# Patient Record
Sex: FEMALE | Race: WHITE | NOT HISPANIC OR LATINO | Employment: OTHER | URBAN - NONMETROPOLITAN AREA
[De-identification: names, ages, dates, MRNs, and addresses within clinical notes are randomized per-mention and may not be internally consistent; named-entity substitution may affect disease eponyms.]

---

## 2017-01-09 DIAGNOSIS — G89.29 CHRONIC LOW BACK PAIN: ICD-10-CM

## 2017-01-09 DIAGNOSIS — M54.50 CHRONIC LOW BACK PAIN: ICD-10-CM

## 2017-01-09 RX ORDER — CETIRIZINE HYDROCHLORIDE 10 MG/1
TABLET ORAL
Qty: 30 TABLET | Refills: 0 | Status: SHIPPED | OUTPATIENT
Start: 2017-01-09 | End: 2017-02-17 | Stop reason: SDUPTHER

## 2017-01-09 RX ORDER — HYDROCHLOROTHIAZIDE 25 MG/1
TABLET ORAL
Qty: 30 TABLET | Refills: 0 | Status: SHIPPED | OUTPATIENT
Start: 2017-01-09 | End: 2017-02-17 | Stop reason: SDUPTHER

## 2017-01-09 RX ORDER — GABAPENTIN 400 MG/1
CAPSULE ORAL
Qty: 180 CAPSULE | Refills: 0 | Status: SHIPPED | OUTPATIENT
Start: 2017-01-09 | End: 2017-02-17 | Stop reason: SDUPTHER

## 2017-01-09 RX ORDER — FLUOXETINE HYDROCHLORIDE 20 MG/1
CAPSULE ORAL
Qty: 90 CAPSULE | Refills: 0 | Status: SHIPPED | OUTPATIENT
Start: 2017-01-09 | End: 2017-02-17 | Stop reason: SDUPTHER

## 2017-01-09 RX ORDER — MELOXICAM 7.5 MG/1
TABLET ORAL
Qty: 30 TABLET | Refills: 0 | Status: SHIPPED | OUTPATIENT
Start: 2017-01-09 | End: 2017-02-17 | Stop reason: SDUPTHER

## 2017-02-17 DIAGNOSIS — J18.9 CAP (COMMUNITY ACQUIRED PNEUMONIA): ICD-10-CM

## 2017-02-17 DIAGNOSIS — G89.29 CHRONIC LOW BACK PAIN: ICD-10-CM

## 2017-02-17 DIAGNOSIS — M54.50 CHRONIC LOW BACK PAIN: ICD-10-CM

## 2017-02-20 RX ORDER — GABAPENTIN 400 MG/1
CAPSULE ORAL
Qty: 180 CAPSULE | Refills: 0 | Status: SHIPPED | OUTPATIENT
Start: 2017-02-20 | End: 2017-04-13 | Stop reason: SDUPTHER

## 2017-02-20 RX ORDER — CETIRIZINE HYDROCHLORIDE 10 MG/1
TABLET ORAL
Qty: 30 TABLET | Refills: 0 | Status: SHIPPED | OUTPATIENT
Start: 2017-02-20 | End: 2017-04-13 | Stop reason: SDUPTHER

## 2017-02-20 RX ORDER — FLUOXETINE HYDROCHLORIDE 20 MG/1
CAPSULE ORAL
Qty: 90 CAPSULE | Refills: 0 | Status: SHIPPED | OUTPATIENT
Start: 2017-02-20 | End: 2017-04-13 | Stop reason: SDUPTHER

## 2017-02-20 RX ORDER — HYDROCHLOROTHIAZIDE 25 MG/1
TABLET ORAL
Qty: 30 TABLET | Refills: 0 | Status: SHIPPED | OUTPATIENT
Start: 2017-02-20 | End: 2017-04-13 | Stop reason: SDUPTHER

## 2017-02-20 RX ORDER — MELOXICAM 7.5 MG/1
TABLET ORAL
Qty: 30 TABLET | Refills: 0 | Status: SHIPPED | OUTPATIENT
Start: 2017-02-20 | End: 2017-04-13 | Stop reason: SDUPTHER

## 2017-03-07 ENCOUNTER — OFFICE VISIT (OUTPATIENT)
Dept: INTERNAL MEDICINE | Facility: CLINIC | Age: 72
End: 2017-03-07

## 2017-03-07 VITALS
SYSTOLIC BLOOD PRESSURE: 110 MMHG | DIASTOLIC BLOOD PRESSURE: 70 MMHG | TEMPERATURE: 97.6 F | HEIGHT: 62 IN | OXYGEN SATURATION: 96 % | WEIGHT: 206.4 LBS | HEART RATE: 68 BPM | BODY MASS INDEX: 37.98 KG/M2

## 2017-03-07 DIAGNOSIS — G89.29 BILATERAL CHRONIC KNEE PAIN: Chronic | ICD-10-CM

## 2017-03-07 DIAGNOSIS — M25.561 BILATERAL CHRONIC KNEE PAIN: Chronic | ICD-10-CM

## 2017-03-07 DIAGNOSIS — E78.5 HYPERLIPIDEMIA, UNSPECIFIED HYPERLIPIDEMIA TYPE: Primary | ICD-10-CM

## 2017-03-07 DIAGNOSIS — I10 BENIGN ESSENTIAL HYPERTENSION: ICD-10-CM

## 2017-03-07 DIAGNOSIS — Z11.59 NEED FOR HEPATITIS C SCREENING TEST: ICD-10-CM

## 2017-03-07 DIAGNOSIS — M25.562 BILATERAL CHRONIC KNEE PAIN: Chronic | ICD-10-CM

## 2017-03-07 LAB
ALBUMIN SERPL-MCNC: 4.2 G/DL (ref 3.2–4.8)
ALBUMIN/GLOB SERPL: 1.6 G/DL (ref 1.5–2.5)
ALP SERPL-CCNC: 63 U/L (ref 25–100)
ALT SERPL W P-5'-P-CCNC: 16 U/L (ref 7–40)
ANION GAP SERPL CALCULATED.3IONS-SCNC: 13 MMOL/L (ref 3–11)
ARTICHOKE IGE QN: 156 MG/DL (ref 0–130)
AST SERPL-CCNC: 18 U/L (ref 0–33)
BILIRUB SERPL-MCNC: 0.4 MG/DL (ref 0.3–1.2)
BUN BLD-MCNC: 39 MG/DL (ref 9–23)
BUN/CREAT SERPL: 39 (ref 7–25)
CALCIUM SPEC-SCNC: 9.9 MG/DL (ref 8.7–10.4)
CHLORIDE SERPL-SCNC: 101 MMOL/L (ref 99–109)
CHOLEST SERPL-MCNC: 219 MG/DL (ref 0–200)
CO2 SERPL-SCNC: 22 MMOL/L (ref 20–31)
CREAT BLD-MCNC: 1 MG/DL (ref 0.6–1.3)
GFR SERPL CREATININE-BSD FRML MDRD: 55 ML/MIN/1.73
GLOBULIN UR ELPH-MCNC: 2.6 GM/DL
GLUCOSE BLD-MCNC: 103 MG/DL (ref 70–100)
HCV AB SER DONR QL: NORMAL
HDLC SERPL-MCNC: 39 MG/DL (ref 40–60)
POTASSIUM BLD-SCNC: 5.4 MMOL/L (ref 3.5–5.5)
PROT SERPL-MCNC: 6.8 G/DL (ref 5.7–8.2)
SODIUM BLD-SCNC: 136 MMOL/L (ref 132–146)
TRIGL SERPL-MCNC: 201 MG/DL (ref 0–150)

## 2017-03-07 PROCEDURE — 80053 COMPREHEN METABOLIC PANEL: CPT | Performed by: FAMILY MEDICINE

## 2017-03-07 PROCEDURE — 36415 COLL VENOUS BLD VENIPUNCTURE: CPT | Performed by: FAMILY MEDICINE

## 2017-03-07 PROCEDURE — 86803 HEPATITIS C AB TEST: CPT | Performed by: FAMILY MEDICINE

## 2017-03-07 PROCEDURE — 99214 OFFICE O/P EST MOD 30 MIN: CPT | Performed by: FAMILY MEDICINE

## 2017-03-07 PROCEDURE — 80061 LIPID PANEL: CPT | Performed by: FAMILY MEDICINE

## 2017-03-07 RX ORDER — INFLUENZA A VIRUS A/MICHIGAN/45/2015 X-275 (H1N1) ANTIGEN (FORMALDEHYDE INACTIVATED), INFLUENZA A VIRUS A/SINGAPORE/INFIMH-16-0019/2016 IVR-186 (H3N2) ANTIGEN (FORMALDEHYDE INACTIVATED), AND INFLUENZA B VIRUS B/MARYLAND/15/2016 BX-69A (A B/COLORADO/6/2017-LIKE VIRUS) ANTIGEN (FORMALDEHYDE INACTIVATED) 60; 60; 60 UG/.5ML; UG/.5ML; UG/.5ML
INJECTION, SUSPENSION INTRAMUSCULAR
Refills: 0 | COMMUNITY
Start: 2016-12-09 | End: 2017-09-10

## 2017-03-07 RX ORDER — PNEUMOCOCCAL VACCINE POLYVALENT 25; 25; 25; 25; 25; 25; 25; 25; 25; 25; 25; 25; 25; 25; 25; 25; 25; 25; 25; 25; 25; 25; 25 UG/.5ML; UG/.5ML; UG/.5ML; UG/.5ML; UG/.5ML; UG/.5ML; UG/.5ML; UG/.5ML; UG/.5ML; UG/.5ML; UG/.5ML; UG/.5ML; UG/.5ML; UG/.5ML; UG/.5ML; UG/.5ML; UG/.5ML; UG/.5ML; UG/.5ML; UG/.5ML; UG/.5ML; UG/.5ML; UG/.5ML
INJECTION, SOLUTION INTRAMUSCULAR; SUBCUTANEOUS
Refills: 0 | COMMUNITY
Start: 2016-12-09 | End: 2017-09-10

## 2017-03-08 NOTE — PROGRESS NOTES
Subjective   Lata Justice is a 71 y.o. female.     History of Present Illness   Patient here to follow up on chronic issues. She continues to have widespread joint pain. At her last visit, I evaluated different joints and completed paperwork for braces for various areas. This was sent to the mail order facility that had contacted her regarding braces and she's not heard any thing back from them since that time. Same for . She is not sure if her insurance was billed. She is having pain in knees, wrists, ankles. Continues to have pain in lower back as well. Taking Mobic and sees orthopedics.    Essential hypertension, which has been hard to control in the past, is controlled today with HCTZ, metoprolol.    Hyperlipidemia was controlled last check (2015) but is due to be rechecked. She was taking pravastatin for this . Obese. Exercise limited due to joint issues and COPD.     The following portions of the patient's history were reviewed and updated as appropriate: allergies, current medications, past family history, past medical history, past social history, past surgical history and problem list.    Review of Systems   Constitutional: Negative for activity change.   Respiratory: Negative for shortness of breath.    Cardiovascular: Negative for chest pain.   Musculoskeletal: Positive for arthralgias, back pain and joint swelling.       Objective   Physical Exam   Constitutional: She is oriented to person, place, and time. Vital signs are normal. She appears well-developed and well-nourished. She is active. She does not have a sickly appearance. She does not appear ill.   Appears stated age. Well groomed. Obese     HENT:   Head: Normocephalic and atraumatic.   Right Ear: Hearing normal.   Left Ear: Hearing normal.   Nose: Nose normal.   Eyes: EOM and lids are normal. Pupils are equal, round, and reactive to light.   Wearing glasses     Cardiovascular: Normal rate, regular rhythm and normal heart sounds.     Pulmonary/Chest: Effort normal and breath sounds normal.   Neurological: She is alert and oriented to person, place, and time. She displays no tremor. No cranial nerve deficit.   Skin: She is not diaphoretic. No cyanosis. Nails show no clubbing.   Psychiatric: She has a normal mood and affect. Her speech is normal and behavior is normal. Judgment and thought content normal. Cognition and memory are normal.   Happy,smiling.   Nursing note and vitals reviewed.      Assessment/Plan   Lata was seen today for hypertension.    Diagnoses and all orders for this visit:    Hyperlipidemia, unspecified hyperlipidemia type  -     Lipid Panel; Future  -     Comprehensive Metabolic Panel; Future  -     Lipid Panel  -     Comprehensive Metabolic Panel    Benign essential hypertension    Need for hepatitis C screening test  -     Hepatitis C Antibody; Future  -     Hepatitis C Antibody    Bilateral chronic knee pain    call for refills if needed  Will have CMA investigate brace situation  Follow up in 3 months on blood pressure

## 2017-03-09 ENCOUNTER — TELEPHONE (OUTPATIENT)
Dept: INTERNAL MEDICINE | Facility: CLINIC | Age: 72
End: 2017-03-09

## 2017-03-09 NOTE — TELEPHONE ENCOUNTER
----- Message from Annetta Mcclendon MD sent at 3/8/2017  5:26 AM EST -----  Regarding: braces  After their last visit, where we did evaluations for braces as requested, they never heard anything back from the company. Can you check on this?

## 2017-03-09 NOTE — TELEPHONE ENCOUNTER
"I CALLED [facility] TO GET A STATUS ON KNEE BRACES AND HAND/WRIST SUPPORTS. THEY TOLD ME THAT THEY ARE NEEDING 6 MONTHS OF OFFICE NOTES FAXED TO THEM IN REGARDS TO THE NEED FOR THESE SUPPORTS. I ADVISED THEM THAT THIS WILL MAKE THE 3RD TIME THAT I HAVE FAXED THE REQUESTED PAPERWORK TO THEM.     I CALLED [facility] AND ASK HER IF WE COULD GO [facility]OUGH [facility]. SHE SAID \"YES\". I TOLD HER THAT I WOULD FAX THE ORDERS AND PROPER PAPERWORK TO [facility] IN THE MORNING AND I WAS SURE THE OUTCOME WOULD BE MUCH BETTER.    I WILL NEED A SCRIPT FOR BILATERAL HAND/WRIST SUPPORT, WE DO NOT HAVE A FORM FOR THOSE.     THANK YOU!  "

## 2017-03-15 DIAGNOSIS — G89.29 BILATERAL CHRONIC KNEE PAIN: Primary | Chronic | ICD-10-CM

## 2017-03-15 DIAGNOSIS — M25.561 BILATERAL CHRONIC KNEE PAIN: Primary | Chronic | ICD-10-CM

## 2017-03-15 DIAGNOSIS — M25.562 BILATERAL CHRONIC KNEE PAIN: Primary | Chronic | ICD-10-CM

## 2017-03-22 ENCOUNTER — TELEPHONE (OUTPATIENT)
Dept: INTERNAL MEDICINE | Facility: CLINIC | Age: 72
End: 2017-03-22

## 2017-04-13 DIAGNOSIS — G89.29 CHRONIC LOW BACK PAIN: ICD-10-CM

## 2017-04-13 DIAGNOSIS — J18.9 CAP (COMMUNITY ACQUIRED PNEUMONIA): ICD-10-CM

## 2017-04-13 DIAGNOSIS — M54.50 CHRONIC LOW BACK PAIN: ICD-10-CM

## 2017-04-14 RX ORDER — MELOXICAM 7.5 MG/1
TABLET ORAL
Qty: 30 TABLET | Refills: 0 | Status: SHIPPED | OUTPATIENT
Start: 2017-04-14 | End: 2017-05-30 | Stop reason: SDUPTHER

## 2017-04-14 RX ORDER — HYDROCHLOROTHIAZIDE 25 MG/1
TABLET ORAL
Qty: 30 TABLET | Refills: 0 | Status: SHIPPED | OUTPATIENT
Start: 2017-04-14 | End: 2017-05-30 | Stop reason: SDUPTHER

## 2017-04-14 RX ORDER — GABAPENTIN 400 MG/1
CAPSULE ORAL
Qty: 180 CAPSULE | Refills: 0 | Status: SHIPPED | OUTPATIENT
Start: 2017-04-14 | End: 2017-05-30 | Stop reason: SDUPTHER

## 2017-04-14 RX ORDER — CETIRIZINE HYDROCHLORIDE 10 MG/1
TABLET ORAL
Qty: 30 TABLET | Refills: 0 | Status: SHIPPED | OUTPATIENT
Start: 2017-04-14 | End: 2017-05-30 | Stop reason: SDUPTHER

## 2017-04-14 RX ORDER — FLUOXETINE HYDROCHLORIDE 20 MG/1
CAPSULE ORAL
Qty: 90 CAPSULE | Refills: 0 | Status: SHIPPED | OUTPATIENT
Start: 2017-04-14 | End: 2017-05-30 | Stop reason: SDUPTHER

## 2017-05-30 ENCOUNTER — TELEPHONE (OUTPATIENT)
Dept: INTERNAL MEDICINE | Facility: CLINIC | Age: 72
End: 2017-05-30

## 2017-05-30 DIAGNOSIS — G89.29 CHRONIC LOW BACK PAIN: ICD-10-CM

## 2017-05-30 DIAGNOSIS — M54.50 CHRONIC LOW BACK PAIN: ICD-10-CM

## 2017-05-30 RX ORDER — HYDROCHLOROTHIAZIDE 25 MG/1
TABLET ORAL
Qty: 30 TABLET | Refills: 0 | Status: SHIPPED | OUTPATIENT
Start: 2017-05-30 | End: 2017-06-22 | Stop reason: SDUPTHER

## 2017-05-30 RX ORDER — MELOXICAM 7.5 MG/1
TABLET ORAL
Qty: 30 TABLET | Refills: 0 | Status: SHIPPED | OUTPATIENT
Start: 2017-05-30 | End: 2017-06-22 | Stop reason: SDUPTHER

## 2017-05-30 RX ORDER — GABAPENTIN 400 MG/1
800 CAPSULE ORAL 3 TIMES DAILY
Qty: 90 CAPSULE | Refills: 0 | Status: SHIPPED | OUTPATIENT
Start: 2017-05-30 | End: 2017-06-22 | Stop reason: SDUPTHER

## 2017-05-30 RX ORDER — GABAPENTIN 400 MG/1
CAPSULE ORAL
Qty: 90 CAPSULE | Refills: 0 | Status: SHIPPED | OUTPATIENT
Start: 2017-05-30 | End: 2017-05-30 | Stop reason: SDUPTHER

## 2017-05-30 RX ORDER — CETIRIZINE HYDROCHLORIDE 10 MG/1
TABLET ORAL
Qty: 30 TABLET | Refills: 0 | Status: SHIPPED | OUTPATIENT
Start: 2017-05-30 | End: 2017-06-22 | Stop reason: SDUPTHER

## 2017-05-30 RX ORDER — FLUOXETINE HYDROCHLORIDE 20 MG/1
CAPSULE ORAL
Qty: 90 CAPSULE | Refills: 0 | Status: SHIPPED | OUTPATIENT
Start: 2017-05-30 | End: 2017-06-22 | Stop reason: SDUPTHER

## 2017-06-08 ENCOUNTER — OFFICE VISIT (OUTPATIENT)
Dept: INTERNAL MEDICINE | Facility: CLINIC | Age: 72
End: 2017-06-08

## 2017-06-08 VITALS
BODY MASS INDEX: 37.58 KG/M2 | DIASTOLIC BLOOD PRESSURE: 80 MMHG | OXYGEN SATURATION: 96 % | WEIGHT: 204.2 LBS | SYSTOLIC BLOOD PRESSURE: 120 MMHG | HEIGHT: 62 IN | HEART RATE: 62 BPM | TEMPERATURE: 97.2 F

## 2017-06-08 DIAGNOSIS — I10 BENIGN ESSENTIAL HYPERTENSION: ICD-10-CM

## 2017-06-08 DIAGNOSIS — N18.30 CHRONIC RENAL INSUFFICIENCY, STAGE III (MODERATE) (HCC): ICD-10-CM

## 2017-06-08 DIAGNOSIS — E78.5 HYPERLIPIDEMIA, UNSPECIFIED HYPERLIPIDEMIA TYPE: Primary | ICD-10-CM

## 2017-06-08 DIAGNOSIS — R63.4 WEIGHT LOSS, UNINTENTIONAL: ICD-10-CM

## 2017-06-08 LAB
BUN SERPL-MCNC: 28 MG/DL (ref 7–20)
BUN/CREAT SERPL: 35 (ref 7.1–23.5)
CALCIUM SERPL-MCNC: 9.4 MG/DL (ref 8.4–10.2)
CHLORIDE SERPL-SCNC: 103 MMOL/L (ref 98–107)
CHOLEST SERPL-MCNC: 167 MG/DL (ref 0–199)
CO2 SERPL-SCNC: 30 MMOL/L (ref 26–30)
CREAT SERPL-MCNC: 0.8 MG/DL (ref 0.6–1.3)
GLUCOSE SERPL-MCNC: 114 MG/DL (ref 74–98)
HDLC SERPL-MCNC: 40 MG/DL (ref 40–60)
LDLC SERPL CALC-MCNC: 101 MG/DL (ref 0–99)
POTASSIUM SERPL-SCNC: 4.3 MMOL/L (ref 3.5–5.1)
SODIUM SERPL-SCNC: 144 MMOL/L (ref 137–145)
TRIGL SERPL-MCNC: 130 MG/DL
VLDLC SERPL CALC-MCNC: 26 MG/DL

## 2017-06-08 PROCEDURE — 99214 OFFICE O/P EST MOD 30 MIN: CPT | Performed by: FAMILY MEDICINE

## 2017-06-09 NOTE — PROGRESS NOTES
Subjective   Lata Justice is a 71 y.o. female.     Hyperlipidemia   This is a chronic problem. The current episode started more than 1 year ago. The problem is uncontrolled. Recent lipid tests were reviewed and are high. Exacerbating diseases include chronic renal disease (Last GFR supports CKD 3.) and obesity. She has no history of diabetes (Past history, no longer on medicine.). Factors aggravating her hyperlipidemia include beta blockers, thiazides and fatty foods. Pertinent negatives include no chest pain or shortness of breath. Current antihyperlipidemic treatment includes statins (Has failed crestor and lipitor due to myalgias. No problems thus far with pravastatin 40 mg.). Compliance problems include adherence to diet and adherence to exercise.  Risk factors for coronary artery disease include dyslipidemia, obesity, post-menopausal, a sedentary lifestyle, stress and hypertension.   Hypertension   This is a chronic problem. The current episode started more than 1 year ago. The problem has been waxing and waning since onset. The problem is controlled. Pertinent negatives include no chest pain or shortness of breath. Agents associated with hypertension include NSAIDs. Risk factors for coronary artery disease include dyslipidemia, obesity, post-menopausal state, stress and sedentary lifestyle. Past treatments include diuretics and beta blockers. The current treatment provides significant improvement. Compliance problems include exercise.  Identifiable causes of hypertension include chronic renal disease (Last GFR supports CKD 3.) and a hypertension causing med.        The following portions of the patient's history were reviewed and updated as appropriate: allergies, current medications, past family history, past medical history, past social history, past surgical history and problem list.    Review of Systems   Constitutional: Positive for unexpected weight change. Negative for activity change.   Respiratory:  Negative for shortness of breath.    Cardiovascular: Negative for chest pain.     Wt Readings from Last 3 Encounters:   06/08/17 204 lb 3.2 oz (92.6 kg)   03/07/17 206 lb 6.4 oz (93.6 kg)   12/07/16 211 lb 9.6 oz (96 kg)         Objective   Physical Exam   Constitutional: She is oriented to person, place, and time. Vital signs are normal. She appears well-developed and well-nourished. She is active. She does not have a sickly appearance. She does not appear ill.   Appears stated age. Well groomed. Obese   HENT:   Head: Normocephalic and atraumatic.   Right Ear: Hearing normal.   Left Ear: Hearing normal.   Nose: Nose normal.   Eyes: EOM and lids are normal. Pupils are equal, round, and reactive to light. No scleral icterus.   Wearing glasses   Cardiovascular: Normal rate, regular rhythm and normal heart sounds.    Pulmonary/Chest: Effort normal and breath sounds normal.   Abdominal: Soft. Bowel sounds are normal. There is no tenderness.   Musculoskeletal: She exhibits no deformity.   Using her walker today to help with ambulation   Neurological: She is alert and oriented to person, place, and time. She displays no tremor. No cranial nerve deficit.   Skin: She is not diaphoretic. No cyanosis. Nails show no clubbing.   Psychiatric: She has a normal mood and affect. Her speech is normal and behavior is normal. Judgment and thought content normal. Cognition and memory are normal.   Happy,smiling.   Nursing note and vitals reviewed.    3/7/17  Total Cholesterol 0 - 200 mg/dL 219 (H)   Triglycerides 0 - 150 mg/dL 201 (H)   HDL Cholesterol 40 - 60 mg/dL 39 (L)   LDL Cholesterol  0 - 130 mg/dL 156 (H)     Patient on pravastatin 40 at this time.  Glucose 70 - 100 mg/dL 103 (H)   BUN 9 - 23 mg/dL 39 (H)   Creatinine 0.60 - 1.30 mg/dL 1.00   Sodium 132 - 146 mmol/L 136   Potassium 3.5 - 5.5 mmol/L 5.4   Chloride 99 - 109 mmol/L 101   CO2 20.0 - 31.0 mmol/L 22.0   Calcium 8.7 - 10.4 mg/dL 9.9   Total Protein 5.7 - 8.2 g/dL 6.8    Albumin 3.20 - 4.80 g/dL 4.20   ALT (SGPT) 7 - 40 U/L 16   AST (SGOT) 0 - 33 U/L 18   Alkaline Phosphatase 25 - 100 U/L 63   Total Bilirubin 0.3 - 1.2 mg/dL 0.4   eGFR Non African Amer >60 mL/min/1.73 55 (L)   Globulin gm/dL 2.6   A/G Ratio 1.5 - 2.5 g/dL 1.6   BUN/Creatinine Ratio 7.0 - 25.0 39.0 (H)   Anion Gap 3.0 - 11.0 mmol/L 13.0 (H)           Assessment/Plan   Lata was seen today for hyperlipidemia.    Diagnoses and all orders for this visit:    Hyperlipidemia, unspecified hyperlipidemia type  -     Lipid Panel    Benign essential hypertension    Chronic renal insufficiency, stage III (moderate)  -     Basic Metabolic Panel    Weight loss, unintentional      Reviewed Synopsis, weight fluctuates, there appears to be a pattern to it and I will watch this carefully. Rechecking lipids, if confirmed abnormal will increase pravastatin to full dose of 80 mg daily. If she develops myalgias with this can cut back to 40 mg.

## 2017-06-22 DIAGNOSIS — M54.50 CHRONIC LOW BACK PAIN: ICD-10-CM

## 2017-06-22 DIAGNOSIS — G89.29 CHRONIC LOW BACK PAIN: ICD-10-CM

## 2017-06-22 RX ORDER — GABAPENTIN 400 MG/1
CAPSULE ORAL
Qty: 90 CAPSULE | Refills: 0 | Status: SHIPPED | OUTPATIENT
Start: 2017-06-22 | End: 2017-07-24 | Stop reason: SDUPTHER

## 2017-06-22 RX ORDER — FLUOXETINE HYDROCHLORIDE 20 MG/1
CAPSULE ORAL
Qty: 90 CAPSULE | Refills: 0 | Status: SHIPPED | OUTPATIENT
Start: 2017-06-22 | End: 2017-07-17 | Stop reason: SDUPTHER

## 2017-06-22 RX ORDER — MELOXICAM 7.5 MG/1
TABLET ORAL
Qty: 30 TABLET | Refills: 0 | Status: SHIPPED | OUTPATIENT
Start: 2017-06-22 | End: 2017-07-17 | Stop reason: SDUPTHER

## 2017-06-22 RX ORDER — HYDROCHLOROTHIAZIDE 25 MG/1
TABLET ORAL
Qty: 30 TABLET | Refills: 0 | Status: SHIPPED | OUTPATIENT
Start: 2017-06-22 | End: 2017-07-17 | Stop reason: SDUPTHER

## 2017-06-22 RX ORDER — CETIRIZINE HYDROCHLORIDE 10 MG/1
TABLET ORAL
Qty: 30 TABLET | Refills: 0 | Status: SHIPPED | OUTPATIENT
Start: 2017-06-22 | End: 2017-07-17 | Stop reason: SDUPTHER

## 2017-06-22 RX ORDER — ASPIRIN 81 MG/1
TABLET ORAL
Qty: 30 TABLET | Refills: 0 | Status: SHIPPED | OUTPATIENT
Start: 2017-06-22 | End: 2017-07-17 | Stop reason: SDUPTHER

## 2017-07-17 DIAGNOSIS — M54.50 CHRONIC LOW BACK PAIN: ICD-10-CM

## 2017-07-17 DIAGNOSIS — G89.29 CHRONIC LOW BACK PAIN: ICD-10-CM

## 2017-07-17 DIAGNOSIS — J18.9 CAP (COMMUNITY ACQUIRED PNEUMONIA): ICD-10-CM

## 2017-07-18 RX ORDER — ASPIRIN 81 MG/1
TABLET ORAL
Qty: 30 TABLET | Refills: 0 | Status: SHIPPED | OUTPATIENT
Start: 2017-07-18 | End: 2017-09-01 | Stop reason: SDUPTHER

## 2017-07-18 RX ORDER — MELOXICAM 7.5 MG/1
TABLET ORAL
Qty: 30 TABLET | Refills: 0 | Status: SHIPPED | OUTPATIENT
Start: 2017-07-18 | End: 2017-09-01 | Stop reason: SDUPTHER

## 2017-07-18 RX ORDER — FLUOXETINE HYDROCHLORIDE 20 MG/1
CAPSULE ORAL
Qty: 90 CAPSULE | Refills: 0 | Status: SHIPPED | OUTPATIENT
Start: 2017-07-18 | End: 2017-09-01 | Stop reason: SDUPTHER

## 2017-07-18 RX ORDER — HYDROCHLOROTHIAZIDE 25 MG/1
TABLET ORAL
Qty: 30 TABLET | Refills: 0 | Status: SHIPPED | OUTPATIENT
Start: 2017-07-18 | End: 2017-09-01 | Stop reason: SDUPTHER

## 2017-07-18 RX ORDER — CETIRIZINE HYDROCHLORIDE 10 MG/1
TABLET ORAL
Qty: 30 TABLET | Refills: 0 | Status: SHIPPED | OUTPATIENT
Start: 2017-07-18 | End: 2017-09-01 | Stop reason: SDUPTHER

## 2017-07-24 RX ORDER — GABAPENTIN 400 MG/1
800 CAPSULE ORAL 3 TIMES DAILY
Qty: 180 CAPSULE | Refills: 0 | OUTPATIENT
Start: 2017-07-24 | End: 2017-10-12 | Stop reason: SDUPTHER

## 2017-09-01 DIAGNOSIS — G89.29 CHRONIC LOW BACK PAIN: ICD-10-CM

## 2017-09-01 DIAGNOSIS — M54.50 CHRONIC LOW BACK PAIN: ICD-10-CM

## 2017-09-01 DIAGNOSIS — J18.9 CAP (COMMUNITY ACQUIRED PNEUMONIA): ICD-10-CM

## 2017-09-07 RX ORDER — HYDROCHLOROTHIAZIDE 25 MG/1
TABLET ORAL
Qty: 30 TABLET | Refills: 0 | Status: SHIPPED | OUTPATIENT
Start: 2017-09-07 | End: 2017-10-06 | Stop reason: SDUPTHER

## 2017-09-07 RX ORDER — FLUOXETINE HYDROCHLORIDE 20 MG/1
CAPSULE ORAL
Qty: 90 CAPSULE | Refills: 0 | Status: SHIPPED | OUTPATIENT
Start: 2017-09-07 | End: 2017-10-06 | Stop reason: SDUPTHER

## 2017-09-07 RX ORDER — MELOXICAM 7.5 MG/1
TABLET ORAL
Qty: 30 TABLET | Refills: 0 | Status: SHIPPED | OUTPATIENT
Start: 2017-09-07 | End: 2017-10-06 | Stop reason: SDUPTHER

## 2017-09-07 RX ORDER — CETIRIZINE HYDROCHLORIDE 10 MG/1
TABLET ORAL
Qty: 30 TABLET | Refills: 0 | Status: SHIPPED | OUTPATIENT
Start: 2017-09-07 | End: 2017-10-06 | Stop reason: SDUPTHER

## 2017-09-07 RX ORDER — ASPIRIN 81 MG/1
TABLET ORAL
Qty: 30 TABLET | Refills: 0 | Status: SHIPPED | OUTPATIENT
Start: 2017-09-07 | End: 2017-10-06 | Stop reason: SDUPTHER

## 2017-09-08 ENCOUNTER — OFFICE VISIT (OUTPATIENT)
Dept: INTERNAL MEDICINE | Facility: CLINIC | Age: 72
End: 2017-09-08

## 2017-09-08 VITALS
SYSTOLIC BLOOD PRESSURE: 118 MMHG | HEIGHT: 62 IN | WEIGHT: 205 LBS | DIASTOLIC BLOOD PRESSURE: 80 MMHG | HEART RATE: 58 BPM | OXYGEN SATURATION: 95 % | TEMPERATURE: 97.5 F | BODY MASS INDEX: 37.73 KG/M2

## 2017-09-08 DIAGNOSIS — I10 BENIGN ESSENTIAL HYPERTENSION: ICD-10-CM

## 2017-09-08 DIAGNOSIS — M54.41 CHRONIC MIDLINE LOW BACK PAIN WITH RIGHT-SIDED SCIATICA: Chronic | ICD-10-CM

## 2017-09-08 DIAGNOSIS — M25.561 BILATERAL CHRONIC KNEE PAIN: Primary | Chronic | ICD-10-CM

## 2017-09-08 DIAGNOSIS — R29.6 FREQUENT FALLS: ICD-10-CM

## 2017-09-08 DIAGNOSIS — G62.9 PERIPHERAL POLYNEUROPATHY: ICD-10-CM

## 2017-09-08 DIAGNOSIS — G89.29 BILATERAL CHRONIC KNEE PAIN: Primary | Chronic | ICD-10-CM

## 2017-09-08 DIAGNOSIS — E78.5 HYPERLIPIDEMIA, UNSPECIFIED HYPERLIPIDEMIA TYPE: ICD-10-CM

## 2017-09-08 DIAGNOSIS — M25.562 BILATERAL CHRONIC KNEE PAIN: Primary | Chronic | ICD-10-CM

## 2017-09-08 DIAGNOSIS — G89.29 CHRONIC MIDLINE LOW BACK PAIN WITH RIGHT-SIDED SCIATICA: Chronic | ICD-10-CM

## 2017-09-08 DIAGNOSIS — I73.9 INTERMITTENT CLAUDICATION (HCC): ICD-10-CM

## 2017-09-08 PROCEDURE — 99214 OFFICE O/P EST MOD 30 MIN: CPT | Performed by: FAMILY MEDICINE

## 2017-09-08 NOTE — PROGRESS NOTES
Subjective    Lata Justice is a 72 y.o. female here for:  Chief Complaint   Patient presents with   • Follow-up     3 month follow up on hyperlip   • Hyperlipidemia   • Hypertension     Hypertension   This is a chronic problem. The current episode started more than 1 year ago. The problem has been waxing and waning since onset. The problem is controlled. Pertinent negatives include no chest pain or shortness of breath. Agents associated with hypertension include NSAIDs. Risk factors for coronary artery disease include dyslipidemia, obesity, post-menopausal state, stress and sedentary lifestyle. Past treatments include diuretics and beta blockers. The current treatment provides significant improvement. Compliance problems include exercise and diet.  Hypertensive end-organ damage includes kidney disease. There is no history of CAD/MI or CVA. Identifiable causes of hypertension include chronic renal disease (Last GFR supports CKD 3.) and a hypertension causing med.   Hyperlipidemia   This is a chronic problem. The current episode started more than 1 year ago. The problem is uncontrolled. Recent lipid tests were reviewed and are high. Exacerbating diseases include chronic renal disease (Last GFR supports CKD 3.) and obesity. She has no history of diabetes (Past history, no longer on medicine.). Factors aggravating her hyperlipidemia include beta blockers, thiazides and fatty foods. Associated symptoms include a focal sensory loss (right lower leg and some to left foot). Pertinent negatives include no chest pain or shortness of breath. Current antihyperlipidemic treatment includes statins (Has failed crestor and lipitor due to myalgias. No problems thus far with pravastatin 40 mg.). Compliance problems include adherence to diet and adherence to exercise.  Risk factors for coronary artery disease include dyslipidemia, obesity, post-menopausal, a sedentary lifestyle, stress and hypertension.   Knee Pain    The incident  occurred more than 1 week ago. There was no injury mechanism. The pain is present in the right knee and left knee. The quality of the pain is described as aching. The pain is severe. The pain has been worsening since onset. Associated symptoms include numbness. She has tried NSAIDs and immobilization (Patient has had injections in the knees but that has not helped with her pain nor with mobility.) for the symptoms. The treatment provided no relief.   Neurologic Problem   The patient's primary symptoms include focal sensory loss (right lower leg and some to left foot) and a loss of balance. This is a chronic problem. The current episode started more than 1 year ago. The neurological problem developed gradually. The problem has been gradually worsening since onset. There was right-sided and lower extremity focality noted. Associated symptoms include back pain (chronic lumbar pain s/p surgery). Pertinent negatives include no chest pain or shortness of breath. (She's noted over the last few months pain in the lower legs that develops with ambulation, improves with rest. She has to shave legs once every two to three months, has little hair growth now. Has been falling a lot. Has a simple cane at home and has a walker. )        The following portions of the patient's history were reviewed and updated as appropriate: allergies, current medications, past family history, past medical history, past social history, past surgical history and problem list.    Review of Systems   Respiratory: Negative for shortness of breath.    Cardiovascular: Negative for chest pain.   Musculoskeletal: Positive for back pain (chronic lumbar pain s/p surgery).   Neurological: Positive for numbness and loss of balance.       Vitals:    09/08/17 0839   BP: 118/80   Pulse: 58   Temp: 97.5 °F (36.4 °C)   SpO2: 95%       Objective   Physical Exam   Constitutional: She is oriented to person, place, and time. Vital signs are normal. She appears  well-developed and well-nourished. She is active. She does not have a sickly appearance. She does not appear ill.   Appears stated age. Well groomed. Obese   HENT:   Head: Normocephalic and atraumatic.   Right Ear: Hearing normal.   Left Ear: Hearing normal.   Nose: Nose normal.   Eyes: EOM and lids are normal. Pupils are equal, round, and reactive to light. No scleral icterus.   Wearing glasses   Neck: Neck supple.   Cardiovascular: Normal rate, regular rhythm and normal heart sounds.    Pulses:       Dorsalis pedis pulses are 1+ on the right side        Posterior tibial pulses are 1+ on the right side   Pulmonary/Chest: Effort normal and breath sounds normal.   Abdominal: Soft. Bowel sounds are normal. There is no tenderness.   Musculoskeletal: She exhibits no edema or deformity.        Right foot: There is no deformity.   Using rolling walker with breaks to help with ambulation.   Neurological: She is alert and oriented to person, place, and time. She displays no tremor. No cranial nerve deficit. Gait abnormal.   Skin: Skin is warm. She is not diaphoretic. No cyanosis. No pallor. Nails show no clubbing.   Paucity of hair to lower legs bilaterally. No ulcerations.    Psychiatric: She has a normal mood and affect. Her speech is normal and behavior is normal. Judgment and thought content normal. Cognition and memory are normal.   Nursing note and vitals reviewed.    Lipids 6/8/17:  167/40/101/130  eGFR on 6/8/17 was 71    Immunization History   Administered Date(s) Administered   • Flu Vaccine High Dose PF 65YR+ 12/09/2016   • Pneumococcal Polysaccharide 12/09/2016   • Zoster 12/09/2016         Assessment/Plan   Lata was seen today for follow-up, hyperlipidemia and hypertension.    Diagnoses and all orders for this visit:    Bilateral chronic knee pain  -     Ambulatory Referral to Orthopedic Surgery  -     [facility]    Frequent falls  -     Misc. Devices (QUAD CANE) misc; Use to help steady gait    Peripheral  polyneuropathy  -     CBC & Differential  -     Comprehensive Metabolic Panel  -     Vitamin B12 & Folate  -     TSH  -     US Arterial Doppler Lower Extremity Bilateral; Future  -     [place]    Intermittent claudication  -     US Arterial Doppler Lower Extremity Bilateral; Future    Benign essential hypertension  -     CBC & Differential  -     Comprehensive Metabolic Panel  -     TSH    Hyperlipidemia, unspecified hyperlipidemia type    Chronic midline low back pain with right-sided sciatica               Annetta Mcclendon MD

## 2017-09-11 LAB
ALBUMIN SERPL-MCNC: 4.3 G/DL (ref 3.5–5)
ALBUMIN/GLOB SERPL: 1.5 G/DL (ref 1–2)
ALP SERPL-CCNC: 64 U/L (ref 38–126)
ALT SERPL-CCNC: 27 U/L (ref 13–69)
ANA SER QL: NEGATIVE
AST SERPL-CCNC: 22 U/L (ref 15–46)
BASOPHILS # BLD AUTO: 0.04 10*3/MM3 (ref 0–0.2)
BASOPHILS NFR BLD AUTO: 0.7 % (ref 0–2.5)
BILIRUB SERPL-MCNC: 0.6 MG/DL (ref 0.2–1.3)
BUN SERPL-MCNC: 35 MG/DL (ref 7–20)
BUN/CREAT SERPL: 31.8 (ref 7.1–23.5)
CALCIUM SERPL-MCNC: 9.9 MG/DL (ref 8.4–10.2)
CHLORIDE SERPL-SCNC: 106 MMOL/L (ref 98–107)
CO2 SERPL-SCNC: 27 MMOL/L (ref 26–30)
CREAT SERPL-MCNC: 1.1 MG/DL (ref 0.6–1.3)
EOSINOPHIL # BLD AUTO: 0.18 10*3/MM3 (ref 0–0.7)
EOSINOPHIL NFR BLD AUTO: 3.1 % (ref 0–7)
ERYTHROCYTE [DISTWIDTH] IN BLOOD BY AUTOMATED COUNT: 13.5 % (ref 11.5–14.5)
FOLATE SERPL-MCNC: 8.7 NG/ML
GLOBULIN SER CALC-MCNC: 2.8 GM/DL
GLUCOSE SERPL-MCNC: 112 MG/DL (ref 74–98)
HCT VFR BLD AUTO: 42.8 % (ref 37–47)
HGB BLD-MCNC: 13.4 G/DL (ref 12–16)
IMM GRANULOCYTES # BLD: 0.03 10*3/MM3 (ref 0–0.06)
IMM GRANULOCYTES NFR BLD: 0.5 % (ref 0–0.6)
LYMPHOCYTES # BLD AUTO: 1.31 10*3/MM3 (ref 0.6–3.4)
LYMPHOCYTES NFR BLD AUTO: 22.7 % (ref 10–50)
MCH RBC QN AUTO: 28.8 PG (ref 27–31)
MCHC RBC AUTO-ENTMCNC: 31.3 G/DL (ref 30–37)
MCV RBC AUTO: 92 FL (ref 81–99)
MONOCYTES # BLD AUTO: 0.45 10*3/MM3 (ref 0–0.9)
MONOCYTES NFR BLD AUTO: 7.8 % (ref 0–12)
NEUTROPHILS # BLD AUTO: 3.76 10*3/MM3 (ref 2–6.9)
NEUTROPHILS NFR BLD AUTO: 65.2 % (ref 37–80)
NRBC BLD AUTO-RTO: 0 /100 WBC (ref 0–0)
PLATELET # BLD AUTO: 222 10*3/MM3 (ref 130–400)
POTASSIUM SERPL-SCNC: 5.5 MMOL/L (ref 3.5–5.1)
PROT SERPL-MCNC: 7.1 G/DL (ref 6.3–8.2)
RBC # BLD AUTO: 4.65 10*6/MM3 (ref 4.2–5.4)
SODIUM SERPL-SCNC: 144 MMOL/L (ref 137–145)
TSH SERPL DL<=0.005 MIU/L-ACNC: 1.13 MIU/ML (ref 0.47–4.68)
VIT B12 SERPL-MCNC: 256 PG/ML (ref 239–931)
WBC # BLD AUTO: 5.77 10*3/MM3 (ref 4.8–10.8)

## 2017-09-13 ENCOUNTER — TELEPHONE (OUTPATIENT)
Dept: INTERNAL MEDICINE | Facility: CLINIC | Age: 72
End: 2017-09-13

## 2017-09-13 DIAGNOSIS — E53.8 LOW VITAMIN B12 LEVEL: ICD-10-CM

## 2017-09-13 DIAGNOSIS — I10 ESSENTIAL (PRIMARY) HYPERTENSION: ICD-10-CM

## 2017-09-13 DIAGNOSIS — N18.30 CHRONIC RENAL INSUFFICIENCY, STAGE III (MODERATE) (HCC): Primary | ICD-10-CM

## 2017-09-13 NOTE — TELEPHONE ENCOUNTER
----- Message from Cresencio Jarrett MA sent at 9/12/2017  5:50 PM EDT -----  PATIENT NOTIFIED. SHE WILL HAVE THEM DONE AT [facility].

## 2017-09-16 LAB
BUN SERPL-MCNC: 17 MG/DL (ref 7–20)
BUN/CREAT SERPL: 18.9 (ref 7.1–23.5)
CALCIUM SERPL-MCNC: 10 MG/DL (ref 8.4–10.2)
CHLORIDE SERPL-SCNC: 102 MMOL/L (ref 98–107)
CO2 SERPL-SCNC: 26 MMOL/L (ref 26–30)
CREAT SERPL-MCNC: 0.9 MG/DL (ref 0.6–1.3)
GLUCOSE SERPL-MCNC: 110 MG/DL (ref 74–98)
HCYS SERPL-SCNC: 19.3 UMOL/L (ref 0–15)
METHYLMALONATE SERPL-SCNC: 423 NMOL/L (ref 0–378)
POTASSIUM SERPL-SCNC: 4.2 MMOL/L (ref 3.5–5.1)
SODIUM SERPL-SCNC: 141 MMOL/L (ref 137–145)

## 2017-09-18 ENCOUNTER — HOSPITAL ENCOUNTER (OUTPATIENT)
Dept: ULTRASOUND IMAGING | Facility: HOSPITAL | Age: 72
Discharge: HOME | End: 2017-09-18
Admitting: FAMILY MEDICINE

## 2017-09-18 DIAGNOSIS — G62.9 PERIPHERAL POLYNEUROPATHY: ICD-10-CM

## 2017-09-18 DIAGNOSIS — I73.9 INTERMITTENT CLAUDICATION (HCC): ICD-10-CM

## 2017-09-18 PROCEDURE — 93923 UPR/LXTR ART STDY 3+ LVLS: CPT

## 2017-09-26 ENCOUNTER — OFFICE VISIT (OUTPATIENT)
Dept: ORTHOPEDIC SURGERY | Facility: CLINIC | Age: 72
End: 2017-09-26

## 2017-09-26 ENCOUNTER — CLINICAL SUPPORT (OUTPATIENT)
Dept: INTERNAL MEDICINE | Facility: CLINIC | Age: 72
End: 2017-09-26

## 2017-09-26 VITALS — WEIGHT: 202 LBS | BODY MASS INDEX: 37.17 KG/M2 | HEIGHT: 62 IN | RESPIRATION RATE: 18 BRPM

## 2017-09-26 DIAGNOSIS — M79.604 PAIN OF RIGHT LOWER EXTREMITY: ICD-10-CM

## 2017-09-26 DIAGNOSIS — M25.562 ARTHRALGIA OF BOTH KNEES: ICD-10-CM

## 2017-09-26 DIAGNOSIS — M25.561 ARTHRALGIA OF BOTH KNEES: ICD-10-CM

## 2017-09-26 DIAGNOSIS — E53.8 B12 DEFICIENCY: ICD-10-CM

## 2017-09-26 DIAGNOSIS — M25.561 PAIN IN BOTH KNEES, UNSPECIFIED CHRONICITY: Primary | ICD-10-CM

## 2017-09-26 DIAGNOSIS — M17.11 PRIMARY OSTEOARTHRITIS OF RIGHT KNEE: ICD-10-CM

## 2017-09-26 DIAGNOSIS — M25.562 PAIN IN BOTH KNEES, UNSPECIFIED CHRONICITY: Primary | ICD-10-CM

## 2017-09-26 DIAGNOSIS — Z01.818 PRE-OP TESTING: ICD-10-CM

## 2017-09-26 DIAGNOSIS — Z23 NEED FOR VACCINATION: Primary | ICD-10-CM

## 2017-09-26 DIAGNOSIS — M79.10 MYALGIA: ICD-10-CM

## 2017-09-26 DIAGNOSIS — M17.0 PRIMARY OSTEOARTHRITIS OF BOTH KNEES: Primary | ICD-10-CM

## 2017-09-26 PROCEDURE — 99213 OFFICE O/P EST LOW 20 MIN: CPT | Performed by: PHYSICIAN ASSISTANT

## 2017-09-26 PROCEDURE — G0008 ADMIN INFLUENZA VIRUS VAC: HCPCS | Performed by: FAMILY MEDICINE

## 2017-09-26 PROCEDURE — 96372 THER/PROPH/DIAG INJ SC/IM: CPT | Performed by: FAMILY MEDICINE

## 2017-09-26 PROCEDURE — 90662 IIV NO PRSV INCREASED AG IM: CPT | Performed by: FAMILY MEDICINE

## 2017-09-26 RX ORDER — CYANOCOBALAMIN 1000 UG/ML
1000 INJECTION, SOLUTION INTRAMUSCULAR; SUBCUTANEOUS
Status: DISCONTINUED | OUTPATIENT
Start: 2017-09-26 | End: 2017-11-29

## 2017-09-26 RX ADMIN — CYANOCOBALAMIN 1000 MCG: 1000 INJECTION, SOLUTION INTRAMUSCULAR; SUBCUTANEOUS at 11:49

## 2017-09-26 NOTE — PROGRESS NOTES
Lata Justice is a 72 y.o.female   Pain and Follow-up of the Left Knee and Pain and Follow-up of the Right Knee       History of Present Illness      Patient presents for continued bilateral knee pain.  She states her right knee is worse than her left.  She states in the past she received Visco supplementation injections which did not provide relief.  She is also received intra-articular cortisone injections which she states causes more pain than relief.  Patient denies any injury or trauma.  She states the right knee pain is interfering with her daily activity.  She is taking anti-inflammatories as well as over-the-counter topical rubs with very little relief.        PAST MEDICAL HISTORY:    Past Medical History:   Diagnosis Date   • Acute colitis    • Arthritis    • Depression    • Diabetes mellitus    • GERD (gastroesophageal reflux disease)    • High cholesterol    • Hx of colonic polyps    • Low back pain    • Obesity    • Osteoarthritis          Current Outpatient Prescriptions:   •  ALPRAZolam (XANAX) 0.25 MG tablet, 2 (two) times a day., Disp: , Rfl:   •  ASPIR-LOW 81 MG EC tablet, TAKE ONE TABLET BY MOUTH AT BEDTIME, Disp: 30 tablet, Rfl: 0  •  aspirin 325 MG tablet, Take 325 mg by mouth daily.  , Disp: , Rfl:   •  cetirizine (zyrTEC) 10 MG tablet, TAKE ONE TABLET BY MOUTH EVERY DAY, Disp: 30 tablet, Rfl: 0  •  diclofenac (VOLTAREN) 1 % gel gel, Apply 4 g topically 4 (Four) Times a Day As Needed (joint pain)., Disp: 100 g, Rfl: 5  •  Elastic Bandages & Supports (KNEE BRACE) misc, 1 Units Daily. When active. Bilateral., Disp: 2 each, Rfl: 0  •  esomeprazole (NEXIUM) 40 MG capsule, Take 1 capsule by mouth daily., Disp: , Rfl:   •  FLUoxetine (PROzac) 20 MG capsule, TAKE 3 CAPSULES BY MOUTH EVERY MORNING, Disp: 90 capsule, Rfl: 0  •  gabapentin (NEURONTIN) 400 MG capsule, Take 2 capsules by mouth 3 (Three) Times a Day., Disp: 180 capsule, Rfl: 0  •  hydrochlorothiazide (HYDRODIURIL) 25 MG tablet, TAKE ONE  TABLET BY MOUTH EVERY DAY, Disp: 30 tablet, Rfl: 0  •  meloxicam (MOBIC) 7.5 MG tablet, TAKE ONE TABLET BY MOUTH EVERY DAY, Disp: 30 tablet, Rfl: 0  •  metoprolol tartrate (LOPRESSOR) 25 MG tablet, TAKE ONE TABLET BY MOUTH 2 TIMES A DAY, Disp: 60 tablet, Rfl: 0  •  Misc. Devices (QUAD CANE) misc, Use to help steady gait, Disp: 1 each, Rfl: 0  •  pravastatin (PRAVACHOL) 40 MG tablet, Take 1 tablet by mouth Daily., Disp: 90 tablet, Rfl: 3  •  PROAIR  (90 Base) MCG/ACT inhaler, INHALE 2 PUFFS INTO THE LUNGS EVERY 4 TO 6 HOURS AS NEEDED FOR WHEEZING., Disp: 8.5 g, Rfl: 0  •  sodium hyaluronate (SUPARTZ FX) 25 MG/2.5ML solution prefilled syringe, Inject 2.5 mL into the joint 1 (one) time per week., Disp: 16.24 mL, Rfl: 0    Current Facility-Administered Medications:   •  cyanocobalamin injection 1,000 mcg, 1,000 mcg, Intramuscular, Q30 Days, Annetta Mcclendon MD, 1,000 mcg at 09/26/17 1149    Past Surgical History:   Procedure Laterality Date   • BACK SURGERY      Lower back   • CHOLECYSTECTOMY     • HYSTERECTOMY      Total Hysterectomy   • LUMBAR SPINE SURGERY     • TUBAL ABDOMINAL LIGATION         Family History   Problem Relation Age of Onset   • Depression Daughter    • Mental illness Daughter    • Obesity Daughter    • Other Daughter      chronic back pain   • Arthritis Other    • Diabetes Other    • Hypertension Other    • Obesity Other    • Hyperlipidemia Other      high cholesterol       Social History     Social History   • Marital status:      Spouse name: N/A   • Number of children: N/A   • Years of education: N/A     Occupational History   • Not on file.     Social History Main Topics   • Smoking status: Never Smoker   • Smokeless tobacco: Never Used   • Alcohol use No   • Drug use: No   • Sexual activity: Defer     Other Topics Concern   • Not on file     Social History Narrative        Allergies   Allergen Reactions   • Acetaminophen    • Ezetimibe    • Fluticasone    • Fluticasone  "Propionate    • Furosemide    • Loratadine    • Mometasone Furoate    • Propoxyphene    • Sertraline Hcl    • Triamcinolone    • Triamcinolone Acetonide        Review of Systems   Constitutional: Negative.    HENT: Negative.    Eyes: Negative.    Respiratory: Negative.    Endocrine: Negative.    Musculoskeletal: Positive for arthralgias (bilateral knee pain).   Skin: Negative.    Allergic/Immunologic: Negative.    Neurological: Negative.    Hematological: Negative.        PHYSICAL EXAMINATION:       Resp 18  Ht 62\" (157.5 cm)  Wt 202 lb (91.6 kg)  BMI 36.95 kg/m2    GENERAL [x] Well developed  []Ill appearing [x] No acute distress    HEENT [x]No acute changes [x] Normocephalic, atraumatic    NECK [x]Supple  [] No midline tenderness    LUNGS [x]Clear bilaterally [x]No wheezes []Rhonchi [] Rales    HEART [x] Regular rate  [x] Regular rhythm [] Irregular    ABDOMEN [x] Soft [x] Not tender [x] Not distended [x] Normal sounds    VAS/EXT [x] Normal Pulses []Edema []Cyanosis             SKIN [x] Warm [x]Dry []Pink []Ecchymosis []Cool    NEURO [x] Sensation Intact [x] Motor Intact [x] Pulse intact  [] Dysesthesias:_________  []Weakness:__________             Physical Exam   Constitutional: She is oriented to person, place, and time. She appears well-nourished.   HENT:   Head: Normocephalic.   Eyes: Conjunctivae are normal.   Neck: No tracheal deviation present.   Cardiovascular: Normal rate.    Pulmonary/Chest: Effort normal. No respiratory distress.   Abdominal: She exhibits no distension.   Musculoskeletal:        Right knee: She exhibits no swelling, no effusion and no ecchymosis. Tenderness found. Medial joint line and lateral joint line tenderness noted.        Left knee: She exhibits no swelling, no effusion, no ecchymosis, no deformity and no erythema. Tenderness found. Medial joint line and lateral joint line tenderness noted.   Neurological: She is alert and oriented to person, place, and time.   Skin: No rash " noted.   Psychiatric: She has a normal mood and affect. Her behavior is normal.   Vitals reviewed.    Right Knee Exam     Range of Motion   Right knee extension: 5.   Flexion: 100     Other   Erythema: absent  Sensation: normal  Pulse: present  Other tests: no effusion present      Left Knee Exam     Range of Motion   Left knee extension: 4.   Flexion: 100     Other   Erythema: absent  Sensation: normal  Pulse: present  Effusion: no effusion present          Extremity DVT signs are Negative on physical exam with negative Peter sign, with no calf pain, with no palpable cords, with no increased pain with passive stretch/extension and with no skin tone change   Neurologic Exam     Mental Status   Oriented to person, place, and time.     Left Knee Exam     Tenderness   The patient is experiencing tenderness in the medial joint line, lateral joint line.    Right Knee Exam     Tenderness   The patient is experiencing tenderness in the medial joint line, lateral joint line.            DVT Screening: No Yes   Smoker [x] []   Personal/Family History of DVT or PE [] [x]   Sedentary Lifestyle [x] []   Overweight [x] []          Previous or Active DVT/PE: yes in her 30's pulmonary embolism  Cardiac Stent within 1 year: NO  Embolic/Ischemic stroke within 1 year: NO  Creatinine less than 30ml/min: NO  Congenital Thrombophilia: NO  Hypersensitivity to TXA: NO  Color Blindness: NO  NOTE:  TXA  tranexemic acid summary: THIS PATIENT IS NOT A CANDIDATE FOR IV TXA DURING SURGERY.    Independent Review of Radiographic Studies:    Indication to evaluate joint condition, and compared with prior images, shows evident chronic advanced osteoarthritis.  Laboratory and Other Studies:  No new results reviewed today.         Risks, benefits, and alternative treatments discussed with the patient: [x] Yes [] No    Risk benefits and merits of the proposed surgery were discussed and the patient's questions were answered risks discussed including and  not limited to:  Anesthesia reactions  Blood loss and possible transfusion  Infection  Deep venous thrombosis and pulmonary embolus  Nerve, vascular or tendon injury  Fracture  Deformity  Stiffness  Weakness  Prosthesis and implant issues such as loosening, material wear or dislocation  Skin necrosis  Revision surgery or further treatment  Recurrence of problem and condition     Informed consent: [] Signed  [x] To be obtained at hospital  [] Selene Guido was seen today for pain, follow-up, pain and follow-up.    Diagnoses and all orders for this visit:    Arthralgia of both knees    Primary osteoarthritis of both knees       Recommendations/Plan:    Orthopedic activities reviewed and patient expressed appreciation  Discussion of orthopedic goals  Risk, benefits, and merits of treatment alternatives reviewed with the patient and questions answered  The nature of the proposed surgery reviewed with the patient including risks, benefits, rehabilitation, recovery timeframe, and outcome expectations  Ice, heat, and/or modalities as beneficial    Exercise, medications, injections, other patient advice, and return appointment as noted.  Surgery: Surgery proposed at this visit as noted., If symptoms and functional limitations persist with current treatment, patient to consider elective surgery. and For intractable painful limiting condition, patient to consider elective surgical options.  Patient is encouraged to call or return for any issues or concerns.    PLANNED SURGICAL PROCEDURE: Elective right total knee replacement

## 2017-10-03 ENCOUNTER — OFFICE VISIT (OUTPATIENT)
Dept: ORTHOPEDIC SURGERY | Facility: CLINIC | Age: 72
End: 2017-10-03

## 2017-10-03 VITALS — BODY MASS INDEX: 38.04 KG/M2 | RESPIRATION RATE: 16 BRPM | WEIGHT: 206.7 LBS | HEIGHT: 62 IN

## 2017-10-03 DIAGNOSIS — M17.11 PRIMARY OSTEOARTHRITIS OF RIGHT KNEE: Primary | ICD-10-CM

## 2017-10-03 PROBLEM — M25.562 ARTHRALGIA OF BOTH KNEES: Status: ACTIVE | Noted: 2017-10-03

## 2017-10-03 PROBLEM — M25.561 ARTHRALGIA OF BOTH KNEES: Status: ACTIVE | Noted: 2017-10-03

## 2017-10-03 PROBLEM — M17.0 PRIMARY OSTEOARTHRITIS OF BOTH KNEES: Status: ACTIVE | Noted: 2017-10-03

## 2017-10-03 PROBLEM — Z01.818 PRE-OP TESTING: Status: ACTIVE | Noted: 2017-10-03

## 2017-10-03 PROCEDURE — 99214 OFFICE O/P EST MOD 30 MIN: CPT | Performed by: ORTHOPAEDIC SURGERY

## 2017-10-03 NOTE — PROGRESS NOTES
Subjective   Patient ID: Lata Justice is a 72 y.o. female  Pre-op Exam of the Right Knee (Right TKA)             History of Present Illness    72-year-old female with many years of increasing constant burning severe knee pain on the medial side with activity denies recent trauma diagnoses osteoarthritis here to discuss right knee replacement surgery.  Walks with a walker due to poor balance, has history of lumbar surgery ×2 most recently in 2003, no history of blood clotting, no history of active diabetes elbow she has had diabetes in the past.  Dr. Mcclendon evaluated her medically found her to have hyperlipidemia, obesity, chronic low back pain, GERD, osteoarthritis and stage III moderate chronic renal insufficiency.  Patient has had history of edema in both lower tremor he is on chronic basis had recent normal vascular ultrasound both legs.  Patient has received cortisone injections and viscous supplement injections to right knee with minimal short-term improvement, is forced to use a walker due to persistence of medial joint line pain with weightbearing activities, is unable to carry out activities of daily living due to right knee pain.    Patient has been walking at home with a walker, her  has had knee replacement surgery in the past successfully and helps with her independent activities at home    Review of Systems   Constitutional: Negative for diaphoresis, fever and unexpected weight change.   HENT: Negative for dental problem and sore throat.    Eyes: Negative for visual disturbance.   Respiratory: Negative for shortness of breath.    Cardiovascular: Negative for chest pain.   Gastrointestinal: Negative for abdominal pain, constipation, diarrhea, nausea and vomiting.   Genitourinary: Negative for difficulty urinating and frequency.   Musculoskeletal: Positive for arthralgias.   Neurological: Negative for headaches.   Hematological: Does not bruise/bleed easily.   All other systems reviewed and are  "negative.      Past Medical History:   Diagnosis Date   • Acute colitis    • Arthritis    • Depression    • Diabetes mellitus    • GERD (gastroesophageal reflux disease)    • High cholesterol    • Hx of colonic polyps    • Low back pain    • Obesity    • Osteoarthritis         Past Surgical History:   Procedure Laterality Date   • BACK SURGERY      Lower back   • CHOLECYSTECTOMY     • HYSTERECTOMY      Total Hysterectomy   • LUMBAR SPINE SURGERY     • TUBAL ABDOMINAL LIGATION         Family History   Problem Relation Age of Onset   • Depression Daughter    • Mental illness Daughter    • Obesity Daughter    • Other Daughter      chronic back pain   • Arthritis Other    • Diabetes Other    • Hypertension Other    • Obesity Other    • Hyperlipidemia Other      high cholesterol       Social History     Social History   • Marital status:      Spouse name: N/A   • Number of children: N/A   • Years of education: N/A     Occupational History   • Not on file.     Social History Main Topics   • Smoking status: Never Smoker   • Smokeless tobacco: Never Used   • Alcohol use No   • Drug use: No   • Sexual activity: Defer     Other Topics Concern   • Not on file     Social History Narrative       All the above social hx, family hx, surgical history,medications, allergies, ros & HPI reviewed.    Allergies   Allergen Reactions   • Acetaminophen    • Ezetimibe    • Fluticasone    • Fluticasone Propionate    • Furosemide    • Loratadine    • Mometasone Furoate    • Propoxyphene    • Sertraline Hcl    • Triamcinolone    • Triamcinolone Acetonide        Objective   Resp 16  Ht 62\" (157.5 cm)  Wt 206 lb 11.2 oz (93.8 kg)  BMI 37.81 kg/m2   Physical Exam   Cardiovascular: Regular rhythm and normal heart sounds.    Pulmonary/Chest: Effort normal and breath sounds normal.     Constitutional: He is oriented to person, place, and time. He appears well-developed and well-nourished.   HENT:   Head: Normocephalic and atraumatic. "   Eyes: EOM are normal. Pupils are equal, round, and reactive to light.   Neck: Normal range of motion. Neck supple.   Cardiovascular: Normal rate.    Pulmonary/Chest: Effort normal and breath sounds normal.   Abdominal: Soft.   Neurological: He is alert and oriented to person, place, and time.   Skin: Skin is warm and dry.   Psychiatric: He has a normal mood and affect.   Nursing note and vitals reviewed.       Ortho Exam   Right knee with slight varus alignment near full extension flexion 110 negative Lockman sign calf supple poorly palpable pulses in the dorsum of the foot and posterior tibial areas minimal edema in the ankle both feet are warm hip with no limitation or pain with internal/external rotation leg lengths appear equal lower back confirms well-healed midline lumbosacral scar with minimal pain with forward bending in the lumbar spine and negative treat leg raising bilaterally.    Assessment/Plan   Review of Radiographic Studies:    Indication to evaluate joint condition, no comparison views available, shows evident chronic advanced osteoarthritis.    X-rays confirm bone-on-bone medial compartment arthrosis with periarticular osteophytes loss of joint space periarticular loose bodies consistent with chronic osteoarthritic change  Procedures     Lata was seen today for pre-op exam.    Diagnoses and all orders for this visit:    Primary osteoarthritis of right knee  -     Ambulatory Referral to Physical Therapy Evaluate and treat     Physical therapy referral given      Recommendations/Plan:   Surgery: Right total knee arthroplasty    Patient agreeable to call or return sooner for any concerns.         I reviewed with the patient the diagnosis of knee joint  arthritis and reviewed images and using models and diagrams explained alternatives of treatment, both surgical and nonsurgical.  I did explain the nature of knee replacement surgery, including procedural details, anesthetic methods, preoperative  testing and postoperative rehabilitation.  I also explained risks and complications of the surgical procedure including but not limited to infection, bleeding, neurovascular injury, blood clotting leading to pulmonary embolus or sudden death, persistence of pain, failure of the procedure, stiffness, poor motion, failure of the procedure, possible need for future surgery, loosening and wear that may require future revision surgery.  Anesthetic related risks were also reviewed and explained.  The patient understood the discussion had questions answered to their satisfaction,  wished to proceed with knee replacement surgery which will be scheduled upon receiving satisfactory medical clearance.  The patient will sign a written consent form in the presence of the nursing staff prior to the surgery.     Christine for elective right total knee arthroplasty    Impression:  History of lumbar spine surgery with poor balance, history of hyperlipidemia, history of chronic renal insufficiency, history of bilateral chronic lower leg edema , history of hypertension, history of obstructive pulmonary disease, history of GERD with Adkins's esophagus  Plan:  Refer to physical therapy for perioperative instruction on right total knee replacement protocol, plan for elective right total knee arthroplasty with discharge home postoperative care and home rehabilitation with therapy

## 2017-10-06 DIAGNOSIS — J18.9 CAP (COMMUNITY ACQUIRED PNEUMONIA): ICD-10-CM

## 2017-10-06 DIAGNOSIS — M54.50 CHRONIC LOW BACK PAIN: ICD-10-CM

## 2017-10-06 DIAGNOSIS — G89.29 CHRONIC LOW BACK PAIN: ICD-10-CM

## 2017-10-09 ENCOUNTER — APPOINTMENT (OUTPATIENT)
Dept: PREADMISSION TESTING | Facility: HOSPITAL | Age: 72
End: 2017-10-09

## 2017-10-09 ENCOUNTER — HOSPITAL ENCOUNTER (OUTPATIENT)
Dept: GENERAL RADIOLOGY | Facility: HOSPITAL | Age: 72
Discharge: HOME | End: 2017-10-09
Admitting: PHYSICIAN ASSISTANT

## 2017-10-09 VITALS
WEIGHT: 206 LBS | BODY MASS INDEX: 37.91 KG/M2 | SYSTOLIC BLOOD PRESSURE: 164 MMHG | DIASTOLIC BLOOD PRESSURE: 83 MMHG | HEIGHT: 62 IN

## 2017-10-09 LAB
ALBUMIN SERPL-MCNC: 4.3 G/DL (ref 3.5–5)
ALBUMIN/GLOB SERPL: 1.5 G/DL (ref 1–2)
ALP SERPL-CCNC: 60 U/L (ref 38–126)
ALT SERPL W P-5'-P-CCNC: 29 U/L (ref 13–69)
ANION GAP SERPL CALCULATED.3IONS-SCNC: 14.9 MMOL/L
APTT PPP: 25.7 SECONDS (ref 25–36)
AST SERPL-CCNC: 23 U/L (ref 15–46)
BASOPHILS # BLD AUTO: 0.03 10*3/MM3 (ref 0–0.2)
BASOPHILS NFR BLD AUTO: 0.6 % (ref 0–2.5)
BILIRUB SERPL-MCNC: 0.7 MG/DL (ref 0.2–1.3)
BILIRUB UR QL STRIP: NEGATIVE
BUN BLD-MCNC: 21 MG/DL (ref 7–20)
BUN/CREAT SERPL: 23.3 (ref 7.1–23.5)
CALCIUM SPEC-SCNC: 9.9 MG/DL (ref 8.4–10.2)
CHLORIDE SERPL-SCNC: 107 MMOL/L (ref 98–107)
CLARITY UR: CLEAR
CO2 SERPL-SCNC: 28 MMOL/L (ref 26–30)
COLOR UR: YELLOW
CREAT BLD-MCNC: 0.9 MG/DL (ref 0.6–1.3)
DEPRECATED RDW RBC AUTO: 43.4 FL (ref 37–54)
EOSINOPHIL # BLD AUTO: 0.13 10*3/MM3 (ref 0–0.7)
EOSINOPHIL NFR BLD AUTO: 2.7 % (ref 0–7)
ERYTHROCYTE [DISTWIDTH] IN BLOOD BY AUTOMATED COUNT: 13.2 % (ref 11.5–14.5)
GFR SERPL CREATININE-BSD FRML MDRD: 62 ML/MIN/1.73
GLOBULIN UR ELPH-MCNC: 2.9 GM/DL
GLUCOSE BLD-MCNC: 104 MG/DL (ref 74–98)
GLUCOSE UR STRIP-MCNC: NEGATIVE MG/DL
HCT VFR BLD AUTO: 40.5 % (ref 37–47)
HGB BLD-MCNC: 12.9 G/DL (ref 12–16)
HGB UR QL STRIP.AUTO: NEGATIVE
IMM GRANULOCYTES # BLD: 0.02 10*3/MM3 (ref 0–0.06)
IMM GRANULOCYTES NFR BLD: 0.4 % (ref 0–0.6)
INR PPP: 1.08 (ref 0.9–1.1)
KETONES UR QL STRIP: NEGATIVE
LEUKOCYTE ESTERASE UR QL STRIP.AUTO: NEGATIVE
LYMPHOCYTES # BLD AUTO: 1.12 10*3/MM3 (ref 0.6–3.4)
LYMPHOCYTES NFR BLD AUTO: 23.1 % (ref 10–50)
MCH RBC QN AUTO: 28.7 PG (ref 27–31)
MCHC RBC AUTO-ENTMCNC: 31.9 G/DL (ref 30–37)
MCV RBC AUTO: 90 FL (ref 81–99)
MONOCYTES # BLD AUTO: 0.3 10*3/MM3 (ref 0–0.9)
MONOCYTES NFR BLD AUTO: 6.2 % (ref 0–12)
NEUTROPHILS # BLD AUTO: 3.24 10*3/MM3 (ref 2–6.9)
NEUTROPHILS NFR BLD AUTO: 67 % (ref 37–80)
NITRITE UR QL STRIP: NEGATIVE
NRBC BLD MANUAL-RTO: 0 /100 WBC (ref 0–0)
PH UR STRIP.AUTO: 7 [PH] (ref 5–8)
PLATELET # BLD AUTO: 190 10*3/MM3 (ref 130–400)
PMV BLD AUTO: 10.4 FL (ref 6–12)
POTASSIUM BLD-SCNC: 4.9 MMOL/L (ref 3.5–5.1)
PROT SERPL-MCNC: 7.2 G/DL (ref 6.3–8.2)
PROT UR QL STRIP: NEGATIVE
PROTHROMBIN TIME: 11.8 SECONDS (ref 9.3–12.1)
RBC # BLD AUTO: 4.5 10*6/MM3 (ref 4.2–5.4)
SODIUM BLD-SCNC: 145 MMOL/L (ref 137–145)
SP GR UR STRIP: 1.02 (ref 1–1.03)
UROBILINOGEN UR QL STRIP: NORMAL
WBC NRBC COR # BLD: 4.84 10*3/MM3 (ref 4.8–10.8)

## 2017-10-09 PROCEDURE — 36415 COLL VENOUS BLD VENIPUNCTURE: CPT | Performed by: PHYSICIAN ASSISTANT

## 2017-10-09 PROCEDURE — 85730 THROMBOPLASTIN TIME PARTIAL: CPT | Performed by: PHYSICIAN ASSISTANT

## 2017-10-09 PROCEDURE — 87081 CULTURE SCREEN ONLY: CPT | Performed by: PHYSICIAN ASSISTANT

## 2017-10-09 PROCEDURE — 71020 HC CHEST PA AND LATERAL: CPT

## 2017-10-09 PROCEDURE — 85610 PROTHROMBIN TIME: CPT | Performed by: PHYSICIAN ASSISTANT

## 2017-10-09 PROCEDURE — 80053 COMPREHEN METABOLIC PANEL: CPT | Performed by: PHYSICIAN ASSISTANT

## 2017-10-09 PROCEDURE — 81003 URINALYSIS AUTO W/O SCOPE: CPT | Performed by: PHYSICIAN ASSISTANT

## 2017-10-09 PROCEDURE — 85025 COMPLETE CBC W/AUTO DIFF WBC: CPT | Performed by: PHYSICIAN ASSISTANT

## 2017-10-09 RX ORDER — LISINOPRIL 40 MG/1
40 TABLET ORAL DAILY
Status: ON HOLD | COMMUNITY
End: 2017-10-18 | Stop reason: SDUPTHER

## 2017-10-09 ASSESSMENT — KOOS JR: KOOS JR SCORE: 22

## 2017-10-11 LAB — MRSA SPEC QL CULT: NORMAL

## 2017-10-11 RX ORDER — LISINOPRIL 40 MG/1
TABLET ORAL
Qty: 30 TABLET | Refills: 5 | Status: SHIPPED | OUTPATIENT
Start: 2017-10-11 | End: 2018-05-02 | Stop reason: SDUPTHER

## 2017-10-11 RX ORDER — CETIRIZINE HYDROCHLORIDE 10 MG/1
TABLET ORAL
Qty: 30 TABLET | Refills: 5 | Status: SHIPPED | OUTPATIENT
Start: 2017-10-11 | End: 2018-05-02 | Stop reason: SDUPTHER

## 2017-10-11 RX ORDER — HYDROCHLOROTHIAZIDE 25 MG/1
TABLET ORAL
Qty: 30 TABLET | Refills: 5 | Status: SHIPPED | OUTPATIENT
Start: 2017-10-11 | End: 2018-05-02 | Stop reason: SDUPTHER

## 2017-10-11 RX ORDER — MELOXICAM 7.5 MG/1
TABLET ORAL
Qty: 30 TABLET | Refills: 5 | Status: SHIPPED | OUTPATIENT
Start: 2017-10-11 | End: 2017-10-20 | Stop reason: HOSPADM

## 2017-10-11 RX ORDER — FLUOXETINE HYDROCHLORIDE 20 MG/1
CAPSULE ORAL
Qty: 90 CAPSULE | Refills: 5 | Status: SHIPPED | OUTPATIENT
Start: 2017-10-11 | End: 2018-05-02 | Stop reason: SDUPTHER

## 2017-10-11 RX ORDER — ASPIRIN 81 MG/1
TABLET ORAL
Qty: 30 TABLET | Refills: 11 | Status: SHIPPED | OUTPATIENT
Start: 2017-10-11 | End: 2018-10-15 | Stop reason: SDUPTHER

## 2017-10-11 RX ORDER — PRAVASTATIN SODIUM 40 MG
TABLET ORAL
Qty: 30 TABLET | Refills: 5 | Status: SHIPPED | OUTPATIENT
Start: 2017-10-11 | End: 2018-05-02 | Stop reason: SDUPTHER

## 2017-10-12 ENCOUNTER — TREATMENT (OUTPATIENT)
Dept: PHYSICAL THERAPY | Facility: CLINIC | Age: 72
End: 2017-10-12

## 2017-10-12 DIAGNOSIS — M25.561 CHRONIC PAIN OF RIGHT KNEE: Primary | ICD-10-CM

## 2017-10-12 DIAGNOSIS — G89.29 CHRONIC PAIN OF RIGHT KNEE: Primary | ICD-10-CM

## 2017-10-12 PROCEDURE — 97110 THERAPEUTIC EXERCISES: CPT | Performed by: PHYSICAL THERAPIST

## 2017-10-12 PROCEDURE — 97161 PT EVAL LOW COMPLEX 20 MIN: CPT | Performed by: PHYSICAL THERAPIST

## 2017-10-12 RX ORDER — GABAPENTIN 400 MG/1
800 CAPSULE ORAL 3 TIMES DAILY
Qty: 180 CAPSULE | Refills: 1 | Status: SHIPPED | OUTPATIENT
Start: 2017-10-12 | End: 2018-02-16 | Stop reason: SDUPTHER

## 2017-10-12 NOTE — PROGRESS NOTES
Physical Therapy Initial Evaluation and Plan of Care      Patient: Lata Justice   : 1945  Diagnosis/ICD-10 Code:  No primary diagnosis found.  Referring practitioner: Doc Mixon MD    Subjective Evaluation    History of Present Illness  Mechanism of injury: R knee pain since early .  Pain insidious onset that progressively got worse.  Never had PT for the knee.  PT for the back 1 year ago. Pain for years in the spine and many years of PT.      Currently no back pain at this time.     Pain  Current pain ratin  Location: Pain under the R Patello femoral joint.   Relieving factors: heat and rest  Progression: worsening    Social Support  Lives with: spouse             Objective       Observations     Additional Observation Details  Arrives with wheeled walker into PT area.     Active Range of Motion     Right Knee   Flexion: 119 degrees with pain  Extension: 2 degrees     Additional Active Range of Motion Details  HS on the R  degrees.     Strength/Myotome Testing     Right Hip   Planes of Motion   Flexion: 3  Extension: 3+  Abduction: 3    Right Knee   Flexion: 3+  Extension: 3+  Quadriceps contraction: fair (fair +  without pain elevated significantly.)    Swelling     Right Knee Girth Measurement (cm)   Joint line: 41.6cm.    Ambulation   Weight-Bearing Status   Weight-Bearing Status (Right): weight-bearing as tolerated    Assistive device used: four-wheeled walker    Ambulation: Level Surfaces   Ambulation with assistive device: independent  Ambulation without assistive device: contact guard assist    Observational Gait   Decreased walking speed and stride length.          Assessment & Plan     Assessment  Impairments: abnormal gait, abnormal muscle tone, abnormal or restricted ROM, activity intolerance, impaired balance, impaired physical strength, lacks appropriate home exercise program and pain with function  Assessment details: Patient is a 72 year old female who comes to  physical therapy with R knee pain. Signs and symptoms are consistent with R Knee OA.  The patient currently has pain, decreased ROM, decreased strength, and inability to perform all essential functional activities. Pt will benefit from skilled PT services to address the above issues.     Prognosis: fair  Prognosis details:   SHORT TERM GOALS:  2 weeks       1. Pt independent with HEP  2. Pt to will understand proper gait mechanics and transfer mechanics.     Functional Limitations: carrying objects, walking, uncomfortable because of pain, standing and unable to perform repetitive tasks  Plan  Therapy options: will be seen for skilled physical therapy services  Planned modality interventions: cryotherapy  Planned therapy interventions: strengthening, motor coordination training, ADL retraining, balance/weight-bearing training, flexibility, functional ROM exercises, gait training and home exercise program  Treatment plan discussed with: patient  Plan details: Pt will be seen 2 times per week for skilled PT until Pt has TKA.         Manual Therapy:         mins  74730;  Therapeutic Exercise:    15     mins  48110;     Neuromuscular Gabrielle:        mins  13326;    Therapeutic Activity:          mins  50521;     Gait Training:           mins  48623;     Ultrasound:          mins  18336;    Electrical Stimulation:         mins  35630 ( );  Dry Needling          mins self-pay    Timed Treatment:   15   mins   Total Treatment:     40   mins    PT SIGNATURE: Fidel García, PT   DATE TREATMENT INITIATED: 10/12/2017    Medicare Initial Certification  Certification Period: 1/10/2018  I certify that the therapy services are furnished while this patient is under my care.  The services outlined above are required by this patient, and will be reviewed every 90 days.     PHYSICIAN: Doc Mixon MD      DATE:     Please sign and return via fax to  .. Thank you, [facility].

## 2017-10-13 ENCOUNTER — TELEPHONE (OUTPATIENT)
Dept: INTERNAL MEDICINE | Facility: CLINIC | Age: 72
End: 2017-10-13

## 2017-10-17 ENCOUNTER — ANESTHESIA EVENT (OUTPATIENT)
Dept: PERIOP | Facility: HOSPITAL | Age: 72
End: 2017-10-17

## 2017-10-18 ENCOUNTER — APPOINTMENT (OUTPATIENT)
Dept: GENERAL RADIOLOGY | Facility: HOSPITAL | Age: 72
End: 2017-10-18

## 2017-10-18 ENCOUNTER — ANESTHESIA (OUTPATIENT)
Dept: PERIOP | Facility: HOSPITAL | Age: 72
End: 2017-10-18

## 2017-10-18 ENCOUNTER — HOSPITAL ENCOUNTER (INPATIENT)
Facility: HOSPITAL | Age: 72
LOS: 2 days | Discharge: HOME | End: 2017-10-20
Attending: ORTHOPAEDIC SURGERY | Admitting: ORTHOPAEDIC SURGERY

## 2017-10-18 DIAGNOSIS — M25.561 ARTHRALGIA OF BOTH KNEES: ICD-10-CM

## 2017-10-18 DIAGNOSIS — Z74.09 IMPAIRED MOBILITY AND ADLS: Primary | ICD-10-CM

## 2017-10-18 DIAGNOSIS — M79.604 PAIN OF RIGHT LOWER EXTREMITY: ICD-10-CM

## 2017-10-18 DIAGNOSIS — M79.10 MYALGIA: ICD-10-CM

## 2017-10-18 DIAGNOSIS — M25.562 ARTHRALGIA OF BOTH KNEES: ICD-10-CM

## 2017-10-18 DIAGNOSIS — Z74.09 IMPAIRED FUNCTIONAL MOBILITY, BALANCE, GAIT, AND ENDURANCE: ICD-10-CM

## 2017-10-18 DIAGNOSIS — Z78.9 IMPAIRED MOBILITY AND ADLS: Primary | ICD-10-CM

## 2017-10-18 DIAGNOSIS — Z01.818 PRE-OP TESTING: ICD-10-CM

## 2017-10-18 DIAGNOSIS — M17.0 PRIMARY OSTEOARTHRITIS OF BOTH KNEES: ICD-10-CM

## 2017-10-18 DIAGNOSIS — Z96.651 STATUS POST TOTAL RIGHT KNEE REPLACEMENT: ICD-10-CM

## 2017-10-18 DIAGNOSIS — M17.11 PRIMARY OSTEOARTHRITIS OF RIGHT KNEE: ICD-10-CM

## 2017-10-18 DIAGNOSIS — E53.8 B12 DEFICIENCY: ICD-10-CM

## 2017-10-18 PROBLEM — R73.03 BORDERLINE DIABETES: Status: ACTIVE | Noted: 2017-10-18

## 2017-10-18 LAB
GLUCOSE BLDC GLUCOMTR-MCNC: 105 MG/DL (ref 70–130)
INR PPP: 1.09 (ref 0.9–1.1)
PROTHROMBIN TIME: 11.9 SECONDS (ref 9.3–12.1)

## 2017-10-18 PROCEDURE — 27447 TOTAL KNEE ARTHROPLASTY: CPT | Performed by: ORTHOPAEDIC SURGERY

## 2017-10-18 PROCEDURE — 25010000002 DEXAMETHASONE SODIUM PHOSPHATE 10 MG/ML SOLUTION: Performed by: NURSE ANESTHETIST, CERTIFIED REGISTERED

## 2017-10-18 PROCEDURE — 85610 PROTHROMBIN TIME: CPT | Performed by: ORTHOPAEDIC SURGERY

## 2017-10-18 PROCEDURE — 94799 UNLISTED PULMONARY SVC/PX: CPT

## 2017-10-18 PROCEDURE — 25010000002 ONDANSETRON PER 1 MG: Performed by: NURSE ANESTHETIST, CERTIFIED REGISTERED

## 2017-10-18 PROCEDURE — 99221 1ST HOSP IP/OBS SF/LOW 40: CPT | Performed by: NURSE PRACTITIONER

## 2017-10-18 PROCEDURE — 25010000002 KETOROLAC TROMETHAMINE PER 15 MG: Performed by: ORTHOPAEDIC SURGERY

## 2017-10-18 PROCEDURE — 25010000002 ROPIVACAINE PER 1 MG: Performed by: ORTHOPAEDIC SURGERY

## 2017-10-18 PROCEDURE — 25010000002 MIDAZOLAM PER 1 MG: Performed by: NURSE ANESTHETIST, CERTIFIED REGISTERED

## 2017-10-18 PROCEDURE — 82962 GLUCOSE BLOOD TEST: CPT

## 2017-10-18 PROCEDURE — 94762 N-INVAS EAR/PLS OXIMTRY CONT: CPT

## 2017-10-18 PROCEDURE — 88300 SURGICAL PATH GROSS: CPT | Performed by: ORTHOPAEDIC SURGERY

## 2017-10-18 PROCEDURE — 25010000002 DEXAMETHASONE PER 1 MG: Performed by: NURSE ANESTHETIST, CERTIFIED REGISTERED

## 2017-10-18 PROCEDURE — 25010000003 CEFAZOLIN PER 500 MG: Performed by: ORTHOPAEDIC SURGERY

## 2017-10-18 PROCEDURE — 97165 OT EVAL LOW COMPLEX 30 MIN: CPT

## 2017-10-18 PROCEDURE — 73560 X-RAY EXAM OF KNEE 1 OR 2: CPT

## 2017-10-18 PROCEDURE — 97162 PT EVAL MOD COMPLEX 30 MIN: CPT

## 2017-10-18 PROCEDURE — 25010000002 PROPOFOL 200 MG/20ML EMULSION: Performed by: NURSE ANESTHETIST, CERTIFIED REGISTERED

## 2017-10-18 PROCEDURE — 25010000003 CEFAZOLIN PER 500 MG: Performed by: PHYSICIAN ASSISTANT

## 2017-10-18 PROCEDURE — 0SRC0J9 REPLACEMENT OF RIGHT KNEE JOINT WITH SYNTHETIC SUBSTITUTE, CEMENTED, OPEN APPROACH: ICD-10-PCS | Performed by: ORTHOPAEDIC SURGERY

## 2017-10-18 PROCEDURE — C1713 ANCHOR/SCREW BN/BN,TIS/BN: HCPCS | Performed by: ORTHOPAEDIC SURGERY

## 2017-10-18 PROCEDURE — 25010000002 EPINEPHRINE 1 MG/ML SOLUTION: Performed by: ORTHOPAEDIC SURGERY

## 2017-10-18 PROCEDURE — C1776 JOINT DEVICE (IMPLANTABLE): HCPCS | Performed by: ORTHOPAEDIC SURGERY

## 2017-10-18 DEVICE — JOURNEY BCS PATELLA RESURFACING                                    ROUND 29 MM STANDARD
Type: IMPLANTABLE DEVICE | Site: KNEE | Status: FUNCTIONAL
Brand: JOURNEY

## 2017-10-18 DEVICE — JOURNEY II BCS XLPE ARTICULAR                                    INSERT SZ 3-4 RIGHT 12MM
Type: IMPLANTABLE DEVICE | Site: KNEE | Status: FUNCTIONAL
Brand: JOURNEY

## 2017-10-18 DEVICE — IMPLANTABLE DEVICE: Type: IMPLANTABLE DEVICE | Site: KNEE | Status: FUNCTIONAL

## 2017-10-18 DEVICE — JOURNEY II BCS FEMORAL OXINIUM                                    RIGHT SIZE 4
Type: IMPLANTABLE DEVICE | Site: KNEE | Status: FUNCTIONAL
Brand: JOURNEY

## 2017-10-18 DEVICE — RALLY HV BONE CEMENT 40 GRAMS
Type: IMPLANTABLE DEVICE | Site: KNEE | Status: FUNCTIONAL
Brand: RALLY

## 2017-10-18 DEVICE — JOURNEY TIBIAL BASEPLATE NONPOROUS                                    RIGHT SIZE 3
Type: IMPLANTABLE DEVICE | Site: KNEE | Status: FUNCTIONAL
Brand: JOURNEY

## 2017-10-18 RX ORDER — MELOXICAM 7.5 MG/1
7.5 TABLET ORAL DAILY
Status: DISCONTINUED | OUTPATIENT
Start: 2017-10-18 | End: 2017-10-20 | Stop reason: HOSPADM

## 2017-10-18 RX ORDER — SODIUM CHLORIDE, SODIUM LACTATE, POTASSIUM CHLORIDE, CALCIUM CHLORIDE 600; 310; 30; 20 MG/100ML; MG/100ML; MG/100ML; MG/100ML
1000 INJECTION, SOLUTION INTRAVENOUS CONTINUOUS PRN
Status: DISCONTINUED | OUTPATIENT
Start: 2017-10-18 | End: 2017-10-18 | Stop reason: HOSPADM

## 2017-10-18 RX ORDER — ROPIVACAINE HYDROCHLORIDE 5 MG/ML
INJECTION, SOLUTION EPIDURAL; INFILTRATION; PERINEURAL AS NEEDED
Status: DISCONTINUED | OUTPATIENT
Start: 2017-10-18 | End: 2017-10-18 | Stop reason: HOSPADM

## 2017-10-18 RX ORDER — GABAPENTIN 300 MG/1
CAPSULE ORAL
Status: COMPLETED
Start: 2017-10-18 | End: 2017-10-18

## 2017-10-18 RX ORDER — ASPIRIN 81 MG/1
81 TABLET ORAL ONCE
Status: COMPLETED | OUTPATIENT
Start: 2017-10-18 | End: 2017-10-18

## 2017-10-18 RX ORDER — MEPERIDINE HYDROCHLORIDE 25 MG/ML
INJECTION INTRAMUSCULAR; INTRAVENOUS; SUBCUTANEOUS
Status: COMPLETED
Start: 2017-10-18 | End: 2017-10-18

## 2017-10-18 RX ORDER — LISINOPRIL 20 MG/1
40 TABLET ORAL NIGHTLY
Status: DISCONTINUED | OUTPATIENT
Start: 2017-10-18 | End: 2017-10-20 | Stop reason: HOSPADM

## 2017-10-18 RX ORDER — BUPIVACAINE HYDROCHLORIDE 2.5 MG/ML
INJECTION, SOLUTION EPIDURAL; INFILTRATION; INTRACAUDAL AS NEEDED
Status: DISCONTINUED | OUTPATIENT
Start: 2017-10-18 | End: 2017-10-18 | Stop reason: SURG

## 2017-10-18 RX ORDER — DOCUSATE SODIUM 100 MG/1
100 CAPSULE, LIQUID FILLED ORAL 2 TIMES DAILY PRN
Status: DISCONTINUED | OUTPATIENT
Start: 2017-10-18 | End: 2017-10-20 | Stop reason: HOSPADM

## 2017-10-18 RX ORDER — ATORVASTATIN CALCIUM 10 MG/1
10 TABLET, FILM COATED ORAL NIGHTLY
Status: DISCONTINUED | OUTPATIENT
Start: 2017-10-18 | End: 2017-10-20 | Stop reason: HOSPADM

## 2017-10-18 RX ORDER — ONDANSETRON 2 MG/ML
4 INJECTION INTRAMUSCULAR; INTRAVENOUS ONCE
Status: DISCONTINUED | OUTPATIENT
Start: 2017-10-18 | End: 2017-10-18 | Stop reason: HOSPADM

## 2017-10-18 RX ORDER — ONDANSETRON 2 MG/ML
INJECTION INTRAMUSCULAR; INTRAVENOUS AS NEEDED
Status: DISCONTINUED | OUTPATIENT
Start: 2017-10-18 | End: 2017-10-18 | Stop reason: SURG

## 2017-10-18 RX ORDER — DEXAMETHASONE SODIUM PHOSPHATE 4 MG/ML
INJECTION, SOLUTION INTRA-ARTICULAR; INTRALESIONAL; INTRAMUSCULAR; INTRAVENOUS; SOFT TISSUE AS NEEDED
Status: DISCONTINUED | OUTPATIENT
Start: 2017-10-18 | End: 2017-10-18 | Stop reason: SURG

## 2017-10-18 RX ORDER — SODIUM CHLORIDE 0.9 % (FLUSH) 0.9 %
3 SYRINGE (ML) INJECTION AS NEEDED
Status: DISCONTINUED | OUTPATIENT
Start: 2017-10-18 | End: 2017-10-18 | Stop reason: HOSPADM

## 2017-10-18 RX ORDER — DIPHENHYDRAMINE HCL 25 MG
25 CAPSULE ORAL EVERY 6 HOURS PRN
Status: DISCONTINUED | OUTPATIENT
Start: 2017-10-18 | End: 2017-10-20 | Stop reason: HOSPADM

## 2017-10-18 RX ORDER — ONDANSETRON 4 MG/1
4 TABLET, FILM COATED ORAL EVERY 6 HOURS PRN
Status: DISCONTINUED | OUTPATIENT
Start: 2017-10-18 | End: 2017-10-20 | Stop reason: HOSPADM

## 2017-10-18 RX ORDER — IPRATROPIUM BROMIDE AND ALBUTEROL SULFATE 2.5; .5 MG/3ML; MG/3ML
3 SOLUTION RESPIRATORY (INHALATION) ONCE
Status: COMPLETED | OUTPATIENT
Start: 2017-10-18 | End: 2017-10-18

## 2017-10-18 RX ORDER — CYANOCOBALAMIN 1000 UG/ML
1000 INJECTION, SOLUTION INTRAMUSCULAR; SUBCUTANEOUS
Status: DISCONTINUED | OUTPATIENT
Start: 2017-10-26 | End: 2017-10-20 | Stop reason: HOSPADM

## 2017-10-18 RX ORDER — ALPRAZOLAM 0.25 MG/1
0.25 TABLET ORAL EVERY 12 HOURS SCHEDULED
Status: DISCONTINUED | OUTPATIENT
Start: 2017-10-18 | End: 2017-10-20 | Stop reason: HOSPADM

## 2017-10-18 RX ORDER — ENALAPRILAT 2.5 MG/2ML
INJECTION INTRAVENOUS AS NEEDED
Status: DISCONTINUED | OUTPATIENT
Start: 2017-10-18 | End: 2017-10-18 | Stop reason: SURG

## 2017-10-18 RX ORDER — PROPOFOL 10 MG/ML
INJECTION, EMULSION INTRAVENOUS AS NEEDED
Status: DISCONTINUED | OUTPATIENT
Start: 2017-10-18 | End: 2017-10-18 | Stop reason: SURG

## 2017-10-18 RX ORDER — KETOROLAC TROMETHAMINE 30 MG/ML
INJECTION, SOLUTION INTRAMUSCULAR; INTRAVENOUS AS NEEDED
Status: DISCONTINUED | OUTPATIENT
Start: 2017-10-18 | End: 2017-10-18 | Stop reason: HOSPADM

## 2017-10-18 RX ORDER — MIDAZOLAM HYDROCHLORIDE 1 MG/ML
INJECTION INTRAMUSCULAR; INTRAVENOUS AS NEEDED
Status: DISCONTINUED | OUTPATIENT
Start: 2017-10-18 | End: 2017-10-18 | Stop reason: SURG

## 2017-10-18 RX ORDER — LABETALOL HYDROCHLORIDE 5 MG/ML
INJECTION, SOLUTION INTRAVENOUS AS NEEDED
Status: DISCONTINUED | OUTPATIENT
Start: 2017-10-18 | End: 2017-10-18 | Stop reason: SURG

## 2017-10-18 RX ORDER — PROMETHAZINE HYDROCHLORIDE 25 MG/ML
6.25 INJECTION, SOLUTION INTRAMUSCULAR; INTRAVENOUS ONCE
Status: DISCONTINUED | OUTPATIENT
Start: 2017-10-18 | End: 2017-10-18 | Stop reason: HOSPADM

## 2017-10-18 RX ORDER — NALOXONE HCL 0.4 MG/ML
0.1 VIAL (ML) INJECTION
Status: DISCONTINUED | OUTPATIENT
Start: 2017-10-18 | End: 2017-10-20 | Stop reason: HOSPADM

## 2017-10-18 RX ORDER — HYDROCHLOROTHIAZIDE 25 MG/1
25 TABLET ORAL DAILY
Status: DISCONTINUED | OUTPATIENT
Start: 2017-10-18 | End: 2017-10-20 | Stop reason: HOSPADM

## 2017-10-18 RX ORDER — MEPERIDINE HYDROCHLORIDE 25 MG/ML
25 INJECTION INTRAMUSCULAR; INTRAVENOUS; SUBCUTANEOUS
Status: COMPLETED | OUTPATIENT
Start: 2017-10-18 | End: 2017-10-18

## 2017-10-18 RX ORDER — OXYCODONE HYDROCHLORIDE 5 MG/1
5 TABLET ORAL EVERY 4 HOURS PRN
Status: DISCONTINUED | OUTPATIENT
Start: 2017-10-18 | End: 2017-10-20 | Stop reason: HOSPADM

## 2017-10-18 RX ORDER — BUPIVACAINE HYDROCHLORIDE 2.5 MG/ML
INJECTION, SOLUTION EPIDURAL; INFILTRATION; INTRACAUDAL
Status: DISPENSED
Start: 2017-10-18 | End: 2017-10-18

## 2017-10-18 RX ORDER — BISACODYL 10 MG
10 SUPPOSITORY, RECTAL RECTAL DAILY PRN
Status: DISCONTINUED | OUTPATIENT
Start: 2017-10-18 | End: 2017-10-20 | Stop reason: HOSPADM

## 2017-10-18 RX ORDER — MORPHINE SULFATE 2 MG/ML
2 INJECTION, SOLUTION INTRAMUSCULAR; INTRAVENOUS
Status: DISCONTINUED | OUTPATIENT
Start: 2017-10-18 | End: 2017-10-18 | Stop reason: HOSPADM

## 2017-10-18 RX ORDER — NEOSTIGMINE METHYLSULFATE 5 MG/5 ML
SYRINGE (ML) INTRAVENOUS AS NEEDED
Status: DISCONTINUED | OUTPATIENT
Start: 2017-10-18 | End: 2017-10-18 | Stop reason: SURG

## 2017-10-18 RX ORDER — SUFENTANIL CITRATE 50 UG/ML
INJECTION EPIDURAL; INTRAVENOUS AS NEEDED
Status: DISCONTINUED | OUTPATIENT
Start: 2017-10-18 | End: 2017-10-18 | Stop reason: SURG

## 2017-10-18 RX ORDER — EPINEPHRINE 1 MG/ML
INJECTION INTRAMUSCULAR; INTRAVENOUS; SUBCUTANEOUS AS NEEDED
Status: DISCONTINUED | OUTPATIENT
Start: 2017-10-18 | End: 2017-10-18 | Stop reason: HOSPADM

## 2017-10-18 RX ORDER — ROCURONIUM BROMIDE 10 MG/ML
INJECTION, SOLUTION INTRAVENOUS AS NEEDED
Status: DISCONTINUED | OUTPATIENT
Start: 2017-10-18 | End: 2017-10-18 | Stop reason: SURG

## 2017-10-18 RX ORDER — PANTOPRAZOLE SODIUM 40 MG/1
40 TABLET, DELAYED RELEASE ORAL EVERY MORNING
Status: DISCONTINUED | OUTPATIENT
Start: 2017-10-19 | End: 2017-10-20 | Stop reason: HOSPADM

## 2017-10-18 RX ORDER — SCOLOPAMINE TRANSDERMAL SYSTEM 1 MG/1
1 PATCH, EXTENDED RELEASE TRANSDERMAL ONCE
Status: DISCONTINUED | OUTPATIENT
Start: 2017-10-18 | End: 2017-10-18

## 2017-10-18 RX ORDER — GLYCOPYRROLATE 0.2 MG/ML
INJECTION INTRAMUSCULAR; INTRAVENOUS AS NEEDED
Status: DISCONTINUED | OUTPATIENT
Start: 2017-10-18 | End: 2017-10-18 | Stop reason: SURG

## 2017-10-18 RX ORDER — SODIUM CHLORIDE, SODIUM LACTATE, POTASSIUM CHLORIDE, CALCIUM CHLORIDE 600; 310; 30; 20 MG/100ML; MG/100ML; MG/100ML; MG/100ML
100 INJECTION, SOLUTION INTRAVENOUS CONTINUOUS
Status: DISCONTINUED | OUTPATIENT
Start: 2017-10-18 | End: 2017-10-20 | Stop reason: HOSPADM

## 2017-10-18 RX ORDER — DEXAMETHASONE SODIUM PHOSPHATE 10 MG/ML
INJECTION, SOLUTION INTRAMUSCULAR; INTRAVENOUS AS NEEDED
Status: DISCONTINUED | OUTPATIENT
Start: 2017-10-18 | End: 2017-10-18 | Stop reason: SURG

## 2017-10-18 RX ORDER — ONDANSETRON 2 MG/ML
4 INJECTION INTRAMUSCULAR; INTRAVENOUS EVERY 6 HOURS PRN
Status: DISCONTINUED | OUTPATIENT
Start: 2017-10-18 | End: 2017-10-20 | Stop reason: HOSPADM

## 2017-10-18 RX ORDER — ONDANSETRON 4 MG/1
4 TABLET, ORALLY DISINTEGRATING ORAL EVERY 6 HOURS PRN
Status: DISCONTINUED | OUTPATIENT
Start: 2017-10-18 | End: 2017-10-20 | Stop reason: HOSPADM

## 2017-10-18 RX ORDER — OXYCODONE HYDROCHLORIDE 5 MG/1
5 TABLET ORAL EVERY 4 HOURS PRN
Status: DISCONTINUED | OUTPATIENT
Start: 2017-10-18 | End: 2017-10-18 | Stop reason: SDUPTHER

## 2017-10-18 RX ORDER — IPRATROPIUM BROMIDE AND ALBUTEROL SULFATE 2.5; .5 MG/3ML; MG/3ML
SOLUTION RESPIRATORY (INHALATION)
Status: COMPLETED
Start: 2017-10-18 | End: 2017-10-18

## 2017-10-18 RX ORDER — MELOXICAM 7.5 MG/1
7.5 TABLET ORAL DAILY
Status: DISCONTINUED | OUTPATIENT
Start: 2017-10-18 | End: 2017-10-18

## 2017-10-18 RX ORDER — SCOLOPAMINE TRANSDERMAL SYSTEM 1 MG/1
PATCH, EXTENDED RELEASE TRANSDERMAL
Status: DISCONTINUED
Start: 2017-10-18 | End: 2017-10-20 | Stop reason: HOSPADM

## 2017-10-18 RX ORDER — DEXAMETHASONE SODIUM PHOSPHATE 10 MG/ML
INJECTION, SOLUTION INTRAMUSCULAR; INTRAVENOUS
Status: DISPENSED
Start: 2017-10-18 | End: 2017-10-18

## 2017-10-18 RX ORDER — GABAPENTIN 300 MG/1
600 CAPSULE ORAL ONCE
Status: COMPLETED | OUTPATIENT
Start: 2017-10-18 | End: 2017-10-18

## 2017-10-18 RX ORDER — GABAPENTIN 400 MG/1
800 CAPSULE ORAL 3 TIMES DAILY
Status: DISCONTINUED | OUTPATIENT
Start: 2017-10-18 | End: 2017-10-20 | Stop reason: HOSPADM

## 2017-10-18 RX ORDER — GABAPENTIN 400 MG/1
800 CAPSULE ORAL ONCE
Status: DISCONTINUED | OUTPATIENT
Start: 2017-10-18 | End: 2017-10-18

## 2017-10-18 RX ADMIN — SUFENTANIL CITRATE 5 MCG: 50 INJECTION EPIDURAL; INTRAVENOUS at 09:30

## 2017-10-18 RX ADMIN — ONDANSETRON 4 MG: 2 INJECTION INTRAMUSCULAR; INTRAVENOUS at 09:15

## 2017-10-18 RX ADMIN — CEFAZOLIN SODIUM 2 G: 2 SOLUTION INTRAVENOUS at 07:58

## 2017-10-18 RX ADMIN — Medication 100 MCG: at 08:17

## 2017-10-18 RX ADMIN — PROPOFOL 150 MG: 10 INJECTION, EMULSION INTRAVENOUS at 08:07

## 2017-10-18 RX ADMIN — GABAPENTIN 600 MG: 300 CAPSULE ORAL at 07:29

## 2017-10-18 RX ADMIN — MIDAZOLAM HYDROCHLORIDE 2 MG: 1 INJECTION, SOLUTION INTRAMUSCULAR; INTRAVENOUS at 07:38

## 2017-10-18 RX ADMIN — MEPERIDINE HYDROCHLORIDE 25 MG: 25 INJECTION, SOLUTION INTRAMUSCULAR; INTRAVENOUS; SUBCUTANEOUS at 11:13

## 2017-10-18 RX ADMIN — CEFAZOLIN SODIUM 2 G: 2 SOLUTION INTRAVENOUS at 16:54

## 2017-10-18 RX ADMIN — LISINOPRIL 40 MG: 20 TABLET ORAL at 20:45

## 2017-10-18 RX ADMIN — BUPIVACAINE HYDROCHLORIDE 20 ML: 2.5 INJECTION, SOLUTION EPIDURAL; INFILTRATION; INTRACAUDAL; PERINEURAL at 07:48

## 2017-10-18 RX ADMIN — ASPIRIN 81 MG: 81 TABLET, COATED ORAL at 13:18

## 2017-10-18 RX ADMIN — LABETALOL HYDROCHLORIDE 10 MG: 5 INJECTION, SOLUTION INTRAVENOUS at 10:29

## 2017-10-18 RX ADMIN — Medication 100 MCG: at 08:30

## 2017-10-18 RX ADMIN — SODIUM CHLORIDE, POTASSIUM CHLORIDE, SODIUM LACTATE AND CALCIUM CHLORIDE 100 ML/HR: 600; 310; 30; 20 INJECTION, SOLUTION INTRAVENOUS at 16:50

## 2017-10-18 RX ADMIN — DEXAMETHASONE SODIUM PHOSPHATE 4 MG: 4 INJECTION, SOLUTION INTRAMUSCULAR; INTRAVENOUS at 09:15

## 2017-10-18 RX ADMIN — METOPROLOL TARTRATE 25 MG: 25 TABLET ORAL at 20:45

## 2017-10-18 RX ADMIN — ROCURONIUM BROMIDE 5 MG: 10 INJECTION INTRAVENOUS at 09:22

## 2017-10-18 RX ADMIN — ENALAPRILAT 1.25 MG: 1.25 INJECTION INTRAVENOUS at 10:31

## 2017-10-18 RX ADMIN — GABAPENTIN 800 MG: 400 CAPSULE ORAL at 20:45

## 2017-10-18 RX ADMIN — SUFENTANIL CITRATE 10 MCG: 50 INJECTION EPIDURAL; INTRAVENOUS at 07:38

## 2017-10-18 RX ADMIN — SUFENTANIL CITRATE 15 MCG: 50 INJECTION EPIDURAL; INTRAVENOUS at 08:07

## 2017-10-18 RX ADMIN — SUFENTANIL CITRATE 5 MCG: 50 INJECTION EPIDURAL; INTRAVENOUS at 09:02

## 2017-10-18 RX ADMIN — Medication 3 MG: at 09:53

## 2017-10-18 RX ADMIN — ROCURONIUM BROMIDE 30 MG: 10 INJECTION INTRAVENOUS at 08:07

## 2017-10-18 RX ADMIN — SODIUM CHLORIDE, POTASSIUM CHLORIDE, SODIUM LACTATE AND CALCIUM CHLORIDE 1000 ML: 600; 310; 30; 20 INJECTION, SOLUTION INTRAVENOUS at 07:15

## 2017-10-18 RX ADMIN — SCOPALAMINE 1 PATCH: 1 PATCH, EXTENDED RELEASE TRANSDERMAL at 07:23

## 2017-10-18 RX ADMIN — SUFENTANIL CITRATE 10 MCG: 50 INJECTION EPIDURAL; INTRAVENOUS at 08:56

## 2017-10-18 RX ADMIN — LABETALOL HYDROCHLORIDE 10 MG: 5 INJECTION, SOLUTION INTRAVENOUS at 10:35

## 2017-10-18 RX ADMIN — MEPERIDINE HYDROCHLORIDE 25 MG: 25 INJECTION INTRAMUSCULAR; INTRAVENOUS; SUBCUTANEOUS at 11:37

## 2017-10-18 RX ADMIN — Medication 8 ML/HR: at 11:07

## 2017-10-18 RX ADMIN — HYDROCHLOROTHIAZIDE 25 MG: 25 TABLET ORAL at 14:36

## 2017-10-18 RX ADMIN — ALPRAZOLAM 0.25 MG: 0.25 TABLET ORAL at 13:17

## 2017-10-18 RX ADMIN — GABAPENTIN 800 MG: 400 CAPSULE ORAL at 16:50

## 2017-10-18 RX ADMIN — ATORVASTATIN CALCIUM 10 MG: 10 TABLET, FILM COATED ORAL at 20:45

## 2017-10-18 RX ADMIN — DEXAMETHASONE SODIUM PHOSPHATE 10 MG: 10 INJECTION, SOLUTION INTRAMUSCULAR; INTRAVENOUS at 07:48

## 2017-10-18 RX ADMIN — GLYCOPYRROLATE 0.6 MG: 0.2 INJECTION, SOLUTION INTRAMUSCULAR; INTRAVENOUS at 09:53

## 2017-10-18 RX ADMIN — ENALAPRILAT 1.25 MG: 1.25 INJECTION INTRAVENOUS at 10:50

## 2017-10-18 RX ADMIN — MELOXICAM 7.5 MG: 7.5 TABLET ORAL at 13:18

## 2017-10-18 RX ADMIN — ROCURONIUM BROMIDE 10 MG: 10 INJECTION INTRAVENOUS at 08:45

## 2017-10-18 RX ADMIN — LABETALOL HYDROCHLORIDE 10 MG: 5 INJECTION, SOLUTION INTRAVENOUS at 10:18

## 2017-10-18 RX ADMIN — METOPROLOL TARTRATE 25 MG: 25 TABLET ORAL at 14:36

## 2017-10-18 RX ADMIN — MEPERIDINE HYDROCHLORIDE 25 MG: 25 INJECTION INTRAMUSCULAR; INTRAVENOUS; SUBCUTANEOUS at 11:13

## 2017-10-18 RX ADMIN — MEPERIDINE HYDROCHLORIDE 25 MG: 25 INJECTION, SOLUTION INTRAMUSCULAR; INTRAVENOUS; SUBCUTANEOUS at 11:37

## 2017-10-18 RX ADMIN — LABETALOL HYDROCHLORIDE 10 MG: 5 INJECTION, SOLUTION INTRAVENOUS at 10:16

## 2017-10-18 RX ADMIN — ALPRAZOLAM 0.25 MG: 0.25 TABLET ORAL at 20:45

## 2017-10-18 RX ADMIN — SODIUM CHLORIDE, POTASSIUM CHLORIDE, SODIUM LACTATE AND CALCIUM CHLORIDE: 600; 310; 30; 20 INJECTION, SOLUTION INTRAVENOUS at 09:00

## 2017-10-18 RX ADMIN — IPRATROPIUM BROMIDE AND ALBUTEROL SULFATE 3 ML: .5; 3 SOLUTION RESPIRATORY (INHALATION) at 11:04

## 2017-10-18 RX ADMIN — SODIUM CHLORIDE, POTASSIUM CHLORIDE, SODIUM LACTATE AND CALCIUM CHLORIDE 100 ML/HR: 600; 310; 30; 20 INJECTION, SOLUTION INTRAVENOUS at 13:18

## 2017-10-18 RX ADMIN — SCOLOPAMINE TRANSDERMAL SYSTEM 1 PATCH: 1 PATCH, EXTENDED RELEASE TRANSDERMAL at 07:23

## 2017-10-18 RX ADMIN — IPRATROPIUM BROMIDE AND ALBUTEROL SULFATE 3 ML: 2.5; .5 SOLUTION RESPIRATORY (INHALATION) at 11:04

## 2017-10-18 NOTE — THERAPY EVALUATION
"Acute Care - Physical Therapy Initial Evaluation  [facility]     Patient Name: Lata Justice  : 1945  MRN: [id number]  Today's Date: 10/18/2017   Onset of Illness/Injury or Date of Surgery Date: 10/18/17  Date of Referral to PT: 10/18/17  Referring Physician: Oral      Admit Date: 10/18/2017     Visit Dx:    ICD-10-CM ICD-9-CM   1. Impaired functional mobility, balance, gait, and endurance Z74.09 V49.89   2. Arthralgia of both knees M25.561 719.46    M25.562    3. Primary osteoarthritis of both knees M17.0 715.16   4. Pre-op testing Z01.818 V72.84   5. Primary osteoarthritis of right knee M17.11 715.16   6. Myalgia  M79.1 729.1   7. Pain of right lower extremity  M79.604 729.5   8. B12 deficiency E53.8 266.2     Patient Active Problem List   Diagnosis   • Adkins's esophagus   • Benign essential hypertension   • Chronic low back pain   • Chronic obstructive pulmonary disease   • Depression   • Impaired glucose tolerance   • Hyperlipidemia   • Seasonal allergic rhinitis   • Bilateral chronic knee pain   • Bilateral edema of lower extremity   • Bilateral wrist pain   • Chronic pain of both ankles   • Chronic renal insufficiency, stage III (moderate)   • Primary osteoarthritis of right knee   • Arthralgia of both knees   • Borderline diabetes   • Status post right knee replacement     Past Medical History:   Diagnosis Date   • Abnormal finding     STATED SHE \"WOKE\" UP DURING AN EGD, BREAST BIOPSY, BACK SURGERY   • Acute colitis    • Allergic    • Anxiety    • Arthritis    • Body piercing     EARS   • Cataracts, bilateral    • Depression    • Depression    • Diabetes mellitus     DIET CONTROLLED   • Full dentures    • GERD (gastroesophageal reflux disease)    • Glaucoma    • High cholesterol    • Hx of colonic polyps    • Hypertension    • Injury of back    • Low back pain    • Obesity    • Osteoarthritis    • Pneumonia    • PONV (postoperative nausea and vomiting)    • Wears glasses      Past Surgical " History:   Procedure Laterality Date   • BACK SURGERY      Lower back X2   • CHOLECYSTECTOMY     • COLONOSCOPY     • DILATION AND CURETTAGE, DIAGNOSTIC / THERAPEUTIC     • ENDOSCOPY     • HYSTERECTOMY      Total Hysterectomy   • TUBAL ABDOMINAL LIGATION            PT ASSESSMENT (last 72 hours)      PT Evaluation       10/18/17 1318 10/18/17 1233    Rehab Evaluation    Document Type evaluation  -LM     Subjective Information agree to therapy;complains of;pain  -LM     Patient Effort, Rehab Treatment adequate  -LM     Symptoms Noted During/After Treatment fatigue;other (see comments)   nausea  -LM     General Information    Patient Profile Review yes  -LM     Onset of Illness/Injury or Date of Surgery Date 10/18/17  -LM     Referring Physician Oral  -LM     General Observations Pt received supine in bed. On-Q and IV intact.  -LM     Pertinent History Of Current Problem s/p R TKA  -LM     Precautions/Limitations fall precautions  -LM     Prior Level of Function independent:;community mobility   with rollator  -LM     Equipment Currently Used at Home cane, quad;rollator;shower chair  -LM walker, rolling  -DS    Plans/Goals Discussed With patient;spouse/S.O.;agreed upon  -LM     Risks Reviewed patient:;spouse/S.O.:;increased discomfort  -LM     Benefits Reviewed patient:;spouse/S.O.:;improve function;increase independence  -LM     Barriers to Rehab none identified  -LM     Living Environment    Lives With spouse  -LM     Living Arrangements house  -LM     Home Accessibility bed and bath on same level;ramps present at home  -LM     Clinical Impression    Date of Referral to PT 10/18/17  -LM     PT Diagnosis R TKA  -LM     Patient/Family Goals Statement Return home.  -LM     Criteria for Skilled Therapeutic Interventions Met yes;treatment indicated  -LM     Rehab Potential good, to achieve stated therapy goals  -LM     Pain Assessment    Pain Assessment 0-10  -LM     Pain Score 4  -LM     Post Pain Score 4  -LM     Pain  Type Acute pain;Surgical pain  -LM     Pain Location Knee  -LM     Pain Orientation Right  -LM     Pain Descriptors Throbbing;Tightness  -LM     Pain Intervention(s) Repositioned;Ambulation/increased activity  -LM     Response to Interventions Tolerated well.  -LM     Cognitive Assessment/Intervention    Current Cognitive/Communication Assessment functional  -LM     Orientation Status oriented x 4  -LM     Follows Commands/Answers Questions 100% of the time;needs cueing  -LM     Personal Safety decreased awareness, need for assist;decreased awareness, need for safety  -LM     Personal Safety Interventions fall prevention program maintained;gait belt;nonskid shoes/slippers when out of bed  -LM     ROM (Range of Motion)    General ROM lower extremity range of motion deficits identified  -LM     General ROM Detail R knee limited s/p TKA  -LM     MMT (Manual Muscle Testing)    General MMT Assessment lower extremity strength deficits identified  -LM     General MMT Assessment Detail R LE limited s/p TKA.  -LM     Mobility Assessment/Training    Extremity Weight-Bearing Status right lower extremity  -LM     Right Lower Extremity Weight-Bearing weight-bearing as tolerated  -LM     Bed Mobility, Assessment/Treatment    Bed Mobility, Assistive Device bed rails;head of bed elevated  -LM     Bed Mob, Supine to Sit, [place] supervision required  -LM     Bed Mobility, Impairments ROM decreased;strength decreased;pain  -LM     Transfer Assessment/Treatment    Transfers, Bed-Chair [place] contact guard assist  -LM     Transfers, Bed-Chair-Bed, Assist Device rolling walker  -LM     Transfers, Sit-Stand [place] contact guard assist  -LM     Transfers, Stand-Sit [place] contact guard assist  -LM     Transfers, Sit-Stand-Sit, Assist Device rolling walker  -LM     Transfer, Safety Issues sequencing ability decreased;step length decreased;weight-shifting ability decreased;steps too close front assistive device  -LM      Transfer, Impairments ROM decreased;strength decreased;impaired balance;pain  -LM     Gait Assessment/Treatment    Gait, [place] Level contact guard assist  -LM     Gait, Assistive Device rolling walker  -LM     Gait, Distance (Feet) 16  -LM     Gait, Gait Pattern Analysis swing-to gait  -LM     Gait, Safety Issues balance decreased during turns;sequencing ability decreased;step length decreased;weight-shifting ability decreased;steps too close front assistive device  -LM     Gait, Impairments ROM decreased;strength decreased;impaired balance;pain  -LM     Positioning and Restraints    Pre-Treatment Position in bed  -LM     Post Treatment Position chair  -LM     In Chair reclined;call light within reach;encouraged to call for assist  -LM       10/18/17 0663       Living Environment    Lives With spouse  -MB     Living Arrangements house  -MB     Home Accessibility no concerns;bed and bath on same level  -MB       User Key  (r) = Recorded By, (t) = Taken By, (c) = Cosigned By    Initials Name Provider Type    DAPHNEY Barakat, RN Registered Nurse    OBIE Pryor, PT Physical Therapist    JOE Mayer, RN Registered Nurse          Physical Therapy Education     Title: PT OT SLP Therapies (Done)     Topic: Physical Therapy (Done)     Point: Mobility training (Done)    Learning Progress Summary    Learner Readiness Method Response Comment Documented by Status   Patient Acceptance E VU Purpose of PT/POC; proper use of RW for safe transfers/ambulation; ther ex for HEP.  10/18/17 1403 Done               Point: Home exercise program (Done)    Learning Progress Summary    Learner Readiness Method Response Comment Documented by Status   Patient Acceptance E VU Purpose of PT/POC; proper use of RW for safe transfers/ambulation; ther ex for HEP.  10/18/17 1403 Done               Point: Precautions (Done)    Learning Progress Summary    Learner Readiness Method Response Comment Documented by Status    Patient Acceptance E VU Purpose of PT/POC; proper use of RW for safe transfers/ambulation; ther ex for HEP. LM 10/18/17 1403 Done                      User Key     Initials Effective Dates Name Provider Type Discipline     10/26/16 -  Venecia Pryor, PT Physical Therapist PT                PT Recommendation and Plan  Anticipated Discharge Disposition: home with home health  Planned Therapy Interventions: balance training, bed mobility training, gait training, home exercise program, patient/family education, strengthening, transfer training  PT Frequency: 2 times/day  Plan of Care Review  Plan Of Care Reviewed With: patient  Outcome Summary/Follow up Plan: PT eval completed with patient s/p R TKA.  She presents with decreased ROM,strength, balance, endurance and independence with mobility but is expected to benefit from continued skilled PT intervention to maximize her functional level.          IP PT Goals       10/18/17 1403          Bed Mobility PT LTG    Bed Mobility PT LTG, Date Established 10/18/17  -LM      Bed Mobility PT LTG, Time to Achieve 2 wks  -LM      Bed Mobility PT LTG, Activity Type supine to sit/sit to supine  -LM      Bed Mobility PT LTG, [place] Level conditional independence  -LM      Bed Mobility PT Goal  LTG, Assist Device bed rails  -LM      Bed Mobility PT LTG, Outcome goal ongoing  -LM      Transfer Training PT LTG    Transfer Training PT LTG, Date Established 10/18/17  -LM      Transfer Training PT LTG, Time to Achieve 2 wks  -LM      Transfer Training PT LTG, Activity Type sit to stand/stand to sit;bed to chair /chair to bed  -LM      Transfer Training PT LTG, [place] Level contact guard assist  -LM      Transfer Training PT LTG, Assist Device walker, rolling  -LM      Transfer Training PT LTG, Outcome goal ongoing  -LM      Gait Training PT LTG    Gait Training Goal PT LTG, Date Established 10/18/17  -LM      Gait Training Goal PT LTG, Time to Achieve 2 wks  -LM      Gait  Training Goal PT LTG, [place] Level contact guard assist  -LM      Gait Training Goal PT LTG, Assist Device walker, rolling  -LM      Gait Training Goal PT LTG, Distance to Achieve 100  -LM      Gait Training Goal PT LTG, Additional Goal WBAT R LE  -LM      Gait Training Goal PT LTG, Outcome goal ongoing  -LM      Strength Goal PT LTG    Strength Goal PT LTG, Date Established 10/18/17  -LM      Strength Goal PT LTG, Time to Achieve 2 wks  -LM      Strength Goal PT LTG, Measure to Achieve Patient will perform LE ther ex x 15 reps to improve functional strength and ROM for mobility.  -LM      Strength Goal PT LTG, Outcome goal ongoing  -LM        User Key  (r) = Recorded By, (t) = Taken By, (c) = Cosigned By    Initials Name Provider Type    OBIE Pryor PT Physical Therapist                Outcome Measures       10/18/17 1318          How much help from another person do you currently need...    Turning from your back to your side while in flat bed without using bedrails? 3  -LM      Moving from lying on back to sitting on the side of a flat bed without bedrails? 3  -LM      Moving to and from a bed to a chair (including a wheelchair)? 3  -LM      Standing up from a chair using your arms (e.g., wheelchair, bedside chair)? 3  -LM      Climbing 3-5 steps with a railing? 2  -LM      To walk in hospital room? 3  -LM      AM-PAC 6 Clicks Score 17  -LM      Functional Assessment    Outcome Measure Options AM-PAC 6 Clicks Basic Mobility (PT)  -LM        User Key  (r) = Recorded By, (t) = Taken By, (c) = Cosigned By    Initials Name Provider Type    OBIE Pryor PT Physical Therapist           Time Calculation:         PT Charges       10/18/17 1406          Time Calculation    Start Time 1318  -LM      PT Received On 10/18/17  -LM      PT Goal Re-Cert Due Date 10/28/17  -LM        User Key  (r) = Recorded By, (t) = Taken By, (c) = Cosigned By    Initials Name Provider Type    OBIE Pryor PT Physical  Therapist          Therapy Charges for Today     Code Description Service Date Service Provider Modifiers Qty    35414661778 HC PT EVAL MOD COMPLEXITY 4 10/18/2017 Venecia Pryor, PT GP 1          PT G-Codes  Outcome Measure Options: AM-PAC 6 Clicks Basic Mobility (PT)      Venecia Pryor, PT  10/18/2017

## 2017-10-18 NOTE — ANESTHESIA PREPROCEDURE EVALUATION
Anesthesia Evaluation     Patient summary reviewed and Nursing notes reviewed   history of anesthetic complications: PONV  NPO Solid Status: > 8 hours  NPO Liquid Status: > 8 hours     Airway   Mallampati: I  TM distance: >3 FB  Neck ROM: full  no difficulty expected  Dental - normal exam   (+) lower dentures, edentulous and upper dentures    Pulmonary - normal exam    breath sounds clear to auscultation  (+) a smoker (Quit Smoking: 10/09/85) Former, COPD mild,   Cardiovascular - normal exam    ECG reviewed  Patient on routine beta blocker and Beta blocker given within 24 hours of surgery  Rhythm: regular  Rate: normal    (+) hypertension well controlled, hyperlipidemia    ROS comment: EKG Sinus Bradycardia, Normal rate 57    Neuro/Psych  (+) psychiatric history Anxiety and Depression,    GI/Hepatic/Renal/Endo    (+) obesity,  GERD, renal disease CRI, diabetes mellitus (Diet controlled, was on meds, none now) well controlled,     Musculoskeletal     (+) back pain (Pt has had two back surgeries.),   Abdominal    Substance History - negative use     OB/GYN negative ob/gyn ROS         Other   (+) arthritis     ROS/Med Hx Other: Chest X-ray No acute cardiopulmonary process.  PT 11.8, INR 1.08  K 4.9  H/H 12.9/40.5  Gluc                                Anesthesia Plan    ASA 3     general   (Risks and benefits of general anesthesia discussed including risk of aspiration, recall and dental damage. All patient questions answered.  Patient told a breathing tube will be used to manage the airway.  Patient has had two back surgeries with fusion and is not a candidate for spinal anes.    Plans for Adductor Canal Block with On-Q catheter and I-VANNA or selective posterior tibial nerve block will be placed post procedure for POPC    Will continue with plan of care.)  intravenous induction   Anesthetic plan and risks discussed with patient.

## 2017-10-18 NOTE — PLAN OF CARE
Problem: Patient Care Overview (Adult)  Goal: Plan of Care Review  Outcome: Ongoing (interventions implemented as appropriate)    10/18/17 1403   Coping/Psychosocial Response Interventions   Plan Of Care Reviewed With patient   Outcome Evaluation   Outcome Summary/Follow up Plan PT deeal completed with patient s/p R TKA. She presents with decreased ROM,strength, balance, endurance and independence with mobility but is expected to benefit from continued skilled PT intervention to maximize her functional level.         Problem: Inpatient Physical Therapy  Goal: Bed Mobility Goal LTG- PT  Outcome: Ongoing (interventions implemented as appropriate)    10/18/17 1403   Bed Mobility PT LTG   Bed Mobility PT LTG, Date Established 10/18/17   Bed Mobility PT LTG, Time to Achieve 2 wks   Bed Mobility PT LTG, Activity Type supine to sit/sit to supine   Bed Mobility PT LTG, [place] Level conditional independence   Bed Mobility PT Goal LTG, Assist Device bed rails   Bed Mobility PT LTG, Outcome goal ongoing       Goal: Transfer Training Goal 1 LTG- PT  Outcome: Ongoing (interventions implemented as appropriate)    10/18/17 1403   Transfer Training PT LTG   Transfer Training PT LTG, Date Established 10/18/17   Transfer Training PT LTG, Time to Achieve 2 wks   Transfer Training PT LTG, Activity Type sit to stand/stand to sit;bed to chair /chair to bed   Transfer Training PT LTG, [place] Level contact guard assist   Transfer Training PT LTG, Assist Device walker, rolling   Transfer Training PT LTG, Outcome goal ongoing       Goal: Gait Training Goal LTG- PT  Outcome: Ongoing (interventions implemented as appropriate)    10/18/17 1403   Gait Training PT LTG   Gait Training Goal PT LTG, Date Established 10/18/17   Gait Training Goal PT LTG, Time to Achieve 2 wks   Gait Training Goal PT LTG, [place] Level contact guard assist   Gait Training Goal PT LTG, Assist Device walker, rolling   Gait Training Goal PT LTG, Distance  to Achieve 100   Gait Training Goal PT LTG, Additional Goal WBAT R LE   Gait Training Goal PT LTG, Outcome goal ongoing       Goal: Strength Goal LTG- PT  Outcome: Ongoing (interventions implemented as appropriate)    10/18/17 1403   Strength Goal PT LTG   Strength Goal PT LTG, Date Established 10/18/17   Strength Goal PT LTG, Time to Achieve 2 wks   Strength Goal PT LTG, Measure to Achieve Patient will perform LE ther ex x 15 reps to improve functional strength and ROM for mobility.   Strength Goal PT LTG, Outcome goal ongoing

## 2017-10-18 NOTE — ANESTHESIA PROCEDURE NOTES
Airway  Urgency: elective    Airway not difficult    General Information and Staff    Patient location during procedure: OR  CRNA: SHANKAR MUKHERJEE    Indications and Patient Condition  Indications for airway management: airway protection    Preoxygenated: yes  Mask difficulty assessment: 1 - vent by mask    Final Airway Details  Final airway type: endotracheal airway      Successful airway: ETT  Cuffed: yes   Successful intubation technique: direct laryngoscopy  Facilitating devices/methods: intubating stylet  Endotracheal tube insertion site: oral  Blade: [place]  Blade size: #2  ETT size: 7.5 mm  Cormack-Lehane Classification: grade I - full view of glottis  Placement verified by: chest auscultation and capnometry   Measured from: lips  ETT to lips (cm): 22  Number of attempts at approach: 1    Additional Comments  Airway placed without problems. Dentition and Lips as pre-induction. ETT cuff inflated to minimal occlusive pressure.

## 2017-10-18 NOTE — OP NOTE
Date of surgery:10/18/17  Surgeon: Doc Mixon M.D.  Preoperative diagnosis: Right knee osteoarthritis tricompartmental  Postoperative diagnosis: Same  Indications for procedure: Severe unrelenting medial and anterior right knee pain refractory to conservative treatment with medications and injections, x-rays confirm grade 4 osteoarthritis with bone-on-bone wear of the medial and patellofemoral compartments with significant periarticular osteophytes.  Name of procedure: Right total knee arthroplasty  Anesthesia: Gen. with regional block   Estimated blood loss: 100 cc  Tourniquet time:36 min  Drains:none  Specimens: Distal femur proximal tibia and patellar bone resections  Implant: [employer] BCS II size 4 right, size 3 tibia, 12 mm poly-, patella 29 x 9 mm.  Description of procedure: Patient was brought to our . suitable level of anesthesia given IV antibiotic prophylaxis in usual standard fashion.  He was in supine position appendectomy table knee was placed through full range of motion was a 2° loss of extension good stability skin appeared normal anteriorly the right knee.  Prep and drape of the knee was carried out in usual manner, standard linear incision was made to expose knee joint using a medial para-tendon quadriceps exposure reflecting the patella laterally performing patellar osteotomy and sizing with placement of a 29 x 9 patellar trial standard fashion.  Prolific amounts of osteophytes and wear of the articular surfaces were noted at the medial lateral and patellofemoral compartments, care was taken to remove osteophytes to expose the anatomic medial epicondylar and lateral, regions on the femur and also to protect the quadriceps and patellar tendons at the inferior and superior pole the patella.  distal femur intramedullary referencing was used posterior condylar rotational 3° rotation and 5° valgus angle was selected, a size 4 femur was cut in the standard fashion.  Proximal tibia  was exposed remnants of medial and lateral meniscal tissue were removed with cautery device ACL PCL were sacrificed posterior capsule was released slightly along the medial aspect 3 mm reference was made referencing the medial compartment resecting the proximal tibia in line with the second metatarsal the foot for correct coronal as well as sagittal alignment and the 3 mm resection gave good exposure and sizing of the proximal tibia to allow placement of the size 3 right tibial trial final preparation of femur and tibia were carried out in usual manner.  Pulsatile lavage is used Esmarch was placed to exsanguinate the upper thigh tourniquet was inflated to 3 mmHg.  Local anesthetic mixture was used and a periarticular block method followed by pulsatile lavage and drying cement mixing under vacuum conditions and then placed with pressurization components were placed and excess cement removed wound was irrigated after cement had hardened knee was placed through full range of motion good stability good flexion-extension gaps and no instability at mid flexion region.  Iodine irrigation was used left in place for 3 minutes in the standard mixture, standard closure was carried out with interrupted Vicryl sutures followed by strata fracture deep layer subcutaneous tissue and running Monocryl subcuticular suture was placed followed by the final Dermabond application.  The knee had full extension ,  flexion of 140 extension,  good stability dressings were applied the tourniquet was deflated there were no complications and she returned to recovery room in stable condition.

## 2017-10-18 NOTE — ANESTHESIA POSTPROCEDURE EVALUATION
Patient: Lata Justice    Procedure Summary     Date Anesthesia Start Anesthesia Stop Room / Location    10/18/17 0800 1051 [facility] 5 / [facility]       Procedure Diagnosis Surgeon Provider     TOTAL KNEE ARTHROPLASTY RIGHT ELECTIVE  (Right Knee) Pre-op testing; Primary osteoarthritis of right knee; Primary osteoarthritis of both knees; Arthralgia of both knees  (Pre-op testing [Z01.818]; Primary osteoarthritis of right knee [M17.11]; Primary osteoarthritis of both knees [M17.0]; Arthralgia of both knees [M25.561, M25.562]) MD Destin Cloud CRNA          Anesthesia Type: general  Last vitals  BP   181/85   Temp   97.4   Pulse   87   Resp   24    SpO2   96     Post Anesthesia Care and Evaluation    Patient location during evaluation: PACU  Patient participation: complete - patient participated  Level of consciousness: awake  Pain management: inadequate  Airway patency: patent  Anesthetic complications: No anesthetic complications  PONV Status: none  Cardiovascular status: acceptable  Respiratory status: acceptable, face mask, spontaneous ventilation and nonlabored ventilation  Hydration status: acceptable    Comments: Treating hypertensive and duoneb has been ordered.

## 2017-10-18 NOTE — THERAPY EVALUATION
"Acute Care - Occupational Therapy Initial Evaluation  [facility]     Patient Name: aLta Justice  : 1945  MRN: [id number]  Today's Date: 10/18/2017  Onset of Illness/Injury or Date of Surgery Date: 10/18/17  Date of Referral to OT: 10/18/17  Referring Physician: Dr. Mixon    Admit Date: 10/18/2017       ICD-10-CM ICD-9-CM   1. Impaired functional mobility, balance, gait, and endurance Z74.09 V49.89   2. Arthralgia of both knees M25.561 719.46    M25.562    3. Primary osteoarthritis of both knees M17.0 715.16   4. Pre-op testing Z01.818 V72.84   5. Primary osteoarthritis of right knee M17.11 715.16   6. Myalgia  M79.1 729.1   7. Pain of right lower extremity  M79.604 729.5   8. B12 deficiency E53.8 266.2   9. Impaired mobility and ADLs Z74.09 799.89     Patient Active Problem List   Diagnosis   • Adkins's esophagus   • Benign essential hypertension   • Chronic low back pain   • Chronic obstructive pulmonary disease   • Depression   • Impaired glucose tolerance   • Hyperlipidemia   • Seasonal allergic rhinitis   • Bilateral chronic knee pain   • Bilateral edema of lower extremity   • Bilateral wrist pain   • Chronic pain of both ankles   • Chronic renal insufficiency, stage III (moderate)   • Primary osteoarthritis of right knee   • Arthralgia of both knees   • Borderline diabetes   • Status post right knee replacement     Past Medical History:   Diagnosis Date   • Abnormal finding     STATED SHE \"WOKE\" UP DURING AN EGD, BREAST BIOPSY, BACK SURGERY   • Acute colitis    • Allergic    • Anxiety    • Arthritis    • Body piercing     EARS   • Cataracts, bilateral    • Depression    • Depression    • Diabetes mellitus     DIET CONTROLLED   • Full dentures    • GERD (gastroesophageal reflux disease)    • Glaucoma    • High cholesterol    • Hx of colonic polyps    • Hypertension    • Injury of back    • Low back pain    • Obesity    • Osteoarthritis    • Pneumonia    • PONV (postoperative nausea and vomiting)    • " Wears glasses      Past Surgical History:   Procedure Laterality Date   • BACK SURGERY      Lower back X2   • CHOLECYSTECTOMY     • COLONOSCOPY     • DILATION AND CURETTAGE, DIAGNOSTIC / THERAPEUTIC     • ENDOSCOPY     • HYSTERECTOMY      Total Hysterectomy   • TUBAL ABDOMINAL LIGATION            OT ASSESSMENT FLOWSHEET (last 72 hours)      OT Evaluation       10/18/17 1431 10/18/17 1320 10/18/17 1318 10/18/17 1233 10/18/17 0644    Rehab Evaluation    Document Type  evaluation  -SD evaluation  -LM      Subjective Information  agree to therapy;complains of;pain  -SD agree to therapy;complains of;pain  -LM      Patient Effort, Rehab Treatment  adequate  -SD adequate  -LM      Symptoms Noted During/After Treatment  dizziness;fatigue   nausea  -SD fatigue;other (see comments)   nausea  -LM      General Information    Patient Profile Review  yes  -SD yes  -LM      Onset of Illness/Injury or Date of Surgery Date  10/18/17  -SD 10/18/17  -LM      Referring Physician  Dr. Mixon  -SD Oral  -LM      General Observations  Supine in bed IV and On-Q intact  -SD Pt received supine in bed. On-Q and IV intact.  -LM      Pertinent History Of Current Problem  s/p R TKA  -SD s/p R TKA  -LM      Precautions/Limitations  fall precautions  -SD fall precautions  -LM      Prior Level of Function  independent:;all household mobility;community mobility;ADL's  -SD independent:;community mobility   with rollator  -LM      Equipment Currently Used at Home commode  -LP cane, quad;rollator;shower chair  -SD cane, quad;rollator;shower chair  -LM walker, rolling  -DS     Plans/Goals Discussed With  patient;agreed upon  -SD patient;spouse/S.O.;agreed upon  -LM      Risks Reviewed  patient:;dizziness;increased discomfort  -SD patient:;spouse/S.O.:;increased discomfort  -LM      Benefits Reviewed  patient:;improve function;increase independence;increase strength;increase balance  -SD patient:;spouse/S.O.:;improve function;increase independence  -LM       Barriers to Rehab  none identified  -SD none identified  -LM      Living Environment    Lives With  spouse  -SD spouse  -LM  spouse  -MB    Living Arrangements  house  -SD house  -LM  house  -MB    Home Accessibility  bed and bath on same level;ramps present at home  -SD bed and bath on same level;ramps present at home  -LM  no concerns;bed and bath on same level  -MB    Stair Railings at Home  none  -SD       Type of Financial/Environmental Concern  none  -SD       Transportation Available  car;family or friend will provide  -SD       Clinical Impression    Date of Referral to OT  10/18/17  -SD       OT Diagnosis  ADL decline  -SD       Impairments Found (describe specific impairments)  aerobic capacity/endurance;gait, locomotion, and balance  -SD       Criteria for Skilled Therapeutic Interventions Met  yes  -SD       Rehab Potential  good, to achieve stated therapy goals  -SD       Therapy Frequency  3-5 times/wk  -SD       Anticipated Discharge Disposition  home with home health  -SD       Functional Level Prior    Ambulation    1-->assistive equipment  -DS 1-->assistive equipment  -MB    Transferring    1-->assistive equipment  -DS 0-->independent  -MB    Toileting    1-->assistive equipment  -DS 0-->independent  -MB    Bathing    1-->assistive equipment  -DS 0-->independent  -MB    Dressing    1-->assistive equipment  -DS 0-->independent  -MB    Eating    0-->independent  -DS 0-->independent  -MB    Communication    0-->understands/communicates without difficulty  -DS 0-->understands/communicates without difficulty  -MB    Swallowing    0-->swallows foods/liquids without difficulty  -DS 0-->swallows foods/liquids without difficulty  -MB    Prior Functional Level Comment    none  -DS     Pain Assessment    Pain Assessment  0-10  -SD 0-10  -LM      Pain Score  4  -SD 4  -LM      Post Pain Score  4  -SD 4  -LM      Pain Type  Acute pain;Surgical pain  -SD Acute pain;Surgical pain  -LM      Pain Location  Knee   -SD Knee  -LM      Pain Orientation  Right  -SD Right  -LM      Pain Descriptors  Throbbing;Tightness  -SD Throbbing;Tightness  -LM      Pain Intervention(s)  Repositioned;Ambulation/increased activity  -SD Repositioned;Ambulation/increased activity  -LM      Response to Interventions  Tolerated  -SD Tolerated well.  -LM      Vision Assessment/Intervention    Visual Impairment  WFL with corrective lenses  -SD       Cognitive Assessment/Intervention    Current Cognitive/Communication Assessment  functional  -SD functional  -LM      Orientation Status  oriented x 4  -SD oriented x 4  -LM      Follows Commands/Answers Questions  100% of the time;needs cueing  -% of the time;needs cueing  -LM      Personal Safety  decreased awareness, need for assist;decreased awareness, need for safety  -SD decreased awareness, need for assist;decreased awareness, need for safety  -LM      Personal Safety Interventions  fall prevention program maintained;gait belt  -SD fall prevention program maintained;gait belt;nonskid shoes/slippers when out of bed  -LM      ROM (Range of Motion)    General ROM   lower extremity range of motion deficits identified  -LM      General ROM Detail  s/p R TKA  -SD R knee limited s/p TKA  -LM      MMT (Manual Muscle Testing)    General MMT Assessment  upper extremity strength deficits identified  -SD lower extremity strength deficits identified  -LM      General MMT Assessment Detail  4-/5   s/p R TKA  -SD R LE limited s/p TKA.  -LM      Mobility Assessment/Training    Extremity Weight-Bearing Status  right lower extremity  -SD right lower extremity  -LM      Right Lower Extremity Weight-Bearing  weight-bearing as tolerated  -SD weight-bearing as tolerated  -LM      Bed Mobility, Assessment/Treatment    Bed Mobility, Assistive Device  bed rails;head of bed elevated  -SD bed rails;head of bed elevated  -LM      Bed Mob, Supine to Sit, [place]  supervision required  -SD supervision required  -LM       Bed Mobility, Impairments  ROM decreased;strength decreased  -SD ROM decreased;strength decreased;pain  -LM      Transfer Assessment/Treatment    Transfers, Bed-Chair [place]  contact guard assist  -SD contact guard assist  -LM      Transfers, Bed-Chair-Bed, Assist Device  rolling walker  -SD rolling walker  -LM      Transfers, Sit-Stand [place]  contact guard assist  -SD contact guard assist  -LM      Transfers, Stand-Sit [place]  contact guard assist  -SD contact guard assist  -LM      Transfers, Sit-Stand-Sit, Assist Device  rolling walker  -SD rolling walker  -LM      Transfer, Safety Issues  balance decreased during turns;sequencing ability decreased;step length decreased;weight-shifting ability decreased  -SD sequencing ability decreased;step length decreased;weight-shifting ability decreased;steps too close front assistive device  -LM      Transfer, Impairments  ROM decreased;strength decreased;impaired balance  -SD ROM decreased;strength decreased;impaired balance;pain  -LM      Functional Mobility    Functional Mobility- Ind. Level  contact guard assist  -SD       Functional Mobility- Device  rolling walker  -SD       Functional Mobility-Distance (Feet)  16  -SD       Functional Mobility- Safety Issues  balance decreased during turns;sequencing ability decreased;weight-shifting ability decreased;step length decreased  -SD       Upper Body Bathing Assessment/Training    UB Bathing Assess/Train, [place] Level  supervision required  -SD       Lower Body Bathing Assessment/Training    LB Bathing Assess/Train, [place] Level  moderate assist (50% patient effort)  -SD       Upper Body Dressing Assessment/Training    UB Dressing Assess/Train, [place]  supervision required  -SD       Lower Body Dressing Assessment/Training    LB Dressing Assess/Train, [place]  moderate assist (50% patient effort)  -SD       Toileting Assessment/Training    Toileting Assess/Train, Indepen Level   minimum assist (75% patient effort)  -SD       Grooming Assessment/Training    Grooming Assess/Train, Indepen Level  set up required  -SD       Positioning and Restraints    Pre-Treatment Position  in bed  -SD in bed  -LM      Post Treatment Position  chair  -SD chair  -LM      In Chair  reclined;call light within reach;encouraged to call for assist  -SD reclined;call light within reach;encouraged to call for assist  -LM        User Key  (r) = Recorded By, (t) = Taken By, (c) = Cosigned By    Initials Name Effective Dates    DS Jazmine Barakat, RN 10/26/16 -     LM Venecia Pryor, PT 10/26/16 -     LP Alexia Hazel 10/26/16 -     JOE Mayer, RN 10/26/16 -     SD Latasha Nix OT 06/30/17 -            Occupational Therapy Education     Title: PT OT SLP Therapies (Active)     Topic: Occupational Therapy (Active)     Point: ADL training (Done)    Description: Instruct learner(s) on proper safety adaptation and remediation techniques during self care or transfers.   Instruct in proper use of assistive devices.    Learning Progress Summary    Learner Readiness Method Response Comment Documented by Status   Patient Acceptance E VU Benefits of OT and OT POC SD 10/18/17 1458 Done                      User Key     Initials Effective Dates Name Provider Type Discipline    SD 06/30/17 -  Latasha Nix, OT Occupational Therapist OT                  OT Recommendation and Plan  Anticipated Discharge Disposition: home with home health  Therapy Frequency: 3-5 times/wk  Plan of Care Review  Plan Of Care Reviewed With: patient  Progress: no change  Outcome Summary/Follow up Plan: OT eval completed. Patient presents deficits in strength, endurance, balance, mobility and ADL performance. Patient is expected to benefit from OT services to improve functional performance and mobility prior to DC.          OT Goals       10/18/17 1454          Transfer Training 2 OT LTG    Transfer Training 2 OT LTG, Date Established  10/18/17  -SD      Transfer Training 2 OT LTG, Time to Achieve 2 wks  -SD      Transfer Training 2 OT LTG, Activity Type sit to stand/stand to sit  -SD      Transfer Training 2 OT LTG, Door Level supervision required  -SD      Transfer Training 2 OT LTG, Assist Device walker, rolling  -SD      Transfer Training 2 OT LTG, Outcome goal ongoing  -SD      Strength OT LTG    Strength Goal OT LTG, Date Established 10/18/17  -SD      Strength Goal OT LTG, Time to Achieve 2 wks  -SD      Strength Goal OT LTG, Measure to Achieve Patient will perform 15 reps UB ther ex using theraband in order to increase strength and endurance.   -SD      Strength Goal OT LTG, Outcome goal ongoing  -SD      Patient Education OT LTG    Patient Education OT LTG, Date Established 10/18/17  -SD      Patient Education OT LTG, Time to Achieve 2 wks  -SD      Patient Education OT LTG, Education Type adaptive equipment mgmt  -SD      Patient Education OT LTG, Education Understanding demonstrates adequately;verbalizes understanding  -SD      Patient Education OT LTG Outcome goal ongoing  -SD      LB Dressing OT LTG    LB Dressing Goal OT LTG, Date Established 10/18/17  -SD      LB Dressing Goal OT LTG, Time to Achieve 2 wks  -SD      LB Dressing Goal OT LTG, Door Level supervision required  -SD      LB Dressing Goal OT LTG, Adaptive Equipment reacher;sock-aid  -SD      LB Dressing Goal OT LTG, Outcome goal ongoing  -SD      Functional Mobility OT LTG    Functional Mobility Goal OT LTG, Date Established 10/18/17  -SD      Functional Mobility Goal OT LTG, Time to Achieve 2 wks  -SD      Functional Mobility Goal OT LTG, Door Level contact guard  -SD      Functional Mobility Goal OT LTG, Assist Device rolling walker  -SD      Functional Mobility Goal OT LTG, Distance to Achieve in hallway  -SD      Functional Mobility Goal OT LTG, Additional Goal 50  -SD      Functional Mobility Goal OT LTG, Outcome goal ongoing  -SD        User  Key  (r) = Recorded By, (t) = Taken By, (c) = Cosigned By    Initials Name Provider Type    PH[place]ENIX Nix OT Occupational Therapist                Outcome Measures       10/18/17 1320 10/18/17 1318       How much help from another person do you currently need...    Turning from your back to your side while in flat bed without using bedrails?  3  -LM     Moving from lying on back to sitting on the side of a flat bed without bedrails?  3  -LM     Moving to and from a bed to a chair (including a wheelchair)?  3  -LM     Standing up from a chair using your arms (e.g., wheelchair, bedside chair)?  3  -LM     Climbing 3-5 steps with a railing?  2  -LM     To walk in hospital room?  3  -LM     AM-PAC 6 Clicks Score  17  -LM     How much help from another is currently needed...    Putting on and taking off regular lower body clothing? 2  -SD      Bathing (including washing, rinsing, and drying) 2  -SD      Toileting (which includes using toilet bed pan or urinal) 3  -SD      Putting on and taking off regular upper body clothing 4  -SD      Taking care of personal grooming (such as brushing teeth) 4  -SD      Eating meals 4  -SD      Score 19  -SD      Functional Assessment    Outcome Measure Options AM-PAC 6 Clicks Daily Activity (OT)  -SD AM-PAC 6 Clicks Basic Mobility (PT)  -LM       User Key  (r) = Recorded By, (t) = Taken By, (c) = Cosigned By    Initials Name Provider Type    OBIE Pryor, PT Physical Therapist    [place] Nix OT Occupational Therapist          Time Calculation:   OT Start Time: 1320    Therapy Charges for Today     Code Description Service Date Service Provider Modifiers Qty    [id number]  OT EVAL LOW COMPLEXITY 4 10/18/2017 Latasha Nix OT GO 1               Latasha Nix OT  10/18/2017

## 2017-10-18 NOTE — PLAN OF CARE
Problem: Perioperative Period (Adult)  Intervention: Promote Pulmonary Hygiene and Secretion Clearance    10/18/17 1045   Activity   Activity Type bedrest   Promote Aggressive Pulmonary Hygiene/Secretion Management   Cough And Deep Breathing done independently per patient   Positioning   Head Of Bed (HOB) Position HOB at 20-30 degrees       Intervention: Prevent/Manage Post-surgical Infection    10/18/17 1045   Prevent/Manage Colorectal Surgical Infection   Fever Reduction/Comfort Measures ice pack(s)       Intervention: Prevent Nimo-procedural Injury    10/18/17 1045   Positioning   Positioning semi-Fowlers   Head Of Bed (HOB) Position HOB at 20-30 degrees       Intervention: Prevent/Manage DVT/VTE Risk    10/18/17 1045   Support Surgical/Anesthesia Recovery   Venous Thromboembolism Prevent/Manage sequential compression devices on       Intervention: Monitor/Manage Postoperative Bleeding    10/18/17 1045   Safety Interventions   Bleeding Management dressing monitored       Intervention: Promote Normothermia    10/18/17 1045   Cardiac Interventions   Warming Thermoregulation Maintenance warm blankets applied;skin exposure time minimized

## 2017-10-18 NOTE — CONSULTS
[facility] HOSPITALIST   CONSULTATION      Name:  Lata Justice   Age:  72 y.o.  Sex:  female  :  1945  MRN:  [id number]   Visit Number:  [id number]  Admission Date:  10/18/2017  Date Of Service:  10/18/17  Primary Care Physician:  Annetta Mcclendon MD    Consulting Physician:    FELIX Quintanilla    Referring Physician:  Dr. Mixon    Reason For Consult:    Medical management    Chief Complaint:   Right knee pain      History Of Presenting Illness:    This is a 72-year-old female who presented to Dr. Mixon's office for complaints of right knee pain.  According to the patient she has a history of osteoarthritis which had continued to worsen to the point that she was using a walker.  She had tried cortisone injections, viscous supplementation, analgesics, and anti-inflammatories with little to no relief.  She had gotten to the point that she was having difficulty with her activities of daily living due to the knee pain.  On the day of evaluation by Dr. Mixon treatment options were discussed including a right total knee replacement.  At that time patient had elected to undergo a right total knee replacement.  A consult was requested of the hospitalist service postoperatively for medical management.  She does have a history of hypertension and borderline diabetes mellitus.  He is currently lying in bed with complaints of pain in her right knee.  She denies any chest pain, shortness of breath, nausea, vomiting.      Review Of Systems:     General ROS: Patient denies any fevers, chills or loss of consciousness. Complains of generalized weakness.   Psychological ROS: Denies any hallucinations and delusions.  Ophthalmic ROS: Denies any diplopia or transient loss of vision.  ENT ROS: Denies sore throat, nasal congestion or ear pain.   Allergy and Immunology ROS: Denies rash or itching.  Hematological and Lymphatic ROS: Denies neck swelling or easy bleeding.  Endocrine ROS: Denies any  "recent unintentional weight gain or loss.  Breast ROS: Denies any pain or swelling.  Respiratory ROS: Denies cough or shortness of breath.   Cardiovascular ROS: Denies chest pain or palpitations. No history of exertional chest pain.   Gastrointestinal ROS: Denies nausea and vomiting. Denies any abdominal pain. No diarrhea.   Genito-Urinary ROS: Denies dysuria or hematuria.  Musculoskeletal ROS: Complains of chronic back pain. No muscle pain. No calf pain. Neurological ROS: Denies any focal weakness. No loss of consciousness. Denies any numbness. Denies neck pain.   Dermatological ROS: Denies any redness or pruritis.     Past Medical History:    Past Medical History:   Diagnosis Date   • Abnormal finding     STATED SHE \"WOKE\" UP DURING AN EGD, BREAST BIOPSY, BACK SURGERY   • Acute colitis    • Allergic    • Anxiety    • Arthritis    • Body piercing     EARS   • Cataracts, bilateral    • Depression    • Depression    • Diabetes mellitus     DIET CONTROLLED   • Full dentures    • GERD (gastroesophageal reflux disease)    • Glaucoma    • High cholesterol    • Hx of colonic polyps    • Hypertension    • Injury of back    • Low back pain    • Obesity    • Osteoarthritis    • Pneumonia    • PONV (postoperative nausea and vomiting)    • Wears glasses        Past Surgical history:    Past Surgical History:   Procedure Laterality Date   • BACK SURGERY      Lower back X2   • CHOLECYSTECTOMY     • COLONOSCOPY     • DILATION AND CURETTAGE, DIAGNOSTIC / THERAPEUTIC     • ENDOSCOPY     • HYSTERECTOMY      Total Hysterectomy   • TUBAL ABDOMINAL LIGATION         Social History:    Social History     Social History   • Marital status:      Spouse name: N/A   • Number of children: N/A   • Years of education: N/A     Occupational History   • Not on file.     Social History Main Topics   • Smoking status: Former Smoker     Years: 4.00     Types: Cigarettes     Quit date: 10/9/1985   • Smokeless tobacco: Never Used      Comment: " 1-2 CIGARETTES PER DAY/SOCIAL   • Alcohol use No   • Drug use: No   • Sexual activity: Defer     Other Topics Concern   • Not on file     Social History Narrative       Family History:    Family History   Problem Relation Age of Onset   • Depression Daughter    • Mental illness Daughter    • Obesity Daughter    • Other Daughter      chronic back pain   • Arthritis Other    • Diabetes Other    • Hypertension Other    • Obesity Other    • Hyperlipidemia Other      high cholesterol       Allergies:      Acetaminophen; Ezetimibe; Fluticasone; Fluticasone propionate; Furosemide; Loratadine; Mometasone furoate; Propoxyphene; Sertraline hcl; Triamcinolone; and Triamcinolone acetonide    Home Medications:    Prior to Admission Medications     Prescriptions Last Dose Informant Patient Reported? Taking?    ALPRAZolam (XANAX) 0.25 MG tablet 10/17/2017  Yes Yes    2 (two) times a day.    cetirizine (zyrTEC) 10 MG tablet 10/17/2017  No Yes    TAKE ONE TABLET BY MOUTH EVERY DAY    esomeprazole (NEXIUM) 40 MG capsule Past Week  Yes Yes    Take 1 capsule by mouth daily.    FLUoxetine (PROzac) 20 MG capsule 10/17/2017  No Yes    TAKE 3 CAPSULES BY MOUTH EVERY MORNING    gabapentin (NEURONTIN) 400 MG capsule 10/17/2017  No Yes    Take 2 capsules by mouth 3 (Three) Times a Day.    hydrochlorothiazide (HYDRODIURIL) 25 MG tablet 10/17/2017  No Yes    TAKE ONE TABLET BY MOUTH EVERY DAY    lisinopril (PRINIVIL,ZESTRIL) 40 MG tablet 10/17/2017  No Yes    TAKE ONE TABLET BY MOUTH EVERY DAY    lisinopril (PRINIVIL,ZESTRIL) 40 MG tablet 10/17/2017  Yes Yes    Take 40 mg by mouth Daily.    meloxicam (MOBIC) 7.5 MG tablet 10/17/2017  No Yes    TAKE ONE TABLET BY MOUTH EVERY DAY    metoprolol tartrate (LOPRESSOR) 25 MG tablet 10/17/2017  No Yes    TAKE ONE TABLET BY MOUTH 2 TIMES A DAY    pravastatin (PRAVACHOL) 40 MG tablet 10/17/2017  No Yes    TAKE ONE TABLET BY MOUTH EVERY DAY    PROAIR  (90 Base) MCG/ACT inhaler Past Week  No Yes     INHALE 2 PUFFS INTO THE LUNGS EVERY 4 TO 6 HOURS AS NEEDED FOR WHEEZING.    ASPIR-LOW 81 MG EC tablet 10/11/2017  No No    TAKE ONE TABLET BY MOUTH AT BEDTIME    cyanocobalamin injection 1,000 mcg   No No             Hospital Scheduled Meds:      ALPRAZolam 0.25 mg Oral Q12H   aspirin 81 mg Oral Once   atorvastatin 10 mg Oral Nightly   bupivacaine PF      ceFAZolin      ceFAZolin 2 g Intravenous Q8H   [START ON 10/26/2017] cyanocobalamin 1,000 mcg Intramuscular Q30 Days   dexamethasone sodium phosphate      [START ON 10/19/2017] enoxaparin 40 mg Subcutaneous Daily   EPINEPHrine HCl PF      gabapentin 800 mg Oral TID   lisinopril 40 mg Oral Nightly   meloxicam 7.5 mg Oral Daily   meloxicam 7.5 mg Oral Daily   [START ON 10/19/2017] pantoprazole 40 mg Oral QAM         Bupivacaine HCl-NaCl (PF) 4-14 mL/hr Last Rate: 8 mL/hr (10/18/17 1107)   lactated ringers 100 mL/hr         Vital Signs:    Temp:  [97.5 °F (36.4 °C)-98.2 °F (36.8 °C)] 98.1 °F (36.7 °C)  Heart Rate:  [60-94] 83  Resp:  [14-19] 19  BP: (135-198)/(60-91) 157/62    There were no vitals filed for this visit.    There is no height or weight on file to calculate BMI.    Physical Exam:    General Appearance:    Alert and cooperative, not in any acute distress.   Head:    Atraumatic and normocephalic, without obvious abnormality.   Eyes:            PERRLA, conjunctivae and sclerae normal, no Icterus. No pallor. Extra-occular movements are within normal limits.   Ears:    Ears appear intact with no abnormalities noted.   Throat:   No oral lesions, no thrush, oral mucosa moist.   Neck:   Supple, trachea midline, no thyromegaly, no carotid bruit.   Back:     No kyphoscoliosis present. No tenderness to palpation,   range of motion normal.   Lungs:     Chest shape is normal. Breath sounds heard bilaterally equally.  No crackles or wheezing. No Pleural rub or bronchial breathing.      Heart:    Normal S1 and S2, no murmur, no gallop, no rub. No JVD   Abdomen:      Normal bowel sounds, no masses, no organomegaly. Soft        non-tender, non-distended, no guarding, no rebound                tenderness   Extremities:   Moves all extremities well, no edema, no cyanosis, no             Clubbing, dressing right knee clean and dry.     Pulses:   Pulses palpable and equal bilaterally   Skin:   No bleeding, bruising or rash, no skin breakdown, pink, warm and dry   Lymph nodes:   No palpable adenopathy   Neurologic:   Cranial nerves 2 - 12 grossly intact, sensation intact, Motor power is normal and equal bilaterally.           Labs:    Lab Results (last 24 hours)     Procedure Component Value Units Date/Time    POC Glucose Fingerstick [[id number]]  (Normal) Collected:  10/18/17 0656    Specimen:  Blood Updated:  10/18/17 0700     Glucose 105 mg/dL       Serial Number: [id number]:  [id number]       Protime-INR [[id number]] Collected:  10/18/17 1247    Specimen:  Blood Updated:  10/18/17 1253          Radiology:    Imaging Results (last 72 hours)     Procedure Component Value Units Date/Time    XR Knee 1 or 2 View Right [[id number]] Collected:  10/18/17 1201     Updated:  10/18/17 1204    Narrative:       PROCEDURE: XR KNEE 1 OR 2 VW RIGHT-     HISTORY: Post-Op Knee Arthoplasty; M25.561-Pain in right knee;  M25.562-Pain in left knee; M17.0-Bilateral primary osteoarthritis of  knee; Z01.818-Encounter for other preprocedural examination;  M17.11-Unilateral primary osteoarthritis, right knee; M79.1-Myalgia;  M79.604-Pain in right leg; E53.8-Deficiency of other specified B group  vitamins     COMPARISON: September 26, 2017.     FINDINGS:  A 2 view exam demonstrates total right knee arthroplasty.   There are no hardware complications.  The expected postoperative fluid  and gas is seen in the soft tissues.           Impression:       Status post total right knee arthroplasty with no hardware  complications.                 This report was finalized on 10/18/2017 12:02 PM by Stacey  CINDA Parrish.          Assessment and Recommendations/Plan:   1.  Borderline diabetes mellitus-monitor blood sugars complaint on and some protocol.    2.  Right total knee replacement-patient on analgesics, DVT prophylaxis, PT OT consult as well as case management consult for discharge planning    3.  Chronic essential hypertension-  Continue home BP medications    4.  Chronic Kidney Disease CKD III        Eugenia Briceno, FELIX  10/18/17  1:06 PM

## 2017-10-18 NOTE — PROGRESS NOTES
Discharge Planning Assessment   [facility]     Patient Name: Lata Justice  MRN: [id number]  Today's Date: 10/18/2017    Admit Date: 10/18/2017          Discharge Needs Assessment       10/18/17 1431    Living Environment    Primary Care Provided By spouse/significant other    Quality Of Family Relationships supportive    Discharge Needs Assessment    Concerns To Be Addressed discharge planning concerns    Equipment Currently Used at Home commode    Discharge Contact Information if Applicable Lata Justice  [phone]            Discharge Plan       10/18/17 1448    Case Management/Social Work Plan    Additional Comments Spoke to [facility] plans .She lives with her   whom will transport  and assist as needed .She has a Walker Shower chair BSC .She has had [facility]  And would like to go there FU Called they do not have  an Appointment unit 10/30  [facility] pt are not in network Called [facility] fax Face sheet to see if they can provide Home Health Case Management to follow         Discharge Placement     No information found                Demographic Summary       10/18/17 1430    Referral Information    Admission Type inpatient    Arrived From home or self-care    Referral Source admission list    Contact Information    Permission Granted to Share Information With     Primary Care Physician Information    Name Juliana Mcclendon MD             Functional Status       10/18/17 1431    IADL    Medications independent    Meal Preparation assistive person    Housekeeping assistive person    Laundry assistive person    Shopping assistive person    Oral Care independent            Psychosocial     None            Abuse/Neglect     None            Legal     None            Substance Abuse     None            Patient Forms     None          Alexia Hazel    [facility] cannot accept pt [place]  is not in network   [facility] is checking to see  if they can provide services   [facility]   [facility] [place]

## 2017-10-18 NOTE — PLAN OF CARE
Problem: Perioperative Period (Adult)  Goal: Signs and Symptoms of Listed Potential Problems Will be Absent or Manageable (Perioperative Period)  Outcome: Ongoing (interventions implemented as appropriate)    10/18/17 2281   Perioperative Period   Problems Assessed (Perioperative Period) all   Problems Present (Perioperative Period) none

## 2017-10-18 NOTE — ANESTHESIA PROCEDURE NOTES
Peripheral Block    Patient location during procedure: pre-op  Start time: 10/18/2017 7:37 AM  Stop time: 10/18/2017 7:54 AM  Reason for block: at surgeon's request and post-op pain management  Performed by  CRNA: SHANKAR MUKHERJEE  Preanesthetic Checklist  Completed: patient identified, site marked, surgical consent, pre-op evaluation, timeout performed, IV checked, risks and benefits discussed and monitors and equipment checked  Prep:  Pt Position: supine  Sterile barriers:cap, gloves, mask and sterile barriers  Prep: ChloraPrep  Patient monitoring: blood pressure monitoring, continuous pulse oximetry and EKG  Procedure  Sedation:yes    Guidance:ultrasound guided  Images:still images obtained    Laterality:right  Block Type:adductor canal block  Injection Technique:catheter  Needle Type:echogenic  Needle Gauge:18 G    Medications  Comment:Adjuncts per total volume of LA:    Decadron 10 mg PSF    If required, intravenous sedation was given -- see meds on anesthesia record.  Local Injected:bupivacaine 0.25% Amount of fentanyl injected (mL): 20.  Post Assessment  Injection Assessment: negative aspiration for heme, no paresthesia on injection and incremental injection  Patient Tolerance:comfortable throughout block  Complications:no  Additional Notes  Procedure:          CATHETER ADDUCTOR CANAL BLOCK                                                             CATHETER at skin: 8                                Patient analgesia was achieved with IV Sedation( see meds)       The pt was placed in the Supine position.  The Insertion site was  prepped and Draped in sterile fashion.  A  I-Flow 18g echogenic needle was then  inserted approximately midline, mid-thigh and advanced In-plane with Ultrasound guidance.  Normal Saline PSF was utilized for hydrodissection of tissue.  The Vastus medialis and Sartorius muscle where visualized and the needle tip was placed in the adductor canal,  lateral to the femoral artery.  LA  injection spread was visualized, injection was incremental 1-5 ml, injection pressure was normal or little; no intraneural injection; no vascular injection.  LA dose was injected thru the needle (see dose above).  I-flow 20g catheter was placed via the needle with ultrasound visualization and confirmation with NS fluid bolus or air injection. The needle was then removed and the skin was sealed with Skin Affiix at catheter insertion site.  Skin was prepped with benzoin or mastisol and the labeled curled catheter was secured with steristrips and a transparent dressing.

## 2017-10-18 NOTE — PLAN OF CARE
Problem: Patient Care Overview (Adult)  Goal: Plan of Care Review  Outcome: Ongoing (interventions implemented as appropriate)    10/18/17 1454   Coping/Psychosocial Response Interventions   Plan Of Care Reviewed With patient   Outcome Evaluation   Outcome Summary/Follow up Plan OT will completed. Patient presents deficits in strength, endurance, balance, mobility and ADL performance. Patient is expected to benefit from OT services to improve functional performance and mobility prior to DC.   Patient Care Overview   Progress no change         Problem: Inpatient Occupational Therapy  Goal: Transfer Training Goal 2 LTG- OT  Outcome: Ongoing (interventions implemented as appropriate)    10/18/17 1454   Transfer Training 2 OT LTG   Transfer Training 2 OT LTG, Date Established 10/18/17   Transfer Training 2 OT LTG, Time to Achieve 2 wks   Transfer Training 2 OT LTG, Activity Type sit to stand/stand to sit   Transfer Training 2 OT LTG, [place] Level supervision required   Transfer Training 2 OT LTG, Assist Device walker, rolling   Transfer Training 2 OT LTG, Outcome goal ongoing       Goal: Strength Goal LTG- OT  Outcome: Ongoing (interventions implemented as appropriate)    10/18/17 1454   Strength OT LTG   Strength Goal OT LTG, Date Established 10/18/17   Strength Goal OT LTG, Time to Achieve 2 wks   Strength Goal OT LTG, Measure to Achieve Patient will perform 15 reps UB ther ex using theraband in order to increase strength and endurance.    Strength Goal OT LTG, Outcome goal ongoing       Goal: Patient Education Goal LTG- OT  Outcome: Ongoing (interventions implemented as appropriate)    10/18/17 1454   Patient Education OT LTG   Patient Education OT LTG, Date Established 10/18/17   Patient Education OT LTG, Time to Achieve 2 wks   Patient Education OT LTG, Education Type adaptive equipment mgmt   Patient Education OT LTG, Education Understanding demonstrates adequately;verbalizes understanding   Patient  Education OT LTG Outcome goal ongoing       Goal: LB Dressing Goal LTG- OT  Outcome: Ongoing (interventions implemented as appropriate)    10/18/17 1454   LB Dressing OT LTG   LB Dressing Goal OT LTG, Date Established 10/18/17   LB Dressing Goal OT LTG, Time to Achieve 2 wks   LB Dressing Goal OT LTG, [place] Level supervision required   LB Dressing Goal OT LTG, Adaptive Equipment reacher;sock-aid   LB Dressing Goal OT LTG, Outcome goal ongoing       Goal: Functional Mobility Goal LTG- OT  Outcome: Ongoing (interventions implemented as appropriate)    10/18/17 1454   Functional Mobility OT LTG   Functional Mobility Goal OT LTG, Date Established 10/18/17   Functional Mobility Goal OT LTG, Time to Achieve 2 wks   Functional Mobility Goal OT LTG, [place] Level contact guard   Functional Mobility Goal OT LTG, Assist Device rolling walker   Functional Mobility Goal OT LTG, Distance to Achieve in hallway   Functional Mobility Goal OT LTG, Additional Goal 50   Functional Mobility Goal OT LTG, Outcome goal ongoing

## 2017-10-19 PROBLEM — N18.30 STAGE 3 CHRONIC KIDNEY DISEASE (HCC): Status: ACTIVE | Noted: 2017-10-19

## 2017-10-19 LAB
ANION GAP SERPL CALCULATED.3IONS-SCNC: 17.9 MMOL/L
APTT PPP: 23.2 SECONDS (ref 25–36)
BASOPHILS # BLD AUTO: 0 10*3/MM3 (ref 0–0.2)
BASOPHILS NFR BLD AUTO: 0 % (ref 0–2.5)
BUN BLD-MCNC: 21 MG/DL (ref 7–20)
BUN/CREAT SERPL: 30 (ref 7.1–23.5)
CALCIUM SPEC-SCNC: 8.5 MG/DL (ref 8.4–10.2)
CHLORIDE SERPL-SCNC: 103 MMOL/L (ref 98–107)
CO2 SERPL-SCNC: 20 MMOL/L (ref 26–30)
CREAT BLD-MCNC: 0.7 MG/DL (ref 0.6–1.3)
DEPRECATED RDW RBC AUTO: 42 FL (ref 37–54)
EOSINOPHIL # BLD AUTO: 0 10*3/MM3 (ref 0–0.7)
EOSINOPHIL NFR BLD AUTO: 0 % (ref 0–7)
ERYTHROCYTE [DISTWIDTH] IN BLOOD BY AUTOMATED COUNT: 13 % (ref 11.5–14.5)
GFR SERPL CREATININE-BSD FRML MDRD: 82 ML/MIN/1.73
GLUCOSE BLD-MCNC: 129 MG/DL (ref 74–98)
HCT VFR BLD AUTO: 26.6 % (ref 37–47)
HGB BLD-MCNC: 8.7 G/DL (ref 12–16)
IMM GRANULOCYTES # BLD: 0.03 10*3/MM3 (ref 0–0.06)
IMM GRANULOCYTES NFR BLD: 0.3 % (ref 0–0.6)
INR PPP: 1.19 (ref 0.9–1.1)
LYMPHOCYTES # BLD AUTO: 0.38 10*3/MM3 (ref 0.6–3.4)
LYMPHOCYTES NFR BLD AUTO: 4.4 % (ref 10–50)
MCH RBC QN AUTO: 29.5 PG (ref 27–31)
MCHC RBC AUTO-ENTMCNC: 32.7 G/DL (ref 30–37)
MCV RBC AUTO: 90.2 FL (ref 81–99)
MONOCYTES # BLD AUTO: 0.55 10*3/MM3 (ref 0–0.9)
MONOCYTES NFR BLD AUTO: 6.3 % (ref 0–12)
NEUTROPHILS # BLD AUTO: 7.75 10*3/MM3 (ref 2–6.9)
NEUTROPHILS NFR BLD AUTO: 89 % (ref 37–80)
NRBC BLD MANUAL-RTO: 0 /100 WBC (ref 0–0)
PLATELET # BLD AUTO: 141 10*3/MM3 (ref 130–400)
PMV BLD AUTO: 10.9 FL (ref 6–12)
POTASSIUM BLD-SCNC: 4.9 MMOL/L (ref 3.5–5.1)
PROTHROMBIN TIME: 13 SECONDS (ref 9.3–12.1)
RBC # BLD AUTO: 2.95 10*6/MM3 (ref 4.2–5.4)
SODIUM BLD-SCNC: 136 MMOL/L (ref 137–145)
WBC NRBC COR # BLD: 8.71 10*3/MM3 (ref 4.8–10.8)

## 2017-10-19 PROCEDURE — 97116 GAIT TRAINING THERAPY: CPT

## 2017-10-19 PROCEDURE — 25010000002 ENOXAPARIN PER 10 MG: Performed by: ORTHOPAEDIC SURGERY

## 2017-10-19 PROCEDURE — 97110 THERAPEUTIC EXERCISES: CPT

## 2017-10-19 PROCEDURE — 85730 THROMBOPLASTIN TIME PARTIAL: CPT | Performed by: ORTHOPAEDIC SURGERY

## 2017-10-19 PROCEDURE — 97535 SELF CARE MNGMENT TRAINING: CPT

## 2017-10-19 PROCEDURE — 99024 POSTOP FOLLOW-UP VISIT: CPT | Performed by: ORTHOPAEDIC SURGERY

## 2017-10-19 PROCEDURE — 94799 UNLISTED PULMONARY SVC/PX: CPT

## 2017-10-19 PROCEDURE — 99232 SBSQ HOSP IP/OBS MODERATE 35: CPT | Performed by: NURSE PRACTITIONER

## 2017-10-19 PROCEDURE — 25010000003 CEFAZOLIN PER 500 MG: Performed by: ORTHOPAEDIC SURGERY

## 2017-10-19 PROCEDURE — 85610 PROTHROMBIN TIME: CPT | Performed by: ORTHOPAEDIC SURGERY

## 2017-10-19 PROCEDURE — 80048 BASIC METABOLIC PNL TOTAL CA: CPT | Performed by: ORTHOPAEDIC SURGERY

## 2017-10-19 PROCEDURE — 85025 COMPLETE CBC W/AUTO DIFF WBC: CPT | Performed by: ORTHOPAEDIC SURGERY

## 2017-10-19 RX ORDER — OXYCODONE HYDROCHLORIDE 5 MG/1
5 TABLET ORAL EVERY 6 HOURS PRN
Qty: 40 TABLET | Refills: 0 | Status: SHIPPED | OUTPATIENT
Start: 2017-10-19 | End: 2017-10-31 | Stop reason: SDUPTHER

## 2017-10-19 RX ADMIN — METOPROLOL TARTRATE 25 MG: 25 TABLET ORAL at 20:59

## 2017-10-19 RX ADMIN — PANTOPRAZOLE SODIUM 40 MG: 40 TABLET, DELAYED RELEASE ORAL at 06:50

## 2017-10-19 RX ADMIN — SODIUM CHLORIDE, POTASSIUM CHLORIDE, SODIUM LACTATE AND CALCIUM CHLORIDE 100 ML/HR: 600; 310; 30; 20 INJECTION, SOLUTION INTRAVENOUS at 03:30

## 2017-10-19 RX ADMIN — ATORVASTATIN CALCIUM 10 MG: 10 TABLET, FILM COATED ORAL at 20:59

## 2017-10-19 RX ADMIN — CEFAZOLIN SODIUM 2 G: 2 SOLUTION INTRAVENOUS at 15:21

## 2017-10-19 RX ADMIN — HYDROCHLOROTHIAZIDE 25 MG: 25 TABLET ORAL at 08:24

## 2017-10-19 RX ADMIN — ALPRAZOLAM 0.25 MG: 0.25 TABLET ORAL at 08:24

## 2017-10-19 RX ADMIN — GABAPENTIN 800 MG: 400 CAPSULE ORAL at 15:21

## 2017-10-19 RX ADMIN — METOPROLOL TARTRATE 25 MG: 25 TABLET ORAL at 08:23

## 2017-10-19 RX ADMIN — ENOXAPARIN SODIUM 40 MG: 40 INJECTION SUBCUTANEOUS at 08:22

## 2017-10-19 RX ADMIN — MELOXICAM 7.5 MG: 7.5 TABLET ORAL at 08:24

## 2017-10-19 RX ADMIN — CEFAZOLIN SODIUM 2 G: 2 SOLUTION INTRAVENOUS at 08:25

## 2017-10-19 RX ADMIN — GABAPENTIN 800 MG: 400 CAPSULE ORAL at 20:59

## 2017-10-19 RX ADMIN — CEFAZOLIN SODIUM 2 G: 2 SOLUTION INTRAVENOUS at 00:00

## 2017-10-19 RX ADMIN — LISINOPRIL 40 MG: 20 TABLET ORAL at 20:59

## 2017-10-19 RX ADMIN — GABAPENTIN 800 MG: 400 CAPSULE ORAL at 08:23

## 2017-10-19 RX ADMIN — ALPRAZOLAM 0.25 MG: 0.25 TABLET ORAL at 20:59

## 2017-10-19 NOTE — PROGRESS NOTES
PROGRESS NOTE        Date of Admission: 10/18/2017  Length of Stay: 1  Primary Care Physician: Annetta Mcclendon MD    Subjective   Chief Complaint:  knee pain    HPI: Patient seen today.  She was brought in yesterday for an elective right total knee replacement.  Patient tolerated the procedure without any complications.  She is sitting up to bedside today.  She denies any chest pain, shortness of breath, nausea or vomiting.  She has been working with physical therapy.  She is otherwise hemodynamically stable.           Review Of Systems:   Review of Systems   General ROS: Patient denies any fevers, chills or loss of consciousness.    Psychological ROS: Denies any hallucinations and delusions.  Ophthalmic ROS: Denies any diplopia or transient loss of vision.  ENT ROS: Denies sore throat, nasal congestion or ear pain.   Allergy and Immunology ROS: Denies rash or itching.  Hematological and Lymphatic ROS: Denies neck swelling or easy bleeding.  Endocrine ROS: Denies any recent unintentional weight gain or loss.  Breast ROS: Denies any pain or swelling.  Respiratory ROS: Denies cough or shortness of breath.   Cardiovascular ROS: Denies chest pain or palpitations. No history of exertional chest pain.   Gastrointestinal ROS: Denies nausea and vomiting. Denies any abdominal pain. No diarrhea.  Genito-Urinary ROS: Denies dysuria or hematuria.  Musculoskeletal ROS: Denies back pain. No muscle pain. No calf pain.   Neurological ROS: Denies any focal weakness. No loss of consciousness. Denies any numbness. Denies neck pain.   Dermatological ROS: Denies any redness or pruritis.    Objective      Temp:  [97.8 °F (36.6 °C)-98.8 °F (37.1 °C)] 98.8 °F (37.1 °C)  Heart Rate:  [70-79] 78  Resp:  [18-20] 18  BP: (116-144)/(49-86) 139/86  Physical Exam    General Appearance:  Alert and cooperative, not in any acute distress.   Head:  Atraumatic and normocephalic, without obvious abnormality.   Eyes:          PERRLA, conjunctivae  and sclerae normal, no Icterus. No pallor. Extra-occular movements are within normal limits.   Ears:  Ears appear intact with no abnormalities noted.   Throat: No oral lesions, no thrush, oral mucosa moist.   Neck: Supple, trachea midline, no thyromegaly, no carotid bruit.   Back:   No kyphoscoliosis present. No tenderness to palpation,   range of motion normal.   Lungs:   Chest shape is normal. Breath sounds heard bilaterally equally.  No crackles or wheezing. No Pleural rub or bronchial breathing.   Heart:  Normal S1 and S2, no murmur, no gallop, no rub. No JVD   Abdomen:   Normal bowel sounds, no masses, no organomegaly. Soft     non-tender, non-distended, no guarding, no rebound                tenderness   Extremities: Moves all extremities well, no edema, no cyanosis, no clubbing.  Dressing right knee clean and intact.     Pulses: Pulses palpable and equal bilaterally   Skin: No bleeding, bruising or rash   Lymph nodes: No palpable adenopathy   Neurologic:    Psychiatric/Behavior:     Cranial nerves 2 - 12 grossly intact, sensation intact, Motor power is normal and equal bilaterally.  Mood normal, behavior normal       Results Review:    I have reviewed the labs, radiology results and diagnostic studies.      Results from last 7 days  Lab Units 10/19/17  0525   WBC 10*3/mm3 8.71   HEMOGLOBIN g/dL 8.7*   PLATELETS 10*3/mm3 141       Results from last 7 days  Lab Units 10/19/17  0525   SODIUM mmol/L 136*   POTASSIUM mmol/L 4.9   CO2 mmol/L 20.0*   CREATININE mg/dL 0.70   GLUCOSE mg/dL 129*       Culture Data:    Radiology Data:   Cardiology Data:    I have reviewed the medications.    Assessment/Plan     Assessment and Plan:  1.  Borderline diabetes mellitus-monitor blood sugars complaint on and some protocol.     2.  Right total knee replacement-patient on analgesics, DVT prophylaxis, PT OT consult as well as case management consult for discharge planning     3.  Chronic essential hypertension-  Continue home BP  medications.  BP is stable.       4.  Chronic Kidney Disease CKD III    5.  Acute blood loss anemia-  Does not require blood transfusion.  Continue to monitor and transfuse if indicated.             DVT prophylaxis:  Discharge Planning:   FELIX Quintanilla 10/19/17 2:42 PM
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
## 2017-10-19 NOTE — PROGRESS NOTES
Continued Stay Note  [facility]     Patient Name: Lata Justice  MRN: [id number]  Today's Date: 10/19/2017    Admit Date: 10/18/2017          Discharge Plan       10/19/17 0922    Case Management/Social Work Plan    Additional Comments There are not any Home Health Agencys to provide service  they all declined .Called [facility] to  See  if they can see pt earlier than 10/23               Discharge Codes     None            Alexia Hazel    Spoke to pt and  confirmed she wants to go home and have outpatient therapy at [facility]   Appointment set for 10/23 at 1 PM still need an order to give to patient

## 2017-10-19 NOTE — THERAPY TREATMENT NOTE
Acute Care - Physical Therapy Treatment Note  [facility]     Patient Name: Lata Justice  : 1945  MRN: [id number]  Today's Date: 10/19/2017  Onset of Illness/Injury or Date of Surgery Date: 10/18/17  Date of Referral to PT: 10/18/17  Referring Physician: Dr. Mixon    Admit Date: 10/18/2017    Visit Dx:    ICD-10-CM ICD-9-CM   1. Impaired functional mobility, balance, gait, and endurance Z74.09 V49.89   2. Arthralgia of both knees M25.561 719.46    M25.562    3. Primary osteoarthritis of both knees M17.0 715.16   4. Pre-op testing Z01.818 V72.84   5. Primary osteoarthritis of right knee M17.11 715.16   6. Myalgia  M79.1 729.1   7. Pain of right lower extremity  M79.604 729.5   8. B12 deficiency E53.8 266.2   9. Impaired mobility and ADLs Z74.09 799.89     Patient Active Problem List   Diagnosis   • Adkins's esophagus   • Benign essential hypertension   • Chronic low back pain   • Chronic obstructive pulmonary disease   • Depression   • Impaired glucose tolerance   • Hyperlipidemia   • Seasonal allergic rhinitis   • Bilateral chronic knee pain   • Bilateral edema of lower extremity   • Bilateral wrist pain   • Chronic pain of both ankles   • Primary osteoarthritis of right knee   • Arthralgia of both knees   • Borderline diabetes   • Status post right knee replacement   • Stage 3 chronic kidney disease               Adult Rehabilitation Note       10/19/17 0906          Rehab Assessment/Intervention    Discipline physical therapist  -LM      Document Type therapy note (daily note)  -LM      Subjective Information agree to therapy;no complaints  -LM      Patient Effort, Rehab Treatment good  -LM      Precautions/Limitations fall precautions  -LM      Recorded by [LM] Venecia Pryor, PT      Pain Assessment    Pain Assessment No/denies pain  -LM      Recorded by [LM] Venecia Pryor, PT      Mobility Assessment/Training    Extremity Weight-Bearing Status right lower extremity  -LM      Right Lower Extremity  Weight-Bearing weight-bearing as tolerated  -LM      Recorded by [LM] Venecia Pryor, PT      Bed Mobility, Assessment/Treatment    Bed Mobility, Assistive Device bed rails;head of bed elevated  -LM      Bed Mob, Supine to Sit, [place] conditional independence  -LM      Recorded by [LM] Venecia Pryor, PT      Transfer Assessment/Treatment    Transfers, Bed-Chair [place] contact guard assist  -LM      Transfers, Bed-Chair-Bed, Assist Device rolling walker  -LM      Transfers, Sit-Stand [place] contact guard assist  -LM      Transfers, Stand-Sit [place] contact guard assist  -LM      Transfers, Sit-Stand-Sit, Assist Device rolling walker  -LM      Recorded by [LM] Venecia Pryor, PT      Gait Assessment/Treatment    Gait, [place] Level contact guard assist  -LM      Gait, Assistive Device rolling walker  -LM      Gait, Distance (Feet) 156  -LM      Gait, Gait Pattern Analysis swing-through gait  -LM      Gait, Safety Issues balance decreased during turns;sequencing ability decreased;step length decreased  -LM      Gait, Impairments ROM decreased;strength decreased  -LM      Recorded by [LM] Venecia Pryor, PT      Therapy Exercises    Right Lower Extremity AAROM:;AROM:;15 reps;supine;ankle pumps/circles;glut sets;heel slides;quad sets;SLR  -LM      Recorded by [LM] Venecia Pryor PT      Positioning and Restraints    Pre-Treatment Position in bed  -LM      Post Treatment Position chair  -LM      In Chair reclined;call light within reach;encouraged to call for assist  -LM      Recorded by [LM] Venecia Pryor, PT        User Key  (r) = Recorded By, (t) = Taken By, (c) = Cosigned By    Initials Name Effective Dates     Venecia Pryor, PT 10/26/16 -                 IP PT Goals       10/19/17 1337 10/18/17 1403       Bed Mobility PT LTG    Bed Mobility PT LTG, Date Established  10/18/17  -LM     Bed Mobility PT LTG, Time to Achieve  2 wks  -LM     Bed Mobility PT LTG, Activity Type  supine to sit/sit to  supine  -LM     Bed Mobility PT LTG, [place] Level  conditional independence  -LM     Bed Mobility PT Goal  LTG, Assist Device  bed rails  -LM     Bed Mobility PT LTG, Outcome goal met  -LM goal ongoing  -LM     Transfer Training PT LTG    Transfer Training PT LTG, Date Established  10/18/17  -LM     Transfer Training PT LTG, Time to Achieve  2 wks  -LM     Transfer Training PT LTG, Activity Type  sit to stand/stand to sit;bed to chair /chair to bed  -LM     Transfer Training PT LTG, [place] Level  contact guard assist  -LM     Transfer Training PT LTG, Assist Device  walker, rolling  -LM     Transfer Training PT LTG, Outcome goal met  -LM goal ongoing  -LM     Gait Training PT LTG    Gait Training Goal PT LTG, Date Established 10/19/17  -LM 10/18/17  -LM     Gait Training Goal PT LTG, Time to Achieve 2 wks  -LM 2 wks  -LM     Gait Training Goal PT LTG, [place] Level contact guard assist  -LM contact guard assist  -LM     Gait Training Goal PT LTG, Assist Device walker, rolling  -LM walker, rolling  -LM     Gait Training Goal PT LTG, Distance to Achieve 300  -  -LM     Gait Training Goal PT LTG, Additional Goal  WBAT R LE  -LM     Gait Training Goal PT LTG, Outcome goal ongoing  -LM goal ongoing  -LM     Gait Training Goal PT LTG, Reason Goal Not Met goals revised this date  -LM      Strength Goal PT LTG    Strength Goal PT LTG, Date Established  10/18/17  -LM     Strength Goal PT LTG, Time to Achieve  2 wks  -LM     Strength Goal PT LTG, Measure to Achieve  Patient will perform LE ther ex x 15 reps to improve functional strength and ROM for mobility.  -LM     Strength Goal PT LTG, Outcome goal met  -LM goal ongoing  -LM       User Key  (r) = Recorded By, (t) = Taken By, (c) = Cosigned By    Initials Name Provider Type    LM Venecia Pryor, PT Physical Therapist          Physical Therapy Education     Title: PT OT SLP Therapies (Active)     Topic: Physical Therapy (Done)     Point: Mobility  training (Done)    Learning Progress Summary    Learner Readiness Method Response Comment Documented by Status   Patient Acceptance E VU Ther ex for HEP; proper use of RW for safe transfers/ambulation.  10/19/17 1337 Done    Acceptance E VU Purpose of PT/POC; proper use of RW for safe transfers/ambulation; ther ex for HEP.  10/18/17 1403 Done               Point: Home exercise program (Done)    Learning Progress Summary    Learner Readiness Method Response Comment Documented by Status   Patient Acceptance E VU Ther ex for HEP; proper use of RW for safe transfers/ambulation.  10/19/17 1337 Done    Acceptance E VU Purpose of PT/POC; proper use of RW for safe transfers/ambulation; ther ex for HEP.  10/18/17 1403 Done               Point: Precautions (Done)    Learning Progress Summary    Learner Readiness Method Response Comment Documented by Status   Patient Acceptance E VU Ther ex for HEP; proper use of RW for safe transfers/ambulation.  10/19/17 1337 Done    Acceptance E VU Purpose of PT/POC; proper use of RW for safe transfers/ambulation; ther ex for HEP.  10/18/17 1403 Done                      User Key     Initials Effective Dates Name Provider Type Discipline     10/26/16 -  Venecia Pryor, PT Physical Therapist PT                    PT Recommendation and Plan  Anticipated Discharge Disposition: home with home health  Planned Therapy Interventions: balance training, bed mobility training, gait training, home exercise program, patient/family education, strengthening, transfer training  PT Frequency: 2 times/day  Plan of Care Review  Plan Of Care Reviewed With: patient  Progress: improving  Outcome Summary/Follow up Plan: Patient participated well with PT this date and was able to perform bed mobility independently, sit to stand, transfer and ambulate 156 feet with RW and CGA.  She also perform ther ex as indicated on care map.  Cont to goals.          Outcome Measures       10/19/17 0906 10/18/17 1320  10/18/17 1318    How much help from another person do you currently need...    Turning from your back to your side while in flat bed without using bedrails? 4  -LM  3  -LM    Moving from lying on back to sitting on the side of a flat bed without bedrails? 4  -LM  3  -LM    Moving to and from a bed to a chair (including a wheelchair)? 3  -LM  3  -LM    Standing up from a chair using your arms (e.g., wheelchair, bedside chair)? 3  -LM  3  -LM    Climbing 3-5 steps with a railing? 2  -LM  2  -LM    To walk in hospital room? 3  -LM  3  -LM    AM-PAC 6 Clicks Score 19  -LM  17  -LM    How much help from another is currently needed...    Putting on and taking off regular lower body clothing?  2  -SD     Bathing (including washing, rinsing, and drying)  2  -SD     Toileting (which includes using toilet bed pan or urinal)  3  -SD     Putting on and taking off regular upper body clothing  4  -SD     Taking care of personal grooming (such as brushing teeth)  4  -SD     Eating meals  4  -SD     Score  19  -SD     Functional Assessment    Outcome Measure Options AM-PAC 6 Clicks Basic Mobility (PT)  -LM AM-PAC 6 Clicks Daily Activity (OT)  -SD AM-PAC 6 Clicks Basic Mobility (PT)  -LM      User Key  (r) = Recorded By, (t) = Taken By, (c) = Cosigned By    Initials Name Provider Type    OBIE Pryor, PT Physical Therapist    PHOENIX Nix OT Occupational Therapist           Time Calculation:         PT Charges       10/19/17 1340          Time Calculation    Start Time 0906  -LM      PT Received On 10/19/17  -LM      Time Calculation- PT    Total Timed Code Minutes- PT 24 minute(s)  -LM        User Key  (r) = Recorded By, (t) = Taken By, (c) = Cosigned By    Initials Name Provider Type    LM Venecia Pryor PT Physical Therapist          Therapy Charges for Today     Code Description Service Date Service Provider Modifiers Qty    [id number]  PT EVAL MOD COMPLEXITY 4 10/18/2017 Venecia Pryor, PT GP 1    [id number]  GAIT  TRAINING EA 15 MIN 10/19/2017 Venecia Pryor, PT GP 1    [id number] HC PT THER PROC EA 15 MIN 10/19/2017 Venecia Pryor, PT GP 1          PT G-Codes  Outcome Measure Options: AM-PAC 6 Clicks Basic Mobility (PT)    Venecia Pryor, PT  10/19/2017

## 2017-10-19 NOTE — THERAPY TREATMENT NOTE
Acute Care - Occupational Therapy Treatment Note  [facility]     Patient Name: Lata Justice  : 1945  MRN: [id number]  Today's Date: 10/19/2017  Onset of Illness/Injury or Date of Surgery Date: 10/18/17  Date of Referral to OT: 10/18/17  Referring Physician: Dr. Mixon      Admit Date: 10/18/2017    Visit Dx:     ICD-10-CM ICD-9-CM   1. Impaired functional mobility, balance, gait, and endurance Z74.09 V49.89   2. Arthralgia of both knees M25.561 719.46    M25.562    3. Primary osteoarthritis of both knees M17.0 715.16   4. Pre-op testing Z01.818 V72.84   5. Primary osteoarthritis of right knee M17.11 715.16   6. Myalgia  M79.1 729.1   7. Pain of right lower extremity  M79.604 729.5   8. B12 deficiency E53.8 266.2   9. Impaired mobility and ADLs Z74.09 799.89     Patient Active Problem List   Diagnosis   • Adkins's esophagus   • Benign essential hypertension   • Chronic low back pain   • Chronic obstructive pulmonary disease   • Depression   • Impaired glucose tolerance   • Hyperlipidemia   • Seasonal allergic rhinitis   • Bilateral chronic knee pain   • Bilateral edema of lower extremity   • Bilateral wrist pain   • Chronic pain of both ankles   • Primary osteoarthritis of right knee   • Arthralgia of both knees   • Borderline diabetes   • Status post right knee replacement   • Stage 3 chronic kidney disease             Adult Rehabilitation Note       10/19/17 1437 10/19/17 1342 10/19/17 0906    Rehab Assessment/Intervention    Discipline occupational therapist  -SD physical therapist  -LM physical therapist  -LM    Document Type therapy note (daily note)  -SD therapy note (daily note)  -LM therapy note (daily note)  -LM    Subjective Information agree to therapy;no complaints  -SD agree to therapy;no complaints  -LM agree to therapy;no complaints  -LM    Patient Effort, Rehab Treatment good  -SD good  -LM good  -LM    Precautions/Limitations fall precautions  -SD fall precautions  -LM fall precautions   -LM    Recorded by [SD] Latasha Nix, OT [LM] Venecia Pryor, PT [LM] Venecia Pryor, PT    Pain Assessment    Pain Assessment 0-10  -SD No/denies pain  -LM No/denies pain  -LM    Pain Score 0  -SD      Post Pain Score 1  -SD      Pain Type Acute pain  -SD      Pain Location Knee  -SD      Pain Orientation Right  -SD      Recorded by [SD] Latasha Nix, OT [LM] Venecia Pryor, PT [LM] Venecia Pryor, PT    Mobility Assessment/Training    Extremity Weight-Bearing Status  right lower extremity  -LM right lower extremity  -LM    Right Lower Extremity Weight-Bearing  weight-bearing as tolerated  -LM weight-bearing as tolerated  -LM    Recorded by  [LM] Venecia Pryor, PT [LM] Venecia Pryor, PT    Bed Mobility, Assessment/Treatment    Bed Mobility, Assistive Device bed rails;head of bed elevated  -SD  bed rails;head of bed elevated  -LM    Bed Mob, Supine to Sit, [place] contact guard assist  -SD  conditional independence  -LM    Bed Mob, Sit to Supine, [place] contact guard assist  -SD contact guard assist  -LM     Recorded by [SD] Latasha Nix, OT [LM] Venecia Pryor, PT [LM] Venecia Pryor, PT    Transfer Assessment/Treatment    Transfers, Bed-Chair [place]   contact guard assist  -LM    Transfers, Chair-Bed [place]  contact guard assist  -LM     Transfers, Bed-Chair-Bed, Assist Device  rolling walker  -LM rolling walker  -LM    Transfers, Sit-Stand [place] contact guard assist  -SD contact guard assist  -LM contact guard assist  -LM    Transfers, Stand-Sit [place] contact guard assist  -SD contact guard assist  -LM contact guard assist  -LM    Transfers, Sit-Stand-Sit, Assist Device rolling walker  -SD rolling walker  -LM rolling walker  -LM    Recorded by [SD] Latasha Nix, OT [LM] Venecia Pryor, PT [LM] Venecia Pryor, PT    Gait Assessment/Treatment    Gait, [place] Level  contact guard assist  -LM contact guard assist  -LM    Gait, Assistive Device  rolling walker  -LM rolling walker   -LM    Gait, Distance (Feet)  204  -  -LM    Gait, Gait Pattern Analysis  swing-through gait  -LM swing-through gait  -LM    Gait, Gait Deviations  antalgic;decreased heel strike;catr[place] decreased;step length decreased;toe-to-floor clearance decreased  -LM     Gait, Safety Issues   balance decreased during turns;sequencing ability decreased;step length decreased  -LM    Gait, Impairments   ROM decreased;strength decreased  -LM    Recorded by  [LM] Venecia Pryor, PT [LM] Venecia Pryor PT    Functional Mobility    Functional Mobility- Ind. Level contact guard assist  -SD      Functional Mobility- Device rolling walker  -SD      Functional Mobility-Distance (Feet) 30  -SD      Recorded by [SD] Latasha Nix OT      Lower Body Dressing Assessment/Training    LB Dressing Assess/Train, Clothing Type doffing:;donning:;socks  -SD      LB Dressing Assess/Train, Assist Device reacher;sock-aid  -SD      LB Dressing Assess/Train, Position edge of bed;sitting  -SD      LB Dressing Assess/Train, [place] supervision required  -SD      Recorded by [SD] Latasha Nix OT      Toileting Assessment/Training    Toileting Assess/Train, [place] Level contact guard assist  -SD      Recorded by [SD] Latasha Nix OT      Therapy Exercises    Right Lower Extremity  AROM:;15 reps;supine;ankle pumps/circles;glut sets;heel slides;hip abduction/adduction;SAQ;SLR;quad sets  -LM AAROM:;AROM:;15 reps;supine;ankle pumps/circles;glut sets;heel slides;quad sets;SLR  -LM    Bilateral Upper Extremity AROM:;15 reps;sitting;elbow flexion/extension;shoulder abduction/adduction;shoulder extension/flexion  -SD      Recorded by [SD] Latasha Nix OT [LM] Venecia Pryor, PT [LM] Venecia Pryor PT    Positioning and Restraints    Pre-Treatment Position in bed  -SD sitting in chair/recliner  -LM in bed  -LM    Post Treatment Position bed  -SD bed  -LM chair  -LM    In Bed supine;call light within reach;encouraged to call for assist  -SD  supine;call light within reach;encouraged to call for assist  -LM     In Chair   reclined;call light within reach;encouraged to call for assist  -LM    Recorded by [SD] Latasha Nix, OT [LM] Venecia Pryor, PT [LM] Venecia Pryor, PT      User Key  (r) = Recorded By, (t) = Taken By, (c) = Cosigned By    Initials Name Effective Dates    LM Venecia Pryor, PT 10/26/16 -     SD Latasha Nix, OT 06/30/17 -                 OT Goals       10/19/17 1511 10/18/17 1454       Transfer Training 2 OT LTG    Transfer Training 2 OT LTG, Date Established  10/18/17  -SD     Transfer Training 2 OT LTG, Time to Achieve  2 wks  -SD     Transfer Training 2 OT LTG, Activity Type  sit to stand/stand to sit  -SD     Transfer Training 2 OT LTG, [place] Level  supervision required  -SD     Transfer Training 2 OT LTG, Assist Device  walker, rolling  -SD     Transfer Training 2 OT LTG, Date Goal Reviewed 10/19/17  -SD      Transfer Training 2 OT LTG, Outcome goal ongoing  -SD goal ongoing  -SD     Strength OT LTG    Strength Goal OT LTG, Date Established  10/18/17  -SD     Strength Goal OT LTG, Time to Achieve  2 wks  -SD     Strength Goal OT LTG, Measure to Achieve  Patient will perform 15 reps UB ther ex using theraband in order to increase strength and endurance.   -SD     Strength Goal OT LTG, Date Goal Reviewed 10/19/17  -SD      Strength Goal OT LTG, Outcome goal partially met  -SD goal ongoing  -SD     Patient Education OT LTG    Patient Education OT LTG, Date Established  10/18/17  -SD     Patient Education OT LTG, Time to Achieve  2 wks  -SD     Patient Education OT LTG, Education Type  adaptive equipment mgmt  -SD     Patient Education OT LTG, Education Understanding  demonstrates adequately;verbalizes understanding  -SD     Patient Education OT LTG, Date Goal Reviewed 10/19/17  -SD      Patient Education OT LTG Outcome goal met  -SD goal ongoing  -SD     LB Dressing OT LTG    LB Dressing Goal OT LTG, Date Established  10/18/17   -SD     LB Dressing Goal OT LTG, Time to Achieve  2 wks  -SD     LB Dressing Goal OT LTG, [place] Level  supervision required  -SD     LB Dressing Goal OT LTG, Adaptive Equipment  reacher;sock-aid  -SD     LB Dressing Goal OT LTG, Date Goal Reviewed 10/19/17  -SD      LB Dressing Goal OT LTG, Outcome goal met  -SD goal ongoing  -SD     Functional Mobility OT LTG    Functional Mobility Goal OT LTG, Date Established  10/18/17  -SD     Functional Mobility Goal OT LTG, Time to Achieve  2 wks  -SD     Functional Mobility Goal OT LTG, [place] Level  contact guard  -SD     Functional Mobility Goal OT LTG, Assist Device  rolling walker  -SD     Functional Mobility Goal OT LTG, Distance to Achieve  in hallway  -SD     Functional Mobility Goal OT LTG, Additional Goal  50  -SD     Functional Mobility Goal OT LTG, Date Goal Reviewed 10/19/17  -SD      Functional Mobility Goal OT LTG, Outcome goal ongoing  -SD goal ongoing  -SD       User Key  (r) = Recorded By, (t) = Taken By, (c) = Cosigned By    Initials Name Provider Type    SD Latasha Nix OT Occupational Therapist          Occupational Therapy Education     Title: PT OT SLP Therapies (Active)     Topic: Occupational Therapy (Active)     Point: ADL training (Done)    Description: Instruct learner(s) on proper safety adaptation and remediation techniques during self care or transfers.   Instruct in proper use of assistive devices.    Learning Progress Summary    Learner Readiness Method Response Comment Documented by Status   Patient Acceptance E VU Safety/sequencing during functional transfers and mobility using RW. SD 10/19/17 1517 Done    Acceptance E VU Benefits of OT and OT POC SD 10/18/17 1458 Done                      User Key     Initials Effective Dates Name Provider Type Discipline    SD 06/30/17 -  Latasha Nix OT Occupational Therapist OT                  OT Recommendation and Plan  Anticipated Discharge Disposition: home with home  health  Therapy Frequency: 3-5 times/wk  Plan of Care Review  Plan Of Care Reviewed With: patient  Progress: improving  Outcome Summary/Follow up Plan: OT tx completed. Patient completed bed mob with CGA; Walked 30' to bathroom using RW; completed toileting task with CGA and performed UB ther ex sitting EOB in order to increase strength and endurance. Continue OT POC.         Outcome Measures       10/19/17 1437 10/19/17 0906 10/18/17 1320    How much help from another person do you currently need...    Turning from your back to your side while in flat bed without using bedrails?  4  -LM     Moving from lying on back to sitting on the side of a flat bed without bedrails?  4  -LM     Moving to and from a bed to a chair (including a wheelchair)?  3  -LM     Standing up from a chair using your arms (e.g., wheelchair, bedside chair)?  3  -LM     Climbing 3-5 steps with a railing?  2  -LM     To walk in hospital room?  3  -LM     AM-PAC 6 Clicks Score  19  -LM     How much help from another is currently needed...    Putting on and taking off regular lower body clothing? 3  -SD  2  -SD    Bathing (including washing, rinsing, and drying) 3  -SD  2  -SD    Toileting (which includes using toilet bed pan or urinal) 3  -SD  3  -SD    Putting on and taking off regular upper body clothing 4  -SD  4  -SD    Taking care of personal grooming (such as brushing teeth) 4  -SD  4  -SD    Eating meals 4  -SD  4  -SD    Score 21  -SD  19  -SD    Functional Assessment    Outcome Measure Options AM-PAC 6 Clicks Daily Activity (OT)  -SD AM-PAC 6 Clicks Basic Mobility (PT)  -LM AM-PAC 6 Clicks Daily Activity (OT)  -SD      10/18/17 1318          How much help from another person do you currently need...    Turning from your back to your side while in flat bed without using bedrails? 3  -LM      Moving from lying on back to sitting on the side of a flat bed without bedrails? 3  -LM      Moving to and from a bed to a chair (including a  wheelchair)? 3  -LM      Standing up from a chair using your arms (e.g., wheelchair, bedside chair)? 3  -LM      Climbing 3-5 steps with a railing? 2  -LM      To walk in hospital room? 3  -LM      AM-PAC 6 Clicks Score 17  -LM      Functional Assessment    Outcome Measure Options AM-PAC 6 Clicks Basic Mobility (PT)  -LM        User Key  (r) = Recorded By, (t) = Taken By, (c) = Cosigned By    Initials Name Provider Type    LM Venecia Pryor, PT Physical Therapist    [place] Nix, OT Occupational Therapist           Time Calculation:         Time Calculation- OT       10/19/17 1519          Time Calculation- OT    OT Start Time 1437  -SD      Total Timed Code Minutes- OT 15 minute(s)  -SD      OT Received On 10/19/17  -SD      OT Goal Re-Cert Due Date 10/28/17  -SD        User Key  (r) = Recorded By, (t) = Taken By, (c) = Cosigned By    Initials Name Provider Type    SD Latasha Nix, OT Occupational Therapist           Therapy Charges for Today     Code Description Service Date Service Provider Modifiers Qty    [id number]  OT EVAL LOW COMPLEXITY 4 10/18/2017 Latasha Nix OT GO 1    [id number]  OT SELF CARE/MGMT/TRAIN EA 15 MIN 10/19/2017 Latasha Nix OT GO 1               Latasha Nix OT  10/19/2017

## 2017-10-19 NOTE — PLAN OF CARE
Problem: Fall Risk (Adult)  Goal: Identify Related Risk Factors and Signs and Symptoms  Outcome: Outcome(s) achieved Date Met:  10/18/17    10/18/17 2045   Fall Risk   Fall Risk: Related Risk Factors age-related changes;fatigue/slow reaction;gait/mobility problems;environment unfamiliar   Fall Risk: Signs and Symptoms presence of risk factors       Goal: Absence of Falls  Outcome: Ongoing (interventions implemented as appropriate)

## 2017-10-19 NOTE — PLAN OF CARE
Problem: Patient Care Overview (Adult)  Goal: Plan of Care Review  Outcome: Ongoing (interventions implemented as appropriate)    10/19/17 1337   Coping/Psychosocial Response Interventions   Plan Of Care Reviewed With patient   Outcome Evaluation   Outcome Summary/Follow up Plan Patient participated well with PT this date and was able to perform bed mobility independently, sit to stand, transfer and ambulate 156 feet with RW and CGA. She also perform ther ex as indicated on care map. Cont to goals.   Patient Care Overview   Progress improving         Problem: Inpatient Physical Therapy  Goal: Bed Mobility Goal LTG- PT  Outcome: Outcome(s) achieved Date Met:  10/19/17    10/18/17 1403 10/19/17 1337   Bed Mobility PT LTG   Bed Mobility PT LTG, Date Established 10/18/17 --    Bed Mobility PT LTG, Time to Achieve 2 wks --    Bed Mobility PT LTG, Activity Type supine to sit/sit to supine --    Bed Mobility PT LTG, [place] Level conditional independence --    Bed Mobility PT Goal LTG, Assist Device bed rails --    Bed Mobility PT LTG, Outcome --  goal met       Goal: Transfer Training Goal 1 LTG- PT  Outcome: Outcome(s) achieved Date Met:  10/19/17    10/18/17 1403 10/19/17 1337   Transfer Training PT LTG   Transfer Training PT LTG, Date Established 10/18/17 --    Transfer Training PT LTG, Time to Achieve 2 wks --    Transfer Training PT LTG, Activity Type sit to stand/stand to sit;bed to chair /chair to bed --    Transfer Training PT LTG, [place] Level contact guard assist --    Transfer Training PT LTG, Assist Device walker, rolling --    Transfer Training PT LTG, Outcome --  goal met       Goal: Gait Training Goal LTG- PT  Outcome: Revised    10/18/17 1403 10/19/17 1337   Gait Training PT LTG   Gait Training Goal PT LTG, Date Established --  10/19/17   Gait Training Goal PT LTG, Time to Achieve --  2 wks   Gait Training Goal PT LTG, [place] Level --  contact guard assist   Gait Training Goal PT LTG,  Assist Device --  walker, rolling   Gait Training Goal PT LTG, Distance to Achieve --  300   Gait Training Goal PT LTG, Additional Goal WBAT R LE --    Gait Training Goal PT LTG, Outcome --  goal ongoing   Gait Training Goal PT LTG, Reason Goal Not Met --  goals revised this date       Goal: Strength Goal LTG- PT  Outcome: Outcome(s) achieved Date Met:  10/19/17    10/18/17 1403 10/19/17 1337   Strength Goal PT LTG   Strength Goal PT LTG, Date Established 10/18/17 --    Strength Goal PT LTG, Time to Achieve 2 wks --    Strength Goal PT LTG, Measure to Achieve Patient will perform LE ther ex x 15 reps to improve functional strength and ROM for mobility. --    Strength Goal PT LTG, Outcome --  goal met

## 2017-10-19 NOTE — PLAN OF CARE
Problem: Patient Care Overview (Adult)  Goal: Plan of Care Review  Outcome: Ongoing (interventions implemented as appropriate)    10/19/17 1511   Coping/Psychosocial Response Interventions   Plan Of Care Reviewed With patient   Outcome Evaluation   Outcome Summary/Follow up Plan OT tx completed. Patient completed bed mob with CGA; Walked 30' to bathroom using RW; completed toileting task with CGA and performed UB ther ex sitting EOB in order to increase strength and endurance. Continue OT POC.    Patient Care Overview   Progress improving         Problem: Inpatient Occupational Therapy  Goal: Transfer Training Goal 2 LTG- OT  Outcome: Ongoing (interventions implemented as appropriate)    10/18/17 1454 10/19/17 1511   Transfer Training 2 OT LTG   Transfer Training 2 OT LTG, Date Established 10/18/17 --    Transfer Training 2 OT LTG, Time to Achieve 2 wks --    Transfer Training 2 OT LTG, Activity Type sit to stand/stand to sit --    Transfer Training 2 OT LTG, [place] Level supervision required --    Transfer Training 2 OT LTG, Assist Device walker, rolling --    Transfer Training 2 OT LTG, Date Goal Reviewed --  10/19/17   Transfer Training 2 OT LTG, Outcome --  goal ongoing       Goal: Strength Goal LTG- OT  Outcome: Ongoing (interventions implemented as appropriate)    10/18/17 1454 10/19/17 1511   Strength OT LTG   Strength Goal OT LTG, Date Established 10/18/17 --    Strength Goal OT LTG, Time to Achieve 2 wks --    Strength Goal OT LTG, Measure to Achieve Patient will perform 15 reps UB ther ex using theraband in order to increase strength and endurance.  --    Strength Goal OT LTG, Date Goal Reviewed --  10/19/17   Strength Goal OT LTG, Outcome --  goal partially met       Goal: Patient Education Goal LTG- OT  Outcome: Outcome(s) achieved Date Met:  10/19/17    10/18/17 1454 10/19/17 1511   Patient Education OT LTG   Patient Education OT LTG, Date Established 10/18/17 --    Patient Education OT LTG, Time  to Achieve 2 wks --    Patient Education OT LTG, Education Type adaptive equipment mgmt --    Patient Education OT LTG, Education Understanding demonstrates adequately;verbalizes understanding --    Patient Education OT LTG, Date Goal Reviewed --  10/19/17   Patient Education OT LTG Outcome --  goal met       Goal: LB Dressing Goal LTG- OT  Outcome: Outcome(s) achieved Date Met:  10/19/17    10/18/17 1454 10/19/17 1511   LB Dressing OT LTG   LB Dressing Goal OT LTG, Date Established 10/18/17 --    LB Dressing Goal OT LTG, Time to Achieve 2 wks --    LB Dressing Goal OT LTG, [place] Level supervision required --    LB Dressing Goal OT LTG, Adaptive Equipment reacher;sock-aid --    LB Dressing Goal OT LTG, Date Goal Reviewed --  10/19/17   LB Dressing Goal OT LTG, Outcome --  goal met       Goal: Functional Mobility Goal LTG- OT  Outcome: Ongoing (interventions implemented as appropriate)    10/18/17 1454 10/19/17 1511   Functional Mobility OT LTG   Functional Mobility Goal OT LTG, Date Established 10/18/17 --    Functional Mobility Goal OT LTG, Time to Achieve 2 wks --    Functional Mobility Goal OT LTG, [place] Level contact guard --    Functional Mobility Goal OT LTG, Assist Device rolling walker --    Functional Mobility Goal OT LTG, Distance to Achieve in hallway --    Functional Mobility Goal OT LTG, Additional Goal 50 --    Functional Mobility Goal OT LTG, Date Goal Reviewed --  10/19/17   Functional Mobility Goal OT LTG, Outcome --  goal ongoing

## 2017-10-19 NOTE — PLAN OF CARE
Problem: Patient Care Overview (Adult)  Goal: Plan of Care Review  Outcome: Ongoing (interventions implemented as appropriate)    10/18/17 2045   Coping/Psychosocial Response Interventions   Plan Of Care Reviewed With patient   Outcome Evaluation   Outcome Summary/Follow up Plan pleasant patient with minimal complaint of pain-On Q @ 8-will monitor pain and labs-continue to monitor patient   Patient Care Overview   Progress improving       Goal: Adult Individualization and Mutuality  Outcome: Ongoing (interventions implemented as appropriate)  Goal: Discharge Needs Assessment  Outcome: Ongoing (interventions implemented as appropriate)    Problem: Skin Integrity Impairment, Risk/Actual (Adult)  Goal: Identify Related Risk Factors and Signs and Symptoms  Outcome: Outcome(s) achieved Date Met:  10/18/17  Goal: Skin Integrity/Wound Healing  Outcome: Ongoing (interventions implemented as appropriate)    Problem: Infection, Risk/Actual (Adult)  Goal: Identify Related Risk Factors and Signs and Symptoms  Outcome: Outcome(s) achieved Date Met:  10/18/17  Goal: Infection Prevention/Resolution  Outcome: Ongoing (interventions implemented as appropriate)

## 2017-10-19 NOTE — PLAN OF CARE
Problem: Patient Care Overview (Adult)  Goal: Plan of Care Review  Outcome: Ongoing (interventions implemented as appropriate)    10/19/17 1519   Coping/Psychosocial Response Interventions   Plan Of Care Reviewed With patient   Outcome Evaluation   Outcome Summary/Follow up Plan PT treatment completed. Patient able to ambulate 204 feet with RW and cues for proper sequencing/step length. She also performs LE ther ex as indicated on care map. Continue to goals.   Patient Care Overview   Progress improving         Problem: Inpatient Physical Therapy  Goal: Gait Training Goal LTG- PT  Outcome: Ongoing (interventions implemented as appropriate)    10/18/17 1403 10/19/17 1337 10/19/17 1519   Gait Training PT LTG   Gait Training Goal PT LTG, Date Established --  10/19/17 --    Gait Training Goal PT LTG, Time to Achieve --  2 wks --    Gait Training Goal PT LTG, [place] Level --  contact guard assist --    Gait Training Goal PT LTG, Assist Device --  walker, rolling --    Gait Training Goal PT LTG, Distance to Achieve --  300 --    Gait Training Goal PT LTG, Additional Goal WBAT R LE --  --    Gait Training Goal PT LTG, Outcome --  --  goal ongoing   Gait Training Goal PT LTG, Reason Goal Not Met --  goals revised this date --

## 2017-10-19 NOTE — THERAPY TREATMENT NOTE
Acute Care - Physical Therapy Treatment Note  [facility]     Patient Name: Lata Justice  : 1945  MRN: [id number]  Today's Date: 10/19/2017  Onset of Illness/Injury or Date of Surgery Date: 10/18/17  Date of Referral to PT: 10/18/17  Referring Physician: Dr. Mixon    Admit Date: 10/18/2017    Visit Dx:    ICD-10-CM ICD-9-CM   1. Impaired functional mobility, balance, gait, and endurance Z74.09 V49.89   2. Arthralgia of both knees M25.561 719.46    M25.562    3. Primary osteoarthritis of both knees M17.0 715.16   4. Pre-op testing Z01.818 V72.84   5. Primary osteoarthritis of right knee M17.11 715.16   6. Myalgia  M79.1 729.1   7. Pain of right lower extremity  M79.604 729.5   8. B12 deficiency E53.8 266.2   9. Impaired mobility and ADLs Z74.09 799.89     Patient Active Problem List   Diagnosis   • Adkins's esophagus   • Benign essential hypertension   • Chronic low back pain   • Chronic obstructive pulmonary disease   • Depression   • Impaired glucose tolerance   • Hyperlipidemia   • Seasonal allergic rhinitis   • Bilateral chronic knee pain   • Bilateral edema of lower extremity   • Bilateral wrist pain   • Chronic pain of both ankles   • Primary osteoarthritis of right knee   • Arthralgia of both knees   • Borderline diabetes   • Status post right knee replacement   • Stage 3 chronic kidney disease               Adult Rehabilitation Note       10/19/17 1437 10/19/17 1342 10/19/17 0906    Rehab Assessment/Intervention    Discipline occupational therapist  -SD physical therapist  -LM physical therapist  -LM    Document Type therapy note (daily note)  -SD therapy note (daily note)  -LM therapy note (daily note)  -LM    Subjective Information agree to therapy;no complaints  -SD agree to therapy;no complaints  -LM agree to therapy;no complaints  -LM    Patient Effort, Rehab Treatment good  -SD good  -LM good  -LM    Precautions/Limitations fall precautions  -SD fall precautions  -LM fall precautions  -LM     Recorded by [SD] Latasha Nix, OT [LM] Venecia Pryor, PT [LM] Venecia Pryor, PT    Pain Assessment    Pain Assessment 0-10  -SD No/denies pain  -LM No/denies pain  -LM    Pain Score 0  -SD      Post Pain Score 1  -SD      Pain Type Acute pain  -SD      Pain Location Knee  -SD      Pain Orientation Right  -SD      Recorded by [SD] Latasha Nix, OT [LM] Venecia Pryor, PT [LM] Venecia Pryor, PT    Mobility Assessment/Training    Extremity Weight-Bearing Status  right lower extremity  -LM right lower extremity  -LM    Right Lower Extremity Weight-Bearing  weight-bearing as tolerated  -LM weight-bearing as tolerated  -LM    Recorded by  [LM] Venecia Pryor, PT [LM] Venecia Pryor, PT    Bed Mobility, Assessment/Treatment    Bed Mobility, Assistive Device bed rails;head of bed elevated  -SD  bed rails;head of bed elevated  -LM    Bed Mob, Supine to Sit, [place] contact guard assist  -SD  conditional independence  -LM    Bed Mob, Sit to Supine, [place] contact guard assist  -SD contact guard assist  -LM     Recorded by [SD] Latasha Nix, OT [LM] Venecia Pryor, PT [LM] Venecia Pryor, PT    Transfer Assessment/Treatment    Transfers, Bed-Chair [place]   contact guard assist  -LM    Transfers, Chair-Bed [place]  contact guard assist  -LM     Transfers, Bed-Chair-Bed, Assist Device  rolling walker  -LM rolling walker  -LM    Transfers, Sit-Stand [place] contact guard assist  -SD contact guard assist  -LM contact guard assist  -LM    Transfers, Stand-Sit [place] contact guard assist  -SD contact guard assist  -LM contact guard assist  -LM    Transfers, Sit-Stand-Sit, Assist Device rolling walker  -SD rolling walker  -LM rolling walker  -LM    Recorded by [SD] Latasha Nix, OT [LM] Venecia Pryor, PT [LM] Venecia Pryor, PT    Gait Assessment/Treatment    Gait, [place] Level  contact guard assist  -LM contact guard assist  -LM    Gait, Assistive Device  rolling walker  -LM rolling walker  -LM     Gait, Distance (Feet)  204  -  -LM    Gait, Gait Pattern Analysis  swing-through gait  -LM swing-through gait  -LM    Gait, Gait Deviations  antalgic;decreased heel strike;catrachito decreased;step length decreased;toe-to-floor clearance decreased  -LM     Gait, Safety Issues   balance decreased during turns;sequencing ability decreased;step length decreased  -LM    Gait, Impairments   ROM decreased;strength decreased  -LM    Recorded by  [LM] Venecia Pryor, PT [LM] Venecia Pryor PT    Functional Mobility    Functional Mobility- Ind. Level contact guard assist  -SD      Functional Mobility- Device rolling walker  -SD      Functional Mobility-Distance (Feet) 30  -SD      Recorded by [SD] Latasha Nix OT      Lower Body Dressing Assessment/Training    LB Dressing Assess/Train, Clothing Type doffing:;donning:;socks  -SD      LB Dressing Assess/Train, Assist Device reacher;sock-aid  -SD      LB Dressing Assess/Train, Position edge of bed;sitting  -SD      LB Dressing Assess/Train, [place] supervision required  -SD      Recorded by [SD] Latasha Nix OT      Toileting Assessment/Training    Toileting Assess/Train, Indepen Level contact guard assist  -SD      Recorded by [SD] Latasha Nix OT      Therapy Exercises    Right Lower Extremity  AROM:;15 reps;supine;ankle pumps/circles;glut sets;heel slides;hip abduction/adduction;SAQ;SLR;quad sets  -LM AAROM:;AROM:;15 reps;supine;ankle pumps/circles;glut sets;heel slides;quad sets;SLR  -LM    Bilateral Upper Extremity AROM:;15 reps;sitting;elbow flexion/extension;shoulder abduction/adduction;shoulder extension/flexion  -SD      Recorded by [SD] Latasha Nix OT [LM] Venecia Pryor, PT [LM] Venecia Pryor PT    Positioning and Restraints    Pre-Treatment Position in bed  -SD sitting in chair/recliner  -LM in bed  -LM    Post Treatment Position bed  -SD bed  -LM chair  -LM    In Bed supine;call light within reach;encouraged to call for assist  -SD supine;call  light within reach;encouraged to call for assist  -LM     In Chair   reclined;call light within reach;encouraged to call for assist  -LM    Recorded by [SD] Latasha Nix, OT [LM] Venecia Pryor, PT [LM] Venecia Pryor, PT      User Key  (r) = Recorded By, (t) = Taken By, (c) = Cosigned By    Initials Name Effective Dates    LM Venecia Pryor, PT 10/26/16 -     SD Latasha Nix OT 06/30/17 -                 IP PT Goals       10/19/17 1519 10/19/17 1337 10/18/17 1403    Bed Mobility PT LTG    Bed Mobility PT LTG, Date Established   10/18/17  -LM    Bed Mobility PT LTG, Time to Achieve   2 wks  -LM    Bed Mobility PT LTG, Activity Type   supine to sit/sit to supine  -LM    Bed Mobility PT LTG, [place] Level   conditional independence  -LM    Bed Mobility PT Goal  LTG, Assist Device   bed rails  -LM    Bed Mobility PT LTG, Outcome  goal met  -LM goal ongoing  -LM    Transfer Training PT LTG    Transfer Training PT LTG, Date Established   10/18/17  -LM    Transfer Training PT LTG, Time to Achieve   2 wks  -LM    Transfer Training PT LTG, Activity Type   sit to stand/stand to sit;bed to chair /chair to bed  -LM    Transfer Training PT LTG, [place] Level   contact guard assist  -LM    Transfer Training PT LTG, Assist Device   walker, rolling  -LM    Transfer Training PT LTG, Outcome  goal met  -LM goal ongoing  -LM    Gait Training PT LTG    Gait Training Goal PT LTG, Date Established  10/19/17  -LM 10/18/17  -LM    Gait Training Goal PT LTG, Time to Achieve  2 wks  -LM 2 wks  -LM    Gait Training Goal PT LTG, [place] Level  contact guard assist  -LM contact guard assist  -LM    Gait Training Goal PT LTG, Assist Device  walker, rolling  -LM walker, rolling  -LM    Gait Training Goal PT LTG, Distance to Achieve  300  -  -LM    Gait Training Goal PT LTG, Additional Goal   WBAT R LE  -LM    Gait Training Goal PT LTG, Outcome goal ongoing  -LM goal ongoing  -LM goal ongoing  -LM    Gait Training Goal PT  LTG, Reason Goal Not Met  goals revised this date  -LM     Strength Goal PT LTG    Strength Goal PT LTG, Date Established   10/18/17  -LM    Strength Goal PT LTG, Time to Achieve   2 wks  -LM    Strength Goal PT LTG, Measure to Achieve   Patient will perform LE ther ex x 15 reps to improve functional strength and ROM for mobility.  -LM    Strength Goal PT LTG, Outcome  goal met  -LM goal ongoing  -LM      User Key  (r) = Recorded By, (t) = Taken By, (c) = Cosigned By    Initials Name Provider Type    OBIE Pryor, PT Physical Therapist          Physical Therapy Education     Title: PT OT SLP Therapies (Active)     Topic: Physical Therapy (Done)     Point: Mobility training (Done)    Learning Progress Summary    Learner Readiness Method Response Comment Documented by Status   Patient Acceptance E VU Ther ex for HEP; proper sequencing of gait for safe transfers/ambulation.  10/19/17 1518 Done    Acceptance E VU Ther ex for HEP; proper use of RW for safe transfers/ambulation.  10/19/17 1337 Done    Acceptance E VU Purpose of PT/POC; proper use of RW for safe transfers/ambulation; ther ex for HEP.  10/18/17 1403 Done               Point: Home exercise program (Done)    Learning Progress Summary    Learner Readiness Method Response Comment Documented by Status   Patient Acceptance E VU Ther ex for HEP; proper sequencing of gait for safe transfers/ambulation.  10/19/17 1518 Done    Acceptance E VU Ther ex for HEP; proper use of RW for safe transfers/ambulation.  10/19/17 1337 Done    Acceptance E VU Purpose of PT/POC; proper use of RW for safe transfers/ambulation; ther ex for HEP.  10/18/17 1403 Done               Point: Precautions (Done)    Learning Progress Summary    Learner Readiness Method Response Comment Documented by Status   Patient Acceptance E VU Ther ex for HEP; proper use of RW for safe transfers/ambulation.  10/19/17 1337 Done    Acceptance E VU Purpose of PT/POC; proper use of RW for safe  transfers/ambulation; ther ex for HEP. LM 10/18/17 1403 Done                      User Key     Initials Effective Dates Name Provider Type Discipline     10/26/16 -  Venecia Pryor, PT Physical Therapist PT                    PT Recommendation and Plan  Anticipated Discharge Disposition: home with home health  Planned Therapy Interventions: balance training, bed mobility training, gait training, home exercise program, patient/family education, strengthening, transfer training  PT Frequency: 2 times/day  Plan of Care Review  Plan Of Care Reviewed With: patient  Progress: improving  Outcome Summary/Follow up Plan: PT treatment completed.  Patient able to ambulate 204 feet with RW and cues for proper sequencing/step length.  She also performs LE ther ex as indicated on care map.  Continue to goals.          Outcome Measures       10/19/17 1437 10/19/17 1342 10/19/17 0906    How much help from another person do you currently need...    Turning from your back to your side while in flat bed without using bedrails?  4  -LM 4  -LM    Moving from lying on back to sitting on the side of a flat bed without bedrails?  4  -LM 4  -LM    Moving to and from a bed to a chair (including a wheelchair)?  3  -LM 3  -LM    Standing up from a chair using your arms (e.g., wheelchair, bedside chair)?  3  -LM 3  -LM    Climbing 3-5 steps with a railing?  2  -LM 2  -LM    To walk in hospital room?  3  -LM 3  -LM    AM-PAC 6 Clicks Score  19  -LM 19  -LM    How much help from another is currently needed...    Putting on and taking off regular lower body clothing? 3  -SD      Bathing (including washing, rinsing, and drying) 3  -SD      Toileting (which includes using toilet bed pan or urinal) 3  -SD      Putting on and taking off regular upper body clothing 4  -SD      Taking care of personal grooming (such as brushing teeth) 4  -SD      Eating meals 4  -SD      Score 21  -SD      Functional Assessment    Outcome Measure Options AM-PAC 6 Clicks  Daily Activity (OT)  -SD AM-PAC 6 Clicks Basic Mobility (PT)  -LM AM-PAC 6 Clicks Basic Mobility (PT)  -LM      10/18/17 1320 10/18/17 1318       How much help from another person do you currently need...    Turning from your back to your side while in flat bed without using bedrails?  3  -LM     Moving from lying on back to sitting on the side of a flat bed without bedrails?  3  -LM     Moving to and from a bed to a chair (including a wheelchair)?  3  -LM     Standing up from a chair using your arms (e.g., wheelchair, bedside chair)?  3  -LM     Climbing 3-5 steps with a railing?  2  -LM     To walk in hospital room?  3  -LM     AM-PAC 6 Clicks Score  17  -LM     How much help from another is currently needed...    Putting on and taking off regular lower body clothing? 2  -SD      Bathing (including washing, rinsing, and drying) 2  -SD      Toileting (which includes using toilet bed pan or urinal) 3  -SD      Putting on and taking off regular upper body clothing 4  -SD      Taking care of personal grooming (such as brushing teeth) 4  -SD      Eating meals 4  -SD      Score 19  -SD      Functional Assessment    Outcome Measure Options AM-PAC 6 Clicks Daily Activity (OT)  -SD AM-[facility] 6 Clicks Basic Mobility (PT)  -LM       User Key  (r) = Recorded By, (t) = Taken By, (c) = Cosigned By    Initials Name Provider Type    OBIE Pryor PT Physical Therapist    [place] Nix OT Occupational Therapist           Time Calculation:         PT Charges       10/19/17 1520 10/19/17 1340       Time Calculation    Start Time 1342  -LM 0906  -LM     PT Received On 10/19/17  -LM 10/19/17  -LM     Time Calculation- PT    Total Timed Code Minutes- PT 24 minute(s)  -LM 24 minute(s)  -LM       User Key  (r) = Recorded By, (t) = Taken By, (c) = Cosigned By    Initials Name Provider Type    OBIE Pryor PT Physical Therapist          Therapy Charges for Today     Code Description Service Date Service Provider Modifiers Qty     92599876622 HC PT EVAL MOD COMPLEXITY 4 10/18/2017 Venecia Pryor, PT GP 1    07823667670 HC GAIT TRAINING EA 15 MIN 10/19/2017 Venecia Pryor, PT GP 1    97607253286 HC PT THER PROC EA 15 MIN 10/19/2017 Venecia Pryor, PT GP 1    44727432710 HC GAIT TRAINING EA 15 MIN 10/19/2017 Venecia Pryor, PT GP 1    80812451210 HC PT THER PROC EA 15 MIN 10/19/2017 Venecia Pryor, PT GP 1          PT G-Codes  Outcome Measure Options: AM-PAC 6 Clicks Daily Activity (OT)    Venecia Pryor, PT  10/19/2017

## 2017-10-19 NOTE — PROGRESS NOTES
[facility]    Nerve Cath Post Op Call    Patient Name: Lata Justice  :  1945  MRN:  [id number]  Date of Discharge:     Nerve Cath Post Op Call:    Analgesia:Excellent                            Nerve block catheter is intact with OnQ pump infusing.  Catheter site is pain free without redness or exudate  Chart reviewed- continue with onQ as planned

## 2017-10-19 NOTE — PROGRESS NOTES
[facility]  PROGRESS NOTE    Name:  Lata Justice   Age:  72 y.o.  Sex:  female  :  1945  MRN:  [id number]   Visit Number:  [id number]  Admission Date:  @ADMITDT  Date Of Service:  10/19/17  Primary Care Physician:  Annetta Mcclendon MD     LOS: 1 day :  Patient Care Team:  Annetta Mcclendon MD as PCP - General:    Chief Complaint:      *Doing much better on a level I - 2/10 and pain and bleeding now in hallway radio go home tomorrow**    Subjective / Interval History:     **Day 1 status post right total knee arthroplasty*    Review of Systems:     General ROS: No fever spike, no chills or loss of consciousness.  Ophthalmic ROS: no acute visual disturbance.  ENT ROS: No sinus congestion or sore throat.   Respiratory ROS: No shortness of breath.   Cardiovascular ROS: No chest pain or palpitations.  Gastrointestinal ROS: No acute abdominal change. Non-distended.  Genito-Urinary ROS: No reported dysuria or hematuria.  Musculoskeletal ROS: No deep calf pain. No acute focal motor deficit  Neurological ROS: No cyanosis, no new numbness or tingling.  Dermatological ROS: No erythema.  No rash or pruritis.    Vital Signs:    Temp:  [97.8 °F (36.6 °C)-98.8 °F (37.1 °C)] 98.8 °F (37.1 °C)  Heart Rate:  [70-79] 78  Resp:  [18-20] 18  BP: (116-144)/(49-86) 139/86    Intake and output:    I/O last 3 completed shifts:  In: 4862 [P.O.:720; I.V.:4042; IV Piggyback:100]  Out: 1400 [Urine:1300; Blood:100]  I/O this shift:  In: 240 [P.O.:240]  Out: -     Physical Examination:    General Appearance:    Alert and cooperative, no acute distress.   Head:    Atraumatic and normocephalic, without obvious abnormality.   Eyes:    PERRLA.  Extraocular movements are within normal limits.   ENT:   No acute change.   Neck:   Supple, trachea midline.   Lungs:     Normal respirations, unlabored.    Heart:    Regular rate.   Abdomen:     Soft non-tender, non-distended.   Extremities:   No new motor deficit, no calf  pain, Peter sign negative.   Skin:     No rash.  No cyanosis.  No erythema.  No active drainage.  With contact precautions, sterile dressing change done.   Neurologic:   Sensation intact, pulses intact.     Laboratory results:    Lab Results (last 24 hours)     Procedure Component Value Units Date/Time    Basic Metabolic Panel [[id number]]  (Abnormal) Collected:  10/19/17 0525    Specimen:  Blood Updated:  10/19/17 0651     Glucose 129 (H) mg/dL      BUN 21 (H) mg/dL      Creatinine 0.70 mg/dL      Sodium 136 (L) mmol/L      Potassium 4.9 mmol/L      Chloride 103 mmol/L      CO2 20.0 (L) mmol/L      Calcium 8.5 mg/dL      eGFR Non African Amer 82 mL/min/1.73      BUN/Creatinine Ratio 30.0 (H)     Anion Gap 17.9 mmol/L     Narrative:       The MDRD GFR formula is only valid for adults with stable renal function between ages 18 and 70.    CBC & Differential [[id number]] Collected:  10/19/17 0525    Specimen:  Blood Updated:  10/19/17 0706    Narrative:       The following orders were created for panel order CBC & Differential.  Procedure                               Abnormality         Status                     ---------                               -----------         ------                     CBC Auto Differential[[id number]]        Abnormal            Final result                 Please view results for these tests on the individual orders.    CBC Auto Differential [[id number]]  (Abnormal) Collected:  10/19/17 0525    Specimen:  Blood Updated:  10/19/17 0706     WBC 8.71 10*3/mm3      RBC 2.95 (L) 10*6/mm3      Hemoglobin 8.7 (L) g/dL      Hematocrit 26.6 (L) %      MCV 90.2 fL      MCH 29.5 pg      MCHC 32.7 g/dL      RDW 13.0 %      RDW-SD 42.0 fl      MPV 10.9 fL      Platelets 141 10*3/mm3      Neutrophil % 89.0 (H) %      Lymphocyte % 4.4 (L) %      Monocyte % 6.3 %      Eosinophil % 0.0 %      Basophil % 0.0 %      Immature Grans % 0.3 %      Neutrophils, Absolute 7.75 (H) 10*3/mm3      Lymphocytes, Absolute  0.38 (L) 10*3/mm3      Monocytes, Absolute 0.55 10*3/mm3      Eosinophils, Absolute 0.00 10*3/mm3      Basophils, Absolute 0.00 10*3/mm3      Immature Grans, Absolute 0.03 10*3/mm3      nRBC 0.0 /100 WBC     Protime-INR [[id number]]  (Abnormal) Collected:  10/19/17 0525    Specimen:  Blood Updated:  10/19/17 0720     Protime 13.0 (H) Seconds      INR 1.19 (H)    aPTT [[id number]]  (Abnormal) Collected:  10/19/17 0525    Specimen:  Blood Updated:  10/19/17 0720     PTT 23.2 (L) seconds           I have reviewed the patient's laboratory results.    Radiology results:    Imaging Results (last 24 hours)     ** No results found for the last 24 hours. **          I have reviewed the patient's radiology reports.    AP and lateral taken in recovery room shows no acute concern.  Good position and alignment of prosthesis and/or hardware.       Assessment/Plan    Principal Problem:    Status post right knee replacement  Active Problems:    Primary osteoarthritis of right knee    Arthralgia of both knees    Borderline diabetes    Stage 3 chronic kidney disease      **Patient progressing well with therapy, tolerating reg diet well, pain managed, arrangements in place for discharge home tomorrow with resumption of outpatient therapy Monday at [facility].*    Continue current management per the detailed orders and instructions, including skin, safety and fall precautions, respiratory therapy with incentive spirometer, advance diet as tolerated, bowel regimen, multi-modal analgesia, multi-modal DVT prophylaxis, Hospitalist consult, PT/OT following post-surgical rehab protocol, and Case Management for discharge plans.    Doc Mixon MD  10/19/17  3:06 PM

## 2017-10-20 ENCOUNTER — APPOINTMENT (OUTPATIENT)
Dept: GENERAL RADIOLOGY | Facility: HOSPITAL | Age: 72
End: 2017-10-20

## 2017-10-20 ENCOUNTER — TELEPHONE (OUTPATIENT)
Dept: INTERNAL MEDICINE | Facility: CLINIC | Age: 72
End: 2017-10-20

## 2017-10-20 VITALS
TEMPERATURE: 98.5 F | SYSTOLIC BLOOD PRESSURE: 132 MMHG | HEIGHT: 62 IN | HEART RATE: 78 BPM | DIASTOLIC BLOOD PRESSURE: 53 MMHG | WEIGHT: 206 LBS | OXYGEN SATURATION: 98 % | BODY MASS INDEX: 37.91 KG/M2 | RESPIRATION RATE: 20 BRPM

## 2017-10-20 LAB
ANION GAP SERPL CALCULATED.3IONS-SCNC: 13.3 MMOL/L
BUN BLD-MCNC: 22 MG/DL (ref 7–20)
BUN/CREAT SERPL: 24.4 (ref 7.1–23.5)
CALCIUM SPEC-SCNC: 8.8 MG/DL (ref 8.4–10.2)
CHLORIDE SERPL-SCNC: 104 MMOL/L (ref 98–107)
CO2 SERPL-SCNC: 26 MMOL/L (ref 26–30)
CREAT BLD-MCNC: 0.9 MG/DL (ref 0.6–1.3)
DEPRECATED RDW RBC AUTO: 43.7 FL (ref 37–54)
ERYTHROCYTE [DISTWIDTH] IN BLOOD BY AUTOMATED COUNT: 13.3 % (ref 11.5–14.5)
GFR SERPL CREATININE-BSD FRML MDRD: 62 ML/MIN/1.73
GLUCOSE BLD-MCNC: 101 MG/DL (ref 74–98)
HCT VFR BLD AUTO: 25.2 % (ref 37–47)
HGB BLD-MCNC: 8.2 G/DL (ref 12–16)
INR PPP: 1.23 (ref 0.9–1.1)
LAB AP CASE REPORT: NORMAL
Lab: NORMAL
MCH RBC QN AUTO: 29.2 PG (ref 27–31)
MCHC RBC AUTO-ENTMCNC: 32.5 G/DL (ref 30–37)
MCV RBC AUTO: 89.7 FL (ref 81–99)
PATH REPORT.FINAL DX SPEC: NORMAL
PLATELET # BLD AUTO: 129 10*3/MM3 (ref 130–400)
PMV BLD AUTO: 10.7 FL (ref 6–12)
POTASSIUM BLD-SCNC: 4.3 MMOL/L (ref 3.5–5.1)
PROTHROMBIN TIME: 13.5 SECONDS (ref 9.3–12.1)
RBC # BLD AUTO: 2.81 10*6/MM3 (ref 4.2–5.4)
SODIUM BLD-SCNC: 139 MMOL/L (ref 137–145)
WBC NRBC COR # BLD: 5.78 10*3/MM3 (ref 4.8–10.8)

## 2017-10-20 PROCEDURE — 94799 UNLISTED PULMONARY SVC/PX: CPT

## 2017-10-20 PROCEDURE — 85027 COMPLETE CBC AUTOMATED: CPT | Performed by: NURSE PRACTITIONER

## 2017-10-20 PROCEDURE — 97116 GAIT TRAINING THERAPY: CPT

## 2017-10-20 PROCEDURE — 85610 PROTHROMBIN TIME: CPT | Performed by: ORTHOPAEDIC SURGERY

## 2017-10-20 PROCEDURE — 97110 THERAPEUTIC EXERCISES: CPT

## 2017-10-20 PROCEDURE — 73660 X-RAY EXAM OF TOE(S): CPT

## 2017-10-20 PROCEDURE — 73560 X-RAY EXAM OF KNEE 1 OR 2: CPT

## 2017-10-20 PROCEDURE — 80048 BASIC METABOLIC PNL TOTAL CA: CPT | Performed by: NURSE PRACTITIONER

## 2017-10-20 PROCEDURE — 99024 POSTOP FOLLOW-UP VISIT: CPT | Performed by: PHYSICIAN ASSISTANT

## 2017-10-20 PROCEDURE — 99232 SBSQ HOSP IP/OBS MODERATE 35: CPT | Performed by: NURSE PRACTITIONER

## 2017-10-20 PROCEDURE — 25010000002 ENOXAPARIN PER 10 MG: Performed by: ORTHOPAEDIC SURGERY

## 2017-10-20 RX ADMIN — METOPROLOL TARTRATE 25 MG: 25 TABLET ORAL at 08:06

## 2017-10-20 RX ADMIN — HYDROCHLOROTHIAZIDE 25 MG: 25 TABLET ORAL at 08:06

## 2017-10-20 RX ADMIN — ENOXAPARIN SODIUM 40 MG: 40 INJECTION SUBCUTANEOUS at 08:06

## 2017-10-20 RX ADMIN — GABAPENTIN 800 MG: 400 CAPSULE ORAL at 08:06

## 2017-10-20 RX ADMIN — ALPRAZOLAM 0.25 MG: 0.25 TABLET ORAL at 08:06

## 2017-10-20 RX ADMIN — MELOXICAM 7.5 MG: 7.5 TABLET ORAL at 08:06

## 2017-10-20 RX ADMIN — PANTOPRAZOLE SODIUM 40 MG: 40 TABLET, DELAYED RELEASE ORAL at 06:12

## 2017-10-20 NOTE — PROGRESS NOTES
PROGRESS NOTE        Date of Admission: 10/18/2017  Length of Stay: 2  Primary Care Physician: Annetta Mcclendon MD    Subjective   Chief Complaint:  knee pain    HPI: Patient seen today.  She was brought in yesterday for an elective right total knee replacement.  Patient tolerated the procedure without any complications. According to the patient she was sitting on the side of the bed last evening when she fell into the floor landing on her bilateral knees.  She denies any injury.  Denies chest pain, SOA, nausea or vomiting.      Review Of Systems:   Review of Systems   General ROS: Patient denies any fevers, chills or loss of consciousness.    Psychological ROS: Denies any hallucinations and delusions.  Ophthalmic ROS: Denies any diplopia or transient loss of vision.  ENT ROS: Denies sore throat, nasal congestion or ear pain.   Allergy and Immunology ROS: Denies rash or itching.  Hematological and Lymphatic ROS: Denies neck swelling or easy bleeding.  Endocrine ROS: Denies any recent unintentional weight gain or loss.  Breast ROS: Denies any pain or swelling.  Respiratory ROS: Denies cough or shortness of breath.   Cardiovascular ROS: Denies chest pain or palpitations. No history of exertional chest pain.   Gastrointestinal ROS: Denies nausea and vomiting. Denies any abdominal pain. No diarrhea.  Genito-Urinary ROS: Denies dysuria or hematuria.  Musculoskeletal ROS: Denies back pain. No muscle pain. No calf pain.   Neurological ROS: Denies any focal weakness. No loss of consciousness. Denies any numbness. Denies neck pain.   Dermatological ROS: Denies any redness or pruritis.    Objective      Temp:  [98.1 °F (36.7 °C)-99.2 °F (37.3 °C)] 98.1 °F (36.7 °C)  Heart Rate:  [74-84] 79  Resp:  [18-20] 20  BP: (112-139)/(42-86) 126/42  Physical Exam    General Appearance:  Alert and cooperative, not in any acute distress.   Head:  Atraumatic and normocephalic, without obvious abnormality.   Eyes:          PERRLA,  conjunctivae and sclerae normal, no Icterus. No pallor. Extra-occular movements are within normal limits.   Ears:  Ears appear intact with no abnormalities noted.   Throat: No oral lesions, no thrush, oral mucosa moist.   Neck: Supple, trachea midline, no thyromegaly, no carotid bruit.   Back:   No kyphoscoliosis present. No tenderness to palpation,   range of motion normal.   Lungs:   Chest shape is normal. Breath sounds heard bilaterally equally.  No crackles or wheezing. No Pleural rub or bronchial breathing.   Heart:  Normal S1 and S2, no murmur, no gallop, no rub. No JVD   Abdomen:   Normal bowel sounds, no masses, no organomegaly. Soft     non-tender, non-distended, no guarding, no rebound                tenderness   Extremities: Moves all extremities well, no edema, no cyanosis, no clubbing.  Dressing right knee clean and intact.     Pulses: Pulses palpable and equal bilaterally   Skin: No bleeding, bruising or rash   Lymph nodes: No palpable adenopathy   Neurologic:    Psychiatric/Behavior:     Cranial nerves 2 - 12 grossly intact, sensation intact, Motor power is normal and equal bilaterally.  Mood normal, behavior normal       Results Review:    I have reviewed the labs, radiology results and diagnostic studies.      Results from last 7 days  Lab Units 10/20/17  0620   WBC 10*3/mm3 5.78   HEMOGLOBIN g/dL 8.2*   PLATELETS 10*3/mm3 129*       Results from last 7 days  Lab Units 10/20/17  0620   SODIUM mmol/L 139   POTASSIUM mmol/L 4.3   CO2 mmol/L 26.0   CREATININE mg/dL 0.90   GLUCOSE mg/dL 101*       Culture Data:    Radiology Data:   Cardiology Data:    I have reviewed the medications.    Assessment/Plan     Assessment and Plan:  1.  Borderline diabetes mellitus-monitor blood sugars complaint on and some protocol.  Blood sugars have been stable.       2.  Right total knee replacement-patient on analgesics, DVT prophylaxis, PT OT consult as well as case management consult for discharge planning.  Plan is  likely discharge home today with home health if orthopedics is in agreement.  Xrays were done since patient did fall landing on her knee.  Xray was negative as well as right foot xray was negative.      3.  Chronic essential hypertension-  Continue home BP medications.  BP is stable.         4.  Chronic Kidney Disease CKD III    5.  Acute blood loss anemia-  Does not require blood transfusion.  Continue to monitor and transfuse if indicated.  Will need repeat CBC on Monday.             DVT prophylaxis:  Discharge Planning:   FELIX Quintanilla 10/20/17 8:19 AM

## 2017-10-20 NOTE — THERAPY DISCHARGE NOTE
Acute Care - Occupational Therapy Discharge Summary  [facility]     Patient Name: Lata Justice  : 1945  MRN: [id number]    Today's Date: 10/20/2017  Onset of Illness/Injury or Date of Surgery Date: 10/18/17    Date of Referral to OT: 10/18/17  Referring Physician: Dr. Mixon      Admit Date: 10/18/2017        OT Recommendation and Plan    Visit Dx:    ICD-10-CM ICD-9-CM   1. Impaired mobility and ADLs Z74.09 799.89   2. Arthralgia of both knees M25.561 719.46    M25.562    3. Primary osteoarthritis of both knees M17.0 715.16   4. Pre-op testing Z01.818 V72.84   5. Primary osteoarthritis of right knee M17.11 715.16   6. Myalgia  M79.1 729.1   7. Pain of right lower extremity  M79.604 729.5   8. B12 deficiency E53.8 266.2   9. Impaired functional mobility, balance, gait, and endurance Z74.09 V49.89   10. Status post total right knee replacement Z96.651 V43.65                     OT Goals       10/19/17 1511 10/18/17 1454       Transfer Training 2 OT LTG    Transfer Training 2 OT LTG, Date Established  10/18/17  -SD     Transfer Training 2 OT LTG, Time to Achieve  2 wks  -SD     Transfer Training 2 OT LTG, Activity Type  sit to stand/stand to sit  -SD     Transfer Training 2 OT LTG, Frontier Level  supervision required  -SD     Transfer Training 2 OT LTG, Assist Device  walker, rolling  -SD     Transfer Training 2 OT LTG, Date Goal Reviewed 10/19/17  -SD      Transfer Training 2 OT LTG, Outcome goal ongoing  -SD goal ongoing  -SD     Strength OT LTG    Strength Goal OT LTG, Date Established  10/18/17  -SD     Strength Goal OT LTG, Time to Achieve  2 wks  -SD     Strength Goal OT LTG, Measure to Achieve  Patient will perform 15 reps UB ther ex using theraband in order to increase strength and endurance.   -SD     Strength Goal OT LTG, Date Goal Reviewed 10/19/17  -SD      Strength Goal OT LTG, Outcome goal partially met  -SD goal ongoing  -SD     Patient Education OT LTG    Patient Education OT LTG, Date  Established  10/18/17  -SD     Patient Education OT LTG, Time to Achieve  2 wks  -SD     Patient Education OT LTG, Education Type  adaptive equipment mgmt  -SD     Patient Education OT LTG, Education Understanding  demonstrates adequately;verbalizes understanding  -SD     Patient Education OT LTG, Date Goal Reviewed 10/19/17  -SD      Patient Education OT LTG Outcome goal met  -SD goal ongoing  -SD     LB Dressing OT LTG    LB Dressing Goal OT LTG, Date Established  10/18/17  -SD     LB Dressing Goal OT LTG, Time to Achieve  2 wks  -SD     LB Dressing Goal OT LTG, Goehner Level  supervision required  -SD     LB Dressing Goal OT LTG, Adaptive Equipment  reacher;sock-aid  -SD     LB Dressing Goal OT LTG, Date Goal Reviewed 10/19/17  -SD      LB Dressing Goal OT LTG, Outcome goal met  -SD goal ongoing  -SD     Functional Mobility OT LTG    Functional Mobility Goal OT LTG, Date Established  10/18/17  -SD     Functional Mobility Goal OT LTG, Time to Achieve  2 wks  -SD     Functional Mobility Goal OT LTG, Goehner Level  contact guard  -SD     Functional Mobility Goal OT LTG, Assist Device  rolling walker  -SD     Functional Mobility Goal OT LTG, Distance to Achieve  in hallway  -SD     Functional Mobility Goal OT LTG, Additional Goal  50  -SD     Functional Mobility Goal OT LTG, Date Goal Reviewed 10/19/17  -SD      Functional Mobility Goal OT LTG, Outcome goal ongoing  -SD goal ongoing  -SD       User Key  (r) = Recorded By, (t) = Taken By, (c) = Cosigned By    Initials Name Provider Type    SD Latasha Buttonwillow, OT Occupational Therapist                Outcome Measures       10/19/17 1437 10/19/17 1342 10/19/17 0906    How much help from another person do you currently need...    Turning from your back to your side while in flat bed without using bedrails?  4  -LM 4  -LM    Moving from lying on back to sitting on the side of a flat bed without bedrails?  4  -LM 4  -LM    Moving to and from a bed to a chair  (including a wheelchair)?  3  -LM 3  -LM    Standing up from a chair using your arms (e.g., wheelchair, bedside chair)?  3  -LM 3  -LM    Climbing 3-5 steps with a railing?  2  -LM 2  -LM    To walk in hospital room?  3  -LM 3  -LM    AM-PAC 6 Clicks Score  19  -LM 19  -LM    How much help from another is currently needed...    Putting on and taking off regular lower body clothing? 3  -SD      Bathing (including washing, rinsing, and drying) 3  -SD      Toileting (which includes using toilet bed pan or urinal) 3  -SD      Putting on and taking off regular upper body clothing 4  -SD      Taking care of personal grooming (such as brushing teeth) 4  -SD      Eating meals 4  -SD      Score 21  -SD      Functional Assessment    Outcome Measure Options AM-PAC 6 Clicks Daily Activity (OT)  -SD AM-PAC 6 Clicks Basic Mobility (PT)  -LM AM-PAC 6 Clicks Basic Mobility (PT)  -LM      10/18/17 1320 10/18/17 1318       How much help from another person do you currently need...    Turning from your back to your side while in flat bed without using bedrails?  3  -LM     Moving from lying on back to sitting on the side of a flat bed without bedrails?  3  -LM     Moving to and from a bed to a chair (including a wheelchair)?  3  -LM     Standing up from a chair using your arms (e.g., wheelchair, bedside chair)?  3  -LM     Climbing 3-5 steps with a railing?  2  -LM     To walk in hospital room?  3  -LM     AM-PAC 6 Clicks Score  17  -LM     How much help from another is currently needed...    Putting on and taking off regular lower body clothing? 2  -SD      Bathing (including washing, rinsing, and drying) 2  -SD      Toileting (which includes using toilet bed pan or urinal) 3  -SD      Putting on and taking off regular upper body clothing 4  -SD      Taking care of personal grooming (such as brushing teeth) 4  -SD      Eating meals 4  -SD      Score 19  -SD      Functional Assessment    Outcome Measure Options AM-PAC 6 Clicks Daily  Activity (OT)  -SD AM-PAC 6 Clicks Basic Mobility (PT)  -LM       User Key  (r) = Recorded By, (t) = Taken By, (c) = Cosigned By    Initials Name Provider Type    OBIE Pryor, PT Physical Therapist    PHOENIX Nix OT Occupational Therapist          Therapy Charges for Today     Code Description Service Date Service Provider Modifiers Qty    [id number]  OT SELF CARE/MGMT/TRAIN EA 15 MIN 10/19/2017 Latasha Nix OT GO 1          OT Discharge Summary  Anticipated Discharge Disposition: home with home health  Reason for Discharge: Discharge from facility  Outcomes Achieved: Refer to plan of care for updates on goals achieved  Discharge Destination: Home with home health      Latasha Nix OT  10/20/2017

## 2017-10-20 NOTE — DISCHARGE SUMMARY
Name:  Lata Justice   Age:  72 y.o.  Sex:  female  :  1945  MRN:  [id number]   Visit Number:  [id number]  Primary Care Physician:  Annetta Mcclendon MD  Date of Discharge:  10/20/2017  Admission Date:  10/18/2017  6:11 AM      Discharge Diagnosis:       Patient Active Problem List   Diagnosis   • Adkins's esophagus   • Benign essential hypertension   • Chronic low back pain   • Chronic obstructive pulmonary disease   • Depression   • Impaired glucose tolerance   • Hyperlipidemia   • Seasonal allergic rhinitis   • Bilateral chronic knee pain   • Bilateral edema of lower extremity   • Bilateral wrist pain   • Chronic pain of both ankles   • Primary osteoarthritis of right knee   • Arthralgia of both knees   • Borderline diabetes   • Status post right knee replacement   • Stage 3 chronic kidney disease       Presenting Problem/History of Present Illness:    Pre-op testing [Z01.818]  Primary osteoarthritis of right knee [M17.11]  Primary osteoarthritis of both knees [M17.0]  Arthralgia of both knees [M25.561, M25.562]  Arthralgia of both knees [M25.561, M25.562]         Consults:     Consults     Date and Time Order Name Status Description    10/18/2017 1232 Inpatient Consult to Hospitalist Completed           Procedures Performed:    Procedure(s):   TOTAL KNEE ARTHROPLASTY RIGHT ELECTIVE          History of presenting illness:    Patient is status post day #2 right total knee arthroplasty.  Patient states she is feeling well.  She states she has very little pain.  She denies chest pain, shortness of breath, dizziness or abdominal pain.  She states she did have a tumble late last night but states her pain has not changed. She thinks she landed on both knees.        Vital Signs:    Temp:  [98.1 °F (36.7 °C)-99.2 °F (37.3 °C)] 98.5 °F (36.9 °C)  Heart Rate:  [74-84] 78  Resp:  [18-20] 20  BP: (112-133)/(42-82) 132/53    Physical Exam:    General Appearance: alert, appears stated age and cooperative  Head:  normocephalic, without obvious abnormality and atraumatic  Ears: ears appear intact with no abnormalities noted  Nose: nares normal  Throat: oral mucosa moist  Lungs: respirations regular, respirations even and respirations unlabored  Abdomen: no guarding and no rebound tenderness  Extremities: moves extremities well, no redness, Peter's sign negative, no ischemic changes and no ulcers  Pulses: Pulses palpable and equal bilaterally  Skin: turgor normal  Neurologic: Mental Status orientated to person, place, time and situation  Psych: normal    Right knee incision is clean and dry. Dermabond is intact  NO TTP to the ankles-no bruising or edema   No TTP to the left or right hip.No bruising      Pertinent Lab Results:     Lab Results (all)     Procedure Component Value Units Date/Time    POC Glucose Fingerstick [[id number]]  (Normal) Collected:  10/18/17 0656    Specimen:  Blood Updated:  10/18/17 0700     Glucose 105 mg/dL       Serial Number: [id number]:  [id number]       Protime-INR [[id number]]  (Normal) Collected:  10/18/17 1247    Specimen:  Blood Updated:  10/18/17 1455     Protime 11.9 Seconds      INR 1.09    Basic Metabolic Panel [[id number]]  (Abnormal) Collected:  10/19/17 0525    Specimen:  Blood Updated:  10/19/17 0651     Glucose 129 (H) mg/dL      BUN 21 (H) mg/dL      Creatinine 0.70 mg/dL      Sodium 136 (L) mmol/L      Potassium 4.9 mmol/L      Chloride 103 mmol/L      CO2 20.0 (L) mmol/L      Calcium 8.5 mg/dL      eGFR Non African Amer 82 mL/min/1.73      BUN/Creatinine Ratio 30.0 (H)     Anion Gap 17.9 mmol/L     Narrative:       The MDRD GFR formula is only valid for adults with stable renal function between ages 18 and 70.    CBC & Differential [[id number]] Collected:  10/19/17 0525    Specimen:  Blood Updated:  10/19/17 0706    Narrative:       The following orders were created for panel order CBC & Differential.  Procedure                               Abnormality         Status                      ---------                               -----------         ------                     CBC Auto Differential[[id number]]        Abnormal            Final result                 Please view results for these tests on the individual orders.    CBC Auto Differential [[id number]]  (Abnormal) Collected:  10/19/17 0525    Specimen:  Blood Updated:  10/19/17 0706     WBC 8.71 10*3/mm3      RBC 2.95 (L) 10*6/mm3      Hemoglobin 8.7 (L) g/dL      Hematocrit 26.6 (L) %      MCV 90.2 fL      MCH 29.5 pg      MCHC 32.7 g/dL      RDW 13.0 %      RDW-SD 42.0 fl      MPV 10.9 fL      Platelets 141 10*3/mm3      Neutrophil % 89.0 (H) %      Lymphocyte % 4.4 (L) %      Monocyte % 6.3 %      Eosinophil % 0.0 %      Basophil % 0.0 %      Immature Grans % 0.3 %      Neutrophils, Absolute 7.75 (H) 10*3/mm3      Lymphocytes, Absolute 0.38 (L) 10*3/mm3      Monocytes, Absolute 0.55 10*3/mm3      Eosinophils, Absolute 0.00 10*3/mm3      Basophils, Absolute 0.00 10*3/mm3      Immature Grans, Absolute 0.03 10*3/mm3      nRBC 0.0 /100 WBC     Protime-INR [[id number]]  (Abnormal) Collected:  10/19/17 0525    Specimen:  Blood Updated:  10/19/17 0720     Protime 13.0 (H) Seconds      INR 1.19 (H)    aPTT [[id number]]  (Abnormal) Collected:  10/19/17 0525    Specimen:  Blood Updated:  10/19/17 0720     PTT 23.2 (L) seconds     CBC (No Diff) [[id number]]  (Abnormal) Collected:  10/20/17 0620    Specimen:  Blood Updated:  10/20/17 0630     WBC 5.78 10*3/mm3      RBC 2.81 (L) 10*6/mm3      Hemoglobin 8.2 (L) g/dL      Hematocrit 25.2 (L) %      MCV 89.7 fL      MCH 29.2 pg      MCHC 32.5 g/dL      RDW 13.3 %      RDW-SD 43.7 fl      MPV 10.7 fL      Platelets 129 (L) 10*3/mm3     Protime-INR [[id number]]  (Abnormal) Collected:  10/20/17 0620    Specimen:  Blood Updated:  10/20/17 0649     Protime 13.5 (H) Seconds      INR 1.23 (H)    Basic Metabolic Panel [[id number]]  (Abnormal) Collected:  10/20/17 0620    Specimen:  Blood Updated:  10/20/17 0656      Glucose 101 (H) mg/dL      BUN 22 (H) mg/dL      Creatinine 0.90 mg/dL      Sodium 139 mmol/L      Potassium 4.3 mmol/L      Chloride 104 mmol/L      CO2 26.0 mmol/L      Calcium 8.8 mg/dL      eGFR Non African Amer 62 mL/min/1.73      BUN/Creatinine Ratio 24.4 (H)     Anion Gap 13.3 mmol/L     Narrative:       The MDRD GFR formula is only valid for adults with stable renal function between ages 18 and 70.    Tissue Pathology Exam - Bone, Knee, Right [[id number]] Collected:  10/18/17 0849    Specimen:  Bone from Knee, Right Updated:  10/20/17 1032     Case Report --     Surgical Pathology Report                         Case: [id number]                                  Authorizing Provider:  Doc Mixon MD         Collected:           10/18/2017 08:49 AM          Ordering Location:     [facility] OR Received:            10/18/2017 02:08 PM          Pathologist:           Wilbur Bonilla MD                                                           Specimen:    Knee, Right, BONE FRAGMENTS RIGHT KNEE                                                      Final Diagnosis --     See Scanned Report         Embedded Images --          Pertinent Radiology Results:    Imaging Results (all)     Procedure Component Value Units Date/Time    XR Knee 1 or 2 View Right [[id number]] Collected:  10/18/17 1201     Updated:  10/18/17 1204    Narrative:       PROCEDURE: XR KNEE 1 OR 2 VW RIGHT-     HISTORY: Post-Op Knee Arthoplasty; M25.561-Pain in right knee;  M25.562-Pain in left knee; M17.0-Bilateral primary osteoarthritis of  knee; Z01.818-Encounter for other preprocedural examination;  M17.11-Unilateral primary osteoarthritis, right knee; M79.1-Myalgia;  M79.604-Pain in right leg; E53.8-Deficiency of other specified B group  vitamins     COMPARISON: September 26, 2017.     FINDINGS:  A 2 view exam demonstrates total right knee arthroplasty.   There are no hardware complications.  The expected postoperative fluid  and  gas is seen in the soft tissues.           Impression:       Status post total right knee arthroplasty with no hardware  complications.                 This report was finalized on 10/18/2017 12:02 PM by Stacey Parrish M.D..    XR Knee 1 or 2 View Right [[id number]] Collected:  10/20/17 1024     Updated:  10/20/17 1028    Narrative:       PROCEDURE: XR KNEE 1 OR 2 VW RIGHT-     HISTORY: Fell s/p right total knee replacement; Z74.09-Other reduced  mobility; M25.561-Pain in right knee; M25.562-Pain in left knee;  M17.0-Bilateral primary osteoarthritis of knee; Z01.818-Encounter for  other preprocedural examination; M17.11-Unilateral primary  osteoarthritis, right knee; M79.1-Myalgia; M79.604-Pain in right leg;  E53.8-Deficiency of other specified B group vitamins; Z74.09-Other  reduced      COMPARISON: October 18, 2017.     FINDINGS:  A 3 view exam demonstrates total right knee arthroplasty.  There are no hardware complications.  There are no fractures.  Diffuse  soft tissue swelling is present.           Impression:       No fracture or dislocation.                 This report was finalized on 10/20/2017 10:26 AM by Stacey Parrish M.D..    XR Toe 2+ View Right [[id number]] Collected:  10/20/17 1027     Updated:  10/20/17 1030    Narrative:       PROCEDURE: XR TOE 2+ VW RIGHT-     History: s/p fall with pain; Z74.09-Other reduced mobility; M25.561-Pain  in right knee; M25.562-Pain in left knee; M17.0-Bilateral primary  osteoarthritis of knee; Z01.818-Encounter for other preprocedural  examination; M17.11-Unilateral primary osteoarthritis, right knee;  M79.1-Myalgia; M79.604-Pain in right leg; E53.8-Deficiency of other  specified B group vitamins; Z74.09-Other reduced mobility; Z96.651-P     COMPARISON: None.     FINDINGS:  A 3 view exam demonstrates no acute fracture or dislocation.  The joint spaces are preserved. Diffuse soft tissue swelling is seen in  the foot.           Impression:       No acute  fracture.                 This report was finalized on 10/20/2017 10:28 AM by Stacey Parrish M.D..          Condition on Discharge:      Stable      Discharge Disposition:    Home-Health Care [facility]    Discharge Medication:     Lata Justice   Home Medication Instructions STEVO:[id number]    Printed on:10/20/17 6323   Medication Information                      ALPRAZolam (XANAX) 0.25 MG tablet  2 (two) times a day.             ASPIR-LOW 81 MG EC tablet  TAKE ONE TABLET BY MOUTH AT BEDTIME             cetirizine (zyrTEC) 10 MG tablet  TAKE ONE TABLET BY MOUTH EVERY DAY             enoxaparin (LOVENOX) 30 MG/0.3ML solution syringe  Inject contents of 1 syringe under the skin Every 12 (Twelve) Hours.             esomeprazole (NEXIUM) 40 MG capsule  Take 1 capsule by mouth daily.             FLUoxetine (PROzac) 20 MG capsule  TAKE 3 CAPSULES BY MOUTH EVERY MORNING             gabapentin (NEURONTIN) 400 MG capsule  Take 2 capsules by mouth 3 (Three) Times a Day.             hydrochlorothiazide (HYDRODIURIL) 25 MG tablet  TAKE ONE TABLET BY MOUTH EVERY DAY             lisinopril (PRINIVIL,ZESTRIL) 40 MG tablet  TAKE ONE TABLET BY MOUTH EVERY DAY             metoprolol tartrate (LOPRESSOR) 25 MG tablet  TAKE ONE TABLET BY MOUTH 2 TIMES A DAY             oxyCODONE (ROXICODONE) 5 MG immediate release tablet  Take 1 tablet by mouth Every 6 (Six) Hours As Needed (for pain).             pravastatin (PRAVACHOL) 40 MG tablet  TAKE ONE TABLET BY MOUTH EVERY DAY             PROAIR  (90 Base) MCG/ACT inhaler  INHALE 2 PUFFS INTO THE LUNGS EVERY 4 TO 6 HOURS AS NEEDED FOR WHEEZING.                 Discharge Diet:     Diet Instructions     Advance Diet As Tolerated                     Activity at Discharge:     Activity Instructions     Discharge Activity Restrictions       No driving until cleared by ortho  Do not get incision site wet.    WBAT with a walker or rollator                 Follow-up Appointments:    Future  Appointments  Date Time Provider Department Center   10/23/2017 1:00 PM Fidel García, PT [facility] None   10/26/2017 8:45 AM Annetta Mcclendon MD [facility] None   10/31/2017 9:15 AM Kinsey Mixon MD [facility] None     Additional Instructions for the Follow-ups that You Need to Schedule     Ambulatory Referral to Home Health    As directed    Face to Face Visit Date:  10/20/2017   Follow-up Provider for Plan of Care?:  I will be treating the patient on an ongoing basis.  Please send me the Plan of Care for signature.   Follow-up Provider:  KINSEY MIXON   Reason/Clinical Findings:  s/p right tka   Describe mobility limitations that make leaving home difficult:  weakness, pain, not able to drive temp.   Nursing/Therapeutic Services Requested:   Physical Therapy  Occupational Therapy      PT orders:   Total joint pathway  Transfer training  Strengthening  Gait Training  Therapeutic exercise      Weight Bearing Status:  As Tolerated   Occupational orders:  Activities of daily living       Ambulatory Referral to Physical Therapy Evaluate and treat, Ortho    As directed    Total joint pathway.   Specialty modality needed?:   Evaluate and treat  Ortho                    Test Results Pending at Discharge:     status post right total knee arthroplasty:  .  Discharge home today with ambulatory PT/OT  Patient continue to use ISABELLA hose incentive spirometer.  No driving until cleared by orthopedics.  Weight bearing as tolerated with a walker.       Robi Kingsley PA-C  10/20/17  12:05 PM    Time: Discharge 22 min

## 2017-10-20 NOTE — PLAN OF CARE
Problem: Infection, Risk/Actual (Adult)  Goal: Infection Prevention/Resolution  Outcome: Ongoing (interventions implemented as appropriate)    Problem: Fall Risk (Adult)  Goal: Absence of Falls  Outcome: Ongoing (interventions implemented as appropriate)

## 2017-10-20 NOTE — PROGRESS NOTES
Continued Stay Note  BILLY Bryant     Patient Name: Lata Justice  MRN: [id number]  Today's Date: 10/20/2017    Admit Date: 10/18/2017          Discharge Plan       10/20/17 0900    Case Management/Social Work Plan    Additional Comments Confirmed appointment with [facility] for 10/23 1300 faxed order to them Confirmed she does not need a paper copy of order  From list provided she choose [facility] they will deliver to her room around 11 AM updated nurse and pt               Discharge Codes       10/20/17 0900    Discharge Codes    Discharge Codes 01  Discharge to home        Expected Discharge Date and Time     Expected Discharge Date Expected Discharge Time    Oct 20, 2017             Alexia Hazel

## 2017-10-20 NOTE — THERAPY TREATMENT NOTE
Acute Care - Physical Therapy Treatment Note  [facility]     Patient Name: Lata Justice  : 1945  MRN: [id number]  Today's Date: 10/20/2017  Onset of Illness/Injury or Date of Surgery Date: 10/18/17  Date of Referral to PT: 10/18/17  Referring Physician: Dr. Mixon    Admit Date: 10/18/2017    Visit Dx:    ICD-10-CM ICD-9-CM   1. Impaired mobility and ADLs Z74.09 799.89   2. Arthralgia of both knees M25.561 719.46    M25.562    3. Primary osteoarthritis of both knees M17.0 715.16   4. Pre-op testing Z01.818 V72.84   5. Primary osteoarthritis of right knee M17.11 715.16   6. Myalgia  M79.1 729.1   7. Pain of right lower extremity  M79.604 729.5   8. B12 deficiency E53.8 266.2   9. Impaired functional mobility, balance, gait, and endurance Z74.09 V49.89   10. Status post total right knee replacement Z96.651 V43.65     Patient Active Problem List   Diagnosis   • Adkins's esophagus   • Benign essential hypertension   • Chronic low back pain   • Chronic obstructive pulmonary disease   • Depression   • Impaired glucose tolerance   • Hyperlipidemia   • Seasonal allergic rhinitis   • Bilateral chronic knee pain   • Bilateral edema of lower extremity   • Bilateral wrist pain   • Chronic pain of both ankles   • Primary osteoarthritis of right knee   • Arthralgia of both knees   • Borderline diabetes   • Status post right knee replacement   • Stage 3 chronic kidney disease               Adult Rehabilitation Note       10/20/17 1108 10/19/17 1437 10/19/17 1342    Rehab Assessment/Intervention    Discipline physical therapy assistant  -RM occupational therapist  -SD physical therapist  -LM    Document Type therapy note (daily note)  -RM therapy note (daily note)  -SD therapy note (daily note)  -LM    Subjective Information agree to therapy;complains of;fatigue  -RM agree to therapy;no complaints  -SD agree to therapy;no complaints  -LM    Patient Effort, Rehab Treatment adequate  -RM good  -SD good  -LM    Symptoms  Noted During/After Treatment fatigue;increased pain  -RM      Precautions/Limitations fall precautions  -RM fall precautions  -SD fall precautions  -LM    Recorded by [RM] Sridhar Main, PTA [SD] Latasha Nix, OT [LM] Venecia Pryor, PT    Pain Assessment    Pain Assessment 0-10  -RM 0-10  -SD No/denies pain  -LM    Pain Score 3  -RM 0  -SD     Post Pain Score 2  -RM 1  -SD     Pain Type Acute pain;Surgical pain  -RM Acute pain  -SD     Pain Location Knee  -RM Knee  -SD     Pain Orientation Right  -RM Right  -SD     Pain Descriptors Tightness  -RM      Pain Frequency Intermittent  -RM      Pain Intervention(s) Repositioned;Ambulation/increased activity  -RM      Response to Interventions tolerated   -RM      Recorded by [RM] Sridhar Main, PTA [SD] Latasha Nix, OT [LM] Venecia Pryor, PT    Mobility Assessment/Training    Extremity Weight-Bearing Status   right lower extremity  -LM    Right Lower Extremity Weight-Bearing   weight-bearing as tolerated  -LM    Recorded by   [LM] Venecia Pryor, PT    Bed Mobility, Assessment/Treatment    Bed Mobility, Assistive Device  bed rails;head of bed elevated  -SD     Bed Mob, Supine to Sit, [place]  contact guard assist  -SD     Bed Mob, Sit to Supine, [place]  contact guard assist  -SD contact guard assist  -LM    Recorded by  [SD] Latasha Nix OT [LM] Venecia Pryor, PT    Transfer Assessment/Treatment    Transfers, Chair-Bed [place]   contact guard assist  -LM    Transfers, Bed-Chair-Bed, Assist Device   rolling walker  -LM    Transfers, Sit-Stand [place] contact guard assist;minimum assist (75% patient effort);verbal cues required  -RM contact guard assist  -SD contact guard assist  -LM    Transfers, Stand-Sit [place] contact guard assist;minimum assist (75% patient effort);verbal cues required  -RM contact guard assist  -SD contact guard assist  -LM    Transfers, Sit-Stand-Sit, Assist Device rolling walker  -RM rolling walker  -SD rolling  walker  -LM    Recorded by [RM] Sridhar Main, PTA [SD] Latasha Nix OT [LM] Venecia Pryor, PT    Gait Assessment/Treatment    Gait, [place] Level contact guard assist;minimum assist (75% patient effort)  -RM  contact guard assist  -LM    Gait, Assistive Device rolling walker  -RM  rolling walker  -LM    Gait, Distance (Feet) 108  -RM  204  -LM    Gait, Gait Pattern Analysis swing-through gait  -RM  swing-through gait  -LM    Gait, Gait Deviations catrachito decreased;decreased heel strike;toe-to-floor clearance decreased;weight-shifting ability decreased;forward flexed posture;step length decreased;stride width increased  -RM  antalgic;decreased heel strike;catrachito decreased;step length decreased;toe-to-floor clearance decreased  -LM    Gait, Safety Issues balance decreased during turns;sequencing ability decreased;step length decreased;weight-shifting ability decreased;steps too close front assistive device  -RM      Gait, Impairments strength decreased;impaired balance;pain  -RM      Recorded by [RM] Sridhar Main, PTA  [LM] Venecia Pryor, PT    Functional Mobility    Functional Mobility- Ind. Level  contact guard assist  -SD     Functional Mobility- Device  rolling walker  -SD     Functional Mobility-Distance (Feet)  30  -SD     Recorded by  [SD] Latasha Nix OT     Lower Body Dressing Assessment/Training    LB Dressing Assess/Train, Clothing Type  doffing:;donning:;socks  -SD     LB Dressing Assess/Train, Assist Device  reacher;sock-aid  -SD     LB Dressing Assess/Train, Position  edge of bed;sitting  -SD     LB Dressing Assess/Train, Dwarf  supervision required  -SD     Recorded by  [SD] Latasha Nix OT     Toileting Assessment/Training    Toileting Assess/Train, Indepen Level  contact guard assist  -SD     Recorded by  [SD] Latasha Nix OT     Therapy Exercises    Right Lower Extremity   AROM:;15 reps;supine;ankle pumps/circles;glut sets;heel slides;hip  abduction/adduction;SAQ;SLR;quad sets  -LM    Bilateral Upper Extremity  AROM:;15 reps;sitting;elbow flexion/extension;shoulder abduction/adduction;shoulder extension/flexion  -SD     Exercise Protocols total knee  -RM      Total Knee Exercises right:;15 reps;with assist;ankle pumps/circles;quad set;glut set;heel slides;SLR;SAQ;hip abduction/adduction   instructed with self assist,written HEP given  -RM      Recorded by [RM] Sridhar Main, PTA [SD] Latasha Nix, OT [LM] Venecia Pryor, PT    Positioning and Restraints    Pre-Treatment Position sitting in chair/recliner  -RM in bed  -SD sitting in chair/recliner  -LM    Post Treatment Position chair  -RM bed  -SD bed  -LM    In Bed  supine;call light within reach;encouraged to call for assist  -SD supine;call light within reach;encouraged to call for assist  -LM    In Chair reclined;sitting;encouraged to call for assist;with family/caregiver;notified nsg  -RM      Recorded by [RM] Sridhar Main, PTA [SD] Latasha Nix, OT [LM] Venecia Pryor, PT      10/19/17 0906          Rehab Assessment/Intervention    Discipline physical therapist  -LM      Document Type therapy note (daily note)  -LM      Subjective Information agree to therapy;no complaints  -LM      Patient Effort, Rehab Treatment good  -LM      Precautions/Limitations fall precautions  -LM      Recorded by [LM] Venecia Pryor, PT      Pain Assessment    Pain Assessment No/denies pain  -LM      Recorded by [LM] Venecia Pryor, PT      Mobility Assessment/Training    Extremity Weight-Bearing Status right lower extremity  -LM      Right Lower Extremity Weight-Bearing weight-bearing as tolerated  -LM      Recorded by [LM] Venecia Pryor, PT      Bed Mobility, Assessment/Treatment    Bed Mobility, Assistive Device bed rails;head of bed elevated  -LM      Bed Mob, Supine to Sit, [place] conditional independence  -LM      Recorded by [LM] Venecia Pryor, PT      Transfer Assessment/Treatment    Transfers,  Bed-Chair [place] contact guard assist  -LM      Transfers, Bed-Chair-Bed, Assist Device rolling walker  -LM      Transfers, Sit-Stand [place] contact guard assist  -LM      Transfers, Stand-Sit [place] contact guard assist  -LM      Transfers, Sit-Stand-Sit, Assist Device rolling walker  -LM      Recorded by [LM] Venecia Pryor, PT      Gait Assessment/Treatment    Gait, [place] Level contact guard assist  -LM      Gait, Assistive Device rolling walker  -LM      Gait, Distance (Feet) 156  -LM      Gait, Gait Pattern Analysis swing-through gait  -LM      Gait, Safety Issues balance decreased during turns;sequencing ability decreased;step length decreased  -LM      Gait, Impairments ROM decreased;strength decreased  -LM      Recorded by [LM] Venecia Pryor, PT      Therapy Exercises    Right Lower Extremity AAROM:;AROM:;15 reps;supine;ankle pumps/circles;glut sets;heel slides;quad sets;SLR  -LM      Recorded by [LM] Venecia Pryor, PT      Positioning and Restraints    Pre-Treatment Position in bed  -LM      Post Treatment Position chair  -LM      In Chair reclined;call light within reach;encouraged to call for assist  -LM      Recorded by [LM] Venecia Pryor, PT        User Key  (r) = Recorded By, (t) = Taken By, (c) = Cosigned By    Initials Name Effective Dates    LM Venecia Pryor, PT 10/26/16 -     RM Sridhar Main, PTA 10/26/16 -     SD Latasha Nix, OT 06/30/17 -                 IP PT Goals       10/20/17 1223 10/19/17 1519 10/19/17 1337    Bed Mobility PT LTG    Bed Mobility PT LTG, Outcome   goal met  -LM    Transfer Training PT LTG    Transfer Training PT LTG, Outcome   goal met  -LM    Gait Training PT LTG    Gait Training Goal PT LTG, Date Established   10/19/17  -LM    Gait Training Goal PT LTG, Time to Achieve   2 wks  -LM    Gait Training Goal PT LTG, [place] Level   contact guard assist  -LM    Gait Training Goal PT LTG, Assist Device   walker, rolling  -LM    Gait Training Goal  PT LTG, Distance to Achieve   300  -LM    Gait Training Goal PT LTG, Outcome goal ongoing  -RM goal ongoing  -LM goal ongoing  -LM    Gait Training Goal PT LTG, Reason Goal Not Met   goals revised this date  -LM    Strength Goal PT LTG    Strength Goal PT LTG, Outcome   goal met  -LM      10/18/17 1403          Bed Mobility PT LTG    Bed Mobility PT LTG, Date Established 10/18/17  -LM      Bed Mobility PT LTG, Time to Achieve 2 wks  -LM      Bed Mobility PT LTG, Activity Type supine to sit/sit to supine  -LM      Bed Mobility PT LTG, [place] Level conditional independence  -LM      Bed Mobility PT Goal  LTG, Assist Device bed rails  -LM      Bed Mobility PT LTG, Outcome goal ongoing  -LM      Transfer Training PT LTG    Transfer Training PT LTG, Date Established 10/18/17  -LM      Transfer Training PT LTG, Time to Achieve 2 wks  -LM      Transfer Training PT LTG, Activity Type sit to stand/stand to sit;bed to chair /chair to bed  -LM      Transfer Training PT LTG, [place] Level contact guard assist  -LM      Transfer Training PT LTG, Assist Device walker, rolling  -LM      Transfer Training PT LTG, Outcome goal ongoing  -LM      Gait Training PT LTG    Gait Training Goal PT LTG, Date Established 10/18/17  -LM      Gait Training Goal PT LTG, Time to Achieve 2 wks  -LM      Gait Training Goal PT LTG, [place] Level contact guard assist  -LM      Gait Training Goal PT LTG, Assist Device walker, rolling  -LM      Gait Training Goal PT LTG, Distance to Achieve 100  -LM      Gait Training Goal PT LTG, Additional Goal WBAT R LE  -LM      Gait Training Goal PT LTG, Outcome goal ongoing  -LM      Strength Goal PT LTG    Strength Goal PT LTG, Date Established 10/18/17  -LM      Strength Goal PT LTG, Time to Achieve 2 wks  -LM      Strength Goal PT LTG, Measure to Achieve Patient will perform LE ther ex x 15 reps to improve functional strength and ROM for mobility.  -LM      Strength Goal PT LTG, Outcome goal  ongoing  -LM        User Key  (r) = Recorded By, (t) = Taken By, (c) = Cosigned By    Initials Name Provider Type    LM Venecia Pryor, PT Physical Therapist    RM Sridhar Main, BRANDY Physical Therapy Assistant          Physical Therapy Education     Title: PT OT SLP Therapies (Done)     Topic: Physical Therapy (Done)     Point: Mobility training (Done)    Learning Progress Summary    Learner Readiness Method Response Comment Documented by Status   Patient Acceptance E,TB,D,H VU,DU   10/20/17 1223 Done    Acceptance E VU Ther ex for HEP; proper sequencing of gait for safe transfers/ambulation. LM 10/19/17 1518 Done    Acceptance E VU Ther ex for HEP; proper use of RW for safe transfers/ambulation.  10/19/17 1337 Done    Acceptance E VU Purpose of PT/POC; proper use of RW for safe transfers/ambulation; ther ex for HEP.  10/18/17 1403 Done   Family Acceptance E,TB,D,H VU,DU   10/20/17 1223 Done               Point: Home exercise program (Done)    Learning Progress Summary    Learner Readiness Method Response Comment Documented by Status   Patient Acceptance E,TB,D,H VU,DU   10/20/17 1223 Done    Acceptance E VU Ther ex for HEP; proper sequencing of gait for safe transfers/ambulation. LM 10/19/17 1518 Done    Acceptance E VU Ther ex for HEP; proper use of RW for safe transfers/ambulation.  10/19/17 1337 Done    Acceptance E VU Purpose of PT/POC; proper use of RW for safe transfers/ambulation; ther ex for HEP.  10/18/17 1403 Done   Family Acceptance E,TB,D,H VU,DU   10/20/17 1223 Done               Point: Precautions (Resolved)    Learning Progress Summary    Learner Readiness Method Response Comment Documented by Status   Patient Acceptance E VU Ther ex for HEP; proper use of RW for safe transfers/ambulation.  10/19/17 1337 Done    Acceptance E VU Purpose of PT/POC; proper use of RW for safe transfers/ambulation; ther ex for HEP.  10/18/17 1403 Done                      User Key     Initials  Effective Dates Name Provider Type Discipline    LM 10/26/16 -  Venecia Pryor PT Physical Therapist PT    RM 10/26/16 -  Sridhar Main PTA Physical Therapy Assistant PT                    PT Recommendation and Plan  Anticipated Discharge Disposition: home with home health  Planned Therapy Interventions: balance training, bed mobility training, gait training, home exercise program, patient/family education, strengthening, transfer training  PT Frequency: 2 times/day  Plan of Care Review  Plan Of Care Reviewed With: patient, spouse  Progress: progress towards functional goals is fair  Outcome Summary/Follow up Plan: Pt tolerated treatment well. HEP was reviewed with pt as well as . Written copy was given to pt and copy placed in chart.            Outcome Measures       10/19/17 1437 10/19/17 1342 10/19/17 0906    How much help from another person do you currently need...    Turning from your back to your side while in flat bed without using bedrails?  4  -LM 4  -LM    Moving from lying on back to sitting on the side of a flat bed without bedrails?  4  -LM 4  -LM    Moving to and from a bed to a chair (including a wheelchair)?  3  -LM 3  -LM    Standing up from a chair using your arms (e.g., wheelchair, bedside chair)?  3  -LM 3  -LM    Climbing 3-5 steps with a railing?  2  -LM 2  -LM    To walk in hospital room?  3  -LM 3  -LM    AM-PAC 6 Clicks Score  19  -LM 19  -LM    How much help from another is currently needed...    Putting on and taking off regular lower body clothing? 3  -SD      Bathing (including washing, rinsing, and drying) 3  -SD      Toileting (which includes using toilet bed pan or urinal) 3  -SD      Putting on and taking off regular upper body clothing 4  -SD      Taking care of personal grooming (such as brushing teeth) 4  -SD      Eating meals 4  -SD      Score 21  -SD      Functional Assessment    Outcome Measure Options AM-PAC 6 Clicks Daily Activity (OT)  -SD AM-PAC 6 Clicks Basic  Mobility (PT)  -LM AM-PAC 6 Clicks Basic Mobility (PT)  -LM      10/18/17 1320 10/18/17 1318       How much help from another person do you currently need...    Turning from your back to your side while in flat bed without using bedrails?  3  -LM     Moving from lying on back to sitting on the side of a flat bed without bedrails?  3  -LM     Moving to and from a bed to a chair (including a wheelchair)?  3  -LM     Standing up from a chair using your arms (e.g., wheelchair, bedside chair)?  3  -LM     Climbing 3-5 steps with a railing?  2  -LM     To walk in hospital room?  3  -LM     AM-PAC 6 Clicks Score  17  -LM     How much help from another is currently needed...    Putting on and taking off regular lower body clothing? 2  -SD      Bathing (including washing, rinsing, and drying) 2  -SD      Toileting (which includes using toilet bed pan or urinal) 3  -SD      Putting on and taking off regular upper body clothing 4  -SD      Taking care of personal grooming (such as brushing teeth) 4  -SD      Eating meals 4  -SD      Score 19  -SD      Functional Assessment    Outcome Measure Options AM-PAC 6 Clicks Daily Activity (OT)  -SD AM-PAC 6 Clicks Basic Mobility (PT)  -LM       User Key  (r) = Recorded By, (t) = Taken By, (c) = Cosigned By    Initials Name Provider Type    OBIE Pryor, PT Physical Therapist    PH[place]EN[place] Nix, OT Occupational Therapist           Time Calculation:         PT Charges       10/20/17 1226          Time Calculation    Start Time 1108  -RM      PT Received On 10/20/17  -RM      PT Goal Re-Cert Due Date 10/28/17  -RM      Time Calculation- PT    Total Timed Code Minutes- PT 39 minute(s)  -RM        User Key  (r) = Recorded By, (t) = Taken By, (c) = Cosigned By    Initials Name Provider Type     Sridhar Main PTA Physical Therapy Assistant          Therapy Charges for Today     Code Description Service Date Service Provider Modifiers Qty    50026475819 HC GAIT TRAINING EA 15 MIN  10/20/2017 Sridhar Main, BRANDY GP 1    86480861637 HC PT THER PROC EA 15 MIN 10/20/2017 Sridhar Main PTA GP 2          PT G-Codes  Outcome Measure Options: AM-PAC 6 Clicks Daily Activity (OT)    Sridhar Main PTA  10/20/2017

## 2017-10-20 NOTE — PROGRESS NOTES
Case Management Discharge Note    Final Note: Discharged home     Discharge Placement     Facility/Agency Request Status Selected? Address Phone Number Fax Number    [facility] Pending - No Request Sent     [address], [facility] [address] [phone]    [facility] Declined   reports not enough staff     [address] [address] [phone]    [facility] Declined   not in  network     [address] [facility] [address] [phone]    [facility] Declined   not enough staff     [address], [facility] [address] [phone]    [facility] Declined   not in network     [address] [facility] [address] [phone]              Discharge Codes: 01  Discharge to home

## 2017-10-20 NOTE — PLAN OF CARE
Problem: Patient Care Overview (Adult)  Goal: Plan of Care Review  Outcome: Ongoing (interventions implemented as appropriate)    10/20/17 1223   Coping/Psychosocial Response Interventions   Plan Of Care Reviewed With patient;spouse   Outcome Evaluation   Outcome Summary/Follow up Plan Pt tolerated treatment well. HEP was reviewed with pt as well as . Written copy was given to pt and copy placed in chart.    Patient Care Overview   Progress progress towards functional goals is fair         Problem: Inpatient Physical Therapy  Goal: Gait Training Goal LTG- PT  Outcome: Ongoing (interventions implemented as appropriate)    10/18/17 1403 10/19/17 1337 10/20/17 1223   Gait Training PT LTG   Gait Training Goal PT LTG, Date Established --  10/19/17 --    Gait Training Goal PT LTG, Time to Achieve --  2 wks --    Gait Training Goal PT LTG, [place] Level --  contact guard assist --    Gait Training Goal PT LTG, Assist Device --  walker, rolling --    Gait Training Goal PT LTG, Distance to Achieve --  300 --    Gait Training Goal PT LTG, Additional Goal WBAT R LE --  --    Gait Training Goal PT LTG, Outcome --  --  goal ongoing   Gait Training Goal PT LTG, Reason Goal Not Met --  goals revised this date --

## 2017-10-20 NOTE — TELEPHONE ENCOUNTER
[facility] FOLLOW UP  ADMITTED - 10/18/2017  DISCHARGED 10/20/2017  DIAGNOSIS - STATUS POST R KNEE REPLACEMENT

## 2017-10-20 NOTE — PLAN OF CARE
Problem: Patient Care Overview (Adult)  Goal: Plan of Care Review  Outcome: Ongoing (interventions implemented as appropriate)    10/20/17 0800   Coping/Psychosocial Response Interventions   Plan Of Care Reviewed With patient   Outcome Evaluation   Outcome Summary/Follow up Plan pt ambulates with walker to bathroom, tolerating diet, minimal complaints of pain, vital signs stable   Patient Care Overview   Progress improving         Problem: Skin Integrity Impairment, Risk/Actual (Adult)  Goal: Skin Integrity/Wound Healing  Outcome: Ongoing (interventions implemented as appropriate)    Problem: Fall Risk (Adult)  Goal: Absence of Falls  Outcome: Ongoing (interventions implemented as appropriate)    Problem: Knee Replacement, Total (Adult)  Goal: Signs and Symptoms of Listed Potential Problems Will be Absent or Manageable (Knee Replacement, Total)  Outcome: Ongoing (interventions implemented as appropriate)

## 2017-10-20 NOTE — PROGRESS NOTES
BILLY Bryant    Nerve Cath Post Op Call    Patient Name: Lata Justice  :  1945  MRN:  [id number]  Date of Discharge: 10/20/2017    Nerve Cath Post Op Call:    Analgesia:Good  Pain Score:4/10  Side Effects:None  Catheter Site:clean  Patient Controlled ON Q pump infusion rate: 8ml/hr  Catheter Plan:Will continue with plan at home without changes

## 2017-10-23 ENCOUNTER — TREATMENT (OUTPATIENT)
Dept: PHYSICAL THERAPY | Facility: CLINIC | Age: 72
End: 2017-10-23

## 2017-10-23 ENCOUNTER — TRANSITIONAL CARE MANAGEMENT TELEPHONE ENCOUNTER (OUTPATIENT)
Dept: INTERNAL MEDICINE | Facility: CLINIC | Age: 72
End: 2017-10-23

## 2017-10-23 DIAGNOSIS — M25.561 ACUTE PAIN OF RIGHT KNEE: Primary | ICD-10-CM

## 2017-10-23 PROCEDURE — 97164 PT RE-EVAL EST PLAN CARE: CPT | Performed by: PHYSICAL THERAPIST

## 2017-10-23 PROCEDURE — G8978 MOBILITY CURRENT STATUS: HCPCS | Performed by: PHYSICAL THERAPIST

## 2017-10-23 PROCEDURE — 97110 THERAPEUTIC EXERCISES: CPT | Performed by: PHYSICAL THERAPIST

## 2017-10-23 PROCEDURE — G8979 MOBILITY GOAL STATUS: HCPCS | Performed by: PHYSICAL THERAPIST

## 2017-10-23 NOTE — PROGRESS NOTES
Physical Therapy Re-evaluation and Plan of Care      Patient: Lata Justice   : 1945  Diagnosis/ICD-10 Code:  S/P total knee replacement, right [Z96.651]  Referring practitioner: Doc Mixon MD    Subjective Evaluation    History of Present Illness  Mechanism of injury: Patient had a right total knee replacement on 10/18/17. Patient states she has had knee problems for many years. States she may later get a replacement of the left knee.       Patient Occupation: Not working.  Quality of life: fair    Pain  Current pain ratin  At best pain ratin  At worst pain ratin  Location: Right knee   Relieving factors: medications and heat  Aggravating factors: stairs, standing and ambulation    Treatments  Discharged from (in last 30 days): inpatient hospitalization  Patient Goals  Patient goals for therapy: decreased edema, improved balance, increased motion, increased strength and independence with ADLs/IADLs  Patient goal: Using bathroom without help           Objective       Observations     Right Knee   Positive for edema and incision.     Additional Observation Details  Two fluid filled cyst on medial proximal calf.     Neurological Testing   Sensation     Knee     Comments   Right light touch: Diminished sensation along lateral thigh and hypersensitivity along lateral calf .     Active Range of Motion   Left Knee   Flexion: 120 degrees   Extension: 0 degrees     Right Knee   Flexion: 97 degrees with pain  Extensor la degrees     Patellar Mobility   Left Knee Hypomobile in the left medial, left lateral, left superior and left inferior patellar tendon(s).     Right Knee Hypomobile in the medial, lateral, superior and inferior patellar tendon(s).     Strength/Myotome Testing     Left Knee   Flexion: 4  Extension: 4    Right Knee   Flexion: 3+  Extension: 3+  Quadriceps contraction: fair    Swelling     Left Knee Girth Measurement (cm)   Joint line: 38.5.  10 cm above joint line: 52 cm  10 cm  below joint line: 39 cm    Right Knee Girth Measurement (cm)   Joint line: 44 cm  10 cm above joint line: 58 cm  10 cm below joint line: 43 cm    Ambulation   Weight-Bearing Status   Weight-Bearing Status (Right): weight-bearing as tolerated    Assistive device used: rollator walker with seat    Observational Gait   Gait: antalgic   Decreased walking speed and stride length.   Right foot contact pattern: foot flat  Right arm swing: decreased  Base of support: decreased    Quality of Movement During Gait     Knee    Knee (Left): Positive increased flexion during stance.   Knee (Right): Positive increased flexion during stance and stiff knee.          Assessment & Plan     Assessment  Impairments: abnormal gait, abnormal muscle firing, abnormal muscle tone, abnormal or restricted ROM, activity intolerance, impaired balance, impaired physical strength, lacks appropriate home exercise program, pain with function and weight-bearing intolerance  Assessment details: Patient is a 72 year old female 1 week post right total knee replacement. Patient presents with decreased knee ROM, decreased strength, antalgic gait pattern, patellar hypomobility, significantly increased edema throughout right LE which is complicated by general weakness and shortness of air.  Prognosis: fair  Prognosis details: Goals:     Short term goals (2 weeks):                              1. Pt independent with HEP  2. Pt to will understand proper gait mechanics and transfer mechanics.    3. Patient will demonstrate right knee flexion of 110 degrees.     Long term goals (6 weeks):  1. Patient will demonstrate right knee AROM of WFL as compared to the left knee.   2. Patient will demonstrate at least 4+/5 knee strength for functional use in gait and transfers.     Functional Limitations: sleeping, walking, uncomfortable because of pain and standing  Plan  Therapy options: will be seen for skilled physical therapy services  Planned modality interventions:  cryotherapy, thermotherapy (hydrocollator packs), ultrasound, high voltage pulsed current (pain management) and electrical stimulation/Russian stimulation  Planned therapy interventions: balance/weight-bearing training, body mechanics training, flexibility, functional ROM exercises, gait training, home exercise program, IADL retraining, joint mobilization, manual therapy, motor coordination training, neuromuscular re-education, postural training, soft tissue mobilization, strengthening, stretching and therapeutic activities  Frequency: 2-3 x/week   Duration in weeks: 6  Treatment plan discussed with: patient      Re-evaluation:  25 min  Manual Therapy:    5     mins  08498;  Therapeutic Exercise:    27     mins  25552;     Neuromuscular Gabrielle:        mins  56085;    Therapeutic Activity:          mins  42729;     Gait Training:           mins  16962;     Ultrasound:          mins  60665;    Electrical Stimulation:         mins  13780 ( );  Dry Needling          mins self-pay    Timed Treatment:   57   mins   Total Treatment:     67   mins    PT SIGNATURE: Fidel García, PT   DATE TREATMENT INITIATED: 10/23/2017    Re-evaluation  Certification Period: 1/21/2018  I certify that the therapy services are furnished while this patient is under my care.  The services outlined above are required by this patient, and will be reviewed every 90 days.     PHYSICIAN: Doc Mixon MD      DATE:     Please sign and return via fax to  .. Thank you, [facility].

## 2017-10-24 RX ORDER — DOCUSATE SODIUM 250 MG
250 CAPSULE ORAL DAILY
Qty: 30 CAPSULE | Refills: 2 | Status: SHIPPED | OUTPATIENT
Start: 2017-10-24 | End: 2018-01-15 | Stop reason: SDUPTHER

## 2017-10-24 NOTE — OUTREACH NOTE
JAG call completed.  Please refer to TCM call flowsheet for call documentation.  Patient would like a stool softener called in for constipation.

## 2017-10-27 ENCOUNTER — TREATMENT (OUTPATIENT)
Dept: PHYSICAL THERAPY | Facility: CLINIC | Age: 72
End: 2017-10-27

## 2017-10-27 DIAGNOSIS — Z74.09 IMPAIRED MOBILITY AND ADLS: ICD-10-CM

## 2017-10-27 DIAGNOSIS — Z96.651 STATUS POST TOTAL RIGHT KNEE REPLACEMENT: ICD-10-CM

## 2017-10-27 DIAGNOSIS — Z78.9 IMPAIRED MOBILITY AND ADLS: ICD-10-CM

## 2017-10-27 DIAGNOSIS — Z74.09 IMPAIRED FUNCTIONAL MOBILITY, BALANCE, GAIT, AND ENDURANCE: ICD-10-CM

## 2017-10-27 PROCEDURE — 97140 MANUAL THERAPY 1/> REGIONS: CPT | Performed by: PHYSICAL THERAPIST

## 2017-10-27 PROCEDURE — 97110 THERAPEUTIC EXERCISES: CPT | Performed by: PHYSICAL THERAPIST

## 2017-10-27 NOTE — PROGRESS NOTES
Subjective   [facility] reports: 0/10 at rest. No distress noted.     Objective   Observation: Secondary bandaging loose and with drainage, incision site with dermabond intact and site appears to be healing well. Significant swelling near distal patellar tendon present.     Right Knee AAROM: 2-105 degrees     Palpation: Patellar mobility moderately limited.       See Exercise, Manual, and Modality Logs for complete treatment.       Assessment/Plan  Patient is improving slowly with knee ROM. Patient exhibited a much stronger quad contraction and was able to perform a straight leg raise with no extensor lag. A secondary bandage was placed using medical tape over incision site with dermabond intact to protect site from shearing forces. Patient's rollator was adjusted in height to allow for improved posture. Patient returns to physician on 10/31/17.   Progress per Plan of Care           Manual Therapy:    9     mins  22032;  Therapeutic Exercise:     27    mins  85111;     Neuromuscular Gabrielle:        mins  90005;    Therapeutic Activity:          mins  73579;     Gait Training:           mins  55985;     Ultrasound:          mins  74482;   Iontophoresis          mins  60676   Electrical Stimulation:         mins  61613 ( );  Dry Needling          mins self-pay  Fluidotherapy          mins 52707    Timed Treatment:   36   mins   Total Treatment:     46   mins    Fidel García, PT  Physical Therapist

## 2017-10-31 ENCOUNTER — OFFICE VISIT (OUTPATIENT)
Dept: ORTHOPEDIC SURGERY | Facility: CLINIC | Age: 72
End: 2017-10-31

## 2017-10-31 VITALS — WEIGHT: 205.9 LBS | BODY MASS INDEX: 37.89 KG/M2 | RESPIRATION RATE: 16 BRPM | HEIGHT: 62 IN

## 2017-10-31 DIAGNOSIS — M17.11 PRIMARY LOCALIZED OSTEOARTHROSIS OF RIGHT LOWER LEG: Primary | ICD-10-CM

## 2017-10-31 PROCEDURE — 99024 POSTOP FOLLOW-UP VISIT: CPT | Performed by: ORTHOPAEDIC SURGERY

## 2017-10-31 RX ORDER — OXYCODONE HYDROCHLORIDE 5 MG/1
5 TABLET ORAL EVERY 6 HOURS PRN
Qty: 40 TABLET | Refills: 0 | Status: SHIPPED | OUTPATIENT
Start: 2017-10-31 | End: 2017-11-29

## 2017-10-31 RX ORDER — CEPHALEXIN 500 MG/1
500 CAPSULE ORAL EVERY 6 HOURS
Qty: 40 CAPSULE | Refills: 0 | Status: SHIPPED | OUTPATIENT
Start: 2017-10-31 | End: 2017-11-29

## 2017-10-31 NOTE — PROGRESS NOTES
"Subjective   Patient ID: Lata Justice is a 72 y.o. female  Post-op of the Right Knee (10/18/17; R TKA)             History of Present Illness    Status post right total knee replacement doing well with her rehabilitation at home, no fever, pain under control with oxycodone.  Minimal swelling in the ankle.    Review of Systems   Constitutional: Negative for diaphoresis, fever and unexpected weight change.   HENT: Negative for dental problem and sore throat.    Eyes: Negative for visual disturbance.   Respiratory: Negative for shortness of breath.    Cardiovascular: Negative for chest pain.   Gastrointestinal: Negative for abdominal pain, constipation, diarrhea, nausea and vomiting.   Genitourinary: Negative for difficulty urinating and frequency.   Musculoskeletal: Positive for arthralgias.   Neurological: Negative for headaches.   Hematological: Does not bruise/bleed easily.   All other systems reviewed and are negative.      Past Medical History:   Diagnosis Date   • Abnormal finding     STATED SHE \"WOKE\" UP DURING AN EGD, BREAST BIOPSY, BACK SURGERY   • Acute colitis    • Allergic    • Anxiety    • Arthritis    • Body piercing     EARS   • Cataracts, bilateral    • Depression    • Depression    • Diabetes mellitus     DIET CONTROLLED   • Full dentures    • GERD (gastroesophageal reflux disease)    • Glaucoma    • High cholesterol    • Hx of colonic polyps    • Hypertension    • Injury of back    • Low back pain    • Obesity    • Osteoarthritis    • Pneumonia    • PONV (postoperative nausea and vomiting)    • Wears glasses         Past Surgical History:   Procedure Laterality Date   • BACK SURGERY      Lower back X2   • CHOLECYSTECTOMY     • COLONOSCOPY     • DILATION AND CURETTAGE, DIAGNOSTIC / THERAPEUTIC     • ENDOSCOPY     • HYSTERECTOMY      Total Hysterectomy   • DC TOTAL KNEE ARTHROPLASTY Right 10/18/2017    Procedure:  TOTAL KNEE ARTHROPLASTY RIGHT ELECTIVE ;  Surgeon: Doc Mixon MD;  Location: [facility]" "[facility];  Service: Orthopedics   • TUBAL ABDOMINAL LIGATION         Family History   Problem Relation Age of Onset   • Depression Daughter    • Mental illness Daughter    • Obesity Daughter    • Other Daughter      chronic back pain   • Arthritis Other    • Diabetes Other    • Hypertension Other    • Obesity Other    • Hyperlipidemia Other      high cholesterol       Social History     Social History   • Marital status:      Spouse name: N/A   • Number of children: N/A   • Years of education: N/A     Occupational History   • Not on file.     Social History Main Topics   • Smoking status: Former Smoker     Years: 4.00     Types: Cigarettes     Quit date: 10/9/1985   • Smokeless tobacco: Never Used      Comment: 1-2 CIGARETTES PER DAY/SOCIAL   • Alcohol use No   • Drug use: No   • Sexual activity: Defer     Other Topics Concern   • Not on file     Social History Narrative       All the above social hx, family hx, surgical history,medications, allergies, ros & HPI reviewed.    Allergies   Allergen Reactions   • Acetaminophen Other (See Comments)     \"I JUT CAN'T TAKE IT.  I'M NOT SURE WHAT IT DID\"   • Ezetimibe Other (See Comments)     UNKNOWN     • Fluticasone Other (See Comments)     UNKNOWN     • Fluticasone Propionate Other (See Comments)     UNKNOWN     • Furosemide Other (See Comments)     UNKNOWN     • Loratadine Other (See Comments)     UNKNOWN     • Mometasone Furoate Other (See Comments)     UNKNOWN     • Propoxyphene Other (See Comments)     UNKNOWN     • Sertraline Hcl Other (See Comments)     UNKNOWN     • Triamcinolone Other (See Comments)     UNKNOWN     • Triamcinolone Acetonide Other (See Comments)     UNKNOWN         Objective   Resp 16  Ht 62\" (157.5 cm)  Wt 205 lb 14.4 oz (93.4 kg)  BMI 37.66 kg/m2   Physical Exam  Constitutional: He is oriented to person, place, and time. He appears well-developed and well-nourished.   HENT:   Head: Normocephalic and atraumatic.   Eyes: EOM are normal. " Pupils are equal, round, and reactive to light.   Neck: Normal range of motion. Neck supple.   Cardiovascular: Normal rate.    Pulmonary/Chest: Effort normal and breath sounds normal.   Abdominal: Soft.   Neurological: He is alert and oriented to person, place, and time.   Skin: Skin is warm and dry.   Psychiatric: He has a normal mood and affect.   Nursing note and vitals reviewed.       Ortho Exam   Right knee incision well approximated, postoperative Dermabond intact, very slight redness at the inferior aspect of the incision, calf supple Homans sign negative, active knee extension full, flexion 100°, neurovascularly intact    Assessment/Plan   Review of Radiographic Studies:    No new imaging done today.      Procedures     Lata was seen today for post-op.    Diagnoses and all orders for this visit:    Primary localized osteoarthrosis of right lower leg    Other orders  -     cephalexin (KEFLEX) 500 MG capsule; Take 1 capsule by mouth Every 6 (Six) Hours.  -     oxyCODONE (ROXICODONE) 5 MG immediate release tablet; Take 1 tablet by mouth Every 6 (Six) Hours As Needed (for pain).     Physical therapy referral given      Recommendations/Plan:   Work/Activity Status: Postop total knee protocol with therapy at home    Patient agreeable to call or return sooner for any concerns.             Impression:  Status post right total knee replacement  Plan:  Progress with therapy at home, wound care, recheck 1 week at that time remove dressing apply Steri-Strips

## 2017-11-01 ENCOUNTER — TREATMENT (OUTPATIENT)
Dept: PHYSICAL THERAPY | Facility: CLINIC | Age: 72
End: 2017-11-01

## 2017-11-01 DIAGNOSIS — Z96.651 STATUS POST TOTAL RIGHT KNEE REPLACEMENT: Primary | ICD-10-CM

## 2017-11-01 PROCEDURE — 97110 THERAPEUTIC EXERCISES: CPT | Performed by: PHYSICAL THERAPIST

## 2017-11-01 NOTE — PROGRESS NOTES
Physical Therapy Daily Progress Note      Visit # : 4    Lata Justice reports 0/10 pain today at rest.  Pt reports she went to the MD yesterday and she is doing better.         Objective Pt present to PT today with no distress at rest.     AROM:  R knee flexion 109 degrees,  Ext -2 degrees from neutral.       See Exercise, Manual, and Modality Logs for complete treatment.     Assessment/Plan  Pt with good progress.  Her knee ROM and strength are improving well.       Progress per Plan of Care             Manual Therapy:         mins  69037;  Therapeutic Exercise:    54     mins  13346;     Neuromuscular Gabrielle:        mins  06421;    Therapeutic Activity:          mins  47224;     Gait Training:        ___  mins  19319;     Ultrasound:          mins  50375;    Electrical Stimulation:         mins  32828 ( );  Dry Needling          mins self-pay    Timed Treatment:   54   mins   Total Treatment:     59   mins    Fidel García, PT  Physical Therapist

## 2017-11-03 ENCOUNTER — TREATMENT (OUTPATIENT)
Dept: PHYSICAL THERAPY | Facility: CLINIC | Age: 72
End: 2017-11-03

## 2017-11-03 DIAGNOSIS — Z96.651 STATUS POST TOTAL RIGHT KNEE REPLACEMENT: Primary | ICD-10-CM

## 2017-11-03 PROCEDURE — 97530 THERAPEUTIC ACTIVITIES: CPT | Performed by: PHYSICAL THERAPIST

## 2017-11-03 PROCEDURE — 97110 THERAPEUTIC EXERCISES: CPT | Performed by: PHYSICAL THERAPIST

## 2017-11-03 NOTE — PROGRESS NOTES
Physical Therapy Daily Progress Note      Visit # : 5    Lata Justice reports 0/10 pain today at rest.  Pt reports she is doing good. Getting up from a seated position is still the hardest thing to do.         Objective Pt present to PT today with no distress at rest.  Pt with WW into PT area today.     Pt with increased function and less pain.       See Exercise, Manual, and Modality Logs for complete treatment.     Assessment/Plan  Pt with increased function and less pain.       Progress per Plan of Care             Manual Therapy:         mins  71933;  Therapeutic Exercise:    36     mins  37681;     Neuromuscular Gabrielle:        mins  05282;    Therapeutic Activity:     18     mins  99148;     Gait Training:        ___  mins  00339;     Ultrasound:          mins  04707;    Electrical Stimulation:         mins  92810 ( );  Dry Needling          mins self-pay    Timed Treatment:   54   mins   Total Treatment:     58   mins    Fidel García, PT  Physical Therapist

## 2017-11-09 ENCOUNTER — OFFICE VISIT (OUTPATIENT)
Dept: ORTHOPEDIC SURGERY | Facility: CLINIC | Age: 72
End: 2017-11-09

## 2017-11-09 VITALS — RESPIRATION RATE: 16 BRPM | WEIGHT: 206.2 LBS | BODY MASS INDEX: 37.94 KG/M2 | HEIGHT: 62 IN

## 2017-11-09 DIAGNOSIS — M17.0 OSTEOARTHRITIS OF BOTH KNEES, UNSPECIFIED OSTEOARTHRITIS TYPE: Primary | ICD-10-CM

## 2017-11-09 PROCEDURE — 99024 POSTOP FOLLOW-UP VISIT: CPT | Performed by: ORTHOPAEDIC SURGERY

## 2017-11-09 NOTE — PROGRESS NOTES
"Subjective   Patient ID: Lata Justice is a 72 y.o. female  Post-op of the Right Knee (10/18/17; R TKA)             History of Present Illness  Routine follow-up status post right total knee arthroplasty minimal pain function improving no edema in the ankle or calf doing home exercise very satisfied with pain control      Review of Systems   Constitutional: Negative for diaphoresis, fever and unexpected weight change.   HENT: Negative for dental problem and sore throat.    Eyes: Negative for visual disturbance.   Respiratory: Negative for shortness of breath.    Cardiovascular: Negative for chest pain.   Gastrointestinal: Negative for abdominal pain, constipation, diarrhea, nausea and vomiting.   Genitourinary: Negative for difficulty urinating and frequency.   Musculoskeletal: Positive for arthralgias.   Neurological: Negative for headaches.   Hematological: Does not bruise/bleed easily.   All other systems reviewed and are negative.      Past Medical History:   Diagnosis Date   • Abnormal finding     STATED SHE \"WOKE\" UP DURING AN EGD, BREAST BIOPSY, BACK SURGERY   • Acute colitis    • Allergic    • Anxiety    • Arthritis    • Body piercing     EARS   • Cataracts, bilateral    • Depression    • Depression    • Diabetes mellitus     DIET CONTROLLED   • Full dentures    • GERD (gastroesophageal reflux disease)    • Glaucoma    • High cholesterol    • Hx of colonic polyps    • Hypertension    • Injury of back    • Low back pain    • Obesity    • Osteoarthritis    • Pneumonia    • PONV (postoperative nausea and vomiting)    • Wears glasses         Past Surgical History:   Procedure Laterality Date   • BACK SURGERY      Lower back X2   • CHOLECYSTECTOMY     • COLONOSCOPY     • DILATION AND CURETTAGE, DIAGNOSTIC / THERAPEUTIC     • ENDOSCOPY     • HYSTERECTOMY      Total Hysterectomy   • [place] TOTAL KNEE ARTHROPLASTY Right 10/18/2017    Procedure:  TOTAL KNEE ARTHROPLASTY RIGHT ELECTIVE ;  Surgeon: Doc Mixon MD;  " "Location: [facility] OR;  Service: Orthopedics   • TUBAL ABDOMINAL LIGATION         Family History   Problem Relation Age of Onset   • Depression Daughter    • Mental illness Daughter    • Obesity Daughter    • Other Daughter      chronic back pain   • Arthritis Other    • Diabetes Other    • Hypertension Other    • Obesity Other    • Hyperlipidemia Other      high cholesterol       Social History     Social History   • Marital status:      Spouse name: N/A   • Number of children: N/A   • Years of education: N/A     Occupational History   • Not on file.     Social History Main Topics   • Smoking status: Former Smoker     Years: 4.00     Types: Cigarettes     Quit date: 10/9/1985   • Smokeless tobacco: Never Used      Comment: 1-2 CIGARETTES PER DAY/SOCIAL   • Alcohol use No   • Drug use: No   • Sexual activity: Defer     Other Topics Concern   • Not on file     Social History Narrative       I have reviewed all of the above social hx, family hx, surgical hx, medications, allergies & ROS and confirm that it is accurate.    Allergies   Allergen Reactions   • Acetaminophen Other (See Comments)     \"I JUT CAN'T TAKE IT.  I'M NOT SURE WHAT IT DID\"   • Ezetimibe Other (See Comments)     UNKNOWN     • Fluticasone Other (See Comments)     UNKNOWN     • Fluticasone Propionate Other (See Comments)     UNKNOWN     • Furosemide Other (See Comments)     UNKNOWN     • Loratadine Other (See Comments)     UNKNOWN     • Mometasone Furoate Other (See Comments)     UNKNOWN     • Propoxyphene Other (See Comments)     UNKNOWN     • Sertraline Hcl Other (See Comments)     UNKNOWN     • Triamcinolone Other (See Comments)     UNKNOWN     • Triamcinolone Acetonide Other (See Comments)     UNKNOWN         Objective   Resp 16  Ht 62\" (157.5 cm)  Wt 206 lb 3.2 oz (93.5 kg)  BMI 37.71 kg/m2   Physical Exam  Constitutional: He is oriented to person, place, and time. He appears well-developed and well-nourished.   HENT:   Head: " Normocephalic and atraumatic.   Eyes: EOM are normal. Pupils are equal, round, and reactive to light.   Neck: Normal range of motion. Neck supple.   Cardiovascular: Normal rate.    Pulmonary/Chest: Effort normal and breath sounds normal.   Abdominal: Soft.   Neurological: He is alert and oriented to person, place, and time.   Skin: Skin is warm and dry.   Psychiatric: He has a normal mood and affect.   Nursing note and vitals reviewed.       Ortho Exam   Dressing change Steri-Strips applied wound well healed calf supple no edema in the ankle active knee extension full flexion to 120 neurovascular status intact    Assessment/Plan   Review of Radiographic Studies:    No new imaging done today.      Procedures     Lata was seen today for post-op.    Diagnoses and all orders for this visit:    Osteoarthritis of both knees, unspecified osteoarthritis type     Orthopedic activities reviewed and patient expressed appreciation      Recommendations/Plan:   Work/Activity Status: Use cane at home, walker out of the home for balance and support    Patient agreeable to call or return sooner for any concerns.             Impression:  Status post right total knee arthroplasty  Plan:  Return in 1 month x-rays 3 views right knee at that time

## 2017-11-29 ENCOUNTER — OFFICE VISIT (OUTPATIENT)
Dept: INTERNAL MEDICINE | Facility: CLINIC | Age: 72
End: 2017-11-29

## 2017-11-29 VITALS
WEIGHT: 196 LBS | SYSTOLIC BLOOD PRESSURE: 124 MMHG | TEMPERATURE: 98.9 F | OXYGEN SATURATION: 96 % | HEART RATE: 67 BPM | RESPIRATION RATE: 14 BRPM | HEIGHT: 62 IN | DIASTOLIC BLOOD PRESSURE: 64 MMHG | BODY MASS INDEX: 36.07 KG/M2

## 2017-11-29 DIAGNOSIS — Z96.651 STATUS POST RIGHT KNEE REPLACEMENT: ICD-10-CM

## 2017-11-29 DIAGNOSIS — M25.561 BILATERAL CHRONIC KNEE PAIN: Chronic | ICD-10-CM

## 2017-11-29 DIAGNOSIS — G89.29 BILATERAL CHRONIC KNEE PAIN: Chronic | ICD-10-CM

## 2017-11-29 DIAGNOSIS — R11.0 NAUSEA: ICD-10-CM

## 2017-11-29 DIAGNOSIS — R63.0 DECREASE IN APPETITE: ICD-10-CM

## 2017-11-29 DIAGNOSIS — I10 BENIGN ESSENTIAL HYPERTENSION: Primary | Chronic | ICD-10-CM

## 2017-11-29 DIAGNOSIS — R63.4 UNINTENDED WEIGHT LOSS: ICD-10-CM

## 2017-11-29 DIAGNOSIS — M25.562 BILATERAL CHRONIC KNEE PAIN: Chronic | ICD-10-CM

## 2017-11-29 PROCEDURE — 99214 OFFICE O/P EST MOD 30 MIN: CPT | Performed by: FAMILY MEDICINE

## 2017-12-08 DIAGNOSIS — Z09 FOLLOW UP: Primary | ICD-10-CM

## 2017-12-11 ENCOUNTER — OFFICE VISIT (OUTPATIENT)
Dept: ORTHOPEDIC SURGERY | Facility: CLINIC | Age: 72
End: 2017-12-11

## 2017-12-11 VITALS — WEIGHT: 205 LBS | HEIGHT: 62 IN | BODY MASS INDEX: 37.73 KG/M2 | RESPIRATION RATE: 18 BRPM

## 2017-12-11 DIAGNOSIS — M17.0 PRIMARY OSTEOARTHRITIS OF BOTH KNEES: Primary | ICD-10-CM

## 2017-12-11 PROCEDURE — 99024 POSTOP FOLLOW-UP VISIT: CPT | Performed by: ORTHOPAEDIC SURGERY

## 2017-12-11 NOTE — PROGRESS NOTES
"Subjective   Patient ID: Lata Justice is a 72 y.o. female  Post-op of the Right Knee (10/18/17; R TKA)             History of Present Illness    2 months status post right total knee arthroplasty, improve range of motion, strength recovering, no longer has night pain, walks with a cane, doing home exercise as instructed.  Has history of preexistent 2 lumbar surgeries with residual right posterior buttock and right anterior thigh pain that seems to come from her back    Review of Systems   Constitutional: Negative for diaphoresis, fever and unexpected weight change.   HENT: Negative for dental problem and sore throat.    Eyes: Negative for visual disturbance.   Respiratory: Negative for shortness of breath.    Cardiovascular: Negative for chest pain.   Gastrointestinal: Negative for abdominal pain, constipation, diarrhea, nausea and vomiting.   Genitourinary: Negative for difficulty urinating and frequency.   Neurological: Negative for headaches.   Hematological: Does not bruise/bleed easily.   All other systems reviewed and are negative.      Past Medical History:   Diagnosis Date   • Abnormal finding     STATED SHE \"WOKE\" UP DURING AN EGD, BREAST BIOPSY, BACK SURGERY   • Acute colitis    • Allergic    • Anxiety    • Arthritis    • Body piercing     EARS   • Cataracts, bilateral    • Depression    • Depression    • Diabetes mellitus     DIET CONTROLLED   • Full dentures    • GERD (gastroesophageal reflux disease)    • Glaucoma    • High cholesterol    • Hx of colonic polyps    • Hypertension    • Injury of back    • Low back pain    • Obesity    • Osteoarthritis    • Pneumonia    • PONV (postoperative nausea and vomiting)    • Wears glasses         Past Surgical History:   Procedure Laterality Date   • BACK SURGERY      Lower back X2   • CHOLECYSTECTOMY     • COLONOSCOPY     • DILATION AND CURETTAGE, DIAGNOSTIC / THERAPEUTIC     • ENDOSCOPY     • HYSTERECTOMY      Total Hysterectomy   • TN TOTAL KNEE ARTHROPLASTY " "Right 10/18/2017    Procedure:  TOTAL KNEE ARTHROPLASTY RIGHT ELECTIVE ;  Surgeon: Doc Mixon MD;  Location: [facility];  Service: Orthopedics   • TUBAL ABDOMINAL LIGATION         Family History   Problem Relation Age of Onset   • Depression Daughter    • Mental illness Daughter    • Obesity Daughter    • Other Daughter      chronic back pain   • Arthritis Other    • Diabetes Other    • Hypertension Other    • Obesity Other    • Hyperlipidemia Other      high cholesterol       Social History     Social History   • Marital status:      Spouse name: N/A   • Number of children: N/A   • Years of education: N/A     Occupational History   • Not on file.     Social History Main Topics   • Smoking status: Former Smoker     Years: 4.00     Types: Cigarettes     Quit date: 10/9/1985   • Smokeless tobacco: Never Used      Comment: 1-2 CIGARETTES PER DAY/SOCIAL   • Alcohol use No   • Drug use: No   • Sexual activity: Defer     Other Topics Concern   • Not on file     Social History Narrative       I have reviewed all of the above social hx, family hx, surgical hx, medications, allergies & ROS and confirm that it is accurate.    Allergies   Allergen Reactions   • Acetaminophen Other (See Comments)     \"I JUT CAN'T TAKE IT.  I'M NOT SURE WHAT IT DID\"   • Ezetimibe Other (See Comments)     UNKNOWN     • Fluticasone Other (See Comments)     UNKNOWN     • Fluticasone Propionate Other (See Comments)     UNKNOWN     • Furosemide Other (See Comments)     UNKNOWN     • Loratadine Other (See Comments)     UNKNOWN     • Mometasone Furoate Other (See Comments)     UNKNOWN     • Propoxyphene Other (See Comments)     UNKNOWN     • Sertraline Hcl Other (See Comments)     UNKNOWN     • Triamcinolone Other (See Comments)     UNKNOWN     • Triamcinolone Acetonide Other (See Comments)     UNKNOWN         Objective   Resp 18  Ht 157.5 cm (62\")  Wt 93 kg (205 lb)  BMI 37.49 kg/m2   Physical Exam  Constitutional: He is oriented to " person, place, and time. He appears well-developed and well-nourished.   HENT:   Head: Normocephalic and atraumatic.   Eyes: EOM are normal. Pupils are equal, round, and reactive to light.   Neck: Normal range of motion. Neck supple.   Cardiovascular: Normal rate.    Pulmonary/Chest: Effort normal and breath sounds normal.   Abdominal: Soft.   Neurological: He is alert and oriented to person, place, and time.   Skin: Skin is warm and dry.   Psychiatric: He has a normal mood and affect.   Nursing note and vitals reviewed.       Ortho Exam     Right knee with full active extension flexion to 1:30 no effusion minimal tenderness along the incisional area calf supple neurovascular status intact negative straight leg raising minimal patellofemoral pain  Assessment/Plan   Review of Radiographic Studies:    Radiographic images today of affected area I personally viewed and showed no sign of acute fracture or dislocation.      Procedures     Lata was seen today for post-op.    Diagnoses and all orders for this visit:    Primary osteoarthritis of both knees     Orthopedic activities reviewed and patient expressed appreciation      Recommendations/Plan:   Work/Activity Status: May perform usual activities as tolerated    Patient agreeable to call or return sooner for any concerns.             Impression:  2 months status post right total knee arthroplasty, history of pre-existing lumbar surgeries ×2 with residual right anterior thigh pain secondary to lumbar condition  Plan:  Recheck 3 months, if nerve symptoms continue we'll refer for neurosurgical eval lumbar spine

## 2018-02-16 ENCOUNTER — OFFICE VISIT (OUTPATIENT)
Dept: INTERNAL MEDICINE | Facility: CLINIC | Age: 73
End: 2018-02-16

## 2018-02-16 VITALS
SYSTOLIC BLOOD PRESSURE: 122 MMHG | HEART RATE: 77 BPM | OXYGEN SATURATION: 98 % | WEIGHT: 202 LBS | RESPIRATION RATE: 14 BRPM | DIASTOLIC BLOOD PRESSURE: 80 MMHG | BODY MASS INDEX: 37.17 KG/M2 | HEIGHT: 62 IN | TEMPERATURE: 97.2 F

## 2018-02-16 DIAGNOSIS — E78.2 MIXED HYPERLIPIDEMIA: Chronic | ICD-10-CM

## 2018-02-16 DIAGNOSIS — K22.719 BARRETT'S ESOPHAGUS WITH DYSPLASIA: ICD-10-CM

## 2018-02-16 DIAGNOSIS — M17.11 PRIMARY OSTEOARTHRITIS OF RIGHT KNEE: ICD-10-CM

## 2018-02-16 DIAGNOSIS — R60.0 BILATERAL EDEMA OF LOWER EXTREMITY: ICD-10-CM

## 2018-02-16 DIAGNOSIS — E53.8 CYANOCOBALAMIN DEFICIENCY: ICD-10-CM

## 2018-02-16 DIAGNOSIS — I10 BENIGN ESSENTIAL HYPERTENSION: Chronic | ICD-10-CM

## 2018-02-16 DIAGNOSIS — M25.561 BILATERAL CHRONIC KNEE PAIN: Chronic | ICD-10-CM

## 2018-02-16 DIAGNOSIS — G89.29 CHRONIC MIDLINE LOW BACK PAIN WITH RIGHT-SIDED SCIATICA: Chronic | ICD-10-CM

## 2018-02-16 DIAGNOSIS — Z00.00 MEDICARE ANNUAL WELLNESS VISIT, INITIAL: Primary | ICD-10-CM

## 2018-02-16 DIAGNOSIS — N18.30 STAGE 3 CHRONIC KIDNEY DISEASE (HCC): ICD-10-CM

## 2018-02-16 DIAGNOSIS — J30.2 CHRONIC SEASONAL ALLERGIC RHINITIS, UNSPECIFIED TRIGGER: ICD-10-CM

## 2018-02-16 DIAGNOSIS — J44.9 CHRONIC OBSTRUCTIVE PULMONARY DISEASE, UNSPECIFIED COPD TYPE (HCC): ICD-10-CM

## 2018-02-16 DIAGNOSIS — F33.41 RECURRENT MAJOR DEPRESSIVE DISORDER, IN PARTIAL REMISSION (HCC): Chronic | ICD-10-CM

## 2018-02-16 DIAGNOSIS — M54.41 CHRONIC MIDLINE LOW BACK PAIN WITH RIGHT-SIDED SCIATICA: Chronic | ICD-10-CM

## 2018-02-16 DIAGNOSIS — E66.9 OBESITY (BMI 30-39.9): ICD-10-CM

## 2018-02-16 DIAGNOSIS — Z12.39 BREAST CANCER SCREENING: ICD-10-CM

## 2018-02-16 DIAGNOSIS — G89.29 BILATERAL CHRONIC KNEE PAIN: Chronic | ICD-10-CM

## 2018-02-16 DIAGNOSIS — Z23 NEED FOR ZOSTER VACCINE: ICD-10-CM

## 2018-02-16 DIAGNOSIS — M25.562 BILATERAL CHRONIC KNEE PAIN: Chronic | ICD-10-CM

## 2018-02-16 DIAGNOSIS — Z96.651 STATUS POST RIGHT KNEE REPLACEMENT: Chronic | ICD-10-CM

## 2018-02-16 PROCEDURE — G0402 INITIAL PREVENTIVE EXAM: HCPCS | Performed by: FAMILY MEDICINE

## 2018-02-16 RX ORDER — GABAPENTIN 400 MG/1
800 CAPSULE ORAL 3 TIMES DAILY
Qty: 180 CAPSULE | Refills: 2 | Status: SHIPPED | OUTPATIENT
Start: 2018-02-16 | End: 2018-05-09 | Stop reason: SDUPTHER

## 2018-02-16 RX ORDER — MELOXICAM 7.5 MG/1
TABLET ORAL
COMMUNITY
Start: 2018-01-15 | End: 2018-05-02 | Stop reason: SDUPTHER

## 2018-02-16 NOTE — TELEPHONE ENCOUNTER
[facility] DOES NOT GIVE ZOSTER VAC. [facility] DOES. SENT ORDER TO [facility].    LEFT MESSAGE ON PATIENT'S VOICEMAIL.

## 2018-02-16 NOTE — PROGRESS NOTES
QUICK REFERENCE INFORMATION:  The ABCs of the Annual Wellness Visit    Initial Medicare Wellness Visit    HEALTH RISK ASSESSMENT    1945    Recent Hospitalizations:  Recently treated at the following:  [facility].        Current Medical Providers:  Patient Care Team:  Annetta Mcclendon MD as PCP - General  Doc Mixon MD as Consulting Physician (Orthopedic Surgery)        Smoking Status:  History   Smoking Status   • Former Smoker   • Years: 4.00   • Types: Cigarettes   • Quit date: 10/9/1985   Smokeless Tobacco   • Never Used     Comment: 1-2 CIGARETTES PER DAY/SOCIAL       Alcohol Consumption:  History   Alcohol Use No       Depression Screen:   PHQ-2/PHQ-9 Depression Screening 2/16/2018   Little interest or pleasure in doing things 0   Feeling down, depressed, or hopeless 0   Total Score 0       Health Habits and Functional and Cognitive Screening:  Functional & Cognitive Status 2/16/2018   Do you have difficulty preparing food and eating? No   Do you have difficulty bathing yourself, getting dressed or grooming yourself? No   Do you have difficulty using the toilet? No   Do you have difficulty moving around from place to place? No   Do you have trouble with steps or getting out of a bed or a chair? No   In the past year have you fallen or experienced a near fall? No   Current Diet Well Balanced Diet   Dental Exam Up to date   Eye Exam Up to date   Exercise (times per week) 7 times per week   Current Exercise Activities Include Walking   Do you need help using the phone?  No   Are you deaf or do you have serious difficulty hearing?  No   Do you need help with transportation? No   Do you need help shopping? No   Do you need help preparing meals?  No   Do you need help with housework?  No   Do you need help with laundry? No   Do you need help taking your medications? No   Do you need help managing money? No   Have you felt unusual stress, anger or loneliness in the last month? No   Who do  you live with? Spouse   If you need help, do you have trouble finding someone available to you? No   Have you been bothered in the last four weeks by sexual problems? No   Do you have difficulty concentrating, remembering or making decisions? No           Does the patient have evidence of cognitive impairment? No    Asiprin use counseling: Taking ASA appropriately as indicated      Recent Lab Results:    Visual Acuity:  No exam data present    Age-appropriate Screening Schedule:  Refer to the list below for future screening recommendations based on patient's age, sex and/or medical conditions. Orders for these recommended tests are listed in the plan section. The patient has been provided with a written plan.    Health Maintenance   Topic Date Due   • MAMMOGRAM  03/30/2018 (Originally 9/16/2017)   • LIPID PANEL  06/08/2018   • DXA SCAN  02/16/2020   • TDAP/TD VACCINES (2 - Td) 03/07/2027   • COLONOSCOPY  02/16/2028   • INFLUENZA VACCINE  Completed   • PNEUMOCOCCAL VACCINES (65+ LOW/MEDIUM RISK)  Completed   • ZOSTER VACCINE  Completed        Subjective   History of Present Illness    Lata Justice is a 72 y.o. female who presents for an Annual Wellness Visit.    She has essential hypertension which is currently controlled (previously it waxed and waned and at times was uncontrolled.) She is well controlled on lisinopril, metoprolol, and HCTZ. She has edema that has been controlled by HCTZ. She has hyperlipidemia that has been controlled with pravastatin. Her mood is well controlled, she's taken Prozac for depression for many years. She suffers from chronic back pain and she takes gabapentin to help with sciatic pain. She prefers the capsules, easier to swallow. She's looked into back surgery via laser in [place] but it was cost prohibitive. She's had back surgery in the past. She has had knee replacement on the right (9/2017) due to osteoarthritis. She continues to have aching pain in other joints such as ankles  and wrists. She takes chronic PPI therapy due to Adkins's esophagus. We monitor her renal function due to underlying chronic kidney disease, stage three and stable. She's a former smoker who has mild COPD. She uses an inhaler on occasion for rescue. She also has allergies which are controlled with zyrtec. Recent labs have shown a vitamin B12 deficiency, proven by homocysteine and methylmalonic acid level testing.    The following portions of the patient's history were reviewed and updated as appropriate: allergies, current medications, past family history, past medical history, past social history, past surgical history and problem list.    Outpatient Medications Prior to Visit   Medication Sig Dispense Refill   • ALPRAZolam (XANAX) 0.25 MG tablet 2 (two) times a day.     • ASPIR-LOW 81 MG EC tablet TAKE ONE TABLET BY MOUTH AT BEDTIME 30 tablet 11   • cetirizine (zyrTEC) 10 MG tablet TAKE ONE TABLET BY MOUTH EVERY DAY 30 tablet 5   • esomeprazole (NEXIUM) 40 MG capsule Take 1 capsule by mouth daily.     • FLUoxetine (PROzac) 20 MG capsule TAKE 3 CAPSULES BY MOUTH EVERY MORNING 90 capsule 5   • hydrochlorothiazide (HYDRODIURIL) 25 MG tablet TAKE ONE TABLET BY MOUTH EVERY DAY 30 tablet 5   • lisinopril (PRINIVIL,ZESTRIL) 40 MG tablet TAKE ONE TABLET BY MOUTH EVERY DAY 30 tablet 5   • metoprolol tartrate (LOPRESSOR) 25 MG tablet TAKE ONE TABLET BY MOUTH 2 TIMES A DAY 60 tablet 5   • pravastatin (PRAVACHOL) 40 MG tablet TAKE ONE TABLET BY MOUTH EVERY DAY 30 tablet 5   • PROAIR  (90 Base) MCG/ACT inhaler INHALE 2 PUFFS INTO THE LUNGS EVERY 4 TO 6 HOURS AS NEEDED FOR WHEEZING. 1 inhaler 5   • STOOL SOFTENER 250 MG capsule TAKE ONE CAPSULE BY MOUTH EVERY DAY 30 capsule 5   • gabapentin (NEURONTIN) 400 MG capsule Take 2 capsules by mouth 3 (Three) Times a Day. 180 capsule 1     No facility-administered medications prior to visit.        Patient Active Problem List   Diagnosis   • Adkins's esophagus   • Benign  essential hypertension   • Chronic low back pain   • Chronic obstructive pulmonary disease   • Recurrent major depressive disorder, in partial remission   • Impaired glucose tolerance   • Mixed hyperlipidemia   • Seasonal allergic rhinitis   • Bilateral chronic knee pain   • Bilateral edema of lower extremity   • Bilateral wrist pain   • Chronic pain of both ankles   • Primary osteoarthritis of right knee   • Arthralgia of both knees   • Borderline diabetes   • Status post right knee replacement   • Stage 3 chronic kidney disease   • Cyanocobalamin deficiency   • Obesity (BMI 30-39.9)       Advance Care Planning:  has an advance directive - a copy HAS NOT been provided. Have asked the patient to send this to us to add to record.    Identification of Risk Factors:  Risk factors include: weight , unhealthy diet, increased fall risk and incontinence. Urge incontinence.     Review of Systems   Respiratory: Negative for shortness of breath.    Cardiovascular: Negative for chest pain.   Gastrointestinal: Negative for abdominal pain.   Musculoskeletal: Positive for arthralgias and back pain.   All other systems reviewed and are negative.      Compared to one year ago, the patient feels her physical health is better. Knee improved. Still uses cane at times if out and about, but she does not in the house.   Compared to one year ago, the patient feels her mental health is the same.    Objective     Physical Exam   Constitutional: She is oriented to person, place, and time. Vital signs are normal. She appears well-developed and well-nourished. She is active. She does not have a sickly appearance. She does not appear ill.   Appears stated age. Well groomed.    HENT:   Head: Normocephalic and atraumatic. Hair is normal.   Right Ear: Hearing normal.   Left Ear: Hearing normal.   Nose: Nose normal.   Eyes: EOM and lids are normal. Pupils are equal, round, and reactive to light. No scleral icterus.   Wearing glasses   Neck:  "Phonation normal. Neck supple.   Cardiovascular: Normal rate, regular rhythm and normal heart sounds.  Exam reveals no gallop and no friction rub.    No murmur heard.  Pulmonary/Chest: Effort normal and breath sounds normal.   Abdominal: Soft. Bowel sounds are normal. There is no tenderness. There is no rigidity, no rebound and no guarding.   Musculoskeletal: She exhibits no deformity.   Neurological: She is alert and oriented to person, place, and time. She displays no tremor. No cranial nerve deficit. Gait abnormal.   Skin: Skin is warm. No rash noted. She is not diaphoretic. No cyanosis. Nails show no clubbing.   Psychiatric: She has a normal mood and affect. Her speech is normal and behavior is normal. Judgment and thought content normal. Cognition and memory are normal.   Nursing note and vitals reviewed.      Vitals:    02/16/18 0800   BP: 122/80   Pulse: 77   Resp: 14   Temp: 97.2 °F (36.2 °C)   SpO2: 98%   Weight: 91.6 kg (202 lb)   Height: 157.5 cm (62.01\")       Body mass index is 36.94 kg/(m^2).  Discussed the patient's BMI with her. BMI is above normal parameters. Follow-up plan includes:  exercise counseling and nutrition counseling.    Assessment/Plan   Patient Self-Management and Personalized Health Advice  The patient has been provided with information about: diet, exercise, weight management, fall prevention and designing advance directives and preventive services including:   · Counseling for cardiovascular disease risk reduction, Nutrition counseling provided, Screening mammography, referral placed.    Visit Diagnoses:    ICD-10-CM ICD-9-CM   1. Medicare annual wellness visit, initial Z00.00 V70.0   2. Benign essential hypertension I10 401.1   3. Mixed hyperlipidemia E78.2 272.2   4. Recurrent major depressive disorder, in partial remission F33.41 296.35   5. Chronic midline low back pain with right-sided sciatica M54.41 724.2    G89.29 724.3     338.29   6. Bilateral chronic knee pain M25.561 " 719.46    M25.562 338.29    G89.29    7. Adkins's esophagus with dysplasia K22.719 530.85   8. Chronic obstructive pulmonary disease, unspecified COPD type J44.9 496   9. Bilateral edema of lower extremity R60.0 782.3   10. Stage 3 chronic kidney disease N18.3 585.3   11. Primary osteoarthritis of right knee M17.11 715.16   12. Status post right knee replacement Z96.651 V43.65   13. Chronic seasonal allergic rhinitis, unspecified trigger J30.2 477.8   14. Cyanocobalamin deficiency E53.8 266.2   15. Obesity (BMI 30-39.9) E66.9 278.00   16. Breast cancer screening Z12.31 V76.10   17. Need for zoster vaccine Z23 V04.89       Orders Placed This Encounter   Procedures   • Mammo Screening Digital Tomosynthesis Bilateral With CAD     Scheduling Instructions:      Schedule at [facility] as that's where her old images are and she lives in [place]     Order Specific Question:   Reason for Exam:     Answer:   screening       Outpatient Encounter Prescriptions as of 2/16/2018   Medication Sig Dispense Refill   • ALPRAZolam (XANAX) 0.25 MG tablet 2 (two) times a day.     • ASPIR-LOW 81 MG EC tablet TAKE ONE TABLET BY MOUTH AT BEDTIME 30 tablet 11   • cetirizine (zyrTEC) 10 MG tablet TAKE ONE TABLET BY MOUTH EVERY DAY 30 tablet 5   • esomeprazole (NEXIUM) 40 MG capsule Take 1 capsule by mouth daily.     • FLUoxetine (PROzac) 20 MG capsule TAKE 3 CAPSULES BY MOUTH EVERY MORNING 90 capsule 5   • gabapentin (NEURONTIN) 400 MG capsule Take 2 capsules by mouth 3 (Three) Times a Day. 180 capsule 2   • hydrochlorothiazide (HYDRODIURIL) 25 MG tablet TAKE ONE TABLET BY MOUTH EVERY DAY 30 tablet 5   • lisinopril (PRINIVIL,ZESTRIL) 40 MG tablet TAKE ONE TABLET BY MOUTH EVERY DAY 30 tablet 5   • meloxicam (MOBIC) 7.5 MG tablet      • metoprolol tartrate (LOPRESSOR) 25 MG tablet TAKE ONE TABLET BY MOUTH 2 TIMES A DAY 60 tablet 5   • pravastatin (PRAVACHOL) 40 MG tablet TAKE ONE TABLET BY MOUTH EVERY DAY 30 tablet 5   • PROAIR   (90 Base) MCG/ACT inhaler INHALE 2 PUFFS INTO THE LUNGS EVERY 4 TO 6 HOURS AS NEEDED FOR WHEEZING. 1 inhaler 5   • STOOL SOFTENER 250 MG capsule TAKE ONE CAPSULE BY MOUTH EVERY DAY 30 capsule 5   • [DISCONTINUED] gabapentin (NEURONTIN) 400 MG capsule Take 2 capsules by mouth 3 (Three) Times a Day. 180 capsule 1   • Cyanocobalamin 1000 MCG sublingual tablet Place 1,000 mcg under the tongue Daily. 90 each 3   • [DISCONTINUED] Zoster Vac Recomb Adjuvanted 50 MCG reconstituted suspension Inject 1 each into the shoulder, thigh, or buttocks 1 (One) Time for 1 dose. 1 each 1     No facility-administered encounter medications on file as of 2/16/2018.        Reviewed use of high risk medication in the elderly: yes  Reviewed for potential of harmful drug interactions in the elderly: yes    Follow Up:  Return in about 3 months (around 5/16/2018) for Blood Pressure follow up; wellness visit 1 year.     An After Visit Summary and PPPS with all of these plans were given to the patient.      Lata was seen today for annual exam.    Diagnoses and all orders for this visit:    Medicare annual wellness visit, initial    Benign essential hypertension    Mixed hyperlipidemia    Recurrent major depressive disorder, in partial remission    Chronic midline low back pain with right-sided sciatica  -     gabapentin (NEURONTIN) 400 MG capsule; Take 2 capsules by mouth 3 (Three) Times a Day.    Bilateral chronic knee pain    Adkins's esophagus with dysplasia    Chronic obstructive pulmonary disease, unspecified COPD type    Bilateral edema of lower extremity    Stage 3 chronic kidney disease    Primary osteoarthritis of right knee    Status post right knee replacement    Chronic seasonal allergic rhinitis, unspecified trigger    Cyanocobalamin deficiency  -     Cyanocobalamin 1000 MCG sublingual tablet; Place 1,000 mcg under the tongue Daily.    Obesity (BMI 30-39.9)    Breast cancer screening  -     Mammo Screening Digital Tomosynthesis  Bilateral With CAD    Need for zoster vaccine    Other orders  -     Discontinue: Zoster Vac Recomb Adjuvanted 50 MCG reconstituted suspension; Inject 1 each into the shoulder, thigh, or buttocks 1 (One) Time for 1 dose.      · Prescription sent for Shingrix. Vaccination discussed in detail including efficacy compared to Zostavax and need for two injections two months apart. Discussed likely myalgias after vaccination as greater than 40% of patients complained of this in clinical trials. If not covered by insurance, recommend waiting until it is covered (if not affordable). Even if Zostavax was previously received, Shingrix is recommended.  · Patient's BMI is above normal parameters. Follow-up plan includes:  nutrition counseling.  ·

## 2018-02-18 PROBLEM — Z96.651 STATUS POST RIGHT KNEE REPLACEMENT: Chronic | Status: ACTIVE | Noted: 2017-10-18

## 2018-02-18 PROBLEM — E66.9 OBESITY (BMI 30-39.9): Status: ACTIVE | Noted: 2018-02-18

## 2018-02-18 PROBLEM — E53.8 CYANOCOBALAMIN DEFICIENCY: Status: ACTIVE | Noted: 2017-09-13

## 2018-02-18 PROBLEM — N18.30 STAGE 3 CHRONIC KIDNEY DISEASE (HCC): Chronic | Status: ACTIVE | Noted: 2017-10-19

## 2018-05-02 DIAGNOSIS — M54.41 CHRONIC MIDLINE LOW BACK PAIN WITH RIGHT-SIDED SCIATICA: Chronic | ICD-10-CM

## 2018-05-02 DIAGNOSIS — J18.9 CAP (COMMUNITY ACQUIRED PNEUMONIA): ICD-10-CM

## 2018-05-02 DIAGNOSIS — G89.29 CHRONIC MIDLINE LOW BACK PAIN WITH RIGHT-SIDED SCIATICA: Chronic | ICD-10-CM

## 2018-05-02 DIAGNOSIS — M17.11 PRIMARY OSTEOARTHRITIS OF RIGHT KNEE: ICD-10-CM

## 2018-05-02 RX ORDER — ALBUTEROL SULFATE 90 UG/1
AEROSOL, METERED RESPIRATORY (INHALATION)
Qty: 18 G | Refills: 5 | Status: SHIPPED | OUTPATIENT
Start: 2018-05-02 | End: 2019-01-22

## 2018-05-02 RX ORDER — LISINOPRIL 40 MG/1
TABLET ORAL
Qty: 30 TABLET | Refills: 5 | Status: SHIPPED | OUTPATIENT
Start: 2018-05-02 | End: 2018-11-14 | Stop reason: SDUPTHER

## 2018-05-02 RX ORDER — HYDROCHLOROTHIAZIDE 25 MG/1
TABLET ORAL
Qty: 30 TABLET | Refills: 5 | Status: SHIPPED | OUTPATIENT
Start: 2018-05-02 | End: 2018-11-14 | Stop reason: SDUPTHER

## 2018-05-02 RX ORDER — MELOXICAM 7.5 MG/1
TABLET ORAL
Qty: 30 TABLET | Refills: 5 | Status: SHIPPED | OUTPATIENT
Start: 2018-05-02 | End: 2018-11-14 | Stop reason: SDUPTHER

## 2018-05-02 RX ORDER — FLUOXETINE HYDROCHLORIDE 20 MG/1
CAPSULE ORAL
Qty: 90 CAPSULE | Refills: 5 | Status: SHIPPED | OUTPATIENT
Start: 2018-05-02 | End: 2018-11-14 | Stop reason: SDUPTHER

## 2018-05-02 RX ORDER — CETIRIZINE HYDROCHLORIDE 10 MG/1
TABLET ORAL
Qty: 30 TABLET | Refills: 5 | Status: SHIPPED | OUTPATIENT
Start: 2018-05-02 | End: 2018-11-14 | Stop reason: SDUPTHER

## 2018-05-02 RX ORDER — PRAVASTATIN SODIUM 40 MG
TABLET ORAL
Qty: 30 TABLET | Refills: 5 | Status: SHIPPED | OUTPATIENT
Start: 2018-05-02 | End: 2018-11-14 | Stop reason: SDUPTHER

## 2018-05-09 ENCOUNTER — OFFICE VISIT (OUTPATIENT)
Dept: INTERNAL MEDICINE | Facility: CLINIC | Age: 73
End: 2018-05-09

## 2018-05-09 VITALS
BODY MASS INDEX: 38.09 KG/M2 | DIASTOLIC BLOOD PRESSURE: 76 MMHG | HEIGHT: 62 IN | TEMPERATURE: 97.3 F | RESPIRATION RATE: 14 BRPM | WEIGHT: 207 LBS | SYSTOLIC BLOOD PRESSURE: 120 MMHG | HEART RATE: 66 BPM | OXYGEN SATURATION: 97 %

## 2018-05-09 DIAGNOSIS — M54.41 CHRONIC MIDLINE LOW BACK PAIN WITH RIGHT-SIDED SCIATICA: Primary | Chronic | ICD-10-CM

## 2018-05-09 DIAGNOSIS — G89.29 CHRONIC MIDLINE LOW BACK PAIN WITH RIGHT-SIDED SCIATICA: Primary | Chronic | ICD-10-CM

## 2018-05-09 DIAGNOSIS — I10 BENIGN ESSENTIAL HYPERTENSION: Chronic | ICD-10-CM

## 2018-05-09 DIAGNOSIS — Z12.39 BREAST CANCER SCREENING: ICD-10-CM

## 2018-05-09 PROCEDURE — 99213 OFFICE O/P EST LOW 20 MIN: CPT | Performed by: FAMILY MEDICINE

## 2018-05-09 RX ORDER — GABAPENTIN 400 MG/1
800 CAPSULE ORAL 3 TIMES DAILY
Qty: 180 CAPSULE | Refills: 2 | Status: SHIPPED | OUTPATIENT
Start: 2018-05-09 | End: 2018-08-09 | Stop reason: SDUPTHER

## 2018-05-11 NOTE — PROGRESS NOTES
"Herb Justice is a 72 y.o. female here for:  Chief Complaint   Patient presents with   • Hypertension     Hypertension   This is a chronic problem. The current episode started more than 1 year ago. The problem is unchanged. The problem is controlled. Pertinent negatives include no chest pain or shortness of breath. Agents associated with hypertension include oral contraceptives. Risk factors for coronary artery disease include dyslipidemia, obesity, post-menopausal state and sedentary lifestyle. Current antihypertension treatment includes diuretics, ACE inhibitors and beta blockers. The current treatment provides significant improvement. Compliance problems include exercise, diet and medication cost.  There is no history of CAD/MI or CVA. There is no history of a hypertension causing med.   Back Pain   This is a chronic problem. The current episode started more than 1 year ago. The problem occurs daily. The problem has been waxing and waning since onset. The pain is present in the lumbar spine. The quality of the pain is described as aching and burning. Pertinent negatives include no chest pain. Risk factors include obesity, menopause, lack of exercise and sedentary lifestyle. Treatments tried: Gabapentin helps with pain. Uses capsule as tablets too hard to swallow.          The following portions of the patient's history were reviewed and updated as appropriate: allergies, current medications, past family history, past medical history, past social history, past surgical history and problem list.    Review of Systems   Respiratory: Negative for shortness of breath.    Cardiovascular: Negative for chest pain.   Musculoskeletal: Positive for back pain.       Vitals:    05/09/18 1032   BP: 120/76   Pulse: 66   Resp: 14   Temp: 97.3 °F (36.3 °C)   SpO2: 97%   Weight: 93.9 kg (207 lb)   Height: 157.5 cm (62\")         Objective   Physical Exam   Constitutional: She is oriented to person, place, and time. " Vital signs are normal. She appears well-developed and well-nourished. She is active.  Non-toxic appearance. She does not appear ill. No distress.   HENT:   Head: Normocephalic and atraumatic. Hair is normal.   Right Ear: Hearing and external ear normal.   Left Ear: Hearing and external ear normal.   Nose: Nose normal.   Mouth/Throat: Mucous membranes are not dry.   Eyes: EOM and lids are normal. Pupils are equal, round, and reactive to light. No scleral icterus.   Neck: Neck supple.   Cardiovascular: Normal rate, regular rhythm and normal heart sounds.    No murmur heard.  Pulmonary/Chest: Effort normal and breath sounds normal.   Musculoskeletal: She exhibits no edema or deformity.   Neurological: She is alert and oriented to person, place, and time. She displays no tremor. No cranial nerve deficit. Gait abnormal.   Skin: Skin is warm and dry. No rash noted. She is not diaphoretic. No cyanosis. Nails show no clubbing.   Psychiatric: She has a normal mood and affect. Her speech is normal and behavior is normal. Judgment and thought content normal. Cognition and memory are normal.   Nursing note and vitals reviewed.        Assessment/Plan     Problem List Items Addressed This Visit        Cardiovascular and Mediastinum    Benign essential hypertension (Chronic)    Current Assessment & Plan     Hypertension is improving with treatment.  Continue current treatment regimen.  Blood pressure will be reassessed at the next regular appointment.            Nervous and Auditory    Chronic low back pain - Primary (Chronic)    Current Assessment & Plan     Stable. Continue current medicines.         Relevant Medications    gabapentin (NEURONTIN) 400 MG capsule      Other Visit Diagnoses     Breast cancer screening        Relevant Orders    Mammo Screening Digital Tomosynthesis Bilateral With CAD              ·     Return in about 3 months (around 8/9/2018) for Blood Pressure follow up, Fasting.    Annetta Mcclendon,  MD    Please note that portions of this note may have been completed with a voice recognition program. Efforts were made to edit dictation, but occasionally words are mistranscribed.

## 2018-05-23 ENCOUNTER — HOSPITAL ENCOUNTER (OUTPATIENT)
Dept: OTHER | Age: 73
Discharge: HOME | End: 2018-05-26
Attending: FAMILY MEDICINE | Admitting: FAMILY MEDICINE

## 2018-05-23 DIAGNOSIS — Z12.39 BREAST CANCER SCREENING: ICD-10-CM

## 2018-07-17 DIAGNOSIS — E53.8 CYANOCOBALAMIN DEFICIENCY: ICD-10-CM

## 2018-07-17 RX ORDER — LANOLIN ALCOHOL/MO/W.PET/CERES
CREAM (GRAM) TOPICAL
Qty: 90 TABLET | Refills: 0 | Status: SHIPPED | OUTPATIENT
Start: 2018-07-17 | End: 2018-10-15 | Stop reason: SDUPTHER

## 2018-08-09 ENCOUNTER — HOSPITAL ENCOUNTER (OUTPATIENT)
Dept: GENERAL RADIOLOGY | Facility: HOSPITAL | Age: 73
Discharge: HOME | End: 2018-08-09
Attending: FAMILY MEDICINE | Admitting: FAMILY MEDICINE

## 2018-08-09 ENCOUNTER — OFFICE VISIT (OUTPATIENT)
Dept: INTERNAL MEDICINE | Facility: CLINIC | Age: 73
End: 2018-08-09

## 2018-08-09 VITALS
HEIGHT: 62 IN | SYSTOLIC BLOOD PRESSURE: 130 MMHG | BODY MASS INDEX: 39.38 KG/M2 | HEART RATE: 65 BPM | WEIGHT: 214 LBS | TEMPERATURE: 98.6 F | OXYGEN SATURATION: 97 % | DIASTOLIC BLOOD PRESSURE: 80 MMHG

## 2018-08-09 DIAGNOSIS — I10 BENIGN ESSENTIAL HYPERTENSION: Primary | Chronic | ICD-10-CM

## 2018-08-09 DIAGNOSIS — R29.898 RIGHT LEG WEAKNESS: ICD-10-CM

## 2018-08-09 DIAGNOSIS — J44.9 CHRONIC OBSTRUCTIVE PULMONARY DISEASE, UNSPECIFIED COPD TYPE (HCC): Chronic | ICD-10-CM

## 2018-08-09 DIAGNOSIS — R80.9 PROTEINURIA, UNSPECIFIED TYPE: ICD-10-CM

## 2018-08-09 DIAGNOSIS — M54.41 CHRONIC MIDLINE LOW BACK PAIN WITH RIGHT-SIDED SCIATICA: Chronic | ICD-10-CM

## 2018-08-09 DIAGNOSIS — R82.998 LEUKOCYTES IN URINE: ICD-10-CM

## 2018-08-09 DIAGNOSIS — R60.0 BILATERAL LOWER EXTREMITY EDEMA: ICD-10-CM

## 2018-08-09 DIAGNOSIS — G89.29 CHRONIC MIDLINE LOW BACK PAIN WITH RIGHT-SIDED SCIATICA: Chronic | ICD-10-CM

## 2018-08-09 LAB
BILIRUB BLD-MCNC: ABNORMAL MG/DL
CLARITY, POC: ABNORMAL
COLOR UR: YELLOW
GLUCOSE UR STRIP-MCNC: NEGATIVE MG/DL
KETONES UR QL: NEGATIVE
LEUKOCYTE EST, POC: ABNORMAL
NITRITE UR-MCNC: NEGATIVE MG/ML
PH UR: 5 [PH] (ref 5–8)
PROT UR STRIP-MCNC: ABNORMAL MG/DL
RBC # UR STRIP: NEGATIVE /UL
SP GR UR: 1.02 (ref 1–1.03)
UROBILINOGEN UR QL: ABNORMAL

## 2018-08-09 PROCEDURE — 99214 OFFICE O/P EST MOD 30 MIN: CPT | Performed by: FAMILY MEDICINE

## 2018-08-09 PROCEDURE — 72110 X-RAY EXAM L-2 SPINE 4/>VWS: CPT

## 2018-08-09 PROCEDURE — 81003 URINALYSIS AUTO W/O SCOPE: CPT | Performed by: FAMILY MEDICINE

## 2018-08-09 RX ORDER — GABAPENTIN 400 MG/1
800 CAPSULE ORAL 3 TIMES DAILY
Qty: 180 CAPSULE | Refills: 2 | Status: SHIPPED | OUTPATIENT
Start: 2018-08-09 | End: 2019-02-01 | Stop reason: SDUPTHER

## 2018-08-09 NOTE — PROGRESS NOTES
Subjective    Lata Justice is a 73 y.o. female here for:  Chief Complaint   Patient presents with   • Back Pain     3 month follow up for chronic back pain and HTN.   • Hypertension     Hypertension   This is a chronic problem. The current episode started more than 1 year ago. The problem is unchanged. The problem is controlled. Pertinent negatives include no chest pain or shortness of breath. Agents associated with hypertension include NSAIDs. Risk factors for coronary artery disease include dyslipidemia, obesity, post-menopausal state and sedentary lifestyle. Current antihypertension treatment includes diuretics, ACE inhibitors and beta blockers. The current treatment provides significant improvement. Compliance problems include exercise, diet and medication cost.  There is no history of CAD/MI or CVA. There is no history of a hypertension causing med or sleep apnea.   Back Pain   This is a chronic problem. The current episode started more than 1 year ago. The problem occurs daily. The problem has been gradually worsening since onset. The pain is present in the lumbar spine. The quality of the pain is described as aching and burning. Pertinent negatives include no chest pain. Risk factors include obesity, menopause, lack of exercise and sedentary lifestyle. Treatments tried: Gabapentin helps with pain. Uses capsule as tablets too hard to swallow.        When she ges up she has soreness and pain across both sides of lumbar back. For last two months has had weakness and numbness of the right leg. Can be walking and it will suddenly goes limp and she's fallen. Numbness quickly improves after the fall. She can sometimes grab ahold of something and she will be okay, not fall. She talked to orthopedics about her back before her knee replacement (10/2017.) Also having swelling of both legs x 2 months. Go down at night, worsens thorught day and as legs are hanging. Not on calcium channel blocker.    No change in  "breathing. No pnd nor orthopnea. No change on dosing of gabapentin. No known MICHAEL. She has trouble sleeping. Sleeps a few minutes and then she's up. Sometimes does not sleep. This is a longstanding issue, years, not changed. No report of snoring when she sleeps. Has nightmares, though, when she sleeps. Falls asleep at  Times sitting at computer, reading, but still only for a few minutes.     The following portions of the patient's history were reviewed and updated as appropriate: allergies, current medications, past family history, past medical history, past social history, past surgical history and problem list.    Review of Systems   Respiratory: Negative for shortness of breath.    Cardiovascular: Positive for leg swelling. Negative for chest pain.   Musculoskeletal: Positive for back pain.       Vitals:    08/09/18 0840   BP: 130/80   Pulse: 65   Temp: 98.6 °F (37 °C)   SpO2: 97%   Weight: 97.1 kg (214 lb)   Height: 157.5 cm (62.01\")         Objective   Physical Exam   Constitutional: She is oriented to person, place, and time. Vital signs are normal. She appears well-developed and well-nourished. She is active.  Non-toxic appearance. She does not appear ill. No distress.   HENT:   Head: Normocephalic and atraumatic. Hair is normal.   Right Ear: Hearing and external ear normal.   Left Ear: Hearing and external ear normal.   Nose: Nose normal.   Mouth/Throat: Mucous membranes are not dry.   Eyes: Pupils are equal, round, and reactive to light. EOM and lids are normal. No scleral icterus.   Neck: Neck supple.   Cardiovascular: Normal rate, regular rhythm and normal heart sounds.    No murmur heard.  Pulmonary/Chest: Effort normal and breath sounds normal.   Musculoskeletal:        Lumbar back: She exhibits tenderness and deformity (surgical scar).        Right lower leg: She exhibits edema (pitting).        Left lower leg: She exhibits edema (pitting).   Neurological: She is alert and oriented to person, place, and " time. She displays no tremor. No cranial nerve deficit. Gait abnormal.   Skin: Skin is warm and dry. No rash noted. She is not diaphoretic. No cyanosis. Nails show no clubbing.   Psychiatric: She has a normal mood and affect. Her speech is normal and behavior is normal. Judgment and thought content normal. Cognition and memory are normal.   Nursing note and vitals reviewed.      Reviewed cardiology studies. No echo in [facility] but us arterial dopplers of bilateral lower extremities normal other than bilateral bakers cysts.    Imaging: no abdominal imaging    Care Everywhere ([facility]):  IMPRESSION:    1. Status post L4-L5 discectomy and laminectomy with posterior  fusion stabilized with small Del Cid rods.    2. 4+  L1-L4 and L5-S1 degenerative disc disease.    3. No acute findings or suspicious bone lesions.   Result Narrative   EXAMINATION: Lumbar spine 3 views.    CLINICAL INFORMATION: Back pain.    FINDINGS: The patient is status post L4-L5 discectomy and  laminectomy with posterior fusion stabilized with small Del Cid  rods. There is moderately severe L1-L4 and L5-S1 disc narrowing  with anterior hypertrophic spurring and vacuum disc formation.  There are no lytic bone lesions or subluxations.     Brief Urine Lab Results  (Last result in the past 365 days)      Color   Clarity   Blood   Leuk Est   Nitrite   Protein   CREAT   Urine HCG        08/09/18 0929 Yellow Cloudy(A) Negative 25 Magan/ul(A) Negative 30 mg/dL(A)                 Lab review:  Lab Results   Component Value Date    TSH 1.130 09/08/2017         Assessment/Plan     Problem List Items Addressed This Visit        Cardiovascular and Mediastinum    Benign essential hypertension - Primary (Chronic)    Relevant Orders    CBC & Differential    TSH    T4, Free       Respiratory    Chronic obstructive pulmonary disease (CMS/HCC) (Chronic)    Relevant Orders    CBC & Differential       Nervous and Auditory    Chronic low back pain  (Chronic)    Relevant Medications    gabapentin (NEURONTIN) 400 MG capsule    Other Relevant Orders    XR Spine Lumbar 4+ View (Completed)    [place]      Other Visit Diagnoses     Bilateral lower extremity edema        Relevant Orders    POC Urinalysis Dipstick, Automated (Completed)    Protein Electrophoresis, Random Urine - Urine, Clean Catch    Protein, Urine, 24 Hour - Urine, Clean Catch    Comprehensive Metabolic Panel    CBC & Differential    TSH    T4, Free    [place]    Right leg weakness        Relevant Orders    XR Spine Lumbar 4+ View (Completed)    Protein, Urine, 24 Hour - Urine, Clean Catch    Comprehensive Metabolic Panel    CBC & Differential    TSH    T4, Free    [place]    Protein Elec + Interp, Serum    Proteinuria, unspecified type        Relevant Orders    Protein Electrophoresis, Random Urine - Urine, Clean Catch    Protein, Urine, 24 Hour - Urine, Clean Catch    Comprehensive Metabolic Panel    CBC & Differential    T4, Free    [place]    Protein Elec + Interp, Serum    Leukocytes in urine        Relevant Orders    Urine Culture - Urine, Urine, Clean Catch          · Patient asked to have labs and imaging to further evaluate. Urine studies requested due to possible  Nephrotic issues.   · Blood pressure controlled continue current medicine    Return in about 4 weeks (around 9/6/2018) for Follow up on current issues.    Annetta Mcclendon MD    Please note that portions of this note may have been completed with a voice recognition program. Efforts were made to edit dictation, but occasionally words are mistranscribed.

## 2018-08-14 LAB
BACTERIA UR CULT: NORMAL
BACTERIA UR CULT: NORMAL

## 2018-08-15 LAB
ALBUMIN MFR UR ELPH: 26.9 %
ALBUMIN SERPL ELPH-MCNC: 3.8 G/DL (ref 2.9–4.4)
ALBUMIN SERPL-MCNC: 4.2 G/DL (ref 3.5–5)
ALBUMIN/GLOB SERPL: 1.2 {RATIO} (ref 0.7–1.7)
ALBUMIN/GLOB SERPL: 1.4 G/DL (ref 1–2)
ALP SERPL-CCNC: 64 U/L (ref 38–126)
ALPHA1 GLOB MFR UR ELPH: 7.1 %
ALPHA1 GLOB SERPL ELPH-MCNC: 0.2 G/DL (ref 0–0.4)
ALPHA2 GLOB MFR UR ELPH: 17 %
ALPHA2 GLOB SERPL ELPH-MCNC: 0.8 G/DL (ref 0.4–1)
ALT SERPL-CCNC: 30 U/L (ref 13–69)
ANA SER QL: NEGATIVE
AST SERPL-CCNC: 24 U/L (ref 15–46)
B-GLOBULIN MFR UR ELPH: 38.6 %
B-GLOBULIN SERPL ELPH-MCNC: 1.2 G/DL (ref 0.7–1.3)
BASOPHILS # BLD AUTO: 0.03 10*3/MM3 (ref 0–0.2)
BASOPHILS NFR BLD AUTO: 0.6 % (ref 0–2.5)
BILIRUB SERPL-MCNC: 0.8 MG/DL (ref 0.2–1.3)
BUN SERPL-MCNC: 30 MG/DL (ref 7–20)
BUN/CREAT SERPL: 37.5 (ref 7.1–23.5)
CALCIUM SERPL-MCNC: 9.2 MG/DL (ref 8.4–10.2)
CHLORIDE SERPL-SCNC: 102 MMOL/L (ref 98–107)
CO2 SERPL-SCNC: 26 MMOL/L (ref 26–30)
CREAT SERPL-MCNC: 0.8 MG/DL (ref 0.6–1.3)
EOSINOPHIL # BLD AUTO: 0.2 10*3/MM3 (ref 0–0.7)
EOSINOPHIL NFR BLD AUTO: 3.9 % (ref 0–7)
ERYTHROCYTE [DISTWIDTH] IN BLOOD BY AUTOMATED COUNT: 13.2 % (ref 11.5–14.5)
GAMMA GLOB MFR UR ELPH: 10.4 %
GAMMA GLOB SERPL ELPH-MCNC: 1 G/DL (ref 0.4–1.8)
GLOBULIN SER CALC-MCNC: 2.9 GM/DL
GLOBULIN SER CALC-MCNC: 3.3 G/DL (ref 2.2–3.9)
GLUCOSE SERPL-MCNC: 110 MG/DL (ref 74–98)
HCT VFR BLD AUTO: 38.7 % (ref 37–47)
HGB BLD-MCNC: 12.5 G/DL (ref 12–16)
IMM GRANULOCYTES # BLD: 0.02 10*3/MM3 (ref 0–0.06)
IMM GRANULOCYTES NFR BLD: 0.4 % (ref 0–0.6)
LABORATORY COMMENT REPORT: NORMAL
LABORATORY COMMENT REPORT: NORMAL
LYMPHOCYTES # BLD AUTO: 1.11 10*3/MM3 (ref 0.6–3.4)
LYMPHOCYTES NFR BLD AUTO: 21.6 % (ref 10–50)
M PROTEIN MFR UR ELPH: NORMAL %
M PROTEIN SERPL ELPH-MCNC: NORMAL G/DL
MCH RBC QN AUTO: 28.7 PG (ref 27–31)
MCHC RBC AUTO-ENTMCNC: 32.3 G/DL (ref 30–37)
MCV RBC AUTO: 88.8 FL (ref 81–99)
MONOCYTES # BLD AUTO: 0.47 10*3/MM3 (ref 0–0.9)
MONOCYTES NFR BLD AUTO: 9.1 % (ref 0–12)
NEUTROPHILS # BLD AUTO: 3.31 10*3/MM3 (ref 2–6.9)
NEUTROPHILS NFR BLD AUTO: 64.4 % (ref 37–80)
NRBC BLD AUTO-RTO: 0 /100 WBC (ref 0–0)
PLATELET # BLD AUTO: 200 10*3/MM3 (ref 130–400)
POTASSIUM SERPL-SCNC: 5.2 MMOL/L (ref 3.5–5.1)
PROT 24H UR-MRATE: 176 MG/24HOURS (ref 42–225)
PROT PATTERN SERPL ELPH-IMP: NORMAL
PROT SERPL-MCNC: 7.1 G/DL (ref 6.3–8.2)
PROT UR-MCNC: 11 MG/DL (ref 0–11.9)
RBC # BLD AUTO: 4.36 10*6/MM3 (ref 4.2–5.4)
SODIUM SERPL-SCNC: 138 MMOL/L (ref 137–145)
T4 FREE SERPL-MCNC: 0.91 NG/DL (ref 0.78–2.19)
TSH SERPL DL<=0.005 MIU/L-ACNC: 2 MIU/ML (ref 0.47–4.68)
WBC # BLD AUTO: 5.14 10*3/MM3 (ref 4.8–10.8)

## 2018-08-18 NOTE — PATIENT INSTRUCTIONS
Hyperkalemia  Hyperkalemia is when you have too much potassium in your blood. Potassium is normally removed (excreted) from your body by your kidneys. If there is too much potassium in your blood, it can affect your heart’s ability to function.  What are the causes?  Hyperkalemia may be caused by:  · Taking in too much potassium. You can do this by:  ? Using salt substitutes. They contain large amounts of potassium.  ? Taking potassium supplements.  ? Eating foods high in potassium.  · Excreting too little potassium. This can happen if:  ? Your kidneys are not working properly. Kidney (renal) disease, including short- or long-term renal failure, is a very common cause of hyperkalemia.  ? You are taking medicines that lower your excretion of potassium.  ? You have Camp disease.  ? You have a urinary tract blockage, such as kidney stones.  ? You are on treatment to mechanically clean your blood (dialysis) and you skip a treatment.  · Releasing a high amount of potassium from your cells into your blood. This can happen with:  ? Injury to muscles (rhabdomyolysis) or other tissues. Most potassium is stored in your muscles.  ? Severe burns or infections.  ? Acidic blood plasma (acidosis). Acidosis can result from many diseases, such as uncontrolled diabetes.    What increases the risk?  The most common risk factor of hyperkalemia is kidney disease. Other risk factors of hyperkalemia include:  · Toni disease. This is a condition where your glands do not produce enough hormones.  · Alcoholism or heavy drug use.  · Using certain blood pressure medicines, such as angiotensin-converting enzyme (ACE) inhibitors, angiotensin II receptor blockers (ARBs), or potassium-sparing diuretics such as spironolactone.  · Severe injury or burn.    What are the signs or symptoms?  Oftentimes, there are no signs or symptoms of hyperkalemia. However, when your potassium level becomes high enough, you may experience symptoms such  as:  · Irregular or very slow heartbeat.  · Nausea.  · Fatigue.  · Tingling of the skin or numbness of the hands or feet.  · Muscle weakness.  · Fatigue.  · Not being able to move (paralysis).    You may not have any symptoms of hyperkalemia.  How is this diagnosed?  Hyperkalemia may be diagnosed by:  · Physical exam.  · Blood tests.  · ECG (electrocardiogram).  · Discussion of prescription and non-prescription drug use.    How is this treated?  Treatment for hyperkalemia is often directed at the underlying cause. In some instances, treatment may include:  · Insulin.  · Glucose (sugar) and water solution given through a vein (intravenous or IV ).  · Dialysis.  · Medicines to remove the potassium from your body.  · Medicines to move calcium from your bloodstream into your tissues.    Follow these instructions at home:  · Take medicines only as directed by your health care provider.  · Do not take any supplements, natural products, herbs, or vitamins without reviewing them with your health care provider. Certain supplements and natural food products can have high amounts of potassium.  · Limit your alcohol intake as directed by your health care provider.  · Stop illegal drug use. If you need help quitting, ask your health care provider.  · Keep all follow-up visits as directed by your health care provider. This is important.  · If you have kidney disease, you may need to follow a low potassium diet. A dietitian can help educate you on low potassium foods.  Contact a health care provider if:  · You notice an irregular or very slow heartbeat.  · You feel light-headed.  · You feel weak.  · You are nauseous.  · You have tingling or numbness in your hands or feet.  Get help right away if:  · You have shortness of breath.  · You have chest pain or discomfort.  · You pass out.  · You have muscle paralysis.  This information is not intended to replace advice given to you by your health care provider. Make sure you discuss any  questions you have with your health care provider.  Document Released: 12/08/2003 Document Revised: 05/25/2017 Document Reviewed: 03/25/2015  [employer] Interactive Patient Education © 2018 [employer]  Potassium Content of Foods  Potassium is a mineral found in many foods and drinks. It helps keep fluids and minerals balanced in your body and affects how steadily your heart beats. Potassium also helps control your blood pressure and keep your muscles and nervous system healthy.  Certain health conditions and medicines may change the balance of potassium in your body. When this happens, you can help balance your level of potassium through the foods that you do or do not eat. Your health care provider or dietitian may recommend an amount of potassium that you should have each day. The following lists of foods provide the amount of potassium (in parentheses) per serving in each item.  High in potassium  The following foods and beverages have 200 mg or more of potassium per serving:  · Apricots, 2 raw or 5 dry (200 mg).  · Artichoke, 1 medium (345 mg).  · Avocado, raw, ¼ each (245 mg).  · Banana, 1 medium (425 mg).  · Beans, lima, or baked beans, canned, ½ cup (280 mg).  · Beans, white, canned, ½ cup (595 mg).  · Beef roast, 3 oz (320 mg).  · Beef, ground, 3 oz (270 mg).  · Beets, raw or cooked, ½ cup (260 mg).  · Bran muffin, 2 oz (300 mg).  · Broccoli, ½ cup (230 mg).  · [place] sprouts, ½ cup (250 mg).  · Cantaloupe, ½ cup (215 mg).  · Cereal, 100% bran, ½ cup (200-400 mg).  · Cheeseburger, single, fast food, 1 each (225-400 mg).  · Chicken, 3 oz (220 mg).  · Clams, canned, 3 oz (535 mg).  · Crab, 3 oz (225 mg).  · Dates, 5 each (270 mg).  · Dried beans and peas, ½ cup (300-475 mg).  · Figs, dried, 2 each (260 mg).  · Fish: halibut, tuna, cod, snapper, 3 oz (480 mg).  · Fish: salmon, jasiel, swordfish, perch, 3 oz (300 mg).  · Fish, tuna, canned 3 oz (200 mg).  · French fries, fast food, 3 oz (470 mg).  · Granola  with fruit and nuts, ½ cup (200 mg).  · Grapefruit juice, ½ cup (200 mg).  · Greens, beet, ½ cup (655 mg).  · Honeydew melon, ½ cup (200 mg).  · Kale, raw, 1 cup (300 mg).  · Kiwi, 1 medium (240 mg).  · Kohlrabi, rutabaga, parsnips, ½ cup (280 mg).  · Lentils, ½ cup (365 mg).  · Paul, 1 each (325 mg).  · Milk, chocolate, 1 cup (420 mg).  · Milk: nonfat, low-fat, whole, buttermilk, 1 cup (350-380 mg).  · Molasses, 1 Tbsp (295 mg).  · Mushrooms, ½ cup (280) mg.  · Nectarine, 1 each (275 mg).  · Nuts: almonds, peanuts, hazelnuts, [place], cashew, mixed, 1 oz (200 mg).  · Nuts, pistachios, 1 oz (295 mg).  · Orange, 1 each (240 mg).  · Orange juice, ½ cup (235 mg).  · Papaya, medium, ½ fruit (390 mg).  · Peanut butter, chunky, 2 Tbsp (240 mg).  · Peanut butter, smooth, 2 Tbsp (210 mg).  · Pear, 1 medium (200 mg).  · Pomegranate, 1 whole (400 mg).  · Pomegranate juice, ½ cup (215 mg).  · Pork, 3 oz (350 mg).  · Potato chips, salted, 1 oz (465 mg).  · Potato, baked with skin, 1 medium (925 mg).  · Potatoes, boiled, ½ cup (255 mg).  · Potatoes, mashed, ½ cup (330 mg).  · Prune juice, ½ cup (370 mg).  · Prunes, 5 each (305 mg).  · Pudding, chocolate, ½ cup (230 mg).  · Pumpkin, canned, ½ cup (250 mg).  · Raisins, seedless, ¼ cup (270 mg).  · Seeds, sunflower or pumpkin, 1 oz (240 mg).  · Soy milk, 1 cup (300 mg).  · Spinach, ½ cup (420 mg).  · Spinach, canned, ½ cup (370 mg).  · Sweet potato, baked with skin, 1 medium (450 mg).  · Swiss chard, ½ cup (480 mg).  · Tomato or vegetable juice, ½ cup (275 mg).  · Tomato sauce or puree, ½ cup (400-550 mg).  · Tomato, raw, 1 medium (290 mg).  · Tomatoes, canned, ½ cup (200-300 mg).  · Turkey, 3 oz (250 mg).  · Wheat germ, 1 oz (250 mg).  · Winter squash, ½ cup (250 mg).  · Yogurt, plain or fruited, 6 oz (260-435 mg).  · Zucchini, ½ cup (220 mg).    Moderate in potassium  The following foods and beverages have  mg of potassium per serving:  · Apple, 1 each (150 mg).  · Apple  juice, ½ cup (150 mg).  · Applesauce, ½ cup (90 mg).  · Apricot nectar, ½ cup (140 mg).  · Asparagus, small cisse, ½ cup or 6 cisse (155 mg).  · Bagel, cinnamon raisin, 1 each (130 mg).  · Bagel, egg or plain, 4 in., 1 each (70 mg).  · Beans, green, ½ cup (90 mg).  · Beans, yellow, ½ cup (190 mg).  · Beer, regular, 12 oz (100 mg).  · Beets, canned, ½ cup (125 mg).  · Blackberries, ½ cup (115 mg).  · Blueberries, ½ cup (60 mg).  · Bread, whole wheat, 1 slice (70 mg).  · Broccoli, raw, ½ cup (145 mg).  · Cabbage, ½ cup (150 mg).  · Carrots, cooked or raw, ½ cup (180 mg).  · Cauliflower, raw, ½ cup (150 mg).  · Celery, raw, ½ cup (155 mg).  · Cereal, bran flakes, ½cup (120-150 mg).  · Cheese, cottage, ½ cup (110 mg).  · Cherries, 10 each (150 mg).  · Chocolate, 1½ oz bar (165 mg).  · Coffee, brewed 6 oz (90 mg).  · Corn, ½ cup or 1 ear (195 mg).  · Cucumbers, ½ cup (80 mg).  · Egg, large, 1 each (60 mg).  · Eggplant, ½ cup (60 mg).  · Endive, raw, ½cup (80 mg).  · English muffin, 1 each (65 mg).  · Fish, orange roughy, 3 oz (150 mg).  · Frankfurter, beef or pork, 1 each (75 mg).  · Fruit cocktail, ½ cup (115 mg).  · Grape juice, ½ cup (170 mg).  · Grapefruit, ½ fruit (175 mg).  · Grapes, ½ cup (155 mg).  · Greens: kale, turnip, ryan, ½ cup (110-150 mg).  · Ice cream or frozen yogurt, chocolate, ½ cup (175 mg).  · Ice cream or frozen yogurt, vanilla, ½ cup (120-150 mg).  · Mark, limes, 1 each (80 mg).  · Lettuce, all types, 1 cup (100 mg).  · Mixed vegetables, ½ cup (150 mg).  · Mushrooms, raw, ½ cup (110 mg).  · Nuts: walnuts, pecans, or macadamia, 1 oz (125 mg).  · Oatmeal, ½ cup (80 mg).  · Okra, ½ cup (110 mg).  · Onions, raw, ½ cup (120 mg).  · Peach, 1 each (185 mg).  · Peaches, canned, ½ cup (120 mg).  · Pears, canned, ½ cup (120 mg).  · Peas, green, frozen, ½ cup (90 mg).  · Peppers, green, ½ cup (130 mg).  · Peppers, red, ½ cup (160 mg).  · Pineapple juice, ½ cup (165 mg).  · Pineapple, fresh or canned,  ½ cup (100 mg).  · Plums, 1 each (105 mg).  · Pudding, vanilla, ½ cup (150 mg).  · Raspberries, ½ cup (90 mg).  · Rhubarb, ½ cup (115 mg).  · Rice, wild, ½ cup (80 mg).  · Shrimp, 3 oz (155 mg).  · Spinach, raw, 1 cup (170 mg).  · Strawberries, ½ cup (125 mg).  · Summer squash ½ cup (175-200 mg).  · Swiss chard, raw, 1 cup (135 mg).  · Tangerines, 1 each (140 mg).  · Tea, brewed, 6 oz (65 mg).  · Turnips, ½ cup (140 mg).  · Watermelon, ½ cup (85 mg).  · Wine, red, table, 5 oz (180 mg).  · Wine, white, table, 5 oz (100 mg).    Low in potassium  The following foods and beverages have less than 50 mg of potassium per serving.  · Bread, white, 1 slice (30 mg).  · Carbonated beverages, 12 oz (less than 5 mg).  · Cheese, 1 oz (20-30 mg).  · Cranberries, ½ cup (45 mg).  · Cranberry juice cocktail, ½ cup (20 mg).  · Fats and oils, 1 Tbsp (less than 5 mg).  · Hummus, 1 Tbsp (32 mg).  · Nectar: papaya, salu, or pear, ½ cup (35 mg).  · Rice, white or brown, ½ cup (50 mg).  · Spaghetti or macaroni, ½ cup cooked (30 mg).  · Tortilla, flour or corn, 1 each (50 mg).  · Waffle, 4 in., 1 each (50 mg).  · Water chestnuts, ½ cup (40 mg).    This information is not intended to replace advice given to you by your health care provider. Make sure you discuss any questions you have with your health care provider.  Document Released: 08/01/2006 Document Revised: 05/25/2017 Document Reviewed: 11/14/2014  [employer] Interactive Patient Education © 2018 [employer]

## 2018-09-19 ENCOUNTER — OFFICE VISIT (OUTPATIENT)
Dept: INTERNAL MEDICINE | Facility: CLINIC | Age: 73
End: 2018-09-19

## 2018-09-19 VITALS
OXYGEN SATURATION: 97 % | SYSTOLIC BLOOD PRESSURE: 130 MMHG | WEIGHT: 216.5 LBS | TEMPERATURE: 98.6 F | HEIGHT: 62 IN | DIASTOLIC BLOOD PRESSURE: 80 MMHG | HEART RATE: 58 BPM | BODY MASS INDEX: 39.84 KG/M2

## 2018-09-19 DIAGNOSIS — Z23 NEED FOR INFLUENZA VACCINATION: ICD-10-CM

## 2018-09-19 DIAGNOSIS — R60.0 BILATERAL LOWER EXTREMITY EDEMA: Primary | ICD-10-CM

## 2018-09-19 DIAGNOSIS — I10 BENIGN ESSENTIAL HYPERTENSION: Chronic | ICD-10-CM

## 2018-09-19 PROCEDURE — 99213 OFFICE O/P EST LOW 20 MIN: CPT | Performed by: FAMILY MEDICINE

## 2018-09-19 PROCEDURE — 90662 IIV NO PRSV INCREASED AG IM: CPT | Performed by: FAMILY MEDICINE

## 2018-09-19 PROCEDURE — G0008 ADMIN INFLUENZA VIRUS VAC: HCPCS | Performed by: FAMILY MEDICINE

## 2018-09-19 NOTE — PROGRESS NOTES
Subjective    Lata Justice is a 73 y.o. female here for:  Chief Complaint   Patient presents with   • Hypertension     4 week follow up for HTN     Edema: improves with elevation of feet, worsens when feet hang. She has compression stockings and she's able to get them on. No history of MICHAEL. Does not sleep much but when she does she feels rested. No chest discomfort or signs of heart failure. Urine studies last month were normal after a somewhat worrisome in office UA showed proteinuria.       Hypertension   This is a chronic problem. The current episode started more than 1 year ago. The problem is controlled. Associated symptoms include peripheral edema. Pertinent negatives include no chest pain, orthopnea or shortness of breath. Agents associated with hypertension include NSAIDs. Risk factors for coronary artery disease include dyslipidemia, obesity, post-menopausal state and sedentary lifestyle. Current antihypertension treatment includes diuretics, ACE inhibitors and beta blockers. The current treatment provides significant improvement. Compliance problems include exercise, diet and medication cost.  There is no history of CAD/MI or CVA. There is no history of chronic renal disease (Normal 24 hour urine, UPEP 8/2018), a hypertension causing med, sleep apnea or a thyroid problem (Normal TSH and free T4 8/13/18).          The following portions of the patient's history were reviewed and updated as appropriate: allergies, current medications, past family history, past medical history, past social history, past surgical history and problem list.    Review of Systems   Respiratory: Negative for shortness of breath.    Cardiovascular: Positive for leg swelling. Negative for chest pain and orthopnea.   Musculoskeletal: Positive for back pain (chronic, improved some from last visit.).       Vitals:    09/19/18 0839   BP: 130/80   Pulse: 58   Temp: 98.6 °F (37 °C)   SpO2: 97%   Weight: 98.2 kg (216 lb 8 oz)   Height:  "157.5 cm (62.01\")         Objective   Physical Exam   Constitutional: She is oriented to person, place, and time. Vital signs are normal. She appears well-developed and well-nourished. She is active.  Non-toxic appearance. She does not appear ill. No distress. She is obese.  HENT:   Head: Normocephalic and atraumatic.   Right Ear: Hearing normal.   Left Ear: Hearing normal.   Mouth/Throat: Mucous membranes are not dry. Abnormal dentition.   Eyes: EOM are normal. No scleral icterus.   Neck: Neck supple.   Cardiovascular: Normal rate, regular rhythm and normal heart sounds.    Pulmonary/Chest: Effort normal and breath sounds normal. She has no rales.   Musculoskeletal:        Cervical back: She exhibits deformity (kyphosis).        Right lower leg: She exhibits edema.        Left lower leg: She exhibits edema.        Right foot: There is swelling.        Left foot: There is swelling.   Neurological: She is alert and oriented to person, place, and time. No cranial nerve deficit. Gait (using her rolling walker w/o difficulty) abnormal.   Skin: Skin is warm. She is not diaphoretic.   Psychiatric: She has a normal mood and affect. Her behavior is normal.   Nursing note and vitals reviewed.      Lab Results   Component Value Date    TSH 2.000 08/13/2018     Lab Results   Component Value Date    FREET4 0.91 08/13/2018       Assessment/Plan     Problem List Items Addressed This Visit        Cardiovascular and Mediastinum    Benign essential hypertension (Chronic)      Other Visit Diagnoses     Bilateral lower extremity edema    -  Primary    Need for influenza vaccination        Relevant Orders    Flu Vaccine High Dose PF 65YR+ ([id number])          · Blood pressure controlled on current medicines. On diuretic. Continue these, encouraged elevation of feet and use of compression stockings due to venous insufficiency. Watch for signs of CHF (orthopnea, PND, etc) and let me know if this occurs, will order ECHO.    Return in " about 4 months (around 1/19/2019) for Blood Pressure follow up.    Annetta Mcclendon MD

## 2018-10-15 DIAGNOSIS — E53.8 CYANOCOBALAMIN DEFICIENCY: ICD-10-CM

## 2018-10-15 RX ORDER — ASPIRIN 81 MG/1
TABLET, DELAYED RELEASE ORAL
Qty: 30 TABLET | Refills: 0 | Status: SHIPPED | OUTPATIENT
Start: 2018-10-15 | End: 2018-11-14 | Stop reason: SDUPTHER

## 2018-10-15 RX ORDER — LANOLIN ALCOHOL/MO/W.PET/CERES
CREAM (GRAM) TOPICAL
Qty: 90 TABLET | Refills: 0 | Status: SHIPPED | OUTPATIENT
Start: 2018-10-15 | End: 2018-12-28 | Stop reason: SDUPTHER

## 2018-11-14 DIAGNOSIS — M17.11 PRIMARY OSTEOARTHRITIS OF RIGHT KNEE: ICD-10-CM

## 2018-11-14 DIAGNOSIS — G89.29 CHRONIC MIDLINE LOW BACK PAIN WITH RIGHT-SIDED SCIATICA: Chronic | ICD-10-CM

## 2018-11-14 DIAGNOSIS — M54.41 CHRONIC MIDLINE LOW BACK PAIN WITH RIGHT-SIDED SCIATICA: Chronic | ICD-10-CM

## 2018-11-14 RX ORDER — CETIRIZINE HYDROCHLORIDE 10 MG/1
TABLET ORAL
Qty: 30 TABLET | Refills: 0 | Status: SHIPPED | OUTPATIENT
Start: 2018-11-14 | End: 2018-12-28 | Stop reason: SDUPTHER

## 2018-11-14 RX ORDER — HYDROCHLOROTHIAZIDE 25 MG/1
TABLET ORAL
Qty: 30 TABLET | Refills: 1 | Status: SHIPPED | OUTPATIENT
Start: 2018-11-14 | End: 2019-02-01 | Stop reason: SDUPTHER

## 2018-11-14 RX ORDER — FLUOXETINE HYDROCHLORIDE 20 MG/1
CAPSULE ORAL
Qty: 90 CAPSULE | Refills: 0 | Status: SHIPPED | OUTPATIENT
Start: 2018-11-14 | End: 2018-12-28 | Stop reason: SDUPTHER

## 2018-11-14 RX ORDER — PRAVASTATIN SODIUM 40 MG
TABLET ORAL
Qty: 30 TABLET | Refills: 0 | Status: SHIPPED | OUTPATIENT
Start: 2018-11-14 | End: 2018-12-28 | Stop reason: SDUPTHER

## 2018-11-14 RX ORDER — MELOXICAM 7.5 MG/1
TABLET ORAL
Qty: 30 TABLET | Refills: 0 | Status: SHIPPED | OUTPATIENT
Start: 2018-11-14 | End: 2018-12-28 | Stop reason: SDUPTHER

## 2018-11-14 RX ORDER — LISINOPRIL 40 MG/1
TABLET ORAL
Qty: 30 TABLET | Refills: 0 | Status: SHIPPED | OUTPATIENT
Start: 2018-11-14 | End: 2018-12-28 | Stop reason: SDUPTHER

## 2018-11-14 RX ORDER — ASPIRIN 81 MG/1
TABLET, DELAYED RELEASE ORAL
Qty: 30 TABLET | Refills: 0 | Status: SHIPPED | OUTPATIENT
Start: 2018-11-14 | End: 2018-12-28 | Stop reason: SDUPTHER

## 2018-12-28 DIAGNOSIS — M54.41 CHRONIC MIDLINE LOW BACK PAIN WITH RIGHT-SIDED SCIATICA: Chronic | ICD-10-CM

## 2018-12-28 DIAGNOSIS — M17.11 PRIMARY OSTEOARTHRITIS OF RIGHT KNEE: ICD-10-CM

## 2018-12-28 DIAGNOSIS — E53.8 CYANOCOBALAMIN DEFICIENCY: ICD-10-CM

## 2018-12-28 DIAGNOSIS — G89.29 CHRONIC MIDLINE LOW BACK PAIN WITH RIGHT-SIDED SCIATICA: Chronic | ICD-10-CM

## 2018-12-28 RX ORDER — LISINOPRIL 40 MG/1
TABLET ORAL
Qty: 30 TABLET | Refills: 0 | Status: SHIPPED | OUTPATIENT
Start: 2018-12-28 | End: 2019-02-01 | Stop reason: SDUPTHER

## 2018-12-28 RX ORDER — ASPIRIN 81 MG/1
TABLET, DELAYED RELEASE ORAL
Qty: 30 TABLET | Refills: 0 | Status: SHIPPED | OUTPATIENT
Start: 2018-12-28 | End: 2019-02-01 | Stop reason: SDUPTHER

## 2018-12-28 RX ORDER — PRAVASTATIN SODIUM 40 MG
TABLET ORAL
Qty: 30 TABLET | Refills: 0 | Status: SHIPPED | OUTPATIENT
Start: 2018-12-28 | End: 2019-02-01 | Stop reason: SDUPTHER

## 2018-12-28 RX ORDER — CYANOCOBALAMIN (VITAMIN B-12) 1000 MCG
TABLET ORAL
Qty: 90 TABLET | Refills: 0 | Status: SHIPPED | OUTPATIENT
Start: 2018-12-28 | End: 2019-02-01 | Stop reason: SDUPTHER

## 2018-12-28 RX ORDER — CETIRIZINE HYDROCHLORIDE 10 MG/1
TABLET ORAL
Qty: 30 TABLET | Refills: 0 | Status: SHIPPED | OUTPATIENT
Start: 2018-12-28 | End: 2019-01-22

## 2018-12-28 RX ORDER — MELOXICAM 7.5 MG/1
TABLET ORAL
Qty: 30 TABLET | Refills: 0 | Status: SHIPPED | OUTPATIENT
Start: 2018-12-28 | End: 2019-02-01 | Stop reason: SDUPTHER

## 2018-12-28 RX ORDER — FLUOXETINE HYDROCHLORIDE 20 MG/1
CAPSULE ORAL
Qty: 90 CAPSULE | Refills: 0 | Status: SHIPPED | OUTPATIENT
Start: 2018-12-28 | End: 2019-02-01 | Stop reason: SDUPTHER

## 2019-01-22 ENCOUNTER — OFFICE VISIT (OUTPATIENT)
Dept: INTERNAL MEDICINE | Facility: CLINIC | Age: 74
End: 2019-01-22

## 2019-01-22 VITALS
DIASTOLIC BLOOD PRESSURE: 78 MMHG | HEIGHT: 62 IN | TEMPERATURE: 97.5 F | OXYGEN SATURATION: 99 % | BODY MASS INDEX: 41.04 KG/M2 | HEART RATE: 64 BPM | RESPIRATION RATE: 16 BRPM | WEIGHT: 223 LBS | SYSTOLIC BLOOD PRESSURE: 142 MMHG

## 2019-01-22 DIAGNOSIS — R06.09 DYSPNEA ON EXERTION: Primary | ICD-10-CM

## 2019-01-22 DIAGNOSIS — R60.0 BILATERAL EDEMA OF LOWER EXTREMITY: ICD-10-CM

## 2019-01-22 DIAGNOSIS — I10 BENIGN ESSENTIAL HYPERTENSION: Chronic | ICD-10-CM

## 2019-01-22 PROCEDURE — 99214 OFFICE O/P EST MOD 30 MIN: CPT | Performed by: FAMILY MEDICINE

## 2019-01-22 NOTE — PROGRESS NOTES
"Subjective    Lata Justice is a 73 y.o. female here for:  Chief Complaint   Patient presents with   • Hypertension     f/u patient states no probelms or concerns today    • Edema       History of Present Illness     Patient continues to have some edema to the lower extremities bilaterally but it does not seem worsened compared to last visit.  Blood pressure is borderline controlled today but she admits to increased stress due to 's current illness.  She has been encouraging him to go the ER but he refuses.  Taking antihypertensives as prescribed.  She does not have a cardiologist has no known heart problems.  She does admit to having some dyspnea upon exertion at times which she has contributed to chronic back issues and deconditioning.    The following portions of the patient's history were reviewed and updated as appropriate: allergies, current medications, past family history, past medical history, past social history, past surgical history and problem list.    Health Maintenance   Topic Date Due   • ZOSTER VACCINE (2 of 2) 02/03/2017   • LIPID PANEL  06/08/2018   • MAMMOGRAM  09/16/2018   • MEDICARE ANNUAL WELLNESS  02/16/2019   • DXA SCAN  02/16/2020   • TDAP/TD VACCINES (2 - Td) 03/07/2027   • COLONOSCOPY  02/16/2028   • HEPATITIS C SCREENING  Completed   • INFLUENZA VACCINE  Completed   • PNEUMOCOCCAL VACCINES (65+ LOW/MEDIUM RISK)  Completed       Review of Systems   Constitutional: Positive for fatigue.   Respiratory: Positive for shortness of breath.    Cardiovascular: Positive for leg swelling. Negative for chest pain.   Musculoskeletal: Positive for arthralgias and gait problem.   Psychiatric/Behavioral: Positive for sleep disturbance.       Vitals:    01/22/19 0847   BP: 142/78   Pulse: 64   Resp: 16   Temp: 97.5 °F (36.4 °C)   TempSrc: Temporal   SpO2: 99%   Weight: 101 kg (223 lb)   Height: 157.5 cm (62.01\")         Objective   Physical Exam   Constitutional: She is oriented to person, place, " and time. Vital signs are normal. She appears well-developed and well-nourished. She is active.  Non-toxic appearance. She does not appear ill. No distress. She is morbidly obese.  HENT:   Head: Normocephalic and atraumatic.   Right Ear: Hearing normal.   Left Ear: Hearing normal.   Mouth/Throat: Mucous membranes are not dry.   Eyes: EOM are normal. No scleral icterus.   Neck: Phonation normal. Neck supple.   Pulmonary/Chest: Effort normal.   Musculoskeletal:        Right lower leg: She exhibits swelling.        Left lower leg: She exhibits swelling.   Neurological: She is alert and oriented to person, place, and time. She displays no tremor. No cranial nerve deficit. Gait abnormal.   Skin: Skin is warm. No rash noted. She is not diaphoretic. No pallor.   Psychiatric: She has a normal mood and affect. Her speech is normal and behavior is normal. Judgment and thought content normal. Cognition and memory are normal.   Nursing note and vitals reviewed.        Assessment/Plan     Problem List Items Addressed This Visit        Cardiovascular and Mediastinum    Benign essential hypertension (Chronic)       Other    Bilateral edema of lower extremity      Other Visit Diagnoses     Dyspnea on exertion    -  Primary    Relevant Orders    Ambulatory Referral to Cardiology          · Continue current antihypertensives, needs to monitor as an outpatient  · Due to the chronic lower extremity edema along with dyspnea upon exertion I am going to refer to cardiology for further evaluation, may need echocardiogram.  · Medicines that may cause edema to lower extremities include gabapentin that she has been taking this for years.    Return for As scheduled previously.    Annetta Mcclendon MD

## 2019-02-01 DIAGNOSIS — M54.41 CHRONIC MIDLINE LOW BACK PAIN WITH RIGHT-SIDED SCIATICA: Chronic | ICD-10-CM

## 2019-02-01 DIAGNOSIS — E53.8 CYANOCOBALAMIN DEFICIENCY: ICD-10-CM

## 2019-02-01 DIAGNOSIS — G89.29 CHRONIC MIDLINE LOW BACK PAIN WITH RIGHT-SIDED SCIATICA: Chronic | ICD-10-CM

## 2019-02-01 DIAGNOSIS — M17.11 PRIMARY OSTEOARTHRITIS OF RIGHT KNEE: ICD-10-CM

## 2019-02-01 RX ORDER — LISINOPRIL 40 MG/1
TABLET ORAL
Qty: 30 TABLET | Refills: 11 | Status: SHIPPED | OUTPATIENT
Start: 2019-02-01 | End: 2019-05-07 | Stop reason: SDUPTHER

## 2019-02-01 RX ORDER — GABAPENTIN 400 MG/1
CAPSULE ORAL
Qty: 180 CAPSULE | Refills: 0 | Status: SHIPPED | OUTPATIENT
Start: 2019-02-01 | End: 2019-03-06 | Stop reason: SDUPTHER

## 2019-02-01 RX ORDER — PRAVASTATIN SODIUM 40 MG
TABLET ORAL
Qty: 30 TABLET | Refills: 11 | Status: SHIPPED | OUTPATIENT
Start: 2019-02-01 | End: 2019-05-07 | Stop reason: SDUPTHER

## 2019-02-01 RX ORDER — ASPIRIN 81 MG/1
TABLET, DELAYED RELEASE ORAL
Qty: 30 TABLET | Refills: 11 | Status: SHIPPED | OUTPATIENT
Start: 2019-02-01 | End: 2020-12-24 | Stop reason: SDUPTHER

## 2019-02-01 RX ORDER — FLUOXETINE HYDROCHLORIDE 20 MG/1
CAPSULE ORAL
Qty: 270 CAPSULE | Refills: 3 | Status: SHIPPED | OUTPATIENT
Start: 2019-02-01 | End: 2019-05-07 | Stop reason: SDUPTHER

## 2019-02-01 RX ORDER — HYDROCHLOROTHIAZIDE 25 MG/1
TABLET ORAL
Qty: 30 TABLET | Refills: 11 | Status: SHIPPED | OUTPATIENT
Start: 2019-02-01 | End: 2019-05-07 | Stop reason: SDUPTHER

## 2019-02-01 RX ORDER — MELOXICAM 7.5 MG/1
TABLET ORAL
Qty: 30 TABLET | Refills: 5 | Status: SHIPPED | OUTPATIENT
Start: 2019-02-01 | End: 2019-05-07 | Stop reason: SDUPTHER

## 2019-02-01 RX ORDER — CYANOCOBALAMIN (VITAMIN B-12) 1000 MCG
TABLET ORAL
Qty: 90 TABLET | Refills: 3 | Status: SHIPPED | OUTPATIENT
Start: 2019-02-01 | End: 2021-11-03

## 2019-02-01 RX ORDER — CETIRIZINE HYDROCHLORIDE 10 MG/1
TABLET ORAL
Qty: 30 TABLET | Refills: 11 | Status: SHIPPED | OUTPATIENT
Start: 2019-02-01 | End: 2020-09-21 | Stop reason: SDUPTHER

## 2019-02-25 ENCOUNTER — OFFICE VISIT (OUTPATIENT)
Dept: INTERNAL MEDICINE | Facility: CLINIC | Age: 74
End: 2019-02-25

## 2019-02-25 VITALS
OXYGEN SATURATION: 95 % | SYSTOLIC BLOOD PRESSURE: 142 MMHG | BODY MASS INDEX: 40.55 KG/M2 | HEIGHT: 62 IN | TEMPERATURE: 97.5 F | DIASTOLIC BLOOD PRESSURE: 82 MMHG | WEIGHT: 220.38 LBS | RESPIRATION RATE: 16 BRPM | HEART RATE: 60 BPM

## 2019-02-25 DIAGNOSIS — M25.572 CHRONIC PAIN OF BOTH ANKLES: ICD-10-CM

## 2019-02-25 DIAGNOSIS — Z00.00 MEDICARE ANNUAL WELLNESS VISIT, SUBSEQUENT: Primary | ICD-10-CM

## 2019-02-25 DIAGNOSIS — E66.01 MORBID OBESITY (HCC): ICD-10-CM

## 2019-02-25 DIAGNOSIS — F33.41 RECURRENT MAJOR DEPRESSIVE DISORDER, IN PARTIAL REMISSION (HCC): ICD-10-CM

## 2019-02-25 DIAGNOSIS — M54.41 CHRONIC MIDLINE LOW BACK PAIN WITH RIGHT-SIDED SCIATICA: Chronic | ICD-10-CM

## 2019-02-25 DIAGNOSIS — Z96.651 STATUS POST RIGHT KNEE REPLACEMENT: Chronic | ICD-10-CM

## 2019-02-25 DIAGNOSIS — G89.29 CHRONIC PAIN OF BOTH ANKLES: ICD-10-CM

## 2019-02-25 DIAGNOSIS — M25.571 CHRONIC PAIN OF BOTH ANKLES: ICD-10-CM

## 2019-02-25 DIAGNOSIS — I10 BENIGN ESSENTIAL HYPERTENSION: Chronic | ICD-10-CM

## 2019-02-25 DIAGNOSIS — K22.719 BARRETT'S ESOPHAGUS WITH DYSPLASIA: Chronic | ICD-10-CM

## 2019-02-25 DIAGNOSIS — R26.9 GAIT ABNORMALITY: ICD-10-CM

## 2019-02-25 DIAGNOSIS — G89.29 CHRONIC MIDLINE LOW BACK PAIN WITH RIGHT-SIDED SCIATICA: Chronic | ICD-10-CM

## 2019-02-25 DIAGNOSIS — M15.9 PRIMARY OSTEOARTHRITIS INVOLVING MULTIPLE JOINTS: ICD-10-CM

## 2019-02-25 DIAGNOSIS — Z12.31 ENCOUNTER FOR SCREENING MAMMOGRAM FOR BREAST CANCER: ICD-10-CM

## 2019-02-25 DIAGNOSIS — I73.9 INTERMITTENT CLAUDICATION (HCC): ICD-10-CM

## 2019-02-25 DIAGNOSIS — E78.2 MIXED HYPERLIPIDEMIA: Chronic | ICD-10-CM

## 2019-02-25 PROBLEM — N18.30 STAGE 3 CHRONIC KIDNEY DISEASE (HCC): Chronic | Status: RESOLVED | Noted: 2017-10-19 | Resolved: 2019-02-25

## 2019-02-25 PROBLEM — M25.562 ARTHRALGIA OF BOTH KNEES: Status: RESOLVED | Noted: 2017-10-03 | Resolved: 2019-02-25

## 2019-02-25 PROBLEM — E66.9 OBESITY (BMI 30-39.9): Status: RESOLVED | Noted: 2018-02-18 | Resolved: 2019-02-25

## 2019-02-25 PROBLEM — M25.561 ARTHRALGIA OF BOTH KNEES: Status: RESOLVED | Noted: 2017-10-03 | Resolved: 2019-02-25

## 2019-02-25 PROBLEM — M15.0 PRIMARY OSTEOARTHRITIS INVOLVING MULTIPLE JOINTS: Status: ACTIVE | Noted: 2019-02-25

## 2019-02-25 PROBLEM — R73.03 BORDERLINE DIABETES: Status: RESOLVED | Noted: 2017-10-18 | Resolved: 2019-02-25

## 2019-02-25 PROCEDURE — G0439 PPPS, SUBSEQ VISIT: HCPCS | Performed by: FAMILY MEDICINE

## 2019-02-25 PROCEDURE — 99397 PER PM REEVAL EST PAT 65+ YR: CPT | Performed by: FAMILY MEDICINE

## 2019-02-25 PROCEDURE — 96160 PT-FOCUSED HLTH RISK ASSMT: CPT | Performed by: FAMILY MEDICINE

## 2019-02-25 RX ORDER — CILOSTAZOL 100 MG/1
100 TABLET ORAL 2 TIMES DAILY
Qty: 180 TABLET | Refills: 3 | Status: SHIPPED | OUTPATIENT
Start: 2019-02-25 | End: 2019-05-07 | Stop reason: SDUPTHER

## 2019-02-25 NOTE — PATIENT INSTRUCTIONS
Serving Sizes  A serving size is a measured amount of food or drink, such as one slice of bread, that has an associated nutrient content. Knowing the serving size of a food or drink can help you determine how much of that food you should consume.  What is the size of one serving?  The size of one healthy serving depends on the food or drink. To determine a serving size, read the food label. If the food or drink does not have a food label, try to find serving size information online. Or, use the following to estimate the size of one adult serving:  Grain  1 slice bread. ½ bagel. ½ cup pasta.  Vegetable  ½ cup cooked or canned vegetables. 1 cup raw, leafy greens.  Fruit  ½ cup canned fruit. 1 medium fruit. ¼ cup dried fruit.  Meat and Other Protein Sources  1 oz meat, poultry, or fish. ¼ cup cooked beans. 1 egg. ¼ cup nuts or seeds. 1 Tbsp nut butter. ¼ cup tofu or tempeh. 2 Tbsp hummus.  Dairy  An individual container of yogurt (6-8 oz). 1 piece of cheese the size of your thumb (1 oz). 1 cup (8 oz) milk or milk alternative.  Fat  A piece the size of one dice. 1 tsp soft margarine. 1 Tbsp mayonnaise. 1 tsp vegetable oil. 1 Tbsp regular salad dressing. 2 Tbsp low-fat salad dressing.  How many servings should I eat from each food group each day?  The following are the suggested number of servings to try and have every day from each food group. You can also look at your eating throughout the week and aim for meeting these requirements on most days for overall healthy eating.  Grain  6-8 servings. Try to have half of your grains from whole grains, such as whole wheat bread, corn tortillas, oatmeal, brown rice, whole wheat pasta, and bulgur.  Vegetable  At least 2½-3 servings.  Fruit  2 servings.  Meat and Other Protein Foods  5-6 servings. Aim to have lean proteins, such as chicken, turkey, fish, beans, or tofu.  Dairy  3 servings. Choose low-fat or nonfat if you are trying to control your weight.  Fat  2-3 servings.  Is  a serving the same thing as a portion?  No. A portion is the actual amount you eat, which may be more than one serving. Knowing the specific serving size of a food and the nutritional information that goes with it can help you make a healthy decision on what size portion to eat.  What are some tips to help me learn healthy serving sizes?  · Check food labels for serving sizes. Many foods that come as a single portion actually contain multiple servings.  · Determine the serving size of foods you commonly eat and figure out how large a portion you usually eat.  · Measure the number of servings that can be held by the bowls, glasses, cups, and plates you typically use. For example, pour your breakfast cereal into your regular bowl and then pour it into a measuring cup.  · For 1-2 days, measure the serving sizes of all the foods you eat.  · Practice estimating serving sizes and determining how big your portions should be.      This information is not intended to replace advice given to you by your health care provider. Make sure you discuss any questions you have with your health care provider.  Document Released: 09/15/2004 Document Revised: 08/12/2017 Document Reviewed: 03/17/2015  [employer] Interactive Patient Education © 2018 [employer]    MyPlate from [employer]    The general, healthful diet is based on the 2010 Dietary Guidelines for Americans. The amount of food you need to eat from each food group depends on your age, sex, and level of physical activity and can be individualized by a dietitian. Go to ChooseMyPlate.gov for more information.  What do I need to know about the MyPlate plan?  · Enjoy your food, but eat less.  · Avoid oversized portions.  ? ½ of your plate should include fruits and vegetables.  ? ¼ of your plate should be grains.  ? ¼ of your plate should be protein.  Grains  · Make at least half of your grains whole grains.  · For a 2,000 calorie daily food plan, eat 6 oz every day.  · 1 oz is about 1  slice bread, 1 cup cereal, or ½ cup cooked rice, cereal, or pasta.  Vegetables  · Make half your plate fruits and vegetables.  · For a 2,000 calorie daily food plan, eat 2½ cups every day.  · 1 cup is about 1 cup raw or cooked vegetables or vegetable juice or 2 cups raw leafy greens.  Fruits  · Make half your plate fruits and vegetables.  · For a 2,000 calorie daily food plan, eat 2 cups every day.  · 1 cup is about 1 cup fruit or 100% fruit juice or ½ cup dried fruit.  Protein  · For a 2,000 calorie daily food plan, eat 5½ oz every day.  · 1 oz is about 1 oz meat, poultry, or fish, ¼ cup cooked beans, 1 egg, 1 Tbsp peanut butter, or ½ oz nuts or seeds.  Dairy  · Switch to fat-free or low-fat (1%) milk.  · For a 2,000 calorie daily food plan, eat 3 cups every day.  · 1 cup is about 1 cup milk or yogurt or soy milk (soy beverage), 1½ oz natural cheese, or 2 oz processed cheese.  Fats, Oils, and Empty Calories  · Only small amounts of oils are recommended.  · Empty calories are calories from solid fats or added sugars.  · Compare sodium in foods like soup, bread, and frozen meals. Choose the foods with lower numbers.  · Drink water instead of sugary drinks.  What foods can I eat?  Grains  Whole grains such as whole wheat, quinoa, millet, and bulgur. Bread, rolls, and pasta made from whole grains. Brown or wild rice. Hot or cold cereals made from whole grains and without added sugar.  Vegetables  All fresh vegetables, especially fresh red, dark green, or orange vegetables. Peas and beans. Low-sodium frozen or canned vegetables prepared without added salt. Low-sodium vegetable juices.  Fruits  All fresh, frozen, and dried fruits. Canned fruit packed in water or fruit juice without added sugar. Fruit juices without added sugar.  Meats and Other Protein Sources  Boiled, baked, or grilled lean meat trimmed of fat. Skinless poultry. Fresh seafood and shellfish. Canned seafood packed in water. Unsalted nuts and unsalted nut  butters. Tofu. Dried beans and pea. Eggs.  Dairy  Low-fat or fat-free milk, yogurt, and cheeses.  Sweets and Desserts  Frozen desserts made from low-fat milk.  Fats and Oils  Olive, peanut, and canola oils and margarine. Salad dressing and mayonnaise made from these oils.  Other  Soups and casseroles made from allowed ingredients and without added fat or salt.  The items listed above may not be a complete list of recommended foods or beverages. Contact your dietitian for more options.  What foods are not recommended?  Grains  Sweetened, low-fiber cereals. Packaged baked goods. Snack crackers and chips. Cheese crackers, butter crackers, and biscuits. Frozen waffles, sweet breads, doughnuts, pastries, packaged baking mixes, pancakes, cakes, and cookies.  Vegetables  Regular canned or frozen vegetables or vegetables prepared with salt. Canned tomatoes. Canned tomato sauce. Fried vegetables. Vegetables in cream sauce or cheese sauce.  Fruits  Fruits packed in syrup or made with added sugar.  Meats and Other Protein Sources  Marbled or fatty meats such as ribs. Poultry with skin. Fried meats, poultry, eggs, or fish. Sausages, hot dogs, and deli meats such as pastrami, bologna, or salami.  Dairy  Whole milk, cream, cheeses made from whole milk, sour cream. Ice cream or yogurt made from whole milk or with added sugar.  Beverages  For adults, no more than one alcoholic drink per day. Regular soft drinks or other sugary beverages. Juice drinks.  Sweets and Desserts  Sugary or fatty desserts, candy, and other sweets.  Fats and Oils  Solid shortening or partially hydrogenated oils. Solid margarine. Margarine that contains trans fats. Butter.    The items listed above may not be a complete list of foods and beverages to avoid. Contact your dietitian for more information.    This information is not intended to replace advice given to you by your health care provider. Make sure you discuss any questions you have with your health  care provider.  Document Released: 01/06/2009 Document Revised: 05/25/2017 Document Reviewed: 11/26/2014  [employer] Interactive Patient Education © 2018 [employer]        Calorie Counting for Weight Loss  Calories are units of energy. Your body needs a certain amount of calories from food to keep you going throughout the day. When you eat more calories than your body needs, your body stores the extra calories as fat. When you eat fewer calories than your body needs, your body burns fat to get the energy it needs.  Calorie counting means keeping track of how many calories you eat and drink each day. Calorie counting can be helpful if you need to lose weight. If you make sure to eat fewer calories than your body needs, you should lose weight. Ask your health care provider what a healthy weight is for you.  For calorie counting to work, you will need to eat the right number of calories in a day in order to lose a healthy amount of weight per week. A dietitian can help you determine how many calories you need in a day and will give you suggestions on how to reach your calorie goal.  · A healthy amount of weight to lose per week is usually 1-2 lb (0.5-0.9 kg). This usually means that your daily calorie intake should be reduced by 500-750 calories.  · Eating 1,200 - 1,500 calories per day can help most women lose weight.  · Eating 1,500 - 1,800 calories per day can help most men lose weight.     What do I need to know about calorie counting?  In order to meet your daily calorie goal, you will need to:  · Find out how many calories are in each food you would like to eat. Try to do this before you eat.  · Decide how much of the food you plan to eat.  · Write down what you ate and how many calories it had. Doing this is called keeping a food log.     To successfully lose weight, it is important to balance calorie counting with a healthy lifestyle that includes regular activity. Aim for 150 minutes of moderate exercise  (such as walking) or 75 minutes of vigorous exercise (such as running) each week.  Where do I find calorie information?       The number of calories in a food can be found on a Nutrition Facts label. If a food does not have a Nutrition Facts label, try to look up the calories online or ask your dietitian for help.  Remember that calories are listed per serving. If you choose to have more than one serving of a food, you will have to multiply the calories per serving by the amount of servings you plan to eat. For example, the label on a package of bread might say that a serving size is 1 slice and that there are 90 calories in a serving. If you eat 1 slice, you will have eaten 90 calories. If you eat 2 slices, you will have eaten 180 calories.  How do I keep a food log?  Immediately after each meal, record the following information in your food log:  · What you ate. Don't forget to include toppings, sauces, and other extras on the food.  · How much you ate. This can be measured in cups, ounces, or number of items.  · How many calories each food and drink had.  · The total number of calories in the meal.     Keep your food log near you, such as in a small notebook in your pocket, or use a mobile dede or website. Some programs will calculate calories for you and show you how many calories you have left for the day to meet your goal.  What are some calorie counting tips?  · Use your calories on foods and drinks that will fill you up and not leave you hungry:  ? Some examples of foods that fill you up are nuts and nut butters, vegetables, lean proteins, and high-fiber foods like whole grains. High-fiber foods are foods with more than 5 g fiber per serving.  ? Drinks such as sodas, specialty coffee drinks, alcohol, and juices have a lot of calories, yet do not fill you up.  · Eat nutritious foods and avoid empty calories. Empty calories are calories you get from foods or beverages that do not have many vitamins or protein,  "such as candy, sweets, and soda. It is better to have a nutritious high-calorie food (such as an avocado) than a food with few nutrients (such as a bag of chips).  · Know how many calories are in the foods you eat most often. This will help you calculate calorie counts faster.  · Pay attention to calories in drinks. Low-calorie drinks include water and unsweetened drinks.  · Pay attention to nutrition labels for \"low fat\" or \"fat free\" foods. These foods sometimes have the same amount of calories or more calories than the full fat versions. They also often have added sugar, starch, or salt, to make up for flavor that was removed with the fat.  ·   · Find a way of tracking calories that works for you. Get creative. Try different apps or programs if writing down calories does not work for you.  What are some portion control tips?  · Know how many calories are in a serving. This will help you know how many servings of a certain food you can have.  · Use a measuring cup to measure serving sizes. You could also try weighing out portions on a kitchen scale. With time, you will be able to estimate serving sizes for some foods.  · Take some time to put servings of different foods on your favorite plates, bowls, and cups so you know what a serving looks like.  · Try not to eat straight from a bag or box. Doing this can lead to overeating. Put the amount you would like to eat in a cup or on a plate to make sure you are eating the right portion.  · Use smaller plates, glasses, and bowls to prevent overeating.  · Try not to multitask (for example, watch TV or use your computer) while eating. If it is time to eat, sit down at a table and enjoy your food. This will help you to know when you are full. It will also help you to be aware of what you are eating and how much you are eating.  What are tips for following this plan?  Reading food labels  · Check the calorie count compared to the serving size. The serving size may be " smaller than what you are used to eating.  · Check the source of the calories. Make sure the food you are eating is high in vitamins and protein and low in saturated and trans fats.  Shopping  · Read nutrition labels while you shop. This will help you make healthy decisions before you decide to purchase your food.  · Make a grocery list and stick to it.  Cooking  · Try to cook your favorite foods in a healthier way. For example, try baking instead of frying.  · Use low-fat dairy products.  Meal planning  · Use more fruits and vegetables. Half of your plate should be fruits and vegetables.  · Include lean proteins like poultry and fish.  How do I count calories when eating out?  · Ask for smaller portion sizes.  · Consider sharing an entree and sides instead of getting your own entree.  · If you get your own entree, eat only half. Ask for a box at the beginning of your meal and put the rest of your entree in it so you are not tempted to eat it.  · If calories are listed on the menu, choose the lower calorie options.  · Choose dishes that include vegetables, fruits, whole grains, low-fat dairy products, and lean protein.  · Choose items that are boiled, broiled, grilled, or steamed. Stay away from items that are buttered, battered, fried, or served with cream sauce. Items labeled “crispy” are usually fried, unless stated otherwise.  · Choose water, low-fat milk, unsweetened iced tea, or other drinks without added sugar. If you want an alcoholic beverage, choose a lower calorie option such as a glass of wine or light beer.  · Ask for dressings, sauces, and syrups on the side. These are usually high in calories, so you should limit the amount you eat.  · If you want a salad, choose a garden salad and ask for grilled meats. Avoid extra toppings like echevarria, cheese, or fried items. Ask for the dressing on the side, or ask for olive oil and vinegar or lemon to use as dressing.  · Estimate how many servings of a food you are  given. For example, a serving of cooked rice is ½ cup or about the size of half a baseball. Knowing serving sizes will help you be aware of how much food you are eating at restaurants. The list below tells you how big or small some common portion sizes are based on everyday objects:  ? 1 oz--4 stacked dice.  ? 3 oz--1 deck of cards.  ? 1 tsp--1 die.  ? 1 Tbsp--½ a ping-pong ball.  ? 2 Tbsp--1 ping-pong ball.  ? ½ cup--½ baseball.  ? 1 cup--1 baseball.  Summary  · Calorie counting means keeping track of how many calories you eat and drink each day. If you eat fewer calories than your body needs, you should lose weight.  · A healthy amount of weight to lose per week is usually 1-2 lb (0.5-0.9 kg). This usually means reducing your daily calorie intake by 500-750 calories.  · The number of calories in a food can be found on a Nutrition Facts label. If a food does not have a Nutrition Facts label, try to look up the calories online or ask your dietitian for help.  · Use your calories on foods and drinks that will fill you up, and not on foods and drinks that will leave you hungry.  · Use smaller plates, glasses, and bowls to prevent overeating.      This information is not intended to replace advice given to you by your health care provider. Make sure you discuss any questions you have with your health care provider.  Document Released: 2006 Document Revised: 2017 Document Reviewed: 2017  [employer] Interactive Patient Education © 2018 [employer]           Medicare Wellness  Personal Prevention Plan of Service     Date of Office Visit:  2019  Encounter Provider:  Annetta Mcclendon MD  Place of Service:  [facility]  Patient Name: Lata Justice  :  1945    As part of the Medicare Wellness portion of your visit today, we are providing you with this personalized preventive plan of services (PPPS). This plan is based upon recommendations of the [place]  Preventive Services Task Force (USPSTF) and the Advisory Committee on Immunization Practices (ACIP).    This lists the preventive care services that should be considered, and provides dates of when you are due. Items listed as completed are up-to-date and do not require any further intervention.    Health Maintenance   Topic Date Due   • MAMMOGRAM  02/25/2019 (Originally 9/16/2018)   • LIPID PANEL  03/01/2020 (Originally 6/8/2018)   • ZOSTER VACCINE (2 of 2) 03/05/2020 (Originally 2/3/2017)   • DXA SCAN  02/16/2020   • MEDICARE ANNUAL WELLNESS  02/25/2020   • TDAP/TD VACCINES (2 - Td) 03/07/2027   • COLONOSCOPY  02/16/2028   • HEPATITIS C SCREENING  Completed   • INFLUENZA VACCINE  Completed   • PNEUMOCOCCAL VACCINES (65+ LOW/MEDIUM RISK)  Completed       No orders of the defined types were placed in this encounter.      Return in about 3 months (around 5/25/2019) for Blood Pressure follow up.

## 2019-02-25 NOTE — PROGRESS NOTES
physQUICK REFERENCE INFORMATION:  The ABCs of the Annual Wellness Visit    Subsequent Medicare Wellness Visit    HEALTH RISK ASSESSMENT    1945    Recent Hospitalizations:  No hospitalization(s) within the last year..        Current Medical Providers:  Patient Care Team:  Annetta Mcclendon MD as PCP - General  Doc Mixon MD as Consulting Physician (Orthopedic Surgery)        Smoking Status:  Social History     Tobacco Use   Smoking Status Former Smoker   • Years: 4.00   • Types: Cigarettes   • Last attempt to quit: 10/9/1985   • Years since quittin.4   Smokeless Tobacco Never Used   Tobacco Comment    1-2 CIGARETTES PER DAY/SOCIAL       Alcohol Consumption:  Social History     Substance and Sexual Activity   Alcohol Use No       Depression Screen:   PHQ-2/PHQ-9 Depression Screening 2019   Little interest or pleasure in doing things 0   Feeling down, depressed, or hopeless 1   Total Score 1       Health Habits and Functional and Cognitive Screening:  Functional & Cognitive Status 2019   Do you have difficulty preparing food and eating? No   Do you have difficulty bathing yourself, getting dressed or grooming yourself? No   Do you have difficulty using the toilet? No   Do you have difficulty moving around from place to place? Yes   Do you have trouble with steps or getting out of a bed or a chair? Yes   In the past year have you fallen or experienced a near fall? No   Current Diet Unhealthy Diet   Dental Exam Not up to date   Eye Exam Up to date   Exercise (times per week) 2 times per week   Current Exercise Activities Include Walking   Do you need help using the phone?  No   Are you deaf or do you have serious difficulty hearing?  No   Do you need help with transportation? Yes   Do you need help shopping? Yes   Do you need help preparing meals?  No   Do you need help with housework?  Yes   Do you need help with laundry? No   Do you need help taking your medications? No   Do you need help  managing money? No   Do you ever drive or ride in a car without wearing a seat belt? No   Have you felt unusual stress, anger or loneliness in the last month? -   Who do you live with? -   If you need help, do you have trouble finding someone available to you? -   Have you been bothered in the last four weeks by sexual problems? -   Do you have difficulty concentrating, remembering or making decisions? -           Does the patient have evidence of cognitive impairment? No    Aspirin use counseling: Taking ASA appropriately as indicated      Recent Lab Results:  CMP:  Lab Results   Component Value Date     (H) 08/13/2018    BUN 30 (H) 08/13/2018    CREATININE 0.80 08/13/2018    EGFRIFNONA 70 08/13/2018    EGFRIFAFRI 85 08/13/2018    BCR 37.5 (H) 08/13/2018     08/13/2018    K 5.2 (H) 08/13/2018    CO2 26.0 08/13/2018    CALCIUM 9.2 08/13/2018    PROTENTOTREF 7.1 08/13/2018    ALBUMIN 4.20 08/13/2018    ALBUMIN 3.8 08/13/2018    LABGLOBREF 2.9 08/13/2018    LABGLOBREF 3.3 08/13/2018    LABIL2 1.4 08/13/2018    LABIL2 1.2 08/13/2018    BILITOT 0.8 08/13/2018    ALKPHOS 64 08/13/2018    AST 24 08/13/2018    ALT 30 08/13/2018     Lipid Panel:  Lab Results   Component Value Date    CHOL 219 (H) 03/07/2017    TRIG 130 06/08/2017    HDL 40 06/08/2017    VLDL 26 06/08/2017     HbA1c:  Lab Results   Component Value Date    HGBA1C 5.9 09/06/2016       Visual Acuity:  No exam data present    Age-appropriate Screening Schedule:  Refer to the list below for future screening recommendations based on patient's age, sex and/or medical conditions. Orders for these recommended tests are listed in the plan section. The patient has been provided with a written plan.    Health Maintenance   Topic Date Due   • MAMMOGRAM  02/25/2019 (Originally 9/16/2018)   • LIPID PANEL  03/01/2020 (Originally 6/8/2018)   • ZOSTER VACCINE (2 of 2) 03/05/2020 (Originally 2/3/2017)   • DXA SCAN  02/16/2020   • TDAP/TD VACCINES (2 - Td)  03/07/2027   • COLONOSCOPY  02/16/2028   • INFLUENZA VACCINE  Completed   • PNEUMOCOCCAL VACCINES (65+ LOW/MEDIUM RISK)  Completed        Subjective   History of Present Illness    Lata Justice is a 73 y.o. female who presents for an Subsequent Wellness Visit.    Still has some trouble walking. Still has a lot of pain in left leg. She did physical therapy in the past after her knee replacement but she has no desire to do physical therapy at this time.  She uses her cane or walker.  She is having difficulty ambulating outside her home as there is a hill.  She previously walked with the help for exercise but now is unable.  She would like to move somewhere else to have a better living situation for mobility but  does not want to.  Patient has family who can help somewhat.  Admits to some pain in the calves that is a cramping pain that improves when she rests.  She also has sciatica that goes down the right leg.  Gabapentin helps with her sciatica.  She also takes meloxicam for arthritic pain.    Hypertension historically has been somewhat labile but recently controlled with HCTZ and lisinopril along with metoprolol.  She has hyperlipidemia, declines lipid screen today.  She is on pravastatin.  She takes a baby aspirin daily.  Heartburn controlled with Nexium and mood is stable on Prozac.  She has a history of Adkins's esophagus.    The following portions of the patient's history were reviewed and updated as appropriate: allergies, current medications, past family history, past medical history, past social history, past surgical history and problem list.    Outpatient Medications Prior to Visit   Medication Sig Dispense Refill   • ALPRAZolam (XANAX) 0.25 MG tablet 2 (two) times a day.     • cetirizine (zyrTEC) 10 MG tablet TAKE ONE TABLET BY MOUTH EVERY DAY 30 tablet 11   • Cyanocobalamin (B-12) 1000 MCG tablet TAKE 1 TABLET BY MOUTH EVERY DAY 90 tablet 3   • esomeprazole (NEXIUM) 40 MG capsule Take 1 capsule  by mouth daily.     • FLUoxetine (PROzac) 20 MG capsule TAKE 3 CAPSULES BY MOUTH EVERY MORNING 270 capsule 3   • gabapentin (NEURONTIN) 400 MG capsule TAKE 2 CAPSULES BY MOUTH 3 TIMES A  capsule 0   • hydrochlorothiazide (HYDRODIURIL) 25 MG tablet TAKE ONE TABLET BY MOUTH EVERY DAY 30 tablet 11   • lisinopril (PRINIVIL,ZESTRIL) 40 MG tablet TAKE ONE TABLET BY MOUTH EVERY DAY 30 tablet 11   • meloxicam (MOBIC) 7.5 MG tablet TAKE ONE TABLET BY MOUTH EVERY DAY 30 tablet 5   • metoprolol tartrate (LOPRESSOR) 25 MG tablet TAKE ONE TABLET BY MOUTH 2 TIMES A DAY 60 tablet 0   • pravastatin (PRAVACHOL) 40 MG tablet TAKE ONE TABLET BY MOUTH EVERY DAY 30 tablet 11   • SM ASPIRIN ADULT LOW STRENGTH 81 MG EC tablet TAKE ONE TABLET BY MOUTH AT BEDTIME 30 tablet 11     No facility-administered medications prior to visit.        Patient Active Problem List   Diagnosis   • Adkins's esophagus   • Benign essential hypertension   • Chronic low back pain   • Chronic obstructive pulmonary disease (CMS/HCC)   • Recurrent major depressive disorder, in partial remission (CMS/HCC)   • Impaired glucose tolerance   • Mixed hyperlipidemia   • Seasonal allergic rhinitis   • Bilateral chronic knee pain   • Bilateral edema of lower extremity   • Chronic pain of both ankles   • Primary osteoarthritis of right knee   • Status post right knee replacement   • Cyanocobalamin deficiency   • Primary osteoarthritis involving multiple joints   • Intermittent claudication (CMS/HCC)       Advance Care Planning:  has an advance directive - a copy HAS NOT been provided    Identification of Risk Factors:  Risk factors include: weight , inactivity, increased fall risk, chronic pain and polypharmacy.    Review of Systems   Constitutional: Positive for fatigue.   Musculoskeletal: Positive for arthralgias and gait problem.   Neurological: Positive for weakness.   All other systems reviewed and are negative.      Compared to one year ago, the patient feels  "her physical health is worse.  Compared to one year ago, the patient feels her mental health is the same.    Objective     Physical Exam   Constitutional: She is oriented to person, place, and time. Vital signs are normal. She appears well-developed and well-nourished. She is active. She does not have a sickly appearance. She does not appear ill.   Appears stated age. Well groomed. Morbidly obese   HENT:   Head: Normocephalic and atraumatic. Hair is normal.   Right Ear: Hearing and external ear normal.   Left Ear: Hearing and external ear normal.   Nose: Nose normal.   Eyes: EOM and lids are normal. Pupils are equal, round, and reactive to light. No scleral icterus.   Neck: Phonation normal. Neck supple.   Cardiovascular: Normal rate, regular rhythm and normal heart sounds. Exam reveals no gallop and no friction rub.   No murmur heard.  Pulmonary/Chest: Effort normal and breath sounds normal.   Abdominal: Soft. Bowel sounds are normal.   Musculoskeletal:        Right knee: She exhibits deformity (surgical scar).        Right ankle: She exhibits swelling.        Left ankle: She exhibits swelling.        Right lower leg: She exhibits edema.        Left lower leg: She exhibits edema.   Neurological: She is alert and oriented to person, place, and time. She displays no tremor. No cranial nerve deficit. Gait abnormal.   Skin: Skin is warm. No rash noted. She is not diaphoretic. No cyanosis. Nails show no clubbing.   Psychiatric: She has a normal mood and affect. Her speech is normal and behavior is normal. Judgment and thought content normal. Cognition and memory are normal.   Nursing note and vitals reviewed.      Vitals:    02/25/19 0816   BP: 142/82   Pulse: 60   Resp: 16   Temp: 97.5 °F (36.4 °C)   TempSrc: Temporal   SpO2: 95%   Weight: 100 kg (220 lb 6 oz)   Height: 157.5 cm (62.01\")   PainSc: 0-No pain       Patient's Body mass index is 40.3 kg/m². BMI is above normal parameters. Recommendations include: " educational material.      Assessment/Plan   Patient Self-Management and Personalized Health Advice  The patient has been provided with information about: diet, exercise and fall prevention and preventive services including:   · Advance directive, Screening mammography, referral placed, shingrix.    Visit Diagnoses:    ICD-10-CM ICD-9-CM   1. Medicare annual wellness visit, subsequent Z00.00 V70.0   2. Intermittent claudication (CMS/[facility]) I73.9 443.9   3. Gait abnormality R26.9 781.2   4. Chronic pain of both ankles M25.571 719.47    G89.29 338.29    M25.572    5. Chronic midline low back pain with right-sided sciatica M54.41 724.2    G89.29 724.3     338.29   6. Primary osteoarthritis involving multiple joints M15.0 715.09   7. Status post right knee replacement Z96.651 V43.65   8. Morbid obesity (CMS/[facility]) E66.01 278.01   9. Recurrent major depressive disorder, in partial remission (CMS/[facility]) F33.41 296.35   10. Mixed hyperlipidemia E78.2 272.2   11. Benign essential hypertension I10 401.1   12. Adkins's esophagus with dysplasia K22.719 530.85   13. Encounter for screening mammogram for breast cancer Z12.31 V76.12       Orders Placed This Encounter   Procedures   • Mammo Screening Digital Tomosynthesis Bilateral With CAD     Standing Status:   Future     Standing Expiration Date:   2/25/2020     Scheduling Instructions:      consuelo and gale     Order Specific Question:   Reason for Exam:     Answer:   screening   • Ambulatory Referral to Physical Therapy (power chair evaluation)     Referral Priority:   Routine     Referral Type:   Therapy     Referral Reason:   Specialty Services Required     Requested Specialty:   Physical Therapy     Number of Visits Requested:   1       Outpatient Encounter Medications as of 2/25/2019   Medication Sig Dispense Refill   • ALPRAZolam (XANAX) 0.25 MG tablet 2 (two) times a day.     • cetirizine (zyrTEC) 10 MG tablet TAKE ONE TABLET BY MOUTH EVERY DAY 30 tablet 11   •  Cyanocobalamin (B-12) 1000 MCG tablet TAKE 1 TABLET BY MOUTH EVERY DAY 90 tablet 3   • esomeprazole (NEXIUM) 40 MG capsule Take 1 capsule by mouth daily.     • FLUoxetine (PROzac) 20 MG capsule TAKE 3 CAPSULES BY MOUTH EVERY MORNING 270 capsule 3   • gabapentin (NEURONTIN) 400 MG capsule TAKE 2 CAPSULES BY MOUTH 3 TIMES A  capsule 0   • hydrochlorothiazide (HYDRODIURIL) 25 MG tablet TAKE ONE TABLET BY MOUTH EVERY DAY 30 tablet 11   • lisinopril (PRINIVIL,ZESTRIL) 40 MG tablet TAKE ONE TABLET BY MOUTH EVERY DAY 30 tablet 11   • meloxicam (MOBIC) 7.5 MG tablet TAKE ONE TABLET BY MOUTH EVERY DAY 30 tablet 5   • metoprolol tartrate (LOPRESSOR) 25 MG tablet TAKE ONE TABLET BY MOUTH 2 TIMES A DAY 60 tablet 0   • pravastatin (PRAVACHOL) 40 MG tablet TAKE ONE TABLET BY MOUTH EVERY DAY 30 tablet 11   • SM ASPIRIN ADULT LOW STRENGTH 81 MG EC tablet TAKE ONE TABLET BY MOUTH AT BEDTIME 30 tablet 11   • cilostazol (PLETAL) 100 MG tablet Take 1 tablet by mouth 2 (Two) Times a Day. To help with leg cramps due to circulation 180 tablet 3     No facility-administered encounter medications on file as of 2/25/2019.        Reviewed use of high risk medication in the elderly: yes  Reviewed for potential of harmful drug interactions in the elderly: yes    Follow Up:  Return in about 3 months (around 5/25/2019) for Blood Pressure follow up.     An After Visit Summary and PPPS with all of these plans were given to the patient.    · Continue current medications.  Added Pletal to help with likely intermittent claudication.  This was a diagnosis previously noted on her chart.  Offered physical therapy but she declines at this time.  We will try to get her set up for power chair evaluation.

## 2019-03-06 DIAGNOSIS — G89.29 CHRONIC MIDLINE LOW BACK PAIN WITH RIGHT-SIDED SCIATICA: Chronic | ICD-10-CM

## 2019-03-06 DIAGNOSIS — M54.41 CHRONIC MIDLINE LOW BACK PAIN WITH RIGHT-SIDED SCIATICA: Chronic | ICD-10-CM

## 2019-03-07 RX ORDER — GABAPENTIN 400 MG/1
CAPSULE ORAL
Qty: 180 CAPSULE | Refills: 1 | Status: SHIPPED | OUTPATIENT
Start: 2019-03-07 | End: 2019-05-14 | Stop reason: SDUPTHER

## 2019-05-07 DIAGNOSIS — I73.9 INTERMITTENT CLAUDICATION (HCC): ICD-10-CM

## 2019-05-07 DIAGNOSIS — M17.11 PRIMARY OSTEOARTHRITIS OF RIGHT KNEE: ICD-10-CM

## 2019-05-07 DIAGNOSIS — M54.41 CHRONIC MIDLINE LOW BACK PAIN WITH RIGHT-SIDED SCIATICA: Chronic | ICD-10-CM

## 2019-05-07 DIAGNOSIS — G89.29 CHRONIC MIDLINE LOW BACK PAIN WITH RIGHT-SIDED SCIATICA: Chronic | ICD-10-CM

## 2019-05-07 RX ORDER — HYDROCHLOROTHIAZIDE 25 MG/1
25 TABLET ORAL DAILY
Qty: 90 TABLET | Refills: 3 | Status: SHIPPED | OUTPATIENT
Start: 2019-05-07 | End: 2020-03-12

## 2019-05-07 RX ORDER — MELOXICAM 7.5 MG/1
7.5 TABLET ORAL DAILY
Qty: 90 TABLET | Refills: 1 | Status: SHIPPED | OUTPATIENT
Start: 2019-05-07 | End: 2019-09-04 | Stop reason: SDUPTHER

## 2019-05-07 RX ORDER — CILOSTAZOL 100 MG/1
100 TABLET ORAL 2 TIMES DAILY
Qty: 180 TABLET | Refills: 3 | Status: SHIPPED | OUTPATIENT
Start: 2019-05-07 | End: 2020-03-12

## 2019-05-07 RX ORDER — FLUOXETINE HYDROCHLORIDE 20 MG/1
60 CAPSULE ORAL EVERY MORNING
Qty: 270 CAPSULE | Refills: 3 | Status: SHIPPED | OUTPATIENT
Start: 2019-05-07 | End: 2020-04-17

## 2019-05-07 RX ORDER — LISINOPRIL 40 MG/1
40 TABLET ORAL DAILY
Qty: 90 TABLET | Refills: 3 | Status: SHIPPED | OUTPATIENT
Start: 2019-05-07 | End: 2020-03-12

## 2019-05-07 RX ORDER — PRAVASTATIN SODIUM 40 MG
40 TABLET ORAL DAILY
Qty: 90 TABLET | Refills: 3 | Status: SHIPPED | OUTPATIENT
Start: 2019-05-07 | End: 2020-03-12

## 2019-05-14 DIAGNOSIS — G89.29 CHRONIC MIDLINE LOW BACK PAIN WITH RIGHT-SIDED SCIATICA: Chronic | ICD-10-CM

## 2019-05-14 DIAGNOSIS — M54.41 CHRONIC MIDLINE LOW BACK PAIN WITH RIGHT-SIDED SCIATICA: Chronic | ICD-10-CM

## 2019-05-15 RX ORDER — GABAPENTIN 400 MG/1
CAPSULE ORAL
Qty: 180 CAPSULE | Refills: 0 | Status: SHIPPED | OUTPATIENT
Start: 2019-05-15 | End: 2019-05-29 | Stop reason: SDUPTHER

## 2019-05-29 ENCOUNTER — OFFICE VISIT (OUTPATIENT)
Dept: INTERNAL MEDICINE | Facility: CLINIC | Age: 74
End: 2019-05-29

## 2019-05-29 VITALS
HEART RATE: 66 BPM | OXYGEN SATURATION: 95 % | RESPIRATION RATE: 16 BRPM | HEIGHT: 62 IN | BODY MASS INDEX: 38.64 KG/M2 | TEMPERATURE: 97.7 F | DIASTOLIC BLOOD PRESSURE: 68 MMHG | SYSTOLIC BLOOD PRESSURE: 122 MMHG | WEIGHT: 210 LBS

## 2019-05-29 DIAGNOSIS — E66.01 CLASS 2 SEVERE OBESITY WITH SERIOUS COMORBIDITY AND BODY MASS INDEX (BMI) OF 38.0 TO 38.9 IN ADULT, UNSPECIFIED OBESITY TYPE (HCC): ICD-10-CM

## 2019-05-29 DIAGNOSIS — Z12.39 BREAST CANCER SCREENING: ICD-10-CM

## 2019-05-29 DIAGNOSIS — M54.41 CHRONIC MIDLINE LOW BACK PAIN WITH RIGHT-SIDED SCIATICA: Chronic | ICD-10-CM

## 2019-05-29 DIAGNOSIS — J44.9 CHRONIC OBSTRUCTIVE PULMONARY DISEASE, UNSPECIFIED COPD TYPE (HCC): ICD-10-CM

## 2019-05-29 DIAGNOSIS — I10 BENIGN ESSENTIAL HYPERTENSION: Primary | Chronic | ICD-10-CM

## 2019-05-29 DIAGNOSIS — G89.29 CHRONIC MIDLINE LOW BACK PAIN WITH RIGHT-SIDED SCIATICA: Chronic | ICD-10-CM

## 2019-05-29 PROCEDURE — 99213 OFFICE O/P EST LOW 20 MIN: CPT | Performed by: FAMILY MEDICINE

## 2019-05-29 RX ORDER — GABAPENTIN 400 MG/1
800 CAPSULE ORAL 3 TIMES DAILY
Qty: 180 CAPSULE | Refills: 2 | Status: SHIPPED | OUTPATIENT
Start: 2019-05-29 | End: 2019-12-04 | Stop reason: SINTOL

## 2019-05-29 NOTE — PROGRESS NOTES
Subjective    Lata Justice is a 73 y.o. female here for:    Chief Complaint   Patient presents with   • Hypertension     3 mo f/u pt states no problems or concerns        Hypertension   This is a chronic problem. The current episode started more than 1 year ago. The problem is controlled. Associated symptoms include peripheral edema. Pertinent negatives include no chest pain, orthopnea or shortness of breath. Agents associated with hypertension include NSAIDs. Risk factors for coronary artery disease include dyslipidemia, obesity, post-menopausal state and sedentary lifestyle. Current antihypertension treatment includes diuretics, ACE inhibitors and beta blockers. The current treatment provides significant improvement. Compliance problems include exercise, diet and medication cost.  There is no history of CAD/MI or CVA. There is no history of chronic renal disease (Normal 24 hour urine, UPEP 8/2018), a hypertension causing med, sleep apnea or a thyroid problem (Normal TSH and free T4 8/13/18).   COPD   This is a chronic problem. The current episode started more than 1 year ago. Pertinent negatives include no chest pain. Associated symptoms comments: No shortness of breath nor wheezing. Exacerbated by: heat. Treatments tried: air conditioning in bedroom, no inhalers at this time. The treatment provided significant relief.          The following portions of the patient's history were reviewed and updated as appropriate: allergies, current medications, past family history, past medical history, past social history, past surgical history and problem list.    Health Maintenance   Topic Date Due   • MAMMOGRAM  06/30/2019 (Originally 9/16/2018)   • LIPID PANEL  03/01/2020 (Originally 6/8/2018)   • ZOSTER VACCINE (2 of 2) 03/05/2020 (Originally 2/3/2017)   • INFLUENZA VACCINE  08/01/2019   • DXA SCAN  02/16/2020   • MEDICARE ANNUAL WELLNESS  02/25/2020   • TDAP/TD VACCINES (2 - Td) 03/07/2027   • COLONOSCOPY  02/16/2028  "  • HEPATITIS C SCREENING  Completed   • PNEUMOCOCCAL VACCINES (65+ LOW/MEDIUM RISK)  Completed       Review of Systems   Constitutional: Positive for appetite change (eating a little less) and unexpected weight loss.   Respiratory: Negative for shortness of breath.    Cardiovascular: Negative for chest pain and orthopnea.       Vitals:    05/29/19 0758   BP: 122/68   Pulse: 66   Resp: 16   Temp: 97.7 °F (36.5 °C)   TempSrc: Temporal   SpO2: 95%   Weight: 95.3 kg (210 lb)   Height: 157.5 cm (62.01\")         Objective   Physical Exam   Constitutional: She is oriented to person, place, and time. Vital signs are normal. She appears well-developed and well-nourished. She is active.  Non-toxic appearance. She does not appear ill. No distress. She is obese.  HENT:   Head: Normocephalic and atraumatic.   Right Ear: Hearing normal.   Left Ear: Hearing normal.   Mouth/Throat: Mucous membranes are not dry.   Eyes: EOM are normal. No scleral icterus.   Neck: Phonation normal. Neck supple.   Pulmonary/Chest: Effort normal.   Neurological: She is alert and oriented to person, place, and time. She displays no tremor. No cranial nerve deficit. Gait (slow, walking with rolling walker) abnormal.   Skin: Skin is warm. No rash noted. She is not diaphoretic. No pallor.   Psychiatric: She has a normal mood and affect. Her speech is normal and behavior is normal. Judgment and thought content normal. Cognition and memory are normal.   Nursing note and vitals reviewed.        Assessment/Plan     Problem List Items Addressed This Visit        Cardiovascular and Mediastinum    Benign essential hypertension - Primary (Chronic)       Respiratory    Chronic obstructive pulmonary disease (CMS/HCC) (Chronic)       Nervous and Auditory    Chronic low back pain (Chronic)    Relevant Medications    gabapentin (NEURONTIN) 400 MG capsule      Other Visit Diagnoses     Class 2 severe obesity with serious comorbidity and body mass index (BMI) of 38.0 to " 38.9 in adult, unspecified obesity type (CMS/HCC)        Breast cancer screening        Relevant Orders    Mammo Screening Digital Tomosynthesis Bilateral With CAD          · Blood pressure controlled, monitor at home.  · Monitor weight. Encouraged mammogram, recommended up until age 75  · Patient's Body mass index is 38.4 kg/m². BMI is above normal parameters. Recommendations include: educational material.  ·     Return in about 6 months (around 11/29/2019) for Blood Pressure follow up. or sooner if needed.    Annetta Mcclendon MD

## 2019-05-29 NOTE — PATIENT INSTRUCTIONS
Serving Sizes  A serving size is a measured amount of food or drink, such as one slice of bread, that has an associated nutrient content. Knowing the serving size of a food or drink can help you determine how much of that food you should consume.  What is the size of one serving?  The size of one healthy serving depends on the food or drink. To determine a serving size, read the food label. If the food or drink does not have a food label, try to find serving size information online. Or, use the following to estimate the size of one adult serving:  Grain  1 slice bread. ½ bagel. ½ cup pasta.  Vegetable  ½ cup cooked or canned vegetables. 1 cup raw, leafy greens.  Fruit  ½ cup canned fruit. 1 medium fruit. ¼ cup dried fruit.  Meat and Other Protein Sources  1 oz meat, poultry, or fish. ¼ cup cooked beans. 1 egg. ¼ cup nuts or seeds. 1 Tbsp nut butter. ¼ cup tofu or tempeh. 2 Tbsp hummus.  Dairy  An individual container of yogurt (6-8 oz). 1 piece of cheese the size of your thumb (1 oz). 1 cup (8 oz) milk or milk alternative.  Fat  A piece the size of one dice. 1 tsp soft margarine. 1 Tbsp mayonnaise. 1 tsp vegetable oil. 1 Tbsp regular salad dressing. 2 Tbsp low-fat salad dressing.  How many servings should I eat from each food group each day?  The following are the suggested number of servings to try and have every day from each food group. You can also look at your eating throughout the week and aim for meeting these requirements on most days for overall healthy eating.  Grain  6-8 servings. Try to have half of your grains from whole grains, such as whole wheat bread, corn tortillas, oatmeal, brown rice, whole wheat pasta, and bulgur.  Vegetable  At least 2½-3 servings.  Fruit  2 servings.  Meat and Other Protein Foods  5-6 servings. Aim to have lean proteins, such as chicken, turkey, fish, beans, or tofu.  Dairy  3 servings. Choose low-fat or nonfat if you are trying to control your weight.  Fat  2-3 servings.  Is  a serving the same thing as a portion?  No. A portion is the actual amount you eat, which may be more than one serving. Knowing the specific serving size of a food and the nutritional information that goes with it can help you make a healthy decision on what size portion to eat.  What are some tips to help me learn healthy serving sizes?  · Check food labels for serving sizes. Many foods that come as a single portion actually contain multiple servings.  · Determine the serving size of foods you commonly eat and figure out how large a portion you usually eat.  · Measure the number of servings that can be held by the bowls, glasses, cups, and plates you typically use. For example, pour your breakfast cereal into your regular bowl and then pour it into a measuring cup.  · For 1-2 days, measure the serving sizes of all the foods you eat.  · Practice estimating serving sizes and determining how big your portions should be.      This information is not intended to replace advice given to you by your health care provider. Make sure you discuss any questions you have with your health care provider.  Document Released: 09/15/2004 Document Revised: 08/12/2017 Document Reviewed: 03/17/2015  [employer] Interactive Patient Education © 2018 [employer]    MyPlate from [employer]    The general, healthful diet is based on the 2010 Dietary Guidelines for Americans. The amount of food you need to eat from each food group depends on your age, sex, and level of physical activity and can be individualized by a dietitian. Go to ChooseMyPlate.gov for more information.  What do I need to know about the MyPlate plan?  · Enjoy your food, but eat less.  · Avoid oversized portions.  ? ½ of your plate should include fruits and vegetables.  ? ¼ of your plate should be grains.  ? ¼ of your plate should be protein.  Grains  · Make at least half of your grains whole grains.  · For a 2,000 calorie daily food plan, eat 6 oz every day.  · 1 oz is about 1  slice bread, 1 cup cereal, or ½ cup cooked rice, cereal, or pasta.  Vegetables  · Make half your plate fruits and vegetables.  · For a 2,000 calorie daily food plan, eat 2½ cups every day.  · 1 cup is about 1 cup raw or cooked vegetables or vegetable juice or 2 cups raw leafy greens.  Fruits  · Make half your plate fruits and vegetables.  · For a 2,000 calorie daily food plan, eat 2 cups every day.  · 1 cup is about 1 cup fruit or 100% fruit juice or ½ cup dried fruit.  Protein  · For a 2,000 calorie daily food plan, eat 5½ oz every day.  · 1 oz is about 1 oz meat, poultry, or fish, ¼ cup cooked beans, 1 egg, 1 Tbsp peanut butter, or ½ oz nuts or seeds.  Dairy  · Switch to fat-free or low-fat (1%) milk.  · For a 2,000 calorie daily food plan, eat 3 cups every day.  · 1 cup is about 1 cup milk or yogurt or soy milk (soy beverage), 1½ oz natural cheese, or 2 oz processed cheese.  Fats, Oils, and Empty Calories  · Only small amounts of oils are recommended.  · Empty calories are calories from solid fats or added sugars.  · Compare sodium in foods like soup, bread, and frozen meals. Choose the foods with lower numbers.  · Drink water instead of sugary drinks.  What foods can I eat?  Grains  Whole grains such as whole wheat, quinoa, millet, and bulgur. Bread, rolls, and pasta made from whole grains. Brown or wild rice. Hot or cold cereals made from whole grains and without added sugar.  Vegetables  All fresh vegetables, especially fresh red, dark green, or orange vegetables. Peas and beans. Low-sodium frozen or canned vegetables prepared without added salt. Low-sodium vegetable juices.  Fruits  All fresh, frozen, and dried fruits. Canned fruit packed in water or fruit juice without added sugar. Fruit juices without added sugar.  Meats and Other Protein Sources  Boiled, baked, or grilled lean meat trimmed of fat. Skinless poultry. Fresh seafood and shellfish. Canned seafood packed in water. Unsalted nuts and unsalted nut  butters. Tofu. Dried beans and pea. Eggs.  Dairy  Low-fat or fat-free milk, yogurt, and cheeses.  Sweets and Desserts  Frozen desserts made from low-fat milk.  Fats and Oils  Olive, peanut, and canola oils and margarine. Salad dressing and mayonnaise made from these oils.  Other  Soups and casseroles made from allowed ingredients and without added fat or salt.  The items listed above may not be a complete list of recommended foods or beverages. Contact your dietitian for more options.  What foods are not recommended?  Grains  Sweetened, low-fiber cereals. Packaged baked goods. Snack crackers and chips. Cheese crackers, butter crackers, and biscuits. Frozen waffles, sweet breads, doughnuts, pastries, packaged baking mixes, pancakes, cakes, and cookies.  Vegetables  Regular canned or frozen vegetables or vegetables prepared with salt. Canned tomatoes. Canned tomato sauce. Fried vegetables. Vegetables in cream sauce or cheese sauce.  Fruits  Fruits packed in syrup or made with added sugar.  Meats and Other Protein Sources  Marbled or fatty meats such as ribs. Poultry with skin. Fried meats, poultry, eggs, or fish. Sausages, hot dogs, and deli meats such as pastrami, bologna, or salami.  Dairy  Whole milk, cream, cheeses made from whole milk, sour cream. Ice cream or yogurt made from whole milk or with added sugar.  Beverages  For adults, no more than one alcoholic drink per day. Regular soft drinks or other sugary beverages. Juice drinks.  Sweets and Desserts  Sugary or fatty desserts, candy, and other sweets.  Fats and Oils  Solid shortening or partially hydrogenated oils. Solid margarine. Margarine that contains trans fats. Butter.    The items listed above may not be a complete list of foods and beverages to avoid. Contact your dietitian for more information.    This information is not intended to replace advice given to you by your health care provider. Make sure you discuss any questions you have with your health  care provider.  Document Released: 01/06/2009 Document Revised: 05/25/2017 Document Reviewed: 11/26/2014  [employer] Interactive Patient Education © 2018 [employer]        Calorie Counting for Weight Loss  Calories are units of energy. Your body needs a certain amount of calories from food to keep you going throughout the day. When you eat more calories than your body needs, your body stores the extra calories as fat. When you eat fewer calories than your body needs, your body burns fat to get the energy it needs.  Calorie counting means keeping track of how many calories you eat and drink each day. Calorie counting can be helpful if you need to lose weight. If you make sure to eat fewer calories than your body needs, you should lose weight. Ask your health care provider what a healthy weight is for you.  For calorie counting to work, you will need to eat the right number of calories in a day in order to lose a healthy amount of weight per week. A dietitian can help you determine how many calories you need in a day and will give you suggestions on how to reach your calorie goal.  · A healthy amount of weight to lose per week is usually 1-2 lb (0.5-0.9 kg). This usually means that your daily calorie intake should be reduced by 500-750 calories.  · Eating 1,200 - 1,500 calories per day can help most women lose weight.  · Eating 1,500 - 1,800 calories per day can help most men lose weight.     What do I need to know about calorie counting?  In order to meet your daily calorie goal, you will need to:  · Find out how many calories are in each food you would like to eat. Try to do this before you eat.  · Decide how much of the food you plan to eat.  · Write down what you ate and how many calories it had. Doing this is called keeping a food log.     To successfully lose weight, it is important to balance calorie counting with a healthy lifestyle that includes regular activity. Aim for 150 minutes of moderate exercise  (such as walking) or 75 minutes of vigorous exercise (such as running) each week.  Where do I find calorie information?       The number of calories in a food can be found on a Nutrition Facts label. If a food does not have a Nutrition Facts label, try to look up the calories online or ask your dietitian for help.  Remember that calories are listed per serving. If you choose to have more than one serving of a food, you will have to multiply the calories per serving by the amount of servings you plan to eat. For example, the label on a package of bread might say that a serving size is 1 slice and that there are 90 calories in a serving. If you eat 1 slice, you will have eaten 90 calories. If you eat 2 slices, you will have eaten 180 calories.  How do I keep a food log?  Immediately after each meal, record the following information in your food log:  · What you ate. Don't forget to include toppings, sauces, and other extras on the food.  · How much you ate. This can be measured in cups, ounces, or number of items.  · How many calories each food and drink had.  · The total number of calories in the meal.     Keep your food log near you, such as in a small notebook in your pocket, or use a mobile dede or website. Some programs will calculate calories for you and show you how many calories you have left for the day to meet your goal.  What are some calorie counting tips?  · Use your calories on foods and drinks that will fill you up and not leave you hungry:  ? Some examples of foods that fill you up are nuts and nut butters, vegetables, lean proteins, and high-fiber foods like whole grains. High-fiber foods are foods with more than 5 g fiber per serving.  ? Drinks such as sodas, specialty coffee drinks, alcohol, and juices have a lot of calories, yet do not fill you up.  · Eat nutritious foods and avoid empty calories. Empty calories are calories you get from foods or beverages that do not have many vitamins or protein,  "such as candy, sweets, and soda. It is better to have a nutritious high-calorie food (such as an avocado) than a food with few nutrients (such as a bag of chips).  · Know how many calories are in the foods you eat most often. This will help you calculate calorie counts faster.  · Pay attention to calories in drinks. Low-calorie drinks include water and unsweetened drinks.  · Pay attention to nutrition labels for \"low fat\" or \"fat free\" foods. These foods sometimes have the same amount of calories or more calories than the full fat versions. They also often have added sugar, starch, or salt, to make up for flavor that was removed with the fat.  ·   · Find a way of tracking calories that works for you. Get creative. Try different apps or programs if writing down calories does not work for you.  What are some portion control tips?  · Know how many calories are in a serving. This will help you know how many servings of a certain food you can have.  · Use a measuring cup to measure serving sizes. You could also try weighing out portions on a kitchen scale. With time, you will be able to estimate serving sizes for some foods.  · Take some time to put servings of different foods on your favorite plates, bowls, and cups so you know what a serving looks like.  · Try not to eat straight from a bag or box. Doing this can lead to overeating. Put the amount you would like to eat in a cup or on a plate to make sure you are eating the right portion.  · Use smaller plates, glasses, and bowls to prevent overeating.  · Try not to multitask (for example, watch TV or use your computer) while eating. If it is time to eat, sit down at a table and enjoy your food. This will help you to know when you are full. It will also help you to be aware of what you are eating and how much you are eating.  What are tips for following this plan?  Reading food labels  · Check the calorie count compared to the serving size. The serving size may be " smaller than what you are used to eating.  · Check the source of the calories. Make sure the food you are eating is high in vitamins and protein and low in saturated and trans fats.  Shopping  · Read nutrition labels while you shop. This will help you make healthy decisions before you decide to purchase your food.  · Make a grocery list and stick to it.  Cooking  · Try to cook your favorite foods in a healthier way. For example, try baking instead of frying.  · Use low-fat dairy products.  Meal planning  · Use more fruits and vegetables. Half of your plate should be fruits and vegetables.  · Include lean proteins like poultry and fish.  How do I count calories when eating out?  · Ask for smaller portion sizes.  · Consider sharing an entree and sides instead of getting your own entree.  · If you get your own entree, eat only half. Ask for a box at the beginning of your meal and put the rest of your entree in it so you are not tempted to eat it.  · If calories are listed on the menu, choose the lower calorie options.  · Choose dishes that include vegetables, fruits, whole grains, low-fat dairy products, and lean protein.  · Choose items that are boiled, broiled, grilled, or steamed. Stay away from items that are buttered, battered, fried, or served with cream sauce. Items labeled “crispy” are usually fried, unless stated otherwise.  · Choose water, low-fat milk, unsweetened iced tea, or other drinks without added sugar. If you want an alcoholic beverage, choose a lower calorie option such as a glass of wine or light beer.  · Ask for dressings, sauces, and syrups on the side. These are usually high in calories, so you should limit the amount you eat.  · If you want a salad, choose a garden salad and ask for grilled meats. Avoid extra toppings like echevarria, cheese, or fried items. Ask for the dressing on the side, or ask for olive oil and vinegar or lemon to use as dressing.  · Estimate how many servings of a food you are  given. For example, a serving of cooked rice is ½ cup or about the size of half a baseball. Knowing serving sizes will help you be aware of how much food you are eating at restaurants. The list below tells you how big or small some common portion sizes are based on everyday objects:  ? 1 oz--4 stacked dice.  ? 3 oz--1 deck of cards.  ? 1 tsp--1 die.  ? 1 Tbsp--½ a ping-pong ball.  ? 2 Tbsp--1 ping-pong ball.  ? ½ cup--½ baseball.  ? 1 cup--1 baseball.  Summary  · Calorie counting means keeping track of how many calories you eat and drink each day. If you eat fewer calories than your body needs, you should lose weight.  · A healthy amount of weight to lose per week is usually 1-2 lb (0.5-0.9 kg). This usually means reducing your daily calorie intake by 500-750 calories.  · The number of calories in a food can be found on a Nutrition Facts label. If a food does not have a Nutrition Facts label, try to look up the calories online or ask your dietitian for help.  · Use your calories on foods and drinks that will fill you up, and not on foods and drinks that will leave you hungry.  · Use smaller plates, glasses, and bowls to prevent overeating.      This information is not intended to replace advice given to you by your health care provider. Make sure you discuss any questions you have with your health care provider.  Document Released: 12/18/2006 Document Revised: 11/17/2017 Document Reviewed: 11/17/2017  [employer] Interactive Patient Education © 2018 [employer]

## 2019-06-17 ENCOUNTER — HOSPITAL ENCOUNTER (OUTPATIENT)
Dept: MAMMOGRAPHY | Facility: HOSPITAL | Age: 74
Discharge: HOME | End: 2019-06-17
Payer: MEDICARE

## 2019-06-17 DIAGNOSIS — Z12.39 BREAST CANCER SCREENING: ICD-10-CM

## 2019-06-17 PROCEDURE — 77067 SCR MAMMO BI INCL CAD: CPT

## 2019-08-12 ENCOUNTER — OFFICE VISIT (OUTPATIENT)
Dept: INTERNAL MEDICINE | Facility: CLINIC | Age: 74
End: 2019-08-12

## 2019-08-12 VITALS
HEART RATE: 66 BPM | DIASTOLIC BLOOD PRESSURE: 88 MMHG | OXYGEN SATURATION: 97 % | WEIGHT: 214.38 LBS | HEIGHT: 62 IN | RESPIRATION RATE: 16 BRPM | BODY MASS INDEX: 39.45 KG/M2 | TEMPERATURE: 98.2 F | SYSTOLIC BLOOD PRESSURE: 140 MMHG

## 2019-08-12 DIAGNOSIS — I73.9 PVD (PERIPHERAL VASCULAR DISEASE) (HCC): ICD-10-CM

## 2019-08-12 DIAGNOSIS — I73.9 INTERMITTENT CLAUDICATION (HCC): ICD-10-CM

## 2019-08-12 DIAGNOSIS — G89.29 BILATERAL CHRONIC KNEE PAIN: Chronic | ICD-10-CM

## 2019-08-12 DIAGNOSIS — M25.572 CHRONIC PAIN OF BOTH ANKLES: ICD-10-CM

## 2019-08-12 DIAGNOSIS — M25.561 BILATERAL CHRONIC KNEE PAIN: Chronic | ICD-10-CM

## 2019-08-12 DIAGNOSIS — M25.571 CHRONIC PAIN OF BOTH ANKLES: ICD-10-CM

## 2019-08-12 DIAGNOSIS — R26.9 UNSPECIFIED ABNORMALITIES OF GAIT AND MOBILITY: Primary | ICD-10-CM

## 2019-08-12 DIAGNOSIS — I10 BENIGN ESSENTIAL HYPERTENSION: Chronic | ICD-10-CM

## 2019-08-12 DIAGNOSIS — M15.9 PRIMARY OSTEOARTHRITIS INVOLVING MULTIPLE JOINTS: ICD-10-CM

## 2019-08-12 DIAGNOSIS — G89.29 CHRONIC MIDLINE LOW BACK PAIN WITH RIGHT-SIDED SCIATICA: Chronic | ICD-10-CM

## 2019-08-12 DIAGNOSIS — M54.41 CHRONIC MIDLINE LOW BACK PAIN WITH RIGHT-SIDED SCIATICA: Chronic | ICD-10-CM

## 2019-08-12 DIAGNOSIS — J44.9 CHRONIC OBSTRUCTIVE PULMONARY DISEASE, UNSPECIFIED COPD TYPE (HCC): Chronic | ICD-10-CM

## 2019-08-12 DIAGNOSIS — G89.29 CHRONIC PAIN OF BOTH ANKLES: ICD-10-CM

## 2019-08-12 DIAGNOSIS — R60.0 BILATERAL EDEMA OF LOWER EXTREMITY: ICD-10-CM

## 2019-08-12 DIAGNOSIS — M25.562 BILATERAL CHRONIC KNEE PAIN: Chronic | ICD-10-CM

## 2019-08-12 PROCEDURE — 99214 OFFICE O/P EST MOD 30 MIN: CPT | Performed by: FAMILY MEDICINE

## 2019-08-12 PROCEDURE — G0372 MD SERVICE REQUIRED FOR PMD: HCPCS | Performed by: FAMILY MEDICINE

## 2019-08-12 RX ORDER — ALBUTEROL SULFATE 90 UG/1
2 AEROSOL, METERED RESPIRATORY (INHALATION) EVERY 6 HOURS PRN
Qty: 3 INHALER | Refills: 3 | Status: SHIPPED | OUTPATIENT
Start: 2019-08-12 | End: 2020-12-21 | Stop reason: SDUPTHER

## 2019-08-12 NOTE — PATIENT INSTRUCTIONS
Chronic Obstructive Pulmonary Disease    Chronic obstructive pulmonary disease (COPD) is a long-term (chronic) condition that affects the lungs. COPD is a general term that can be used to describe many different lung problems that cause lung swelling (inflammation) and limit airflow, including chronic bronchitis and emphysema. If you have COPD, your lung function will probably never return to normal. In most cases, it gets worse over time. However, there are steps you can take to slow the progression of the disease and improve your quality of life.  What are the causes?  This condition may be caused by:  · Smoking. This is the most common cause.  · Certain genes passed down through families.  What increases the risk?  The following factors may make you more likely to develop this condition:  · Secondhand smoke from cigarettes, pipes, or cigars.  · Exposure to chemicals and other irritants such as fumes and dust in the work environment.  · Chronic lung conditions or infections.  What are the signs or symptoms?  Symptoms of this condition include:  · Shortness of breath, especially during physical activity.  · Chronic cough with a large amount of thick mucus. Sometimes the cough may not have any mucus (dry cough).  · Wheezing.  · Rapid breaths.  · Gray or bluish discoloration (cyanosis) of the skin, especially in your fingers, toes, or lips.  · Feeling tired (fatigue).  · Weight loss.  · Chest tightness.  · Frequent infections.  · Episodes when breathing symptoms become much worse (exacerbations).  · Swelling in the ankles, feet, or legs. This may occur in later stages of the disease.  How is this diagnosed?  This condition is diagnosed based on:  · Your medical history.  · A physical exam.  You may also have tests, including:  · Lung (pulmonary) function tests. This may include a spirometry test, which measures your ability to exhale properly.  · Chest X-ray.  · CT scan.  · Blood tests.  How is this  treated?  This condition may be treated with:  · Medicines. These may include inhaled rescue medicines to treat acute exacerbations as well as long-term, or maintenance, medicines to prevent flare-ups of COPD.  ? Bronchodilators help treat COPD by dilating the airways to allow increased airflow and make your breathing more comfortable.  ? Steroids can reduce airway inflammation and help prevent exacerbations.  · Smoking cessation. If you smoke, your health care provider may ask you to quit, and may also recommend therapy or replacement products to help you quit.  · Pulmonary rehabilitation. This may involve working with a team of health care providers and specialists, such as respiratory, occupational, and physical therapists.  · Exercise and physical activity. These are beneficial for nearly all people with COPD.  · Nutrition therapy to gain weight, if you are underweight.  · Oxygen. Supplemental oxygen therapy is only helpful if you have a low oxygen level in your blood (hypoxemia).  · Lung surgery or transplant.  · Palliative care. This is to help people with COPD feel comfortable when treatment is no longer working.  Follow these instructions at home:  Medicines  · Take over-the-counter and prescription medicines (inhaled or pills) only as told by your health care provider.  · Talk to your health care provider before taking any cough or allergy medicines. You may need to avoid certain medicines that dry out your airways.  Lifestyle  · If you are a smoker, the most important thing that you can do is to stop smoking. Do not use any products that contain nicotine or tobacco, such as cigarettes and e-cigarettes. If you need help quitting, ask your health care provider. Continuing to smoke will cause the disease to progress faster.  · Avoid exposure to things that irritate your lungs, such as smoke, chemicals, and fumes.  · Stay active, but balance activity with periods of rest. Exercise and physical activity will  help you maintain your ability to do things you want to do.  · Learn and use relaxation techniques to manage stress and to control your breathing.  · Get the right amount of sleep and get quality sleep. Most adults need 7 or more hours per night.  · Eat healthy foods. Eating smaller, more frequent meals and resting before meals may help you maintain your strength.  Controlled breathing  Learn and use controlled breathing techniques as directed by your health care provider. Controlled breathing techniques include:  · Pursed lip breathing. Start by breathing in (inhaling) through your nose for 1 second. Then, purse your lips as if you were going to whistle and breathe out (exhale) through the pursed lips for 2 seconds.  · Diaphragmatic breathing. Start by putting one hand on your abdomen just above your waist. Inhale slowly through your nose. The hand on your abdomen should move out. Then purse your lips and exhale slowly. You should be able to feel the hand on your abdomen moving in as you exhale.  Controlled coughing  Learn and use controlled coughing to clear mucus from your lungs. Controlled coughing is a series of short, progressive coughs. The steps of controlled coughing are:  1. Lean your head slightly forward.  2. Breathe in deeply using diaphragmatic breathing.  3. Try to hold your breath for 3 seconds.  4. Keep your mouth slightly open while coughing twice.  5. Spit any mucus out into a tissue.  6. Rest and repeat the steps once or twice as needed.  General instructions  · Make sure you receive all the vaccines that your health care provider recommends, especially the pneumococcal and influenza vaccines. Preventing infection and hospitalization is very important when you have COPD.  · Use oxygen therapy and pulmonary rehabilitation if directed to by your health care provider. If you require home oxygen therapy, ask your health care provider whether you should purchase a pulse oximeter to measure your oxygen  level at home.  · Work with your health care provider to develop a COPD action plan. This will help you know what steps to take if your condition gets worse.  · Keep other chronic health conditions under control as told by your health care provider.  · Avoid extreme temperature and humidity changes.  · Avoid contact with people who have an illness that spreads from person to person (is contagious), such as viral infections or pneumonia.  · Keep all follow-up visits as told by your health care provider. This is important.  Contact a health care provider if:  · You are coughing up more mucus than usual.  · There is a change in the color or thickness of your mucus.  · Your breathing is more labored than usual.  · Your breathing is faster than usual.  · You have difficulty sleeping.  · You need to use your rescue medicines or inhalers more often than expected.  · You have trouble doing routine activities such as getting dressed or walking around the house.  Get help right away if:  · You have shortness of breath while you are resting.  · You have shortness of breath that prevents you from:  ? Being able to talk.  ? Performing your usual physical activities.  · You have chest pain lasting longer than 5 minutes.  · Your skin color is more blue (cyanotic) than usual.  · You measure low oxygen saturations for longer than 5 minutes with a pulse oximeter.  · You have a fever.  · You feel too tired to breathe normally.  Summary  · Chronic obstructive pulmonary disease (COPD) is a long-term (chronic) condition that affects the lungs.  · Your lung function will probably never return to normal. In most cases, it gets worse over time. However, there are steps you can take to slow the progression of the disease and improve your quality of life.  · Treatment for COPD may include taking medicines, quitting smoking, pulmonary rehabilitation, and changes to diet and exercise. As the disease progresses, you may need oxygen therapy, a  lung transplant, or palliative care.  · To help manage your condition, do not smoke, avoid exposure to things that irritate your lungs, stay up to date on all vaccines, and follow your health care provider's instructions for taking medicines.  This information is not intended to replace advice given to you by your health care provider. Make sure you discuss any questions you have with your health care provider.  Document Released: 09/27/2006 Document Revised: 06/13/2018 Document Reviewed: 01/22/2018  [employer] Interactive Patient Education © 2019 [employer]

## 2019-08-14 PROBLEM — I73.9 PVD (PERIPHERAL VASCULAR DISEASE): Status: ACTIVE | Noted: 2019-08-14

## 2019-08-14 NOTE — PROGRESS NOTES
"Herb Justice is a 74 y.o. female here for:    Chief Complaint   Patient presents with   • Pain     wheelchair evaluation   • Edema     feet have still been swelling        History of Present Illness   Here for wheelchair evaluation. Has undergone mobility exam with PT. Continues to struggle with weakness, claudication, joint pain.    Continues to have bilateral lower extremity edema as well. Has compression stockings, not wearing regularly. Tries to keep feet up. On diuretic for hypertension.    Needs refill of albuterol for COPD, chronic issue that is stable.    The following portions of the patient's history were reviewed and updated as appropriate: allergies, current medications, past family history, past medical history, past social history, past surgical history and problem list.    Review of Systems   Constitutional: Positive for fatigue.   Respiratory: Negative for shortness of breath.    Cardiovascular: Positive for leg swelling. Negative for chest pain.   Musculoskeletal: Positive for arthralgias, back pain and gait problem.   Neurological: Positive for weakness and numbness.       Vitals:    08/12/19 1255   BP: 140/88   Pulse: 66   Resp: 16   Temp: 98.2 °F (36.8 °C)   TempSrc: Temporal   SpO2: 97%   Weight: 97.2 kg (214 lb 6 oz)   Height: 157.5 cm (62.01\")       Objective   Physical Exam   Constitutional: She is oriented to person, place, and time. Vital signs are normal. She appears well-developed and well-nourished. She is active.  Non-toxic appearance. She does not appear ill. No distress. She is obese.  HENT:   Head: Normocephalic and atraumatic.   Right Ear: Hearing normal.   Left Ear: Hearing normal.   Mouth/Throat: Mucous membranes are not dry.   Eyes: EOM are normal. No scleral icterus.   Neck: Phonation normal. Neck supple.   Cardiovascular: Normal rate, regular rhythm and normal heart sounds.   Pulmonary/Chest: Effort normal and breath sounds normal.   Musculoskeletal:        " Right ankle: She exhibits swelling.        Left ankle: She exhibits swelling.        Right lower leg: She exhibits edema.        Left lower leg: She exhibits edema.   Neurological: She is alert and oriented to person, place, and time. She displays no tremor. No cranial nerve deficit. Gait abnormal.   Using a rolling walker but slow and a bit unsteady   Skin: Skin is warm. No rash noted. She is not diaphoretic. No pallor.   Psychiatric: She has a normal mood and affect. Her speech is normal and behavior is normal. Judgment and thought content normal. Cognition and memory are normal.   Nursing note and vitals reviewed.        Assessment/Plan     Problem List Items Addressed This Visit        Cardiovascular and Mediastinum    PVD (peripheral vascular disease) (CMS/HCC)       Respiratory    Chronic obstructive pulmonary disease (CMS/HCC) (Chronic)    Relevant Medications    albuterol sulfate  (90 Base) MCG/ACT inhaler       Nervous and Auditory    Chronic low back pain (Chronic)    Chronic pain of both ankles    Intermittent claudication (CMS/HCC)       Musculoskeletal and Integument    Bilateral chronic knee pain (Chronic)    Primary osteoarthritis involving multiple joints      Other Visit Diagnoses     Unspecified abnormalities of gait and mobility    -  Primary          · I saw Ms. Lata Justice today to discuss her mobility issues and limitations.  Ms. Justice has the following diagnoses that limits her mobility and affects her activities for daily living: Lumbago with sciatica, right side; pain in left ankle and joints of left foot; pain in right ankle and joints of right foot; peripheral vascular disease; primary generalized osteoarthritis; and unspecified abnormalities of gait and mobility.  We have considered and ruled out a cane or walker due to unstable gait and these devices do not help with her pain.  Patient lacks the strength and dexterity to operate a manual wheelchair.  Power wheelchair as needed  for safe and functional mobility within the home, allowing her to be able to make it to the bathroom in a timely manner, prevent falls.  Without this equipment and increase in medical care will be needed within the home.  She is willing and capable of safely using the equipment prescribed.  I have read and agree with her therapist evaluation.  · Monitor blood pressure, cannot go up further on diuretic due to potential renal impairment. Use compression stockings and elevate feet when possible. Gabapentin can sometimes worsen edema, she can try taking half as much to see if it helps with edema and if pain is tolerable on lower dose.     Return for As scheduled previously. or sooner if needed.    Annetta Mcclendon MD

## 2019-09-04 DIAGNOSIS — G89.29 CHRONIC MIDLINE LOW BACK PAIN WITH RIGHT-SIDED SCIATICA: Chronic | ICD-10-CM

## 2019-09-04 DIAGNOSIS — M54.41 CHRONIC MIDLINE LOW BACK PAIN WITH RIGHT-SIDED SCIATICA: Chronic | ICD-10-CM

## 2019-09-04 DIAGNOSIS — M17.11 PRIMARY OSTEOARTHRITIS OF RIGHT KNEE: ICD-10-CM

## 2019-09-04 RX ORDER — MELOXICAM 7.5 MG/1
TABLET ORAL
Qty: 90 TABLET | Refills: 1 | Status: SHIPPED | OUTPATIENT
Start: 2019-09-04 | End: 2019-12-04 | Stop reason: SDUPTHER

## 2019-12-04 ENCOUNTER — OFFICE VISIT (OUTPATIENT)
Dept: INTERNAL MEDICINE | Facility: CLINIC | Age: 74
End: 2019-12-04

## 2019-12-04 VITALS
DIASTOLIC BLOOD PRESSURE: 72 MMHG | TEMPERATURE: 96.8 F | SYSTOLIC BLOOD PRESSURE: 124 MMHG | WEIGHT: 207.38 LBS | HEART RATE: 68 BPM | RESPIRATION RATE: 16 BRPM | BODY MASS INDEX: 38.16 KG/M2 | OXYGEN SATURATION: 97 % | HEIGHT: 62 IN

## 2019-12-04 DIAGNOSIS — E66.01 CLASS 2 SEVERE OBESITY WITH SERIOUS COMORBIDITY AND BODY MASS INDEX (BMI) OF 37.0 TO 37.9 IN ADULT, UNSPECIFIED OBESITY TYPE (HCC): ICD-10-CM

## 2019-12-04 DIAGNOSIS — J44.9 CHRONIC OBSTRUCTIVE PULMONARY DISEASE, UNSPECIFIED COPD TYPE (HCC): Chronic | ICD-10-CM

## 2019-12-04 DIAGNOSIS — E78.2 MIXED HYPERLIPIDEMIA: ICD-10-CM

## 2019-12-04 DIAGNOSIS — R73.9 HYPERGLYCEMIA: ICD-10-CM

## 2019-12-04 DIAGNOSIS — G89.29 CHRONIC MIDLINE LOW BACK PAIN WITH RIGHT-SIDED SCIATICA: Chronic | ICD-10-CM

## 2019-12-04 DIAGNOSIS — M17.11 PRIMARY OSTEOARTHRITIS OF RIGHT KNEE: ICD-10-CM

## 2019-12-04 DIAGNOSIS — M54.41 CHRONIC MIDLINE LOW BACK PAIN WITH RIGHT-SIDED SCIATICA: Chronic | ICD-10-CM

## 2019-12-04 DIAGNOSIS — I10 BENIGN ESSENTIAL HYPERTENSION: Primary | Chronic | ICD-10-CM

## 2019-12-04 DIAGNOSIS — E53.8 VITAMIN B12 DEFICIENCY: ICD-10-CM

## 2019-12-04 DIAGNOSIS — M17.0 PRIMARY OSTEOARTHRITIS OF BOTH KNEES: ICD-10-CM

## 2019-12-04 PROCEDURE — 99214 OFFICE O/P EST MOD 30 MIN: CPT | Performed by: FAMILY MEDICINE

## 2019-12-04 RX ORDER — MELOXICAM 7.5 MG/1
7.5 TABLET ORAL DAILY
Qty: 90 TABLET | Refills: 3 | Status: SHIPPED | OUTPATIENT
Start: 2019-12-04 | End: 2020-09-17

## 2019-12-04 NOTE — PATIENT INSTRUCTIONS
Serving Sizes  A serving size is a measured amount of food or drink, such as one slice of bread, that has an associated nutrient content. Knowing the serving size of a food or drink can help you determine how much of that food you should consume.  What is the size of one serving?  The size of one healthy serving depends on the food or drink. To determine a serving size, read the food label. If the food or drink does not have a food label, try to find serving size information online. Or, use the following to estimate the size of one adult serving:  Grain  1 slice bread. ½ bagel. ½ cup pasta.  Vegetable  ½ cup cooked or canned vegetables. 1 cup raw, leafy greens.  Fruit  ½ cup canned fruit. 1 medium fruit. ¼ cup dried fruit.  Meat and Other Protein Sources  1 oz meat, poultry, or fish. ¼ cup cooked beans. 1 egg. ¼ cup nuts or seeds. 1 Tbsp nut butter. ¼ cup tofu or tempeh. 2 Tbsp hummus.  Dairy  An individual container of yogurt (6-8 oz). 1 piece of cheese the size of your thumb (1 oz). 1 cup (8 oz) milk or milk alternative.  Fat  A piece the size of one dice. 1 tsp soft margarine. 1 Tbsp mayonnaise. 1 tsp vegetable oil. 1 Tbsp regular salad dressing. 2 Tbsp low-fat salad dressing.  How many servings should I eat from each food group each day?  The following are the suggested number of servings to try and have every day from each food group. You can also look at your eating throughout the week and aim for meeting these requirements on most days for overall healthy eating.  Grain  6-8 servings. Try to have half of your grains from whole grains, such as whole wheat bread, corn tortillas, oatmeal, brown rice, whole wheat pasta, and bulgur.  Vegetable  At least 2½-3 servings.  Fruit  2 servings.  Meat and Other Protein Foods  5-6 servings. Aim to have lean proteins, such as chicken, turkey, fish, beans, or tofu.  Dairy  3 servings. Choose low-fat or nonfat if you are trying to control your weight.  Fat  2-3 servings.  Is  a serving the same thing as a portion?  No. A portion is the actual amount you eat, which may be more than one serving. Knowing the specific serving size of a food and the nutritional information that goes with it can help you make a healthy decision on what size portion to eat.  What are some tips to help me learn healthy serving sizes?  · Check food labels for serving sizes. Many foods that come as a single portion actually contain multiple servings.  · Determine the serving size of foods you commonly eat and figure out how large a portion you usually eat.  · Measure the number of servings that can be held by the bowls, glasses, cups, and plates you typically use. For example, pour your breakfast cereal into your regular bowl and then pour it into a measuring cup.  · For 1-2 days, measure the serving sizes of all the foods you eat.  · Practice estimating serving sizes and determining how big your portions should be.      This information is not intended to replace advice given to you by your health care provider. Make sure you discuss any questions you have with your health care provider.  Document Released: 09/15/2004 Document Revised: 08/12/2017 Document Reviewed: 03/17/2015  [employer] Interactive Patient Education © 2018 [employer]    MyPlate from [employer]    The general, healthful diet is based on the 2010 Dietary Guidelines for Americans. The amount of food you need to eat from each food group depends on your age, sex, and level of physical activity and can be individualized by a dietitian. Go to ChooseMyPlate.gov for more information.  What do I need to know about the MyPlate plan?  · Enjoy your food, but eat less.  · Avoid oversized portions.  ? ½ of your plate should include fruits and vegetables.  ? ¼ of your plate should be grains.  ? ¼ of your plate should be protein.  Grains  · Make at least half of your grains whole grains.  · For a 2,000 calorie daily food plan, eat 6 oz every day.  · 1 oz is about 1  slice bread, 1 cup cereal, or ½ cup cooked rice, cereal, or pasta.  Vegetables  · Make half your plate fruits and vegetables.  · For a 2,000 calorie daily food plan, eat 2½ cups every day.  · 1 cup is about 1 cup raw or cooked vegetables or vegetable juice or 2 cups raw leafy greens.  Fruits  · Make half your plate fruits and vegetables.  · For a 2,000 calorie daily food plan, eat 2 cups every day.  · 1 cup is about 1 cup fruit or 100% fruit juice or ½ cup dried fruit.  Protein  · For a 2,000 calorie daily food plan, eat 5½ oz every day.  · 1 oz is about 1 oz meat, poultry, or fish, ¼ cup cooked beans, 1 egg, 1 Tbsp peanut butter, or ½ oz nuts or seeds.  Dairy  · Switch to fat-free or low-fat (1%) milk.  · For a 2,000 calorie daily food plan, eat 3 cups every day.  · 1 cup is about 1 cup milk or yogurt or soy milk (soy beverage), 1½ oz natural cheese, or 2 oz processed cheese.  Fats, Oils, and Empty Calories  · Only small amounts of oils are recommended.  · Empty calories are calories from solid fats or added sugars.  · Compare sodium in foods like soup, bread, and frozen meals. Choose the foods with lower numbers.  · Drink water instead of sugary drinks.  What foods can I eat?  Grains  Whole grains such as whole wheat, quinoa, millet, and bulgur. Bread, rolls, and pasta made from whole grains. Brown or wild rice. Hot or cold cereals made from whole grains and without added sugar.  Vegetables  All fresh vegetables, especially fresh red, dark green, or orange vegetables. Peas and beans. Low-sodium frozen or canned vegetables prepared without added salt. Low-sodium vegetable juices.  Fruits  All fresh, frozen, and dried fruits. Canned fruit packed in water or fruit juice without added sugar. Fruit juices without added sugar.  Meats and Other Protein Sources  Boiled, baked, or grilled lean meat trimmed of fat. Skinless poultry. Fresh seafood and shellfish. Canned seafood packed in water. Unsalted nuts and unsalted nut  butters. Tofu. Dried beans and pea. Eggs.  Dairy  Low-fat or fat-free milk, yogurt, and cheeses.  Sweets and Desserts  Frozen desserts made from low-fat milk.  Fats and Oils  Olive, peanut, and canola oils and margarine. Salad dressing and mayonnaise made from these oils.  Other  Soups and casseroles made from allowed ingredients and without added fat or salt.  The items listed above may not be a complete list of recommended foods or beverages. Contact your dietitian for more options.  What foods are not recommended?  Grains  Sweetened, low-fiber cereals. Packaged baked goods. Snack crackers and chips. Cheese crackers, butter crackers, and biscuits. Frozen waffles, sweet breads, doughnuts, pastries, packaged baking mixes, pancakes, cakes, and cookies.  Vegetables  Regular canned or frozen vegetables or vegetables prepared with salt. Canned tomatoes. Canned tomato sauce. Fried vegetables. Vegetables in cream sauce or cheese sauce.  Fruits  Fruits packed in syrup or made with added sugar.  Meats and Other Protein Sources  Marbled or fatty meats such as ribs. Poultry with skin. Fried meats, poultry, eggs, or fish. Sausages, hot dogs, and deli meats such as pastrami, bologna, or salami.  Dairy  Whole milk, cream, cheeses made from whole milk, sour cream. Ice cream or yogurt made from whole milk or with added sugar.  Beverages  For adults, no more than one alcoholic drink per day. Regular soft drinks or other sugary beverages. Juice drinks.  Sweets and Desserts  Sugary or fatty desserts, candy, and other sweets.  Fats and Oils  Solid shortening or partially hydrogenated oils. Solid margarine. Margarine that contains trans fats. Butter.    The items listed above may not be a complete list of foods and beverages to avoid. Contact your dietitian for more information.    This information is not intended to replace advice given to you by your health care provider. Make sure you discuss any questions you have with your health  care provider.  Document Released: 01/06/2009 Document Revised: 05/25/2017 Document Reviewed: 11/26/2014  [employer] Interactive Patient Education © 2018 [employer]        Calorie Counting for Weight Loss  Calories are units of energy. Your body needs a certain amount of calories from food to keep you going throughout the day. When you eat more calories than your body needs, your body stores the extra calories as fat. When you eat fewer calories than your body needs, your body burns fat to get the energy it needs.  Calorie counting means keeping track of how many calories you eat and drink each day. Calorie counting can be helpful if you need to lose weight. If you make sure to eat fewer calories than your body needs, you should lose weight. Ask your health care provider what a healthy weight is for you.  For calorie counting to work, you will need to eat the right number of calories in a day in order to lose a healthy amount of weight per week. A dietitian can help you determine how many calories you need in a day and will give you suggestions on how to reach your calorie goal.  · A healthy amount of weight to lose per week is usually 1-2 lb (0.5-0.9 kg). This usually means that your daily calorie intake should be reduced by 500-750 calories.  · Eating 1,200 - 1,500 calories per day can help most women lose weight.  · Eating 1,500 - 1,800 calories per day can help most men lose weight.     What do I need to know about calorie counting?  In order to meet your daily calorie goal, you will need to:  · Find out how many calories are in each food you would like to eat. Try to do this before you eat.  · Decide how much of the food you plan to eat.  · Write down what you ate and how many calories it had. Doing this is called keeping a food log.     To successfully lose weight, it is important to balance calorie counting with a healthy lifestyle that includes regular activity. Aim for 150 minutes of moderate exercise  (such as walking) or 75 minutes of vigorous exercise (such as running) each week.  Where do I find calorie information?       The number of calories in a food can be found on a Nutrition Facts label. If a food does not have a Nutrition Facts label, try to look up the calories online or ask your dietitian for help.  Remember that calories are listed per serving. If you choose to have more than one serving of a food, you will have to multiply the calories per serving by the amount of servings you plan to eat. For example, the label on a package of bread might say that a serving size is 1 slice and that there are 90 calories in a serving. If you eat 1 slice, you will have eaten 90 calories. If you eat 2 slices, you will have eaten 180 calories.  How do I keep a food log?  Immediately after each meal, record the following information in your food log:  · What you ate. Don't forget to include toppings, sauces, and other extras on the food.  · How much you ate. This can be measured in cups, ounces, or number of items.  · How many calories each food and drink had.  · The total number of calories in the meal.     Keep your food log near you, such as in a small notebook in your pocket, or use a mobile dede or website. Some programs will calculate calories for you and show you how many calories you have left for the day to meet your goal.  What are some calorie counting tips?  · Use your calories on foods and drinks that will fill you up and not leave you hungry:  ? Some examples of foods that fill you up are nuts and nut butters, vegetables, lean proteins, and high-fiber foods like whole grains. High-fiber foods are foods with more than 5 g fiber per serving.  ? Drinks such as sodas, specialty coffee drinks, alcohol, and juices have a lot of calories, yet do not fill you up.  · Eat nutritious foods and avoid empty calories. Empty calories are calories you get from foods or beverages that do not have many vitamins or protein,  "such as candy, sweets, and soda. It is better to have a nutritious high-calorie food (such as an avocado) than a food with few nutrients (such as a bag of chips).  · Know how many calories are in the foods you eat most often. This will help you calculate calorie counts faster.  · Pay attention to calories in drinks. Low-calorie drinks include water and unsweetened drinks.  · Pay attention to nutrition labels for \"low fat\" or \"fat free\" foods. These foods sometimes have the same amount of calories or more calories than the full fat versions. They also often have added sugar, starch, or salt, to make up for flavor that was removed with the fat.  ·   · Find a way of tracking calories that works for you. Get creative. Try different apps or programs if writing down calories does not work for you.  What are some portion control tips?  · Know how many calories are in a serving. This will help you know how many servings of a certain food you can have.  · Use a measuring cup to measure serving sizes. You could also try weighing out portions on a kitchen scale. With time, you will be able to estimate serving sizes for some foods.  · Take some time to put servings of different foods on your favorite plates, bowls, and cups so you know what a serving looks like.  · Try not to eat straight from a bag or box. Doing this can lead to overeating. Put the amount you would like to eat in a cup or on a plate to make sure you are eating the right portion.  · Use smaller plates, glasses, and bowls to prevent overeating.  · Try not to multitask (for example, watch TV or use your computer) while eating. If it is time to eat, sit down at a table and enjoy your food. This will help you to know when you are full. It will also help you to be aware of what you are eating and how much you are eating.  What are tips for following this plan?  Reading food labels  · Check the calorie count compared to the serving size. The serving size may be " smaller than what you are used to eating.  · Check the source of the calories. Make sure the food you are eating is high in vitamins and protein and low in saturated and trans fats.  Shopping  · Read nutrition labels while you shop. This will help you make healthy decisions before you decide to purchase your food.  · Make a grocery list and stick to it.  Cooking  · Try to cook your favorite foods in a healthier way. For example, try baking instead of frying.  · Use low-fat dairy products.  Meal planning  · Use more fruits and vegetables. Half of your plate should be fruits and vegetables.  · Include lean proteins like poultry and fish.  How do I count calories when eating out?  · Ask for smaller portion sizes.  · Consider sharing an entree and sides instead of getting your own entree.  · If you get your own entree, eat only half. Ask for a box at the beginning of your meal and put the rest of your entree in it so you are not tempted to eat it.  · If calories are listed on the menu, choose the lower calorie options.  · Choose dishes that include vegetables, fruits, whole grains, low-fat dairy products, and lean protein.  · Choose items that are boiled, broiled, grilled, or steamed. Stay away from items that are buttered, battered, fried, or served with cream sauce. Items labeled “crispy” are usually fried, unless stated otherwise.  · Choose water, low-fat milk, unsweetened iced tea, or other drinks without added sugar. If you want an alcoholic beverage, choose a lower calorie option such as a glass of wine or light beer.  · Ask for dressings, sauces, and syrups on the side. These are usually high in calories, so you should limit the amount you eat.  · If you want a salad, choose a garden salad and ask for grilled meats. Avoid extra toppings like echevarria, cheese, or fried items. Ask for the dressing on the side, or ask for olive oil and vinegar or lemon to use as dressing.  · Estimate how many servings of a food you are  given. For example, a serving of cooked rice is ½ cup or about the size of half a baseball. Knowing serving sizes will help you be aware of how much food you are eating at restaurants. The list below tells you how big or small some common portion sizes are based on everyday objects:  ? 1 oz--4 stacked dice.  ? 3 oz--1 deck of cards.  ? 1 tsp--1 die.  ? 1 Tbsp--½ a ping-pong ball.  ? 2 Tbsp--1 ping-pong ball.  ? ½ cup--½ baseball.  ? 1 cup--1 baseball.  Summary  · Calorie counting means keeping track of how many calories you eat and drink each day. If you eat fewer calories than your body needs, you should lose weight.  · A healthy amount of weight to lose per week is usually 1-2 lb (0.5-0.9 kg). This usually means reducing your daily calorie intake by 500-750 calories.  · The number of calories in a food can be found on a Nutrition Facts label. If a food does not have a Nutrition Facts label, try to look up the calories online or ask your dietitian for help.  · Use your calories on foods and drinks that will fill you up, and not on foods and drinks that will leave you hungry.  · Use smaller plates, glasses, and bowls to prevent overeating.      This information is not intended to replace advice given to you by your health care provider. Make sure you discuss any questions you have with your health care provider.  Document Released: 12/18/2006 Document Revised: 11/17/2017 Document Reviewed: 11/17/2017  [employer] Interactive Patient Education © 2018 [employer]

## 2019-12-04 NOTE — PROGRESS NOTES
Subjective    Lata Justice is a 74 y.o. female here for:    Chief Complaint   Patient presents with   • Hypertension     6 mo f/u        Back pain: stopped gabapentin as she found it was causing the edema to lower extremities. Feels she can deal with the occasional severe pain more than the swelling. Meloxicam 7.5 mg continues to help with pain.     Obesity: has cut back and this chronic condition is improving. Watching diet as  is non-compliant with diabetic diet. Limited on exercise due to pain. Using walker today but has powerchair at home.     COPD is a chronic daily issue x years that is stable. No shortness of breath.        Hypertension   This is a chronic problem. The current episode started more than 1 year ago. The problem has been waxing and waning since onset. The problem is controlled. Pertinent negatives include no chest pain or shortness of breath. Agents associated with hypertension include NSAIDs. Risk factors for coronary artery disease include dyslipidemia, obesity, post-menopausal state, sedentary lifestyle and stress. Current antihypertension treatment includes diuretics, ACE inhibitors and beta blockers. The current treatment provides significant improvement. Compliance problems include exercise, diet and medication cost.  There is no history of CAD/MI or CVA. There is no history of a hypertension causing med, sleep apnea or a thyroid problem.   Back Pain   This is a chronic problem. The current episode started more than 1 year ago. The problem occurs daily. The problem has been waxing and waning since onset. The pain is present in the lumbar spine. The quality of the pain is described as aching and burning. Pertinent negatives include no chest pain. Risk factors include obesity, menopause, lack of exercise and sedentary lifestyle. Treatments tried: Gabapentin, Mobic.          The following portions of the patient's history were reviewed and updated as appropriate: allergies, current  "medications, past family history, past medical history, past social history, past surgical history and problem list.    Review of Systems   Respiratory: Negative for shortness of breath.    Cardiovascular: Negative for chest pain.   Musculoskeletal: Positive for back pain.       Visit Vitals  /72   Pulse 68   Temp 96.8 °F (36 °C) (Temporal)   Resp 16   Ht 157.5 cm (62.01\")   Wt 94.1 kg (207 lb 6 oz)   SpO2 97%   BMI 37.92 kg/m²         Objective   Physical Exam   Constitutional: She is oriented to person, place, and time. Vital signs are normal. She appears well-developed and well-nourished. She is active.  Non-toxic appearance. She does not have a sickly appearance. She does not appear ill. No distress. She is obese.  HENT:   Head: Normocephalic and atraumatic. Hair is normal.   Right Ear: Hearing and external ear normal.   Left Ear: Hearing and external ear normal.   Nose: Nose normal.   Mouth/Throat: Mucous membranes are not dry. No trismus in the jaw.   Eyes: Conjunctivae, EOM and lids are normal. Pupils are equal, round, and reactive to light. No scleral icterus.   Neck: Phonation normal. Neck supple. No tracheal deviation present.   Cardiovascular: Normal rate, regular rhythm and normal heart sounds.   No murmur heard.  Pulmonary/Chest: Effort normal and breath sounds normal.   Musculoskeletal: She exhibits no edema or deformity.   Neurological: She is alert and oriented to person, place, and time. She displays no tremor. No cranial nerve deficit. She exhibits normal muscle tone. Gait abnormal.   Skin: Skin is warm. Turgor is normal. No rash noted. She is not diaphoretic. No cyanosis. No pallor. Nails show no clubbing.   Psychiatric: She has a normal mood and affect. Her speech is normal and behavior is normal. Judgment and thought content normal. Cognition and memory are normal.   Nursing note and vitals reviewed.        Assessment/Plan     Problem List Items Addressed This Visit        Cardiovascular " and Mediastinum    Benign essential hypertension - Primary (Chronic)    Relevant Orders    CBC (No Diff)    TSH+Free T4    Mixed hyperlipidemia (Chronic)    Relevant Orders    Comprehensive Metabolic Panel    Lipid Panel       Respiratory    Chronic obstructive pulmonary disease (CMS/HCC) (Chronic)    Current Assessment & Plan     COPD is unchanged.  Continue current treatment.                Nervous and Auditory    Chronic low back pain (Chronic)    Relevant Medications    meloxicam (MOBIC) 7.5 MG tablet    Other Relevant Orders    Comprehensive Metabolic Panel       Other    Primary osteoarthritis of both knees    Relevant Medications    meloxicam (MOBIC) 7.5 MG tablet      Other Visit Diagnoses     Class 2 severe obesity with serious comorbidity and body mass index (BMI) of 37.0 to 37.9 in adult, unspecified obesity type (CMS/HCC)        down 13 lb from 2/2019 with smaller portions    Hyperglycemia        Relevant Orders    Hemoglobin A1c    Vitamin B12 deficiency        Relevant Orders    Vitamin B12    Folate          · Patient's Body mass index is 37.92 kg/m². BMI is above normal parameters. Recommendations include: nutrition counseling.  ·     Annetta Mcclendon MD

## 2020-02-10 ENCOUNTER — TELEPHONE (OUTPATIENT)
Dept: INTERNAL MEDICINE | Facility: CLINIC | Age: 75
End: 2020-02-10

## 2020-02-10 NOTE — TELEPHONE ENCOUNTER
[facility] called stating they have been send in scripts for Diclofenac Sodium (PENNSAID) 2 % solution [[id number]] (Order [id number]), calcipotriene and clobetasol. They stated they have not heard a response     Pleas advise [phone]

## 2020-02-10 NOTE — TELEPHONE ENCOUNTER
Unsure of this pharmacy, called to verify with patient, she was not familiar with this pharmacy either, verified she uses [facility] mail order and [facility] in [place]. Pt also denies needing any refills at this time. Disregarding message.

## 2020-03-02 ENCOUNTER — ANESTHESIA (OUTPATIENT)
Dept: PERIOP | Facility: HOSPITAL | Age: 75
End: 2020-03-02

## 2020-03-02 ENCOUNTER — ANESTHESIA EVENT (OUTPATIENT)
Dept: PERIOP | Facility: HOSPITAL | Age: 75
End: 2020-03-02

## 2020-03-02 ENCOUNTER — HOSPITAL ENCOUNTER (OUTPATIENT)
Facility: HOSPITAL | Age: 75
Setting detail: HOSPITAL OUTPATIENT SURGERY
Discharge: HOME | End: 2020-03-02
Attending: OPHTHALMOLOGY | Admitting: OPHTHALMOLOGY

## 2020-03-02 VITALS
OXYGEN SATURATION: 96 % | DIASTOLIC BLOOD PRESSURE: 85 MMHG | SYSTOLIC BLOOD PRESSURE: 171 MMHG | BODY MASS INDEX: 34.96 KG/M2 | WEIGHT: 190 LBS | HEIGHT: 62 IN | HEART RATE: 65 BPM | RESPIRATION RATE: 16 BRPM | TEMPERATURE: 98.1 F

## 2020-03-02 LAB — GLUCOSE BLDC GLUCOMTR-MCNC: 111 MG/DL (ref 70–130)

## 2020-03-02 PROCEDURE — 25010000002 PROPOFOL 200 MG/20ML EMULSION: Performed by: NURSE ANESTHETIST, CERTIFIED REGISTERED

## 2020-03-02 PROCEDURE — 82962 GLUCOSE BLOOD TEST: CPT

## 2020-03-02 PROCEDURE — V2632 POST CHMBR INTRAOCULAR LENS: HCPCS | Performed by: OPHTHALMOLOGY

## 2020-03-02 DEVICE — LENS ACRYSOF IQ SA60WF W/ULTRASERT 6X13MM ACU0T0 20.5: Type: IMPLANTABLE DEVICE | Site: POSTERIOR CHAMBER | Status: FUNCTIONAL

## 2020-03-02 RX ORDER — BALANCED SALT SOLUTION 6.4; .75; .48; .3; 3.9; 1.7 MG/ML; MG/ML; MG/ML; MG/ML; MG/ML; MG/ML
SOLUTION OPHTHALMIC AS NEEDED
Status: DISCONTINUED | OUTPATIENT
Start: 2020-03-02 | End: 2020-03-02 | Stop reason: HOSPADM

## 2020-03-02 RX ORDER — SODIUM CHLORIDE 0.9 % (FLUSH) 0.9 %
1-10 SYRINGE (ML) INJECTION AS NEEDED
Status: DISCONTINUED | OUTPATIENT
Start: 2020-03-02 | End: 2020-03-02 | Stop reason: HOSPADM

## 2020-03-02 RX ORDER — CYCLOPENTOLATE HYDROCHLORIDE 20 MG/ML
1 SOLUTION/ DROPS OPHTHALMIC
Status: COMPLETED | OUTPATIENT
Start: 2020-03-02 | End: 2020-03-02

## 2020-03-02 RX ORDER — PHENYLEPHRINE HYDROCHLORIDE 100 MG/ML
1 SOLUTION/ DROPS OPHTHALMIC
Status: COMPLETED | OUTPATIENT
Start: 2020-03-02 | End: 2020-03-02

## 2020-03-02 RX ORDER — ACETAZOLAMIDE 500 MG/1
500 CAPSULE, EXTENDED RELEASE ORAL ONCE
Status: COMPLETED | OUTPATIENT
Start: 2020-03-02 | End: 2020-03-02

## 2020-03-02 RX ORDER — PREDNISOLONE ACETATE 10 MG/ML
SUSPENSION/ DROPS OPHTHALMIC AS NEEDED
Status: DISCONTINUED | OUTPATIENT
Start: 2020-03-02 | End: 2020-03-02 | Stop reason: HOSPADM

## 2020-03-02 RX ORDER — KETAMINE HYDROCHLORIDE 50 MG/ML
INJECTION, SOLUTION, CONCENTRATE INTRAMUSCULAR; INTRAVENOUS AS NEEDED
Status: DISCONTINUED | OUTPATIENT
Start: 2020-03-02 | End: 2020-03-02 | Stop reason: SURG

## 2020-03-02 RX ORDER — LIDOCAINE HYDROCHLORIDE 40 MG/ML
INJECTION, SOLUTION RETROBULBAR; TOPICAL AS NEEDED
Status: DISCONTINUED | OUTPATIENT
Start: 2020-03-02 | End: 2020-03-02 | Stop reason: HOSPADM

## 2020-03-02 RX ORDER — PROPOFOL 10 MG/ML
INJECTION, EMULSION INTRAVENOUS AS NEEDED
Status: DISCONTINUED | OUTPATIENT
Start: 2020-03-02 | End: 2020-03-02 | Stop reason: SURG

## 2020-03-02 RX ORDER — SODIUM CHLORIDE, SODIUM LACTATE, POTASSIUM CHLORIDE, CALCIUM CHLORIDE 600; 310; 30; 20 MG/100ML; MG/100ML; MG/100ML; MG/100ML
1000 INJECTION, SOLUTION INTRAVENOUS CONTINUOUS
Status: DISCONTINUED | OUTPATIENT
Start: 2020-03-02 | End: 2020-03-02 | Stop reason: HOSPADM

## 2020-03-02 RX ORDER — TETRACAINE HYDROCHLORIDE 5 MG/ML
SOLUTION OPHTHALMIC AS NEEDED
Status: DISCONTINUED | OUTPATIENT
Start: 2020-03-02 | End: 2020-03-02 | Stop reason: HOSPADM

## 2020-03-02 RX ORDER — PREDNISOLONE ACETATE 10 MG/ML
1 SUSPENSION/ DROPS OPHTHALMIC SEE ADMIN INSTRUCTIONS
Status: DISCONTINUED | OUTPATIENT
Start: 2020-03-02 | End: 2020-03-02 | Stop reason: HOSPADM

## 2020-03-02 RX ORDER — TETRACAINE HYDROCHLORIDE 5 MG/ML
1 SOLUTION OPHTHALMIC SEE ADMIN INSTRUCTIONS
Status: COMPLETED | OUTPATIENT
Start: 2020-03-02 | End: 2020-03-02

## 2020-03-02 RX ORDER — SODIUM CHLORIDE 0.9 % (FLUSH) 0.9 %
3 SYRINGE (ML) INJECTION EVERY 12 HOURS SCHEDULED
Status: DISCONTINUED | OUTPATIENT
Start: 2020-03-02 | End: 2020-03-02 | Stop reason: HOSPADM

## 2020-03-02 RX ADMIN — PROPOFOL 50 MG: 10 INJECTION, EMULSION INTRAVENOUS at 11:50

## 2020-03-02 RX ADMIN — PHENYLEPHRINE HYDROCHLORIDE 1 DROP: 100 SOLUTION/ DROPS OPHTHALMIC at 10:58

## 2020-03-02 RX ADMIN — TETRACAINE HYDROCHLORIDE 1 DROP: 5 SOLUTION OPHTHALMIC at 10:51

## 2020-03-02 RX ADMIN — SODIUM CHLORIDE, POTASSIUM CHLORIDE, SODIUM LACTATE AND CALCIUM CHLORIDE 1000 ML: 600; 310; 30; 20 INJECTION, SOLUTION INTRAVENOUS at 11:02

## 2020-03-02 RX ADMIN — PHENYLEPHRINE HYDROCHLORIDE 1 DROP: 100 SOLUTION/ DROPS OPHTHALMIC at 11:03

## 2020-03-02 RX ADMIN — TETRACAINE HYDROCHLORIDE 1 DROP: 5 SOLUTION OPHTHALMIC at 10:52

## 2020-03-02 RX ADMIN — CYCLOPENTOLATE HYDROCHLORIDE 1 DROP: 20 SOLUTION/ DROPS OPHTHALMIC at 10:58

## 2020-03-02 RX ADMIN — CYCLOPENTOLATE HYDROCHLORIDE 1 DROP: 20 SOLUTION/ DROPS OPHTHALMIC at 11:03

## 2020-03-02 RX ADMIN — CYCLOPENTOLATE HYDROCHLORIDE 1 DROP: 20 SOLUTION/ DROPS OPHTHALMIC at 10:53

## 2020-03-02 RX ADMIN — KETAMINE HYDROCHLORIDE 10 MG: 50 INJECTION, SOLUTION INTRAMUSCULAR; INTRAVENOUS at 11:41

## 2020-03-02 RX ADMIN — PHENYLEPHRINE HYDROCHLORIDE 1 DROP: 100 SOLUTION/ DROPS OPHTHALMIC at 10:53

## 2020-03-02 RX ADMIN — PROPOFOL 50 MG: 10 INJECTION, EMULSION INTRAVENOUS at 11:41

## 2020-03-02 RX ADMIN — ACETAZOLAMIDE 500 MG: 500 CAPSULE, EXTENDED RELEASE ORAL at 12:09

## 2020-03-02 NOTE — OP NOTE
OPERATIVE NOTE    Date of Procedure: 3/2/2020  Patient Name: Lata Justice  Patient MRN: [id number]  YOB: 1945     Preoperative Diagnosis: Left nuclear sclerotic cataract.     Postoperative Diagnosis: Left nuclear sclerotic cataract.     Procedure Performed: Phacoemulsification with implantation of a  foldable posterior chamber intraocular lens, Left eye.     Surgeon: Alfredo Patiño MD     Anesthesia:  Monitored Anesthesia Care (MAC)      Brief History and Indication: The patient presents with a history of past progressive loss of vision.  Patient was diagnosed with cataract and requests removal for increased ability to read and see.     Operation Description: The patient was taken to the OR and prepped and draped in the usual sterile ophthalmic fashion. A lid speculum was placed in the eye.  A #75 blade was then used to make a stab incision two o’clock hours from the intended temporal clear cornea groove. The anterior chamber was then inflated with a Viscoelastic. A metal microkeratome blade was then used to enter the anterior chamber at the temporal clear cornea site. A three level tunnel incision was made. A curvilinear capsulorrhexis was then performed with a bent cystotome needle and capsulorrhexis forceps.  BSS on a 30 gauge bent cannula was used to hydro-dissect, and hydro-delineate the lens. Good fluid waves and hydro-delineation were noted. Phacoemulsification was then used to remove nuclear material without complications. The residual cortical and lenticular material was then removed with irrigation and aspiration. Viscoelastics were then used to inflate the bag in a soft shell technique. A PCIOL was injected into the bag. Post-implantation, there were no rents or tears in the bag and the lens was noted to be stable and centered. The residual Viscoelastic was then removed with irrigation and aspiration.  The wound was checked and found to be without leaks. Therefore a Polydex ointment  and one drop of a Prednisilone eye drop was placed in the eye.     Implant Information:   Implant Name Type Inv. Item Serial No.  Lot No. LRB No. Used   LENS ACRYSOF IQ SA60WF W/ULTRASERT 6X13MM ACU0T0 20.5 - [id number] - [id number] Implant LENS ACRYSOF IQ SA60WF W/ULTRASERT 6X13MM ACU0T0 20.5 [id number] RUPINDER NA Left 1       Complications: None    Discharge and Condition  The patient was transported to same day surgery in excellent condition and scheduled for follow-up tomorrow morning. The patient was given instructions on use of eye drops for the operative eye and was specifically instructed to call Dr. Patiño at his office or home for any nausea, vomiting, headache, decreased visual acuity, or pain, or if the patient had any general concerns.    Alfredo Patiño MD  3/2/2020

## 2020-03-02 NOTE — DISCHARGE INSTRUCTIONS
No pushing, pulling, tugging,  heavy lifting, or strenuous activity.  No major decision making, driving, or drinking alcoholic beverages for 24 hours. ( due to the medications you have  received)  Always use good hand hygiene/washing techniques.  NO driving while taking pain medications.    * if you have an incision:  Check your incision area every day for signs of infection.   Check for:  * more redness, swelling, or pain  *more fluid or blood  *warmth  *pus or bad smell    To assist you in voiding:  Drink plenty of fluids  Listen to running water while attempting to void.    If you are unable to urinate and you have an uncomfortable urge to void or it has been   6 hours since you were discharged, return to the Emergency Room    Post Operative Cataract Instructions     Left Eye    1.  Wear eye shield at bedtime for 3 nights.  You may wear glasses or sunglasses during the day.  You must keep eye protected at all times.  2.  Try not to sleep on the side in which the eye was operated (1-2 weeks).  3.  No heavy lifting (at least 3 days).  No bending below the waist.  Keep your head above your heart.  4.  Try not to cough or sneeze excessively.  5.  Bring eye drops and instructions with you to post-op appointment.  6.  If eyes become stuck together after surgery you may soak with warm cloth.  7. Start Prednisilone (Pred-Forte) today as soon as you get home.   *Apply 1 drop to operative eye four times a day for 7 days and then twice daily until all medication is gone.    Complications from cataract surgery usually occur within the first couple of weeks.  Complications could include excessive redness, pain, decreased vision, or changes in vision.  If problems are treated early, there is a better chance of resolution.  Please contact Dr. Patiño at one of the numbers listed below if you are experiencing any problems.  If a holiday, evenings, or weekends, do not hesitate to call if you are having a problem.      Dr. Capps  Haroon Patiño    [phone] CELL  [phone] CELL    [phone] CELL             [phone] CELL        If you are unable to reach any of the above doctors, please call [facility] at [phone]    IMPORTANT INFORMATION  If you have any questions or need any refills on your eye drops, please call the office at [phone].

## 2020-03-02 NOTE — H&P
"[facility] [place]         History and Physical    Patient Name: Lata Justice  MRN: [id number]  : 1945  Gender: female     HPI: Patient complaint of PPLOV Left eye diagnosed with cataract. Patient requests PHACO PCIOL for Increase of VA/ADL.    History:    Past Medical History:   Diagnosis Date   • Abnormal finding     STATED SHE \"WOKE\" UP DURING AN EGD, BREAST BIOPSY, BACK SURGERY   • Acute colitis    • Allergic    • Anxiety    • Arthritis    • Body piercing     EARS   • Cataracts, bilateral    • COPD (chronic obstructive pulmonary disease) (CMS/[facility])    • Depression    • Depression    • Diabetes mellitus (CMS/[facility])     DIET CONTROLLED   • Full dentures    • GERD (gastroesophageal reflux disease)    • Glaucoma    • High cholesterol    • Hx of colonic polyps    • Hypertension    • Injury of back    • Low back pain    • Obesity    • Osteoarthritis    • Pneumonia    • PONV (postoperative nausea and vomiting)    • Wears glasses        Past Surgical History:   Procedure Laterality Date   • BACK SURGERY      Lower back X2   • CHOLECYSTECTOMY     • COLONOSCOPY     • DILATION AND CURETTAGE, DIAGNOSTIC / THERAPEUTIC     • ENDOSCOPY     • HYSTERECTOMY      Total Hysterectomy   • LA TOTAL KNEE ARTHROPLASTY Right 10/18/2017    Procedure:  TOTAL KNEE ARTHROPLASTY RIGHT ELECTIVE ;  Surgeon: Doc Mixon MD;  Location: [facility];  Service: Orthopedics   • TUBAL ABDOMINAL LIGATION         Social History     Socioeconomic History   • Marital status:      Spouse name: Not on file   • Number of children: Not on file   • Years of education: Not on file   • Highest education level: Not on file   Tobacco Use   • Smoking status: Former Smoker     Years: 4.00     Types: Cigarettes     Last attempt to quit: 10/9/1985     Years since quittin.4   • Smokeless tobacco: Never Used   Substance and Sexual Activity   • Alcohol use: No   • Drug use: No   • Sexual activity: Defer       Family History "   Problem Relation Age of Onset   • Depression Daughter    • Mental illness Daughter    • Obesity Daughter    • Other Daughter         chronic back pain   • Arthritis Other    • Diabetes Other    • Hypertension Other    • Obesity Other    • Hyperlipidemia Other         high cholesterol       Prior to Admission Medications:  Medications Prior to Admission   Medication Sig Dispense Refill Last Dose   • albuterol sulfate  (90 Base) MCG/ACT inhaler Inhale 2 puffs Every 6 (Six) Hours As Needed for Wheezing or Shortness of Air. 3 inhaler 3 Past Week at Unknown time   • ALPRAZolam (XANAX) 0.25 MG tablet 2 (two) times a day.   3/1/2020 at 2100   • cetirizine (zyrTEC) 10 MG tablet TAKE ONE TABLET BY MOUTH EVERY DAY 30 tablet 11 3/1/2020 at 2100   • cilostazol (PLETAL) 100 MG tablet Take 1 tablet by mouth 2 (Two) Times a Day. To help with leg cramps due to circulation 180 tablet 3 3/1/2020 at 2100   • Cyanocobalamin (B-12) 1000 MCG tablet TAKE 1 TABLET BY MOUTH EVERY DAY 90 tablet 3 3/1/2020 at 2100   • esomeprazole (NEXIUM) 40 MG capsule Take 1 capsule by mouth daily.   3/1/2020 at 2100   • FLUoxetine (PROzac) 20 MG capsule Take 3 capsules by mouth Every Morning. 270 capsule 3 3/1/2020 at 2100   • hydrochlorothiazide (HYDRODIURIL) 25 MG tablet Take 1 tablet by mouth Daily. 90 tablet 3 3/1/2020 at 2100   • lisinopril (PRINIVIL,ZESTRIL) 40 MG tablet Take 1 tablet by mouth Daily. 90 tablet 3 3/1/2020 at 2100   • meloxicam (MOBIC) 7.5 MG tablet Take 1 tablet by mouth Daily. 90 tablet 3 3/1/2020 at 2100   • metoprolol tartrate (LOPRESSOR) 25 MG tablet Take 1 tablet by mouth 2 (Two) Times a Day. 180 tablet 3 3/1/2020 at 1800   • pravastatin (PRAVACHOL) 40 MG tablet Take 1 tablet by mouth Daily. 90 tablet 3 3/1/2020 at 2100   • SM ASPIRIN ADULT LOW STRENGTH 81 MG EC tablet TAKE ONE TABLET BY MOUTH AT BEDTIME 30 tablet 11 3/1/2020 at 1800       Allergies:  Allergies   Allergen Reactions   • Fluticasone Other (See Comments)     " UNKNOWN     • Fluticasone Propionate Other (See Comments)     UNKNOWN     • Furosemide Other (See Comments)     UNKNOWN     • Loratadine Other (See Comments)     UNKNOWN     • Mometasone Furoate Other (See Comments)     UNKNOWN     • Propoxyphene Other (See Comments)     UNKNOWN     • Sertraline Hcl Other (See Comments)     UNKNOWN     • Triamcinolone Other (See Comments)     UNKNOWN     • Triamcinolone Acetonide Other (See Comments)     UNKNOWN     • Acetaminophen Other (See Comments)     \"I JUT CAN'T TAKE IT.  I'M NOT SURE WHAT IT DID\"   • Ezetimibe Other (See Comments)     UNKNOWN          Vitals: Temp:  [97.4 °F (36.3 °C)] 97.4 °F (36.3 °C)  Heart Rate:  [65] 65  Resp:  [16] 16  BP: (163)/(74) 163/74    Review of Systems:   Within Normal Limits Abnormal   HEENT [x]    []     Cardiovascular [x]   []     Gastrointestinal [x]   []     Gentiourinary [x]   []     Neurologic [x]   []     Pulmonary [x]   []       Physical Exam:   Within Normal Limits Abnormal   HEENT [x]    []     Heart [x]   []     Lungs [x]   []     Abdomen [x]   []     Extremities [x]   []       Impression: Left nuclear sclerotic cataract.     Plan: CATARACT PHACO EXTRACTION WITH INTRAOCULAR LENS IMPLANT LEFT (Left)     Alfredo Patiño MD  3/2/2020    "

## 2020-03-02 NOTE — ANESTHESIA PREPROCEDURE EVALUATION
Anesthesia Evaluation     Patient summary reviewed and Nursing notes reviewed   history of anesthetic complications: PONV  NPO Solid Status: > 8 hours  NPO Liquid Status: > 8 hours           Airway   Mallampati: I  TM distance: >3 FB  Neck ROM: full  No difficulty expected  Dental - normal exam   (+) upper dentures and lower dentures    Pulmonary - normal exam   (+) COPD moderate, shortness of breath,   Cardiovascular - normal exam  Exercise tolerance: good (4-7 METS)    (+) hypertension, PVD, hyperlipidemia,       Neuro/Psych  (+) psychiatric history Anxiety and Depression,     GI/Hepatic/Renal/Endo    (+) obesity,  GERD,  diabetes mellitus,   (-) morbid obesity    Musculoskeletal     Abdominal  - normal exam    Bowel sounds: normal.   Substance History - negative use     OB/GYN negative ob/gyn ROS         Other   arthritis,                    Anesthesia Plan    ASA 3     MAC     intravenous induction     Anesthetic plan, all risks, benefits, and alternatives have been provided, discussed and informed consent has been obtained with: patient.    Plan discussed with attending.

## 2020-03-02 NOTE — ANESTHESIA POSTPROCEDURE EVALUATION
Patient: Lata Justice    Procedure Summary     Date:  03/02/20 Room / Location:  [facility] OR 1 /  [facility]    Anesthesia Start:  1137 Anesthesia Stop:  1158    Procedure:  CATARACT PHACO EXTRACTION WITH INTRAOCULAR LENS IMPLANT LEFT (Left Eye) Diagnosis:       Age-related nuclear cataract of left eye      (Age-related nuclear cataract of left eye [H25.12])    Surgeon:  Alfredo Patiño MD Provider:  James Pradhan CRNA    Anesthesia Type:  MAC ASA Status:  3          Anesthesia Type: MAC    Vitals  Vitals Value Taken Time   /85 3/2/2020 12:20 PM   Temp 98.1 °F (36.7 °C) 3/2/2020 12:02 PM   Pulse 65 3/2/2020 12:20 PM   Resp 16 3/2/2020 12:20 PM   SpO2 96 % 3/2/2020 12:20 PM           Post Anesthesia Care and Evaluation    Patient location during evaluation: bedside  Patient participation: complete - patient participated  Level of consciousness: awake and alert  Pain management: satisfactory to patient  Airway patency: patent  Anesthetic complications: No anesthetic complications  PONV Status: none  Cardiovascular status: acceptable and hemodynamically stable  Respiratory status: acceptable  Hydration status: acceptable

## 2020-03-03 ENCOUNTER — PREP FOR SURGERY (OUTPATIENT)
Dept: OTHER | Facility: HOSPITAL | Age: 75
End: 2020-03-03

## 2020-03-03 DIAGNOSIS — H25.11 NUCLEAR SCLEROTIC CATARACT OF RIGHT EYE: Primary | ICD-10-CM

## 2020-03-03 RX ORDER — TETRACAINE HYDROCHLORIDE 5 MG/ML
1 SOLUTION OPHTHALMIC SEE ADMIN INSTRUCTIONS
Status: CANCELLED | OUTPATIENT
Start: 2020-03-16

## 2020-03-03 RX ORDER — PHENYLEPHRINE HYDROCHLORIDE 100 MG/ML
1 SOLUTION/ DROPS OPHTHALMIC
Status: CANCELLED | OUTPATIENT
Start: 2020-03-16 | End: 2020-03-16

## 2020-03-03 RX ORDER — SODIUM CHLORIDE 0.9 % (FLUSH) 0.9 %
3 SYRINGE (ML) INJECTION EVERY 12 HOURS SCHEDULED
Status: CANCELLED | OUTPATIENT
Start: 2020-03-16

## 2020-03-03 RX ORDER — PREDNISOLONE ACETATE 10 MG/ML
1 SUSPENSION/ DROPS OPHTHALMIC SEE ADMIN INSTRUCTIONS
Status: CANCELLED | OUTPATIENT
Start: 2020-03-16

## 2020-03-03 RX ORDER — SODIUM CHLORIDE 0.9 % (FLUSH) 0.9 %
1-10 SYRINGE (ML) INJECTION AS NEEDED
Status: CANCELLED | OUTPATIENT
Start: 2020-03-16

## 2020-03-03 RX ORDER — CYCLOPENTOLATE HYDROCHLORIDE 20 MG/ML
1 SOLUTION/ DROPS OPHTHALMIC
Status: CANCELLED | OUTPATIENT
Start: 2020-03-16 | End: 2020-03-16

## 2020-03-04 PROBLEM — H25.11 NUCLEAR SCLEROTIC CATARACT OF RIGHT EYE: Status: ACTIVE | Noted: 2020-03-04

## 2020-03-11 DIAGNOSIS — I73.9 INTERMITTENT CLAUDICATION (HCC): ICD-10-CM

## 2020-03-12 RX ORDER — HYDROCHLOROTHIAZIDE 25 MG/1
TABLET ORAL
Qty: 90 TABLET | Refills: 3 | Status: SHIPPED | OUTPATIENT
Start: 2020-03-12 | End: 2020-12-18

## 2020-03-12 RX ORDER — CILOSTAZOL 100 MG/1
TABLET ORAL
Qty: 180 TABLET | Refills: 3 | Status: SHIPPED | OUTPATIENT
Start: 2020-03-12 | End: 2020-12-18

## 2020-03-12 RX ORDER — PRAVASTATIN SODIUM 40 MG
TABLET ORAL
Qty: 90 TABLET | Refills: 3 | Status: SHIPPED | OUTPATIENT
Start: 2020-03-12 | End: 2020-12-18

## 2020-03-12 RX ORDER — LISINOPRIL 40 MG/1
TABLET ORAL
Qty: 90 TABLET | Refills: 3 | Status: SHIPPED | OUTPATIENT
Start: 2020-03-12 | End: 2020-12-18

## 2020-03-16 ENCOUNTER — ANESTHESIA EVENT (OUTPATIENT)
Dept: PERIOP | Facility: HOSPITAL | Age: 75
End: 2020-03-16

## 2020-03-16 ENCOUNTER — HOSPITAL ENCOUNTER (OUTPATIENT)
Facility: HOSPITAL | Age: 75
Setting detail: HOSPITAL OUTPATIENT SURGERY
Discharge: HOME | End: 2020-03-16
Attending: OPHTHALMOLOGY | Admitting: OPHTHALMOLOGY

## 2020-03-16 ENCOUNTER — ANESTHESIA (OUTPATIENT)
Dept: PERIOP | Facility: HOSPITAL | Age: 75
End: 2020-03-16

## 2020-03-16 VITALS
DIASTOLIC BLOOD PRESSURE: 77 MMHG | OXYGEN SATURATION: 99 % | RESPIRATION RATE: 16 BRPM | SYSTOLIC BLOOD PRESSURE: 169 MMHG | WEIGHT: 190 LBS | HEIGHT: 62 IN | TEMPERATURE: 97.9 F | HEART RATE: 68 BPM | BODY MASS INDEX: 34.96 KG/M2

## 2020-03-16 DIAGNOSIS — H25.11 NUCLEAR SCLEROTIC CATARACT OF RIGHT EYE: ICD-10-CM

## 2020-03-16 PROCEDURE — V2632 POST CHMBR INTRAOCULAR LENS: HCPCS | Performed by: OPHTHALMOLOGY

## 2020-03-16 PROCEDURE — 25010000002 PROPOFOL 200 MG/20ML EMULSION: Performed by: NURSE ANESTHETIST, CERTIFIED REGISTERED

## 2020-03-16 RX ORDER — SODIUM CHLORIDE, SODIUM LACTATE, POTASSIUM CHLORIDE, CALCIUM CHLORIDE 600; 310; 30; 20 MG/100ML; MG/100ML; MG/100ML; MG/100ML
1000 INJECTION, SOLUTION INTRAVENOUS CONTINUOUS
Status: DISCONTINUED | OUTPATIENT
Start: 2020-03-16 | End: 2020-03-16 | Stop reason: HOSPADM

## 2020-03-16 RX ORDER — PREDNISOLONE ACETATE 10 MG/ML
SUSPENSION/ DROPS OPHTHALMIC AS NEEDED
Status: DISCONTINUED | OUTPATIENT
Start: 2020-03-16 | End: 2020-03-16 | Stop reason: HOSPADM

## 2020-03-16 RX ORDER — LIDOCAINE HYDROCHLORIDE 40 MG/ML
INJECTION, SOLUTION RETROBULBAR; TOPICAL AS NEEDED
Status: DISCONTINUED | OUTPATIENT
Start: 2020-03-16 | End: 2020-03-16 | Stop reason: HOSPADM

## 2020-03-16 RX ORDER — TETRACAINE HYDROCHLORIDE 5 MG/ML
1 SOLUTION OPHTHALMIC SEE ADMIN INSTRUCTIONS
Status: COMPLETED | OUTPATIENT
Start: 2020-03-16 | End: 2020-03-16

## 2020-03-16 RX ORDER — CYCLOPENTOLATE HYDROCHLORIDE 20 MG/ML
1 SOLUTION/ DROPS OPHTHALMIC
Status: COMPLETED | OUTPATIENT
Start: 2020-03-16 | End: 2020-03-16

## 2020-03-16 RX ORDER — KETAMINE HCL IN NACL, ISO-OSM 100MG/10ML
SYRINGE (ML) INJECTION AS NEEDED
Status: DISCONTINUED | OUTPATIENT
Start: 2020-03-16 | End: 2020-03-16 | Stop reason: SURG

## 2020-03-16 RX ORDER — LIDOCAINE HYDROCHLORIDE 20 MG/ML
INJECTION, SOLUTION INTRAVENOUS AS NEEDED
Status: DISCONTINUED | OUTPATIENT
Start: 2020-03-16 | End: 2020-03-16 | Stop reason: SURG

## 2020-03-16 RX ORDER — BALANCED SALT SOLUTION 6.4; .75; .48; .3; 3.9; 1.7 MG/ML; MG/ML; MG/ML; MG/ML; MG/ML; MG/ML
SOLUTION OPHTHALMIC AS NEEDED
Status: DISCONTINUED | OUTPATIENT
Start: 2020-03-16 | End: 2020-03-16 | Stop reason: HOSPADM

## 2020-03-16 RX ORDER — TETRACAINE HYDROCHLORIDE 5 MG/ML
SOLUTION OPHTHALMIC AS NEEDED
Status: DISCONTINUED | OUTPATIENT
Start: 2020-03-16 | End: 2020-03-16 | Stop reason: HOSPADM

## 2020-03-16 RX ORDER — SODIUM CHLORIDE 0.9 % (FLUSH) 0.9 %
1-10 SYRINGE (ML) INJECTION AS NEEDED
Status: DISCONTINUED | OUTPATIENT
Start: 2020-03-16 | End: 2020-03-16 | Stop reason: HOSPADM

## 2020-03-16 RX ORDER — PROPOFOL 10 MG/ML
INJECTION, EMULSION INTRAVENOUS AS NEEDED
Status: DISCONTINUED | OUTPATIENT
Start: 2020-03-16 | End: 2020-03-16 | Stop reason: SURG

## 2020-03-16 RX ORDER — PREDNISOLONE ACETATE 10 MG/ML
1 SUSPENSION/ DROPS OPHTHALMIC SEE ADMIN INSTRUCTIONS
Status: DISCONTINUED | OUTPATIENT
Start: 2020-03-16 | End: 2020-03-16 | Stop reason: HOSPADM

## 2020-03-16 RX ORDER — PHENYLEPHRINE HYDROCHLORIDE 100 MG/ML
1 SOLUTION/ DROPS OPHTHALMIC
Status: COMPLETED | OUTPATIENT
Start: 2020-03-16 | End: 2020-03-16

## 2020-03-16 RX ORDER — SODIUM CHLORIDE 0.9 % (FLUSH) 0.9 %
3 SYRINGE (ML) INJECTION EVERY 12 HOURS SCHEDULED
Status: DISCONTINUED | OUTPATIENT
Start: 2020-03-16 | End: 2020-03-16 | Stop reason: HOSPADM

## 2020-03-16 RX ADMIN — PHENYLEPHRINE HYDROCHLORIDE 1 DROP: 100 SOLUTION/ DROPS OPHTHALMIC at 07:53

## 2020-03-16 RX ADMIN — SODIUM CHLORIDE, POTASSIUM CHLORIDE, SODIUM LACTATE AND CALCIUM CHLORIDE 1000 ML: 600; 310; 30; 20 INJECTION, SOLUTION INTRAVENOUS at 08:03

## 2020-03-16 RX ADMIN — CYCLOPENTOLATE HYDROCHLORIDE 1 DROP: 20 SOLUTION/ DROPS OPHTHALMIC at 07:53

## 2020-03-16 RX ADMIN — PHENYLEPHRINE HYDROCHLORIDE 1 DROP: 100 SOLUTION/ DROPS OPHTHALMIC at 07:58

## 2020-03-16 RX ADMIN — PROPOFOL 30 MG: 10 INJECTION, EMULSION INTRAVENOUS at 09:13

## 2020-03-16 RX ADMIN — TETRACAINE HYDROCHLORIDE 1 DROP: 5 SOLUTION OPHTHALMIC at 07:46

## 2020-03-16 RX ADMIN — PROPOFOL 50 MG: 10 INJECTION, EMULSION INTRAVENOUS at 09:07

## 2020-03-16 RX ADMIN — PROPOFOL 50 MG: 10 INJECTION, EMULSION INTRAVENOUS at 09:01

## 2020-03-16 RX ADMIN — PHENYLEPHRINE HYDROCHLORIDE 1 DROP: 100 SOLUTION/ DROPS OPHTHALMIC at 07:48

## 2020-03-16 RX ADMIN — CYCLOPENTOLATE HYDROCHLORIDE 1 DROP: 20 SOLUTION/ DROPS OPHTHALMIC at 07:58

## 2020-03-16 RX ADMIN — TETRACAINE HYDROCHLORIDE 1 DROP: 5 SOLUTION OPHTHALMIC at 07:47

## 2020-03-16 RX ADMIN — Medication 10 MG: at 09:01

## 2020-03-16 RX ADMIN — CYCLOPENTOLATE HYDROCHLORIDE 1 DROP: 20 SOLUTION/ DROPS OPHTHALMIC at 07:48

## 2020-03-16 RX ADMIN — LIDOCAINE HYDROCHLORIDE 60 MG: 20 INJECTION, SOLUTION INTRAVENOUS at 09:01

## 2020-03-16 NOTE — H&P
"[facility]         History and Physical    Patient Name: Lata Justice  MRN: [id number]  : 1945  Gender: female     HPI: Patient complaint of PPLOV Right eye diagnosed with cataract. Patient requests PHACO PCIOL for Increase of VA/ADL.    History:    Past Medical History:   Diagnosis Date   • Abnormal finding     STATED SHE \"WOKE\" UP DURING AN EGD, BREAST BIOPSY, BACK SURGERY   • Acute colitis    • Allergic    • Anxiety    • Arthritis    • Body piercing     EARS   • Cataracts, bilateral    • COPD (chronic obstructive pulmonary disease) (CMS/HCC)    • Depression    • Depression    • Full dentures    • GERD (gastroesophageal reflux disease)    • Glaucoma    • High cholesterol    • Hx of colonic polyps    • Hypertension    • Injury of back    • Low back pain    • Obesity    • Osteoarthritis    • Pneumonia    • PONV (postoperative nausea and vomiting)    • Wears glasses        Past Surgical History:   Procedure Laterality Date   • BACK SURGERY      Lower back X2   • CATARACT EXTRACTION W/ INTRAOCULAR LENS IMPLANT Left 3/2/2020    Procedure: CATARACT PHACO EXTRACTION WITH INTRAOCULAR LENS IMPLANT LEFT;  Surgeon: Alfredo Patiño MD;  Location: [facility];  Service: Ophthalmology;  Laterality: Left;   • CHOLECYSTECTOMY     • COLONOSCOPY     • DILATION AND CURETTAGE, DIAGNOSTIC / THERAPEUTIC     • ENDOSCOPY     • HYSTERECTOMY      Total Hysterectomy   • [place] TOTAL KNEE ARTHROPLASTY Right 10/18/2017    Procedure:  TOTAL KNEE ARTHROPLASTY RIGHT ELECTIVE ;  Surgeon: Doc Mixon MD;  Location: [facility];  Service: Orthopedics   • TUBAL ABDOMINAL LIGATION         Social History     Socioeconomic History   • Marital status:      Spouse name: Not on file   • Number of children: Not on file   • Years of education: Not on file   • Highest education level: Not on file   Tobacco Use   • Smoking status: Former Smoker     Years: 4.00     Types: Cigarettes     Last attempt to quit: " 10/9/1985     Years since quittin.4   • Smokeless tobacco: Never Used   Substance and Sexual Activity   • Alcohol use: No   • Drug use: No   • Sexual activity: Defer       Family History   Problem Relation Age of Onset   • Depression Daughter    • Mental illness Daughter    • Obesity Daughter    • Other Daughter         chronic back pain   • Arthritis Other    • Diabetes Other    • Hypertension Other    • Obesity Other    • Hyperlipidemia Other         high cholesterol       Prior to Admission Medications:  Medications Prior to Admission   Medication Sig Dispense Refill Last Dose   • albuterol sulfate  (90 Base) MCG/ACT inhaler Inhale 2 puffs Every 6 (Six) Hours As Needed for Wheezing or Shortness of Air. 3 inhaler 3 3/15/2020 at 0800   • ALPRAZolam (XANAX) 0.25 MG tablet 2 (two) times a day.   3/15/2020 at 0800   • cetirizine (zyrTEC) 10 MG tablet TAKE ONE TABLET BY MOUTH EVERY DAY 30 tablet 11 3/15/2020 at 0800   • cilostazol (PLETAL) 100 MG tablet TAKE 1 TABLET TWICE DAILY TO HELP WITH LEG CRAMPS DUE TO CIRCULATION 180 tablet 3 3/15/2020 at 0800   • Cyanocobalamin (B-12) 1000 MCG tablet TAKE 1 TABLET BY MOUTH EVERY DAY 90 tablet 3 3/15/2020 at 0800   • esomeprazole (NEXIUM) 40 MG capsule Take 1 capsule by mouth daily.   3/15/2020 at 0800   • FLUoxetine (PROzac) 20 MG capsule Take 3 capsules by mouth Every Morning. 270 capsule 3 3/15/2020 at 0800   • hydroCHLOROthiazide (HYDRODIURIL) 25 MG tablet TAKE 1 TABLET EVERY DAY 90 tablet 3 3/15/2020 at 0800   • lisinopril (PRINIVIL,ZESTRIL) 40 MG tablet TAKE 1 TABLET EVERY DAY 90 tablet 3 3/15/2020 at 0800   • meloxicam (MOBIC) 7.5 MG tablet Take 1 tablet by mouth Daily. 90 tablet 3 3/15/2020 at 0800   • metoprolol tartrate (LOPRESSOR) 25 MG tablet TAKE 1 TABLET TWICE DAILY 180 tablet 3 3/15/2020 at 2100   • pravastatin (PRAVACHOL) 40 MG tablet TAKE 1 TABLET EVERY DAY 90 tablet 3 3/15/2020 at 0800   • SM ASPIRIN ADULT LOW STRENGTH 81 MG EC tablet TAKE ONE  "TABLET BY MOUTH AT BEDTIME 30 tablet 11 3/15/2020 at 800       Allergies:  Allergies   Allergen Reactions   • Fluticasone Other (See Comments)     UNKNOWN     • Fluticasone Propionate Other (See Comments)     UNKNOWN     • Furosemide Other (See Comments)     UNKNOWN     • Loratadine Other (See Comments)     UNKNOWN     • Mometasone Furoate Other (See Comments)     UNKNOWN     • Propoxyphene Other (See Comments)     UNKNOWN     • Sertraline Hcl Other (See Comments)     UNKNOWN     • Triamcinolone Other (See Comments)     UNKNOWN     • Triamcinolone Acetonide Other (See Comments)     UNKNOWN     • Acetaminophen Other (See Comments)     \"I JUT CAN'T TAKE IT.  I'M NOT SURE WHAT IT DID\"   • Ezetimibe Other (See Comments)     UNKNOWN          Vitals: Temp:  [98.1 °F (36.7 °C)] 98.1 °F (36.7 °C)  Heart Rate:  [67] 67  Resp:  [17] 17  BP: (163)/(69) 163/69    Review of Systems:   Within Normal Limits Abnormal   HEENT [x]    []     Cardiovascular [x]   []     Gastrointestinal [x]   []     Gentiourinary [x]   []     Neurologic [x]   []     Pulmonary [x]   []       Physical Exam:   Within Normal Limits Abnormal   HEENT [x]    []     Heart [x]   []     Lungs [x]   []     Abdomen [x]   []     Extremities [x]   []       Impression: Right nuclear sclerotic cataract.     Plan: CATARACT PHACO EXTRACTION WITH INTRAOCULAR LENS IMPLANT (Right)     Alfredo Patiño MD  3/16/2020    "

## 2020-03-16 NOTE — OP NOTE
OPERATIVE NOTE    Date of Procedure: 3/16/2020  Patient Name: Lata Justice  Patient MRN: [id number]  YOB: 1945     Preoperative Diagnosis: Right nuclear sclerotic cataract.     Postoperative Diagnosis: Right nuclear sclerotic cataract.     Procedure Performed: Phacoemulsification with implantation of a  foldable posterior chamber intraocular lens, Right eye.     Surgeon: Alfredo Patiño MD     Anesthesia:  Monitored Anesthesia Care (MAC)      Brief History and Indication: The patient presents with a history of past progressive loss of vision.  Patient was diagnosed with cataract and requests removal for increased ability to read and see.     Operation Description: The patient was taken to the OR and prepped and draped in the usual sterile ophthalmic fashion. A lid speculum was placed in the eye.  A #75 blade was then used to make a stab incision two o’clock hours from the intended temporal clear cornea groove. The anterior chamber was then inflated with a Viscoelastic. A metal microkeratome blade was then used to enter the anterior chamber at the temporal clear cornea site. A three level tunnel incision was made. A curvilinear capsulorrhexis was then performed with a bent cystotome needle and capsulorrhexis forceps.  BSS on a 30 gauge bent cannula was used to hydro-dissect, and hydro-delineate the lens.  Good fluid waves and hydro-delineation were noted. Phacoemulsification was then used to remove nuclear material without complications. The residual cortical and lenticular material was then removed with irrigation and aspiration. Viscoelastics were then used to inflate the bag in a soft shell technique. A PCIOL was injected into the bag.The lens was defective and Vannas Sissors were used to remove the lens and a new lens was injected without complication. Post-implantation, there were no rents or tears in the bag and the lens was noted to be stable and centered. The residual Viscoelastic was  then removed with irrigation and aspiration.  The wound was checked and found to be without leaks. Therefore a Polydex ointment and one drop of a Prednisilone eye drop was placed in the eye.     Implant Information:   Implant Name Type Inv. Item Serial No.  Lot No. LRB No. Used   LENS ACRYSOF IQ SA60WF W/ULTRASERT 6X13MM ACU0T0 20 - [id number] Implant LENS ACRYSOF IQ SA60WF W/ULTRASERT 6X13MM ACU0T0 20  RUPINDER [id number] Right 1       Complications: None    Discharge and Condition  The patient was transported to same day surgery in excellent condition and scheduled for follow-up tomorrow morning. The patient was given instructions on use of eye drops for the operative eye and was specifically instructed to call Dr. Patiño at his office or home for any nausea, vomiting, headache, decreased visual acuity, or pain, or if the patient had any general concerns.    Alfredo Patiño MD  3/16/2020

## 2020-03-16 NOTE — ANESTHESIA POSTPROCEDURE EVALUATION
Patient: Lata Justice    Procedure Summary     Date:  03/16/20 Room / Location:  [facility] OR  /  [facility]    Anesthesia Start:  0859 Anesthesia Stop:  0922    Procedure:  CATARACT PHACO EXTRACTION WITH INTRAOCULAR LENS IMPLANT (Right Eye) Diagnosis:       Nuclear sclerotic cataract of right eye      (Nuclear sclerotic cataract of right eye [H25.11])    Surgeon:  Alfredo Patiño MD Provider:  Yeimy Huitron CRNA    Anesthesia Type:  MAC ASA Status:  3          Anesthesia Type: MAC    Vitals  Vitals Value Taken Time   /77 3/16/2020  9:51 AM   Temp 97.9 °F (36.6 °C) 3/16/2020  9:26 AM   Pulse 68 3/16/2020  9:51 AM   Resp 16 3/16/2020  9:51 AM   SpO2 99 % 3/16/2020  9:51 AM           Post Anesthesia Care and Evaluation    Patient location during evaluation: PACU  Patient participation: complete - patient participated  Level of consciousness: awake and alert  Pain score: 0  Pain management: satisfactory to patient  Airway patency: patent  Anesthetic complications: No anesthetic complications  PONV Status: none  Cardiovascular status: acceptable and stable  Respiratory status: acceptable  Hydration status: acceptable

## 2020-03-16 NOTE — ANESTHESIA PREPROCEDURE EVALUATION
Anesthesia Evaluation     Patient summary reviewed and Nursing notes reviewed   history of anesthetic complications: PONV  NPO Solid Status: > 8 hours  NPO Liquid Status: > 8 hours           Airway   Mallampati: I  TM distance: >3 FB  Neck ROM: full  No difficulty expected  Dental - normal exam   (+) upper dentures and lower dentures    Pulmonary - normal exam   (+) pneumonia resolved , COPD moderate, shortness of breath,   Cardiovascular - normal exam  Exercise tolerance: good (4-7 METS)    (+) hypertension, PVD, hyperlipidemia,       Neuro/Psych  (+) psychiatric history Anxiety and Depression,     GI/Hepatic/Renal/Endo    (+) obesity,  GERD,  diabetes mellitus,   (-) morbid obesity    Musculoskeletal     Abdominal  - normal exam    Bowel sounds: normal.   Substance History - negative use     OB/GYN negative ob/gyn ROS         Other   arthritis,                        Anesthesia Plan    ASA 3     MAC     intravenous induction     Anesthetic plan, all risks, benefits, and alternatives have been provided, discussed and informed consent has been obtained with: patient.    Plan discussed with attending.

## 2020-03-16 NOTE — DISCHARGE INSTRUCTIONS
Post Operative Cataract Instructions     Right Eye    1.  Wear eye shield at bedtime for 3 nights.  You may wear glasses or sunglasses during the day.  You must keep eye protected at all times.  2.  Try not to sleep on the side in which the eye was operated (1-2 weeks).  3.  No heavy lifting (at least 3 days).  No bending below the waist.  Keep your head above your heart.  4.  Try not to cough or sneeze excessively.  5.  Bring eye drops and instructions with you to post-op appointment.  6.  If eyes become stuck together after surgery you may soak with warm cloth.  7. Start Prednisilone (Pred-Forte) today as soon as you get home.   *Apply 1 drop to operative eye four times a day for 7 days and then twice daily until all medication is gone.    Complications from cataract surgery usually occur within the first couple of weeks.  Complications could include excessive redness, pain, decreased vision, or changes in vision.  If problems are treated early, there is a better chance of resolution.  Please contact Dr. Patiño at one of the numbers listed below if you are experiencing any problems.  If a holiday, evenings, or weekends, do not hesitate to call if you are having a problem.      Dr. Jefry Patiño    [phone] CELL  [phone] CELL    [phone] CELL             [phone] CELL        If you are unable to reach any of the above doctors, please call [facility] at [phone]    IMPORTANT INFORMATION  If you have any questions or need any refills on your eye drops, please call the office at [phone].    No pushing, pulling, tugging,  heavy lifting, or strenuous activity.  No major decision making, driving, or drinking alcoholic beverages for 24 hours. ( due to the medications you have  received)  Always use good hand hygiene/washing techniques.  NO driving while taking pain medications.    * if you have an incision:  Check your incision area every  day for signs of infection.   Check for:  * more redness, swelling, or pain  *more fluid or blood  *warmth  *pus or bad smell    To assist you in voiding:  Drink plenty of fluids  Listen to running water while attempting to void.    If you are unable to urinate and you have an uncomfortable urge to void or it has been   6 hours since you were discharged, return to the Emergency Room

## 2020-04-17 RX ORDER — FLUOXETINE HYDROCHLORIDE 20 MG/1
CAPSULE ORAL
Qty: 270 CAPSULE | Refills: 3 | Status: SHIPPED | OUTPATIENT
Start: 2020-04-17 | End: 2021-01-19

## 2020-05-29 ENCOUNTER — HOSPITAL ENCOUNTER (EMERGENCY)
Facility: HOSPITAL | Age: 75
Discharge: HOME | End: 2020-05-30
Attending: EMERGENCY MEDICINE | Admitting: EMERGENCY MEDICINE

## 2020-05-29 ENCOUNTER — APPOINTMENT (OUTPATIENT)
Dept: CT IMAGING | Facility: HOSPITAL | Age: 75
End: 2020-05-29

## 2020-05-29 DIAGNOSIS — M25.552 CHRONIC LEFT HIP PAIN: Primary | ICD-10-CM

## 2020-05-29 DIAGNOSIS — G89.29 CHRONIC LEFT HIP PAIN: Primary | ICD-10-CM

## 2020-05-29 PROCEDURE — 72192 CT PELVIS W/O DYE: CPT

## 2020-05-29 PROCEDURE — 99284 EMERGENCY DEPT VISIT MOD MDM: CPT

## 2020-05-29 RX ORDER — MELOXICAM 7.5 MG/1
7.5 TABLET ORAL ONCE
Status: COMPLETED | OUTPATIENT
Start: 2020-05-29 | End: 2020-05-29

## 2020-05-29 RX ORDER — CYCLOBENZAPRINE HCL 10 MG
5 TABLET ORAL ONCE
Status: COMPLETED | OUTPATIENT
Start: 2020-05-29 | End: 2020-05-29

## 2020-05-29 RX ORDER — LISINOPRIL 20 MG/1
20 TABLET ORAL ONCE
Status: COMPLETED | OUTPATIENT
Start: 2020-05-29 | End: 2020-05-29

## 2020-05-29 RX ORDER — HYDROCHLOROTHIAZIDE 25 MG/1
25 TABLET ORAL ONCE
Status: COMPLETED | OUTPATIENT
Start: 2020-05-29 | End: 2020-05-29

## 2020-05-29 RX ADMIN — MELOXICAM 7.5 MG: 7.5 TABLET ORAL at 23:09

## 2020-05-29 RX ADMIN — LISINOPRIL 20 MG: 20 TABLET ORAL at 23:09

## 2020-05-29 RX ADMIN — CYCLOBENZAPRINE HYDROCHLORIDE 5 MG: 10 TABLET, FILM COATED ORAL at 23:19

## 2020-05-29 RX ADMIN — HYDROCHLOROTHIAZIDE 25 MG: 25 TABLET ORAL at 23:19

## 2020-05-30 VITALS
DIASTOLIC BLOOD PRESSURE: 77 MMHG | SYSTOLIC BLOOD PRESSURE: 159 MMHG | BODY MASS INDEX: 46.1 KG/M2 | OXYGEN SATURATION: 97 % | RESPIRATION RATE: 18 BRPM | WEIGHT: 270 LBS | TEMPERATURE: 99.5 F | HEART RATE: 79 BPM | HEIGHT: 64 IN

## 2020-05-30 RX ORDER — CYCLOBENZAPRINE HCL 5 MG
5 TABLET ORAL NIGHTLY PRN
Qty: 7 TABLET | Refills: 0 | Status: SHIPPED | OUTPATIENT
Start: 2020-05-30 | End: 2020-12-24

## 2020-06-01 ENCOUNTER — EPISODE CHANGES (OUTPATIENT)
Dept: CASE MANAGEMENT | Facility: OTHER | Age: 75
End: 2020-06-01

## 2020-07-15 ENCOUNTER — TELEPHONE (OUTPATIENT)
Dept: INTERNAL MEDICINE | Facility: CLINIC | Age: 75
End: 2020-07-15

## 2020-07-15 NOTE — TELEPHONE ENCOUNTER
I've not heard anything about this, but she's had something like this in the past where we filled out forms for braces and I don't think she ever received them but they were billed through medicare.

## 2020-07-15 NOTE — TELEPHONE ENCOUNTER
"Spoke with patient and she states she does not know who [employer] is and she did not request any braces for any part of her body. Apologized and advised I will call the company back and advised of this.     Called the number listed and re-directed several times to different voice recordings. Finally promted to leave a message for \"[employer]\". Left a message that the patient did not request any braces. No further action is needed. Only information I gave was Dr. Mcclendon's name and the patient's name.   "

## 2020-07-15 NOTE — TELEPHONE ENCOUNTER
I do not see any notes, messages, or communications regarding this (recent either). Are you familiar with that what are wanting? Please advise.

## 2020-07-15 NOTE — TELEPHONE ENCOUNTER
[employer] CALLED TO GET INFORMATION FOR PATIENT NEEDING A BACK, ANKLE, AND NECK SUPPORT BRACE;   THERE IS NO DOCUMENTATION  IN CHART FOR THIS, BUT THEY SAID WOULD CALL BACK IN COUPLE DAYS TO FOLLOW UP    DOCUMENT : [phone]

## 2020-07-22 ENCOUNTER — TRANSCRIBE ORDERS (OUTPATIENT)
Dept: LAB | Facility: HOSPITAL | Age: 75
End: 2020-07-22

## 2020-07-22 ENCOUNTER — LAB (OUTPATIENT)
Dept: LAB | Facility: HOSPITAL | Age: 75
End: 2020-07-22

## 2020-07-22 DIAGNOSIS — M54.50 LOW BACK PAIN, UNSPECIFIED BACK PAIN LATERALITY, UNSPECIFIED CHRONICITY, UNSPECIFIED WHETHER SCIATICA PRESENT: Primary | ICD-10-CM

## 2020-07-22 DIAGNOSIS — M54.50 LOW BACK PAIN, UNSPECIFIED BACK PAIN LATERALITY, UNSPECIFIED CHRONICITY, UNSPECIFIED WHETHER SCIATICA PRESENT: ICD-10-CM

## 2020-07-22 LAB
BUN SERPL-MCNC: 25 MG/DL (ref 8–23)
CREAT SERPL-MCNC: 1.02 MG/DL (ref 0.57–1)
GFR SERPL CREATININE-BSD FRML MDRD: 53 ML/MIN/1.73

## 2020-07-22 PROCEDURE — 36415 COLL VENOUS BLD VENIPUNCTURE: CPT

## 2020-07-22 PROCEDURE — 82565 ASSAY OF CREATININE: CPT

## 2020-07-22 PROCEDURE — 84520 ASSAY OF UREA NITROGEN: CPT

## 2020-07-31 ENCOUNTER — TELEPHONE (OUTPATIENT)
Dept: INTERNAL MEDICINE | Facility: CLINIC | Age: 75
End: 2020-07-31

## 2020-07-31 NOTE — TELEPHONE ENCOUNTER
[employer] WITH [employer] REPORTS THAT THEY FAXED OVER A REQUEST FOR BOTH A BACK AND KNEE BRACE FOR THE PATIENT.    PLEASE CALL AND CONFIRM -[phone]

## 2020-08-03 NOTE — TELEPHONE ENCOUNTER
Left another detailed message for [employer] that the patient has not asked for these devices nor does she want them. Please stop sending faxes and calls about this matter. They can reach out to the patient if they feel this was in error. Only info given was patient's name.

## 2020-09-16 DIAGNOSIS — M17.11 PRIMARY OSTEOARTHRITIS OF RIGHT KNEE: ICD-10-CM

## 2020-09-16 DIAGNOSIS — M54.41 CHRONIC MIDLINE LOW BACK PAIN WITH RIGHT-SIDED SCIATICA: Chronic | ICD-10-CM

## 2020-09-16 DIAGNOSIS — G89.29 CHRONIC MIDLINE LOW BACK PAIN WITH RIGHT-SIDED SCIATICA: Chronic | ICD-10-CM

## 2020-09-17 RX ORDER — MELOXICAM 7.5 MG/1
TABLET ORAL
Qty: 90 TABLET | Refills: 3 | Status: SHIPPED | OUTPATIENT
Start: 2020-09-17 | End: 2021-05-03 | Stop reason: SDUPTHER

## 2020-09-18 ENCOUNTER — OFFICE VISIT (OUTPATIENT)
Dept: INTERNAL MEDICINE | Facility: CLINIC | Age: 75
End: 2020-09-18

## 2020-09-18 VITALS
TEMPERATURE: 97.7 F | OXYGEN SATURATION: 98 % | SYSTOLIC BLOOD PRESSURE: 140 MMHG | HEART RATE: 81 BPM | DIASTOLIC BLOOD PRESSURE: 65 MMHG | RESPIRATION RATE: 18 BRPM | HEIGHT: 64 IN | WEIGHT: 231 LBS | BODY MASS INDEX: 39.44 KG/M2

## 2020-09-18 DIAGNOSIS — M15.9 PRIMARY OSTEOARTHRITIS INVOLVING MULTIPLE JOINTS: ICD-10-CM

## 2020-09-18 DIAGNOSIS — E78.2 MIXED HYPERLIPIDEMIA: ICD-10-CM

## 2020-09-18 DIAGNOSIS — R79.89 ELEVATED HOMOCYSTEINE: ICD-10-CM

## 2020-09-18 DIAGNOSIS — I10 ESSENTIAL HYPERTENSION: ICD-10-CM

## 2020-09-18 DIAGNOSIS — R73.9 HYPERGLYCEMIA: ICD-10-CM

## 2020-09-18 DIAGNOSIS — Z23 NEED FOR INFLUENZA VACCINATION: ICD-10-CM

## 2020-09-18 DIAGNOSIS — Z00.00 MEDICARE ANNUAL WELLNESS VISIT, SUBSEQUENT: Primary | ICD-10-CM

## 2020-09-18 DIAGNOSIS — Z91.81 AT MODERATE RISK FOR FALL: ICD-10-CM

## 2020-09-18 DIAGNOSIS — I73.9 INTERMITTENT CLAUDICATION (HCC): ICD-10-CM

## 2020-09-18 DIAGNOSIS — K22.719 BARRETT'S ESOPHAGUS WITH DYSPLASIA: ICD-10-CM

## 2020-09-18 DIAGNOSIS — J44.9 CHRONIC OBSTRUCTIVE PULMONARY DISEASE, UNSPECIFIED COPD TYPE (HCC): Chronic | ICD-10-CM

## 2020-09-18 DIAGNOSIS — F33.41 RECURRENT MAJOR DEPRESSIVE DISORDER, IN PARTIAL REMISSION (HCC): Chronic | ICD-10-CM

## 2020-09-18 DIAGNOSIS — E66.01 CLASS 2 SEVERE OBESITY DUE TO EXCESS CALORIES WITH SERIOUS COMORBIDITY AND BODY MASS INDEX (BMI) OF 39.0 TO 39.9 IN ADULT (HCC): ICD-10-CM

## 2020-09-18 DIAGNOSIS — E53.8 CYANOCOBALAMIN DEFICIENCY: ICD-10-CM

## 2020-09-18 DIAGNOSIS — R79.9 ABNORMAL BLOOD CHEMISTRY: ICD-10-CM

## 2020-09-18 DIAGNOSIS — I73.9 PVD (PERIPHERAL VASCULAR DISEASE) (HCC): ICD-10-CM

## 2020-09-18 PROBLEM — H25.11 NUCLEAR SCLEROTIC CATARACT OF RIGHT EYE: Status: RESOLVED | Noted: 2020-03-04 | Resolved: 2020-09-18

## 2020-09-18 PROCEDURE — G0008 ADMIN INFLUENZA VIRUS VAC: HCPCS | Performed by: FAMILY MEDICINE

## 2020-09-18 PROCEDURE — 96160 PT-FOCUSED HLTH RISK ASSMT: CPT | Performed by: FAMILY MEDICINE

## 2020-09-18 PROCEDURE — 99397 PER PM REEVAL EST PAT 65+ YR: CPT | Performed by: FAMILY MEDICINE

## 2020-09-18 PROCEDURE — G0439 PPPS, SUBSEQ VISIT: HCPCS | Performed by: FAMILY MEDICINE

## 2020-09-18 PROCEDURE — 90694 VACC AIIV4 NO PRSRV 0.5ML IM: CPT | Performed by: FAMILY MEDICINE

## 2020-09-18 NOTE — PATIENT INSTRUCTIONS
Fall Prevention in the Home, Adult  Falls can cause injuries and can affect people from all age groups. There are many simple things that you can do to make your home safe and to help prevent falls. Ask for help when making these changes, if needed.  What actions can I take to prevent falls?  General instructions  · Use good lighting in all rooms. Replace any light bulbs that burn out.  · Turn on lights if it is dark. Use night-lights.  · Place frequently used items in easy-to-reach places. Lower the shelves around your home if necessary.  · Set up furniture so that there are clear paths around it. Avoid moving your furniture around.  · Remove throw rugs and other tripping hazards from the floor.  · Avoid walking on wet floors.  · Fix any uneven floor surfaces.  · Add color or contrast paint or tape to grab bars and handrails in your home. Place contrasting color strips on the first and last steps of stairways.  · When you use a stepladder, make sure that it is completely opened and that the sides are firmly locked. Have someone hold the ladder while you are using it. Do not climb a closed stepladder.  · Be aware of any and all pets.  What can I do in the bathroom?         · Keep the floor dry. Immediately clean up any water that spills onto the floor.  · Remove soap buildup in the tub or shower on a regular basis.  · Use non-skid mats or decals on the floor of the tub or shower.  · Attach bath mats securely with double-sided, non-slip rug tape.  · If you need to sit down while you are in the shower, use a plastic, non-slip stool.  · Install grab bars by the toilet and in the tub and shower. Do not use towel bars as grab bars.  What can I do in the bedroom?  · Make sure that a bedside light is easy to reach.  · Do not use oversized bedding that drapes onto the floor.  · Have a firm chair that has side arms to use for getting dressed.  What can I do in the kitchen?  · Clean up any spills right away.  · If you  need to reach for something above you, use a sturdy step stool that has a grab bar.  · Keep electrical cables out of the way.  · Do not use floor polish or wax that makes floors slippery. If you must use wax, make sure that it is non-skid floor wax.  What can I do in the stairways?  · Do not leave any items on the stairs.  · Make sure that you have a light switch at the top of the stairs and the bottom of the stairs. Have them installed if you do not have them.  · Make sure that there are handrails on both sides of the stairs. Fix handrails that are broken or loose. Make sure that handrails are as long as the stairways.  · Install non-slip stair treads on all stairs in your home.  · Avoid having throw rugs at the top or bottom of stairways, or secure the rugs with carpet tape to prevent them from moving.  · Choose a carpet design that does not hide the edge of steps on the stairway.  · Check any carpeting to make sure that it is firmly attached to the stairs. Fix any carpet that is loose or worn.  What can I do on the outside of my home?  · Use bright outdoor lighting.  · Regularly repair the edges of walkways and driveways and fix any cracks.  · Remove high doorway thresholds.  · Trim any shrubbery on the main path into your home.  · Regularly check that handrails are securely fastened and in good repair. Both sides of any steps should have handrails.  · Install guardrails along the edges of any raised decks or porches.  · Clear walkways of debris and clutter, including tools and rocks.  · Have leaves, snow, and ice cleared regularly.  · Use sand or salt on walkways during winter months.  · In the garage, clean up any spills right away, including grease or oil spills.  What other actions can I take?  · Wear closed-toe shoes that fit well and support your feet. Wear shoes that have rubber soles or low heels.  · Use mobility aids as needed, such as canes, walkers, scooters, and crutches.  · Review your medicines with  your health care provider. Some medicines can cause dizziness or changes in blood pressure, which increase your risk of falling.  Talk with your health care provider about other ways that you can decrease your risk of falls. This may include working with a physical therapist or  to improve your strength, balance, and endurance.  Where to find more information  · Centers for Disease Control and Prevention, [place]: https://www.cdc.gov  · National [place] on Aging: https://ai9srar.yari.nih.gov  Contact a health care provider if:  · You are afraid of falling at home.  · You feel weak, drowsy, or dizzy at home.  · You fall at home.  Summary  · There are many simple things that you can do to make your home safe and to help prevent falls.  · Ways to make your home safe include removing tripping hazards and installing grab bars in the bathroom.  · Ask for help when making these changes in your home.  This information is not intended to replace advice given to you by your health care provider. Make sure you discuss any questions you have with your health care provider.  Document Released: 12/08/2003 Document Revised: 11/30/2018 Document Reviewed: 08/02/2018  [employer] Patient Education © 2020 [employer]      Chronic Obstructive Pulmonary Disease    Chronic obstructive pulmonary disease (COPD) is a long-term (chronic) condition that affects the lungs. COPD is a general term that can be used to describe many different lung problems that cause lung swelling (inflammation) and limit airflow, including chronic bronchitis and emphysema. If you have COPD, your lung function will probably never return to normal. In most cases, it gets worse over time. However, there are steps you can take to slow the progression of the disease and improve your quality of life.  What are the causes?  This condition may be caused by:  · Smoking. This is the most common cause.  · Certain genes passed down through families.  What increases the  risk?  The following factors may make you more likely to develop this condition:  · Secondhand smoke from cigarettes, pipes, or cigars.  · Exposure to chemicals and other irritants such as fumes and dust in the work environment.  · Chronic lung conditions or infections.  What are the signs or symptoms?  Symptoms of this condition include:  · Shortness of breath, especially during physical activity.  · Chronic cough with a large amount of thick mucus. Sometimes the cough may not have any mucus (dry cough).  · Wheezing.  · Rapid breaths.  · Gray or bluish discoloration (cyanosis) of the skin, especially in your fingers, toes, or lips.  · Feeling tired (fatigue).  · Weight loss.  · Chest tightness.  · Frequent infections.  · Episodes when breathing symptoms become much worse (exacerbations).  · Swelling in the ankles, feet, or legs. This may occur in later stages of the disease.  How is this diagnosed?  This condition is diagnosed based on:  · Your medical history.  · A physical exam.  You may also have tests, including:  · Lung (pulmonary) function tests. This may include a spirometry test, which measures your ability to exhale properly.  · Chest X-ray.  · CT scan.  · Blood tests.  How is this treated?  This condition may be treated with:  · Medicines. These may include inhaled rescue medicines to treat acute exacerbations as well as long-term, or maintenance, medicines to prevent flare-ups of COPD.  ? Bronchodilators help treat COPD by dilating the airways to allow increased airflow and make your breathing more comfortable.  ? Steroids can reduce airway inflammation and help prevent exacerbations.  · Smoking cessation. If you smoke, your health care provider may ask you to quit, and may also recommend therapy or replacement products to help you quit.  · Pulmonary rehabilitation. This may involve working with a team of health care providers and specialists, such as respiratory, occupational, and physical  therapists.  · Exercise and physical activity. These are beneficial for nearly all people with COPD.  · Nutrition therapy to gain weight, if you are underweight.  · Oxygen. Supplemental oxygen therapy is only helpful if you have a low oxygen level in your blood (hypoxemia).  · Lung surgery or transplant.  · Palliative care. This is to help people with COPD feel comfortable when treatment is no longer working.  Follow these instructions at home:  Medicines  · Take over-the-counter and prescription medicines (inhaled or pills) only as told by your health care provider.  · Talk to your health care provider before taking any cough or allergy medicines. You may need to avoid certain medicines that dry out your airways.  Lifestyle  · If you are a smoker, the most important thing that you can do is to stop smoking. Do not use any products that contain nicotine or tobacco, such as cigarettes and e-cigarettes. If you need help quitting, ask your health care provider. Continuing to smoke will cause the disease to progress faster.  · Avoid exposure to things that irritate your lungs, such as smoke, chemicals, and fumes.  · Stay active, but balance activity with periods of rest. Exercise and physical activity will help you maintain your ability to do things you want to do.  · Learn and use relaxation techniques to manage stress and to control your breathing.  · Get the right amount of sleep and get quality sleep. Most adults need 7 or more hours per night.  · Eat healthy foods. Eating smaller, more frequent meals and resting before meals may help you maintain your strength.  Controlled breathing  Learn and use controlled breathing techniques as directed by your health care provider. Controlled breathing techniques include:  · Pursed lip breathing. Start by breathing in (inhaling) through your nose for 1 second. Then, purse your lips as if you were going to whistle and breathe out (exhale) through the pursed lips for 2  seconds.  · Diaphragmatic breathing. Start by putting one hand on your abdomen just above your waist. Inhale slowly through your nose. The hand on your abdomen should move out. Then purse your lips and exhale slowly. You should be able to feel the hand on your abdomen moving in as you exhale.  Controlled coughing  Learn and use controlled coughing to clear mucus from your lungs. Controlled coughing is a series of short, progressive coughs. The steps of controlled coughing are:  1. Lean your head slightly forward.  2. Breathe in deeply using diaphragmatic breathing.  3. Try to hold your breath for 3 seconds.  4. Keep your mouth slightly open while coughing twice.  5. Spit any mucus out into a tissue.  6. Rest and repeat the steps once or twice as needed.  General instructions  · Make sure you receive all the vaccines that your health care provider recommends, especially the pneumococcal and influenza vaccines. Preventing infection and hospitalization is very important when you have COPD.  · Use oxygen therapy and pulmonary rehabilitation if directed to by your health care provider. If you require home oxygen therapy, ask your health care provider whether you should purchase a pulse oximeter to measure your oxygen level at home.  · Work with your health care provider to develop a COPD action plan. This will help you know what steps to take if your condition gets worse.  · Keep other chronic health conditions under control as told by your health care provider.  · Avoid extreme temperature and humidity changes.  · Avoid contact with people who have an illness that spreads from person to person (is contagious), such as viral infections or pneumonia.  · Keep all follow-up visits as told by your health care provider. This is important.  Contact a health care provider if:  · You are coughing up more mucus than usual.  · There is a change in the color or thickness of your mucus.  · Your breathing is more labored than  usual.  · Your breathing is faster than usual.  · You have difficulty sleeping.  · You need to use your rescue medicines or inhalers more often than expected.  · You have trouble doing routine activities such as getting dressed or walking around the house.  Get help right away if:  · You have shortness of breath while you are resting.  · You have shortness of breath that prevents you from:  ? Being able to talk.  ? Performing your usual physical activities.  · You have chest pain lasting longer than 5 minutes.  · Your skin color is more blue (cyanotic) than usual.  · You measure low oxygen saturations for longer than 5 minutes with a pulse oximeter.  · You have a fever.  · You feel too tired to breathe normally.  Summary  · Chronic obstructive pulmonary disease (COPD) is a long-term (chronic) condition that affects the lungs.  · Your lung function will probably never return to normal. In most cases, it gets worse over time. However, there are steps you can take to slow the progression of the disease and improve your quality of life.  · Treatment for COPD may include taking medicines, quitting smoking, pulmonary rehabilitation, and changes to diet and exercise. As the disease progresses, you may need oxygen therapy, a lung transplant, or palliative care.  · To help manage your condition, do not smoke, avoid exposure to things that irritate your lungs, stay up to date on all vaccines, and follow your health care provider's instructions for taking medicines.  This information is not intended to replace advice given to you by your health care provider. Make sure you discuss any questions you have with your health care provider.  Document Released: 09/27/2006 Document Revised: 11/30/2018 Document Reviewed: 01/22/2018  [email or web address] Patient Education © 2020 [employer]      Medicare Wellness  Personal Prevention Plan of Service     Date of Office Visit:  09/18/2020  Encounter Provider:  Annetta Mcclendon MD  Place of  Service:  [facility]  Patient Name: Lata Justice  :  1945    As part of the Medicare Wellness portion of your visit today, we are providing you with this personalized preventive plan of services (PPPS). This plan is based upon recommendations of the [place] Preventive Services Task Force (USPSTF) and the Advisory Committee on Immunization Practices (ACIP).    This lists the preventive care services that should be considered, and provides dates of when you are due. Items listed as completed are up-to-date and do not require any further intervention.    Health Maintenance   Topic Date Due   • LIPID PANEL  2018   • ZOSTER VACCINE (2 of 2) 2021 (Originally 2/3/2017)   • MAMMOGRAM  2021   • MEDICARE ANNUAL WELLNESS  2021   • TDAP/TD VACCINES (2 - Td) 2027   • COLONOSCOPY  2028   • HEPATITIS C SCREENING  Completed   • Pneumococcal Vaccine Once at 65 Years Old  Completed   • INFLUENZA VACCINE  Completed   • DXA SCAN  Discontinued       Orders Placed This Encounter   Procedures   • Fluad Quad 65+ yrs (5518-9775)   • Hemoglobin A1c   • CBC (No Diff)   • Comprehensive Metabolic Panel   • Lipid Panel     Order Specific Question:   [facility] Has the patient fasted?     Answer:   Yes   • TSH+Free T4   • Vitamin B12   • Folate   • Homocysteine   • Methylmalonic Acid, Serum       Return in about 3 months (around 2020) for Blood Pressure follow up.

## 2020-09-18 NOTE — PROGRESS NOTES
"Subjective   Dalila Valerio is a 71 y.o. female who presents c/o severe cramping in both legs that is traveling up to hips. Also with stiffness in hands. Worse at night.     History of Present Illness   Cramping in legs progressing over the last few months, having trouble sleeping and waking up screaming. When gets into hands, cannot bend fingers. Reports lower legs hurt to be touched. She reports feet draw up into claws. With leg cramping, if tries to stand will fall. Unsure if medications were changed in ER.   Went to therapy 3 months ago for shoulder pain, got MR at time, but if takes will be out for 2 days. (CYCLOBENZEPRINE). Was having LE cramping before got this. No other medication changes. Does take HCTZ and atorvastatin. Though it does tend to give muscle cramps.  On additional consideration is applying 2 new medications to rash under breasts. She mixes them together for a viral dermatosis. No blistering or pain.  The following portions of the patient's history were reviewed and updated as appropriate: allergies, current medications, past family history, past medical history, past social history, past surgical history and problem list.    Review of Systems   Constitutional: Negative for activity change, chills, diaphoresis and fever.   Respiratory: Positive for shortness of breath (chronic no increase--attributes to asthma, treatments help).    Cardiovascular: Positive for leg swelling (feet only swell at night. ).   Musculoskeletal: Positive for arthralgias and back pain. Negative for myalgias.   Hematological: Negative.    Psychiatric/Behavioral: Negative.      /80   Pulse 61   Temp 98.4 °F (36.9 °C) (Oral)   Ht 157.5 cm (62\")   Wt 84.8 kg (187 lb)   SpO2 99%   BMI 34.20 kg/m²     Objective   Physical Exam   Constitutional: She appears well-developed and well-nourished. No distress.   Neck: Normal range of motion. Neck supple. No tracheal deviation present. No thyromegaly present. " The ABCs of the Annual Wellness Visit  Subsequent Medicare Wellness Visit    Chief Complaint   Patient presents with   • Medicare Wellness-subsequent       Subjective   History of Present Illness:  Lata Justice is a 75 y.o. female who presents for a Subsequent Medicare Wellness Visit.    COPD chronic x years, stable with inhaler use as needed.    Obesity chronic x years, associated with hyperlipidemia (on statin), hypertension (treated) and hyperglycemia.    barretts esophagus tx with PPI. History of vitamin B12 deficiency (can be worsened by PPI), on oral replacement.    Depression chronic x years, stable on Prozac.    Peripheral vascular disease chronic associated with claudication. On statin, pletal.     HEALTH RISK ASSESSMENT    Recent Hospitalizations:  No hospitalization(s) within the last year.    Current Medical Providers:  Patient Care Team:  Annetta Mcclendon MD as PCP - General  Juanita Alvares RN as Ambulatory  (Population Health)  Sly Capellan MD as Consulting Physician (Orthopedic Surgery)  Alfredo Patiño MD as Consulting Physician (Ophthalmology)    Smoking Status:  Social History     Tobacco Use   Smoking Status Former Smoker   • Years: 4.00   • Types: Cigarettes   • Quit date: 10/9/1985   • Years since quittin.9   Smokeless Tobacco Never Used       Alcohol Consumption:  Social History     Substance and Sexual Activity   Alcohol Use No       Depression Screen:   PHQ-2/PHQ-9 Depression Screening 2020   Little interest or pleasure in doing things 0   Feeling down, depressed, or hopeless 1   Total Score 1       Fall Risk Screen:  SHEELAADI Fall Risk Assessment was completed, and patient is at MODERATE risk for falls. Assessment completed on:2020    Health Habits and Functional and Cognitive Screening:  Functional & Cognitive Status 2020   Do you have difficulty preparing food and eating? Yes   Do you have difficulty bathing yourself, getting dressed or    Cardiovascular: Normal rate and regular rhythm.   Murmur heard.  Pulmonary/Chest: Effort normal. She has no decreased breath sounds. She has no wheezes. She has no rhonchi. She has rales (faint bibasilar) in the right lower field and the left lower field.   Musculoskeletal: She exhibits tenderness (moderate diffuse tenderness UE and LE). She exhibits no edema.        Right forearm: She exhibits tenderness. She exhibits no bony tenderness, no swelling, no edema and no deformity.        Left forearm: She exhibits tenderness. She exhibits no bony tenderness, no swelling, no edema and no laceration.        Right hand: She exhibits decreased range of motion and bony tenderness (DIPS). She exhibits normal capillary refill and no swelling. Normal sensation noted. Normal strength noted.        Left hand: She exhibits decreased range of motion and bony tenderness (DIPS). She exhibits no swelling. Normal sensation noted. Normal strength noted.        Right upper leg: She exhibits tenderness. She exhibits no bony tenderness, no swelling, no edema and no laceration.        Left upper leg: She exhibits tenderness. She exhibits no bony tenderness, no swelling and no edema.        Right lower leg: She exhibits tenderness. She exhibits no bony tenderness, no swelling and no edema.        Left lower leg: She exhibits tenderness. She exhibits no bony tenderness, no swelling and no edema.   Lymphadenopathy:     She has no cervical adenopathy.   Neurological: Coordination and gait normal.   No cane today   Skin: Skin is warm and dry. No erythema.   Area in lower skin fold of L breast with red base mild maceration at fold only, central to area scattered minimally raised stuck on warty lesions.   Psychiatric: Her speech is normal and behavior is normal. Judgment and thought content normal. Her mood appears anxious.   Nursing note and vitals reviewed.    Assessment/Plan   Problems Addressed this Visit        Nervous and Auditory     grooming yourself? No   Do you have difficulty using the toilet? Yes   Do you have difficulty moving around from place to place? Yes   Do you have trouble with steps or getting out of a bed or a chair? Yes   Current Diet Well Balanced Diet   Dental Exam Up to date   Eye Exam Up to date   Exercise (times per week) 0 times per week   Current Exercise Activities Include None   Do you need help using the phone?  No   Are you deaf or do you have serious difficulty hearing?  No   Do you need help with transportation? Yes   Do you need help shopping? No   Do you need help preparing meals?  Yes   Do you need help with housework?  Yes   Do you need help with laundry? Yes   Do you need help taking your medications? No   Do you need help managing money? No   Do you ever drive or ride in a car without wearing a seat belt? No   Have you felt unusual stress, anger or loneliness in the last month? No   Who do you live with? Spouse   If you need help, do you have trouble finding someone available to you? No   Have you been bothered in the last four weeks by sexual problems? No   Do you have difficulty concentrating, remembering or making decisions? No         Does the patient have evidence of cognitive impairment? No    Asprin use counseling:Taking ASA appropriately as indicated    Age-appropriate Screening Schedule:  Refer to the list below for future screening recommendations based on patient's age, sex and/or medical conditions. Orders for these recommended tests are listed in the plan section. The patient has been provided with a written plan.    Health Maintenance   Topic Date Due   • ZOSTER VACCINE (2 of 2) 09/27/2021 (Originally 2/3/2017)   • MAMMOGRAM  06/17/2021   • LIPID PANEL  09/18/2021   • TDAP/TD VACCINES (2 - Td) 03/07/2027   • COLONOSCOPY  02/16/2028   • INFLUENZA VACCINE  Completed   • DXA SCAN  Discontinued          The following portions of the patient's history were reviewed and updated as appropriate:  Lumbar radiculopathy      Other Visit Diagnoses     Muscle cramping    -  Primary    Relevant Orders    Magnesium    Comprehensive Metabolic Panel    Ferritin    Elevated brain natriuretic peptide (BNP) level        Relevant Orders    BNP    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     Muscle cramping and low back pain--discussed at times people with chronic low back pain have innate muscle cramping or RLS when they sleep on back. Could sleep in an alternative position to see if improves. As was hyponatremic, with high BNP, when in ER and faint basilar rales, recommend recheck. Does have significant atherosclerosis and frequent ED visits for chest pain--should continue to follow with cardiology.   Y9MO--jvi gained weight and checking sugar infrequently. Overdue for A1c update.   Statin intolerance--consider rotating to crestor and if persist do more full statin tolerance labs  FU 3 months          allergies, current medications, past family history, past medical history, past social history, past surgical history and problem list.    Outpatient Medications Prior to Visit   Medication Sig Dispense Refill   • albuterol sulfate  (90 Base) MCG/ACT inhaler Inhale 2 puffs Every 6 (Six) Hours As Needed for Wheezing or Shortness of Air. 3 inhaler 3   • ALPRAZolam (XANAX) 0.25 MG tablet 2 (two) times a day.     • cetirizine (zyrTEC) 10 MG tablet TAKE ONE TABLET BY MOUTH EVERY DAY 30 tablet 11   • cilostazol (PLETAL) 100 MG tablet TAKE 1 TABLET TWICE DAILY TO HELP WITH LEG CRAMPS DUE TO CIRCULATION 180 tablet 3   • cyclobenzaprine (FLEXERIL) 5 MG tablet Take 1 tablet by mouth At Night As Needed for Muscle Spasms. Do not take with benzodiazepine 7 tablet 0   • esomeprazole (NEXIUM) 40 MG capsule Take 1 capsule by mouth daily.     • FLUoxetine (PROzac) 20 MG capsule TAKE 3 CAPSULES EVERY MORNING 270 capsule 3   • hydroCHLOROthiazide (HYDRODIURIL) 25 MG tablet TAKE 1 TABLET EVERY DAY 90 tablet 3   • lisinopril (PRINIVIL,ZESTRIL) 40 MG tablet TAKE 1 TABLET EVERY DAY 90 tablet 3   • meloxicam (MOBIC) 7.5 MG tablet TAKE 1 TABLET EVERY DAY 90 tablet 3   • metoprolol tartrate (LOPRESSOR) 25 MG tablet TAKE 1 TABLET TWICE DAILY 180 tablet 3   • pravastatin (PRAVACHOL) 40 MG tablet TAKE 1 TABLET EVERY DAY 90 tablet 3   • SM ASPIRIN ADULT LOW STRENGTH 81 MG EC tablet TAKE ONE TABLET BY MOUTH AT BEDTIME 30 tablet 11   • Cyanocobalamin (B-12) 1000 MCG tablet TAKE 1 TABLET BY MOUTH EVERY DAY 90 tablet 3   • traMADol (ULTRAM) 50 MG tablet        No facility-administered medications prior to visit.        Patient Active Problem List   Diagnosis   • Adkins's esophagus   • Essential hypertension   • Chronic low back pain   • Chronic obstructive pulmonary disease (CMS/HCC)   • Recurrent major depressive disorder, in partial remission (CMS/HCC)   • Impaired glucose tolerance   • Mixed hyperlipidemia   • Seasonal allergic rhinitis  "  • Bilateral chronic knee pain   • Bilateral edema of lower extremity   • Chronic pain of both ankles   • Primary osteoarthritis of both knees   • Status post right knee replacement   • Cyanocobalamin deficiency   • Primary osteoarthritis involving multiple joints   • Intermittent claudication (CMS/HCC)   • PVD (peripheral vascular disease) (CMS/[facility])       Advanced Care Planning:  ACP discussion was held with the patient during this visit. Patient has an advance directive (not in EMR), copy requested.    Review of Systems   Constitutional: Positive for fatigue. Negative for fever.   Respiratory: Negative for shortness of breath.    Cardiovascular: Negative for chest pain.   Musculoskeletal: Positive for arthralgias and back pain.   Psychiatric/Behavioral:        Stress   All other systems reviewed and are negative.      Compared to one year ago, the patient feels her physical health is worse.  Compared to one year ago, the patient feels her mental health is the same.    Reviewed chart for potential of high risk medication in the elderly: yes  Reviewed chart for potential of harmful drug interactions in the elderly:yes    Objective         Vitals:    09/18/20 1320   BP: 140/65   Pulse: 81   Resp: 18   Temp: 97.7 °F (36.5 °C)   TempSrc: Temporal   SpO2: 98%   Weight: 105 kg (231 lb)   Height: 162.6 cm (64.02\")   PainSc:   6       Body mass index is 39.63 kg/m².  Discussed the patient's BMI with her. The BMI is above average; BMI management plan is completed.    Physical Exam  Vitals signs and nursing note reviewed.   Constitutional:       General: She is not in acute distress.     Appearance: Normal appearance. She is well-developed. She is obese. She is not ill-appearing, toxic-appearing or diaphoretic.      Interventions: Face mask in place.   HENT:      Head: Normocephalic and atraumatic. Hair is normal.      Right Ear: Hearing, tympanic membrane, ear canal and external ear normal.      Left Ear: Hearing, tympanic " membrane, ear canal and external ear normal.   Eyes:      General: Lids are normal. Gaze aligned appropriately. No scleral icterus.        Right eye: No discharge.         Left eye: No discharge.      Extraocular Movements: Extraocular movements intact.      Conjunctiva/sclera: Conjunctivae normal.      Pupils: Pupils are equal, round, and reactive to light.   Neck:      Musculoskeletal: Neck supple.      Thyroid: No thyromegaly.      Trachea: Trachea and phonation normal. No tracheal deviation.   Cardiovascular:      Rate and Rhythm: Normal rate and regular rhythm.      Heart sounds: Normal heart sounds. No murmur. No friction rub. No gallop.    Pulmonary:      Effort: Pulmonary effort is normal.      Breath sounds: Normal breath sounds.   Chest:      Chest wall: No tenderness.   Abdominal:      General: Bowel sounds are normal. There is no distension.      Palpations: Abdomen is soft. Abdomen is not rigid. There is no mass.      Tenderness: There is no abdominal tenderness. There is no guarding or rebound.   Musculoskeletal:         General: No tenderness or deformity.      Right lower leg: No edema.      Left lower leg: No edema.   Lymphadenopathy:      Head:      Right side of head: No submandibular adenopathy.      Left side of head: No submandibular adenopathy.      Cervical: No cervical adenopathy.   Skin:     General: Skin is warm.      Capillary Refill: Capillary refill takes less than 2 seconds.      Coloration: Skin is not pale.      Findings: No rash.      Nails: There is no clubbing.     Neurological:      General: No focal deficit present.      Mental Status: She is alert and oriented to person, place, and time.      Cranial Nerves: No cranial nerve deficit.      Motor: No tremor, atrophy, abnormal muscle tone or seizure activity.      Gait: Gait abnormal (using walker, slow, antalgic).   Psychiatric:         Attention and Perception: Attention and perception normal.         Mood and Affect: Mood and  affect normal.         Speech: Speech normal.         Behavior: Behavior normal. Behavior is cooperative.         Thought Content: Thought content normal.         Cognition and Memory: Cognition and memory normal.         Judgment: Judgment normal.         Lab Results   Component Value Date     (H) 09/18/2020    CHLPL 139 09/18/2020    TRIG 103 09/18/2020    HDL 44 09/18/2020    LDL 74 09/18/2020    VLDL 20.6 09/18/2020    HGBA1C 5.30 09/18/2020        Assessment/Plan   Medicare Risks and Personalized Health Plan  CMS Preventative Services Quick Reference  Advance Directive Discussion  Cardiovascular risk  Chronic Pain   Dementia/Memory   Depression/Dysphoria  Diabetic Lab Screening   Fall Risk  Immunizations Discussed/Encouraged (specific immunizations; Influenza )  Inadequate Social Support, Isolation, Loneliness, Lack of Transportation, Financial Difficulties, or Caregiver Stress   Inactivity/Sedentary  Obesity/Overweight   Polypharmacy    The above risks/problems have been discussed with the patient.  Pertinent information has been shared with the patient in the After Visit Summary.  Follow up plans and orders are seen below in the Assessment/Plan Section.    Diagnoses and all orders for this visit:    1. Medicare annual wellness visit, subsequent (Primary)    2. Chronic obstructive pulmonary disease, unspecified COPD type (CMS/[facility])  Comments:  continue albuterol as needed for shortness of breath  Orders:  -     CBC (No Diff)    3. PVD (peripheral vascular disease) (CMS/[facility])  Comments:  continue statin, pletal, aspirin, encouraged activity with walker  Orders:  -     Lipid Panel    4. Intermittent claudication (CMS/[facility])  Comments:  continue statin, pletal, aspirin, encouraged activity with walker  Orders:  -     Lipid Panel    5. Class 2 severe obesity due to excess calories with serious comorbidity and body mass index (BMI) of 39.0 to 39.9 in adult (CMS/[facility])  Comments:  limited on exercise due to pain,  fatigue. Discussed physical therapy to help with endurance, strength. she'll consider.     6. Recurrent major depressive disorder, in partial remission (CMS/HCC)  Comments:  continue SSRI  Orders:  -     TSH+Free T4    7. Essential hypertension  Comments:  continue HCTZ, lisinopril, metoprolol  Orders:  -     TSH+Free T4  -     Homocysteine    8. Primary osteoarthritis involving multiple joints  Comments:  continue NSAID (with food). discussed CBD, consider PT  Orders:  -     Comprehensive Metabolic Panel    9. Mixed hyperlipidemia  Comments:  continue pravastatin  Orders:  -     Comprehensive Metabolic Panel  -     Lipid Panel    10. Adkins's esophagus with dysplasia  Comments:  continue PPI  Orders:  -     CBC (No Diff)  -     Comprehensive Metabolic Panel  -     Vitamin B12    11. Cyanocobalamin deficiency  Comments:  continue oral replacement, if labs indicate need will go back to shots  Orders:  -     CBC (No Diff)  -     Vitamin B12  -     Folate  -     Homocysteine  -     Methylmalonic Acid, Serum    12. Hyperglycemia  -     Hemoglobin A1c    13. At moderate risk for fall  Comments:  discussed/encouraged physical therapy she'll consider declines order at this time    14. Elevated homocysteine  -     CBC (No Diff)  -     Vitamin B12  -     Folate  -     Homocysteine  -     Methylmalonic Acid, Serum    15. Abnormal blood chemistry  -     Hemoglobin A1c  -     CBC (No Diff)  -     Comprehensive Metabolic Panel  -     Lipid Panel  -     TSH+Free T4  -     Vitamin B12  -     Folate  -     Homocysteine  -     Methylmalonic Acid, Serum    16. Need for influenza vaccination  -     Fluad Quad 65+ yrs ([id number])      Follow Up:  Return in about 3 months (around 12/18/2020) for Blood Pressure follow up.     An After Visit Summary and PPPS were given to the patient.

## 2020-09-21 RX ORDER — TRAMADOL HYDROCHLORIDE 50 MG/1
TABLET ORAL
COMMUNITY
Start: 2020-07-14 | End: 2021-11-03

## 2020-09-21 NOTE — TELEPHONE ENCOUNTER
Caller: Lata Justice    Relationship: Self    Best call back number: [phone]     Medication needed:   Requested Prescriptions     Pending Prescriptions Disp Refills   • cetirizine (zyrTEC) 10 MG tablet 30 tablet 11     Sig: Take 1 tablet by mouth Daily.       When do you need the refill by: ASAP    What details did the patient provide when requesting the medication: PATIENT STATES THAT SHE IS OUT OF THE MEDICATION .    Does the patient have less than a 3 day supply:  [x] Yes  [] No    What is the patient's preferred pharmacy: [facility] [address] - [phone]  - [phone] FX

## 2020-09-22 RX ORDER — CETIRIZINE HYDROCHLORIDE 10 MG/1
10 TABLET ORAL DAILY
Qty: 90 TABLET | Refills: 3 | Status: SHIPPED | OUTPATIENT
Start: 2020-09-22 | End: 2022-05-10 | Stop reason: SDUPTHER

## 2020-09-23 LAB
ALBUMIN SERPL-MCNC: 4.6 G/DL (ref 3.5–5.2)
ALBUMIN/GLOB SERPL: 2.9 G/DL
ALP SERPL-CCNC: 62 U/L (ref 39–117)
ALT SERPL-CCNC: 14 U/L (ref 1–33)
AST SERPL-CCNC: 19 U/L (ref 1–32)
BILIRUB SERPL-MCNC: 0.7 MG/DL (ref 0–1.2)
BUN SERPL-MCNC: 18 MG/DL (ref 8–23)
BUN/CREAT SERPL: 20.5 (ref 7–25)
CALCIUM SERPL-MCNC: 9.5 MG/DL (ref 8.6–10.5)
CHLORIDE SERPL-SCNC: 97 MMOL/L (ref 98–107)
CHOLEST SERPL-MCNC: 139 MG/DL (ref 0–200)
CO2 SERPL-SCNC: 25.4 MMOL/L (ref 22–29)
CREAT SERPL-MCNC: 0.88 MG/DL (ref 0.57–1)
ERYTHROCYTE [DISTWIDTH] IN BLOOD BY AUTOMATED COUNT: 13.6 % (ref 12.3–15.4)
FOLATE SERPL-MCNC: 16.2 NG/ML (ref 4.78–24.2)
GLOBULIN SER CALC-MCNC: 1.6 GM/DL
GLUCOSE SERPL-MCNC: 100 MG/DL (ref 65–99)
HBA1C MFR BLD: 5.3 % (ref 4.8–5.6)
HCT VFR BLD AUTO: 36.1 % (ref 34–46.6)
HCYS SERPL-SCNC: 11.5 UMOL/L (ref 0–19.2)
HDLC SERPL-MCNC: 44 MG/DL (ref 40–60)
HGB BLD-MCNC: 12.2 G/DL (ref 12–15.9)
LDLC SERPL CALC-MCNC: 74 MG/DL (ref 0–100)
Lab: NORMAL
MCH RBC QN AUTO: 30.1 PG (ref 26.6–33)
MCHC RBC AUTO-ENTMCNC: 33.8 G/DL (ref 31.5–35.7)
MCV RBC AUTO: 89.1 FL (ref 79–97)
METHYLMALONATE SERPL-SCNC: 107 NMOL/L (ref 0–378)
PLATELET # BLD AUTO: 205 10*3/MM3 (ref 140–450)
POTASSIUM SERPL-SCNC: 4.5 MMOL/L (ref 3.5–5.2)
PROT SERPL-MCNC: 6.2 G/DL (ref 6–8.5)
RBC # BLD AUTO: 4.05 10*6/MM3 (ref 3.77–5.28)
SODIUM SERPL-SCNC: 133 MMOL/L (ref 136–145)
T4 FREE SERPL-MCNC: 1.17 NG/DL (ref 0.93–1.7)
TRIGL SERPL-MCNC: 103 MG/DL (ref 0–150)
TSH SERPL DL<=0.005 MIU/L-ACNC: 2.02 UIU/ML (ref 0.27–4.2)
VIT B12 SERPL-MCNC: 982 PG/ML (ref 211–946)
VLDLC SERPL CALC-MCNC: 20.6 MG/DL
WBC # BLD AUTO: 4.62 10*3/MM3 (ref 3.4–10.8)

## 2020-09-23 NOTE — PROGRESS NOTES
Please let her know labs look okay. Her sodium (salt) level was just a bit low but a lot of patients who had labs drawn at that time have this, so we suspect a lab error. Continue oral vitamin B12 supplement, level is good. Cholesterol good. No medicine changes needed.

## 2020-10-29 ENCOUNTER — EPISODE CHANGES (OUTPATIENT)
Dept: CASE MANAGEMENT | Facility: OTHER | Age: 75
End: 2020-10-29

## 2020-12-17 DIAGNOSIS — I73.9 INTERMITTENT CLAUDICATION (HCC): ICD-10-CM

## 2020-12-18 RX ORDER — PRAVASTATIN SODIUM 40 MG
40 TABLET ORAL NIGHTLY
Qty: 90 TABLET | Refills: 3 | Status: SHIPPED | OUTPATIENT
Start: 2020-12-18 | End: 2021-09-24

## 2020-12-18 RX ORDER — HYDROCHLOROTHIAZIDE 25 MG/1
25 TABLET ORAL DAILY
Qty: 90 TABLET | Refills: 3 | Status: SHIPPED | OUTPATIENT
Start: 2020-12-18 | End: 2021-09-24

## 2020-12-18 RX ORDER — CILOSTAZOL 100 MG/1
TABLET ORAL
Qty: 180 TABLET | Refills: 3 | Status: SHIPPED | OUTPATIENT
Start: 2020-12-18 | End: 2021-09-24

## 2020-12-18 RX ORDER — LISINOPRIL 40 MG/1
40 TABLET ORAL DAILY
Qty: 90 TABLET | Refills: 3 | Status: SHIPPED | OUTPATIENT
Start: 2020-12-18 | End: 2021-09-24

## 2020-12-21 ENCOUNTER — OFFICE VISIT (OUTPATIENT)
Dept: INTERNAL MEDICINE | Facility: CLINIC | Age: 75
End: 2020-12-21

## 2020-12-21 VITALS
BODY MASS INDEX: 36.58 KG/M2 | SYSTOLIC BLOOD PRESSURE: 140 MMHG | RESPIRATION RATE: 18 BRPM | TEMPERATURE: 97.5 F | DIASTOLIC BLOOD PRESSURE: 70 MMHG | OXYGEN SATURATION: 95 % | HEIGHT: 64 IN | WEIGHT: 214.25 LBS | HEART RATE: 65 BPM

## 2020-12-21 DIAGNOSIS — I73.9 PVD (PERIPHERAL VASCULAR DISEASE) (HCC): Primary | ICD-10-CM

## 2020-12-21 DIAGNOSIS — J44.9 CHRONIC OBSTRUCTIVE PULMONARY DISEASE, UNSPECIFIED COPD TYPE (HCC): Chronic | ICD-10-CM

## 2020-12-21 DIAGNOSIS — I10 ESSENTIAL HYPERTENSION: ICD-10-CM

## 2020-12-21 PROCEDURE — 99214 OFFICE O/P EST MOD 30 MIN: CPT | Performed by: FAMILY MEDICINE

## 2020-12-21 RX ORDER — ALBUTEROL SULFATE 90 UG/1
2 AEROSOL, METERED RESPIRATORY (INHALATION) EVERY 6 HOURS PRN
Qty: 54 G | Refills: 3 | Status: SHIPPED | OUTPATIENT
Start: 2020-12-21 | End: 2021-08-02

## 2020-12-21 NOTE — PROGRESS NOTES
Subjective    Lata Justice is a 75 y.o. female here for:  Chief Complaint   Patient presents with   • Hypertension     Pt does not check her BP at home. She states she can tell when her BP goes up because she gets blurred vision, light headed, and dizzy. She has not had any of those spells since her last visit.        History per MA reviewed.    Back pain: previously stopped gabapentin as she found it was causing the edema to lower extremities. Feels she can deal with the occasional severe pain more than the swelling. Meloxicam 7.5 mg continues to help with pain.     COPD is a chronic daily issue x years that is not as controlled. Having to use rescue inhaler often (daily).       Hypertension  This is a chronic problem. The current episode started more than 1 year ago. The problem has been waxing and waning since onset. The problem is controlled. Associated symptoms include shortness of breath. Pertinent negatives include no chest pain. Agents associated with hypertension include NSAIDs. Risk factors for coronary artery disease include dyslipidemia, obesity, post-menopausal state, sedentary lifestyle and stress. Current antihypertension treatment includes diuretics, ACE inhibitors and beta blockers. The current treatment provides significant improvement. Compliance problems include exercise, diet and medication cost.  There is no history of CAD/MI or CVA. There is no history of a hypertension causing med, sleep apnea or a thyroid problem.   Back Pain  This is a chronic problem. The current episode started more than 1 year ago. The problem occurs daily. The problem has been waxing and waning since onset. The pain is present in the lumbar spine. The quality of the pain is described as aching and burning. Pertinent negatives include no chest pain or fever. Risk factors include obesity, menopause, lack of exercise and sedentary lifestyle. Treatments tried: Gabapentin, Mobic.          The following portions of the  "patient's history were reviewed and updated as appropriate: allergies, current medications, past family history, past medical history, past social history, past surgical history and problem list.    Review of Systems   Constitutional: Negative for fever.   Respiratory: Positive for shortness of breath.    Cardiovascular: Negative for chest pain.   Musculoskeletal: Positive for arthralgias, back pain and gait problem.   Psychiatric/Behavioral: Positive for stress.       Visit Vitals  /70   Pulse 65   Temp 97.5 °F (36.4 °C) (Temporal)   Resp 18   Ht 162.6 cm (64.02\")   Wt 97.2 kg (214 lb 4 oz)   SpO2 95%   BMI 36.76 kg/m²         Objective   Physical Exam  Vitals signs and nursing note reviewed.   Constitutional:       General: She is not in acute distress.     Appearance: Normal appearance. She is well-developed and well-groomed. She is obese. She is not ill-appearing, toxic-appearing or diaphoretic.      Interventions: Face mask in place.   HENT:      Head: Normocephalic and atraumatic.      Right Ear: Hearing normal.      Left Ear: Hearing normal.   Eyes:      General: Lids are normal. No scleral icterus.     Extraocular Movements: Extraocular movements intact.      Conjunctiva/sclera: Conjunctivae normal.      Pupils: Pupils are equal, round, and reactive to light.   Neck:      Musculoskeletal: Neck supple.      Trachea: Phonation normal.   Pulmonary:      Effort: Pulmonary effort is normal.   Skin:     General: Skin is warm.      Coloration: Skin is not ashen, cyanotic, jaundiced, mottled, pale or sallow.   Neurological:      General: No focal deficit present.      Mental Status: She is alert and oriented to person, place, and time.      Cranial Nerves: No dysarthria.      Motor: Motor function is intact.      Gait: Gait abnormal.   Psychiatric:         Attention and Perception: Attention and perception normal.         Mood and Affect: Mood and affect normal.         Speech: Speech normal.         Behavior: " Behavior normal. Behavior is cooperative.         Thought Content: Thought content normal.         Cognition and Memory: Cognition and memory normal.         Judgment: Judgment normal.           Assessment/Plan     Problem List Items Addressed This Visit        Cardiovascular and Mediastinum    Essential hypertension    Current Assessment & Plan     Hypertension is improving with treatment.  Dietary sodium restriction.  Continue current medications.  Ambulatory blood pressure monitoring.  Blood pressure will be reassessed at the next regular appointment.         PVD (peripheral vascular disease) (CMS/HCC) - Primary    Relevant Medications    aspirin (SM Aspirin Adult Low Strength) 81 MG EC tablet       Respiratory    Chronic obstructive pulmonary disease (CMS/HCC) (Chronic)    Current Assessment & Plan     COPD is not controlled.  Samples of Spiriva and Anoro Ellipta given, try one then the other, if one preferred let us know and will send. Continue albuterol prn soa             Relevant Medications    albuterol sulfate  (90 Base) MCG/ACT inhaler          Return in about 3 months (around 3/21/2021) for Blood Pressure follow up.      Annetta Mcclendon MD

## 2020-12-24 RX ORDER — ASPIRIN 81 MG/1
81 TABLET ORAL
Qty: 90 TABLET | Refills: 3 | Status: SHIPPED | OUTPATIENT
Start: 2020-12-24 | End: 2021-09-24

## 2020-12-24 NOTE — ASSESSMENT & PLAN NOTE
Hypertension is improving with treatment.  Dietary sodium restriction.  Continue current medications.  Ambulatory blood pressure monitoring.  Blood pressure will be reassessed at the next regular appointment.

## 2020-12-24 NOTE — ASSESSMENT & PLAN NOTE
COPD is not controlled.  Samples of Spiriva and Anoro Ellipta given, try one then the other, if one preferred let us know and will send. Continue albuterol prn soa

## 2021-01-19 RX ORDER — FLUOXETINE HYDROCHLORIDE 20 MG/1
CAPSULE ORAL
Qty: 270 CAPSULE | Refills: 3 | Status: SHIPPED | OUTPATIENT
Start: 2021-01-19 | End: 2021-10-25

## 2021-03-23 ENCOUNTER — OFFICE VISIT (OUTPATIENT)
Dept: INTERNAL MEDICINE | Facility: CLINIC | Age: 76
End: 2021-03-23

## 2021-03-23 VITALS
WEIGHT: 216 LBS | SYSTOLIC BLOOD PRESSURE: 125 MMHG | DIASTOLIC BLOOD PRESSURE: 70 MMHG | HEIGHT: 64 IN | BODY MASS INDEX: 36.88 KG/M2 | HEART RATE: 68 BPM | OXYGEN SATURATION: 96 % | TEMPERATURE: 97.7 F | RESPIRATION RATE: 18 BRPM

## 2021-03-23 DIAGNOSIS — I73.9 PVD (PERIPHERAL VASCULAR DISEASE) (HCC): ICD-10-CM

## 2021-03-23 DIAGNOSIS — J44.9 CHRONIC OBSTRUCTIVE PULMONARY DISEASE, UNSPECIFIED COPD TYPE (HCC): ICD-10-CM

## 2021-03-23 DIAGNOSIS — L65.9 HAIR LOSS: ICD-10-CM

## 2021-03-23 DIAGNOSIS — F33.41 RECURRENT MAJOR DEPRESSIVE DISORDER, IN PARTIAL REMISSION (HCC): ICD-10-CM

## 2021-03-23 DIAGNOSIS — I10 ESSENTIAL HYPERTENSION: Primary | ICD-10-CM

## 2021-03-23 DIAGNOSIS — M25.559 HIP PAIN: ICD-10-CM

## 2021-03-23 PROCEDURE — 99214 OFFICE O/P EST MOD 30 MIN: CPT | Performed by: FAMILY MEDICINE

## 2021-03-23 NOTE — PROGRESS NOTES
"Subjective    Lata Justice is a 75 y.o. female here for:  Chief Complaint   Patient presents with   • Hypertension     Pt denies any excessive headaches, blurred vision, or chest pain.        History per MA reviewed.    Blood pressure is well controlled with current medications.  She continues to solitario hip pain but she is going to see orthopedics soon.  She is hoping that he will give her injection in her hip as that has helped in the past on the other side.  She wants to avoid surgery.  Has some stress with 's health.  She has had both Covid vaccination she reports that she cannot tell me which one.  Mentions hair loss, hair is getting thin.  Cannot name any specific stressor over the last few months in her life.  She feels her mood is stable on Prozac.  No breathing problems at this time.         The following portions of the patient's history were reviewed and updated as appropriate: allergies, current medications, past family history, past medical history, past social history, past surgical history and problem list.    Review of Systems   Constitutional: Negative for fever.   Gastrointestinal: Negative for anal bleeding and blood in stool.        No melena   Musculoskeletal: Positive for arthralgias and gait problem.       Visit Vitals  /70   Pulse 68   Temp 97.7 °F (36.5 °C) (Temporal)   Resp 18   Ht 162.6 cm (64.02\")   Wt 98 kg (216 lb)   SpO2 96%   BMI 37.06 kg/m²         Objective   Physical Exam  Vitals and nursing note reviewed.   Constitutional:       General: She is not in acute distress.     Appearance: Normal appearance. She is well-developed and well-groomed. She is obese. She is not ill-appearing, toxic-appearing or diaphoretic.      Interventions: Face mask in place.   HENT:      Head: Normocephalic and atraumatic.      Right Ear: Hearing normal.      Left Ear: Hearing normal.   Eyes:      General: Lids are normal. No scleral icterus.     Extraocular Movements: Extraocular movements " intact.   Neck:      Trachea: Phonation normal.   Pulmonary:      Effort: Pulmonary effort is normal.   Skin:     Coloration: Skin is not jaundiced.   Neurological:      General: No focal deficit present.      Mental Status: She is alert and oriented to person, place, and time.      Motor: Motor function is intact.      Gait: Gait abnormal.   Psychiatric:         Attention and Perception: Attention and perception normal.         Mood and Affect: Mood and affect normal.         Speech: Speech normal.         Behavior: Behavior normal. Behavior is cooperative.         Thought Content: Thought content normal.         Cognition and Memory: Cognition and memory normal.         Judgment: Judgment normal.         For medical decision making review of the following was required:  Lab Results   Component Value Date    HGBA1C 5.30 09/18/2020    HGBA1C 5.9 09/06/2016     Lab Results   Component Value Date    TSH 2.020 09/18/2020     Lab Results   Component Value Date    FREET4 1.17 09/18/2020           Assessment/Plan     Problem List Items Addressed This Visit        Cardiac and Vasculature    Essential hypertension - Primary    PVD (peripheral vascular disease) (CMS/HCC)    Current Assessment & Plan     Stable, continue pletal and statin.            Mental Health    Recurrent major depressive disorder, in partial remission (CMS/HCC) (Chronic)       Pulmonary and Pneumonias    Chronic obstructive pulmonary disease (CMS/HCC) (Chronic)      Other Visit Diagnoses     Hair loss        Hip pain              · Blood pressure is well controlled at this time.  No medicine changes.  COPD is stable, continue inhalers as needed.  For depression, doing well.  Follow-up with orthopedics regarding hip pain, discussed injection but for now.  Discussed hair loss, possibly telogen effluvium.  Consider increasing greens in diet to help iron levels, last blood work did not show any indication of low iron previous thyroid labs normal as well.   If she continues to lose hair we will need to repeat labs next visit.    Return in about 3 months (around 6/23/2021) for Blood Pressure, hair loss follow up.     Annetta Mcclendon MD

## 2021-05-03 DIAGNOSIS — M17.11 PRIMARY OSTEOARTHRITIS OF RIGHT KNEE: ICD-10-CM

## 2021-05-03 DIAGNOSIS — G89.29 CHRONIC MIDLINE LOW BACK PAIN WITH RIGHT-SIDED SCIATICA: Chronic | ICD-10-CM

## 2021-05-03 DIAGNOSIS — M54.41 CHRONIC MIDLINE LOW BACK PAIN WITH RIGHT-SIDED SCIATICA: Chronic | ICD-10-CM

## 2021-05-03 RX ORDER — MELOXICAM 7.5 MG/1
7.5 TABLET ORAL DAILY
Qty: 90 TABLET | Refills: 3 | Status: SHIPPED | OUTPATIENT
Start: 2021-05-03 | End: 2022-05-10 | Stop reason: SDUPTHER

## 2021-05-03 NOTE — TELEPHONE ENCOUNTER
[facility] mail order pharmacy faxed a request for a refill of meloxicam 7.5mg tabs.

## 2021-07-01 ENCOUNTER — OFFICE VISIT (OUTPATIENT)
Dept: INTERNAL MEDICINE | Facility: CLINIC | Age: 76
End: 2021-07-01

## 2021-07-01 VITALS
HEIGHT: 64 IN | WEIGHT: 209 LBS | TEMPERATURE: 97.7 F | DIASTOLIC BLOOD PRESSURE: 60 MMHG | OXYGEN SATURATION: 99 % | RESPIRATION RATE: 18 BRPM | HEART RATE: 65 BPM | BODY MASS INDEX: 35.68 KG/M2 | SYSTOLIC BLOOD PRESSURE: 139 MMHG

## 2021-07-01 DIAGNOSIS — G89.29 CHRONIC MIDLINE LOW BACK PAIN WITH RIGHT-SIDED SCIATICA: Chronic | ICD-10-CM

## 2021-07-01 DIAGNOSIS — I10 ESSENTIAL HYPERTENSION: Primary | ICD-10-CM

## 2021-07-01 DIAGNOSIS — M54.41 CHRONIC MIDLINE LOW BACK PAIN WITH RIGHT-SIDED SCIATICA: Chronic | ICD-10-CM

## 2021-07-01 PROCEDURE — 99213 OFFICE O/P EST LOW 20 MIN: CPT | Performed by: FAMILY MEDICINE

## 2021-07-01 NOTE — PROGRESS NOTES
"Subjective    Lata Justice is a 75 y.o. female here for:  Chief Complaint   Patient presents with   • Hypertension     Pt does not check her BP at home. denies headaches or vision changes.    • Alopecia     hair loss is a little better.        History per MA reviewed.    Doing bettter since epidural shot, not hurting like she was    Hair loss is some better. Not losing handfuls  Using flaxseed oil and it seems to have helped.   Scalp doesn't itch like it did.     Hypertension    Alopecia  Associated symptoms include arthralgias. Pertinent negatives include no fever.          The following portions of the patient's history were reviewed and updated as appropriate: allergies, current medications, past family history, past medical history, past social history, past surgical history and problem list.    Review of Systems   Constitutional: Negative for fever.   Musculoskeletal: Positive for arthralgias.         Objective   Visit Vitals  /60   Pulse 65   Temp 97.7 °F (36.5 °C) (Temporal)   Resp 18   Ht 162.6 cm (64.02\")   Wt 94.8 kg (209 lb)   SpO2 99%   BMI 35.86 kg/m²       Physical Exam  Vitals and nursing note reviewed.   Constitutional:       General: She is not in acute distress.     Appearance: Normal appearance. She is well-developed and well-groomed. She is obese. She is not ill-appearing, toxic-appearing or diaphoretic.      Interventions: Face mask in place.   HENT:      Head: Normocephalic and atraumatic.      Right Ear: Hearing normal.      Left Ear: Hearing normal.   Eyes:      General: Lids are normal. No scleral icterus.     Extraocular Movements: Extraocular movements intact.   Neck:      Trachea: Phonation normal.   Cardiovascular:      Rate and Rhythm: Normal rate and regular rhythm.      Heart sounds: Normal heart sounds.   Pulmonary:      Effort: Pulmonary effort is normal.   Musculoskeletal:      Cervical back: Neck supple.   Skin:     Coloration: Skin is not jaundiced or pale. "   Neurological:      General: No focal deficit present.      Mental Status: She is alert and oriented to person, place, and time.      Motor: Motor function is intact.      Gait: Gait abnormal (using rolling walker).   Psychiatric:         Attention and Perception: Attention and perception normal.         Mood and Affect: Mood and affect normal.         Speech: Speech normal.         Behavior: Behavior normal. Behavior is cooperative.         Thought Content: Thought content normal.         Cognition and Memory: Cognition and memory normal.         Judgment: Judgment normal.       For medical decision making review of the following was required:  Lab Results   Component Value Date    WBC 4.62 09/18/2020    HGB 12.2 09/18/2020    HCT 36.1 09/18/2020    MCV 89.1 09/18/2020     09/18/2020     Lab Results   Component Value Date    GLUCOSE 101 (H) 10/20/2017    BUN 18 09/18/2020    CREATININE 0.88 09/18/2020    EGFRIFNONA 63 09/18/2020    EGFRIFAFRI 76 09/18/2020    BCR 20.5 09/18/2020    K 4.5 09/18/2020    CO2 25.4 09/18/2020    CALCIUM 9.5 09/18/2020    PROTENTOTREF 6.2 09/18/2020    ALBUMIN 4.60 09/18/2020    LABIL2 2.9 09/18/2020    AST 19 09/18/2020    ALT 14 09/18/2020           Assessment/Plan     Problem List Items Addressed This Visit        Cardiac and Vasculature    Essential hypertension - Primary    Current Assessment & Plan     Hypertension is improving with treatment.  Continue current treatment regimen.  Blood pressure will be reassessed at the next regular appointment.            Musculoskeletal and Injuries    Chronic low back pain (Chronic)    Current Assessment & Plan     Follow up with orthopedics.               · Patient has been erroneously marked as diabetic. Based on the available clinical information, she does not have diabetes and should therefore be excluded from diabetic health maintenance and quality measures for the remainder of the reporting period.     Return in about 3 months  (around 9/20/2021) for Medicare Wellness (366 days from last AWV).     Annetta Mcclendon MD

## 2021-07-09 ENCOUNTER — HOSPITAL ENCOUNTER (EMERGENCY)
Facility: HOSPITAL | Age: 76
Discharge: HOME | End: 2021-07-10
Attending: FAMILY MEDICINE
Payer: MEDICARE

## 2021-07-09 ENCOUNTER — APPOINTMENT (OUTPATIENT)
Dept: GENERAL RADIOLOGY | Facility: HOSPITAL | Age: 76
End: 2021-07-09
Payer: MEDICARE

## 2021-07-09 DIAGNOSIS — F43.0 PANIC ATTACK DUE TO EXCEPTIONAL STRESS: Primary | ICD-10-CM

## 2021-07-09 DIAGNOSIS — F41.0 PANIC ATTACK DUE TO EXCEPTIONAL STRESS: Primary | ICD-10-CM

## 2021-07-09 LAB
BASOPHILS ABSOLUTE: 0 K/UL (ref 0–0.1)
BASOPHILS RELATIVE PERCENT: 0.4 %
EOSINOPHILS ABSOLUTE: 0.1 K/UL (ref 0–0.4)
EOSINOPHILS RELATIVE PERCENT: 1.3 %
HCT VFR BLD CALC: 41.1 % (ref 37–47)
HEMOGLOBIN: 13.2 G/DL (ref 11.5–16.5)
IMMATURE GRANULOCYTES #: 0 K/UL
IMMATURE GRANULOCYTES %: 0.3 % (ref 0–5)
LYMPHOCYTES ABSOLUTE: 1.1 K/UL (ref 1.5–4)
LYMPHOCYTES RELATIVE PERCENT: 15.2 %
MCH RBC QN AUTO: 29.5 PG (ref 27–32)
MCHC RBC AUTO-ENTMCNC: 32.1 G/DL (ref 31–35)
MCV RBC AUTO: 91.7 FL (ref 80–100)
MONOCYTES ABSOLUTE: 0.4 K/UL (ref 0.2–0.8)
MONOCYTES RELATIVE PERCENT: 5.3 %
NEUTROPHILS ABSOLUTE: 5.4 K/UL (ref 2–7.5)
NEUTROPHILS RELATIVE PERCENT: 77.5 %
PDW BLD-RTO: 13.3 % (ref 11–16)
PLATELET # BLD: 204 K/UL (ref 150–400)
PMV BLD AUTO: 9.7 FL (ref 6–10)
RBC # BLD: 4.48 M/UL (ref 3.8–5.8)
WBC # BLD: 7 K/UL (ref 4–11)

## 2021-07-09 PROCEDURE — 84484 ASSAY OF TROPONIN QUANT: CPT

## 2021-07-09 PROCEDURE — 36415 COLL VENOUS BLD VENIPUNCTURE: CPT

## 2021-07-09 PROCEDURE — 85025 COMPLETE CBC W/AUTO DIFF WBC: CPT

## 2021-07-09 PROCEDURE — 71045 X-RAY EXAM CHEST 1 VIEW: CPT

## 2021-07-09 PROCEDURE — 6370000000 HC RX 637 (ALT 250 FOR IP): Performed by: FAMILY MEDICINE

## 2021-07-09 PROCEDURE — 80053 COMPREHEN METABOLIC PANEL: CPT

## 2021-07-09 PROCEDURE — 99283 EMERGENCY DEPT VISIT LOW MDM: CPT

## 2021-07-09 RX ORDER — ALPRAZOLAM 0.25 MG/1
0.5 TABLET ORAL ONCE
Status: COMPLETED | OUTPATIENT
Start: 2021-07-09 | End: 2021-07-09

## 2021-07-09 RX ADMIN — ALPRAZOLAM 0.5 MG: 0.25 TABLET ORAL at 23:30

## 2021-07-10 VITALS
SYSTOLIC BLOOD PRESSURE: 140 MMHG | RESPIRATION RATE: 27 BRPM | OXYGEN SATURATION: 99 % | DIASTOLIC BLOOD PRESSURE: 69 MMHG | HEART RATE: 90 BPM

## 2021-07-10 LAB
A/G RATIO: 1.4 (ref 0.8–2)
ALBUMIN SERPL-MCNC: 4.3 G/DL (ref 3.4–4.8)
ALP BLD-CCNC: 76 U/L (ref 25–100)
ALT SERPL-CCNC: 13 U/L (ref 4–36)
ANION GAP SERPL CALCULATED.3IONS-SCNC: 11 MMOL/L (ref 3–16)
AST SERPL-CCNC: 17 U/L (ref 8–33)
BILIRUB SERPL-MCNC: 0.3 MG/DL (ref 0.3–1.2)
BUN BLDV-MCNC: 25 MG/DL (ref 6–20)
CALCIUM SERPL-MCNC: 9.7 MG/DL (ref 8.5–10.5)
CHLORIDE BLD-SCNC: 102 MMOL/L (ref 98–107)
CO2: 26 MMOL/L (ref 20–30)
CREAT SERPL-MCNC: 0.9 MG/DL (ref 0.4–1.2)
GFR AFRICAN AMERICAN: >59
GFR NON-AFRICAN AMERICAN: >60
GLOBULIN: 3.1 G/DL
GLUCOSE BLD-MCNC: 148 MG/DL (ref 74–106)
POTASSIUM REFLEX MAGNESIUM: 4.2 MMOL/L (ref 3.4–5.1)
SODIUM BLD-SCNC: 139 MMOL/L (ref 136–145)
TOTAL PROTEIN: 7.4 G/DL (ref 6.4–8.3)
TROPONIN: <0.3 NG/ML

## 2021-07-10 PROCEDURE — 6370000000 HC RX 637 (ALT 250 FOR IP): Performed by: FAMILY MEDICINE

## 2021-07-10 RX ORDER — ALPRAZOLAM 0.25 MG/1
0.5 TABLET ORAL ONCE
Status: COMPLETED | OUTPATIENT
Start: 2021-07-10 | End: 2021-07-10

## 2021-07-10 RX ADMIN — ALPRAZOLAM 0.5 MG: 0.25 TABLET ORAL at 00:47

## 2021-07-10 ASSESSMENT — ENCOUNTER SYMPTOMS
WHEEZING: 1
SHORTNESS OF BREATH: 1

## 2021-07-10 NOTE — ED PROVIDER NOTES
Past Surgical History:   Procedure Laterality Date    BACK SURGERY      low back     BREAST BIOPSY      left    CHOLECYSTECTOMY      ESOPHAGUS SURGERY      HYSTERECTOMY      complete         CURRENT MEDICATIONS       Discharge Medication List as of 7/10/2021 12:41 AM      CONTINUE these medications which have NOT CHANGED    Details   sitaGLIPtin (JANUVIA) 25 MG tablet Take 25 mg by mouth daily. albuterol (PROVENTIL HFA;VENTOLIN HFA) 108 (90 BASE) MCG/ACT inhaler Inhale 2 puffs into the lungs every 6 hours as needed for Wheezing. GLUCOSAMINE CHONDROITIN COMPLX PO Take  by mouth daily. Calcium-Vitamin D (CALCIUM + D PO) Take  by mouth daily. Naproxen Sodium (ALEVE PO) Take  by mouth daily. aspirin 325 MG tablet Take 325 mg by mouth daily. hydrochlorothiazide (HYDRODIURIL) 25 MG tablet Take 25 mg by mouth daily. trazodone (DESYREL) 50 MG tablet Take 50 mg by mouth nightly. metformin (GLUCOPHAGE) 500 MG tablet Take 500 mg by mouth 2 times daily. fluoxetine (PROZAC) 20 MG capsule Take 20 mg by mouth daily. alprazolam (XANAX) 0.25 MG tablet Take 0.25 mg by mouth 2 times daily as needed. metoprolol (LOPRESSOR) 25 MG tablet Take 25 mg by mouth 2 times daily. lisinopril (PRINIVIL;ZESTRIL) 10 MG tablet Take 10 mg by mouth daily. gabapentin (NEURONTIN) 300 MG capsule Take 300 mg by mouth 3 times daily. metoclopramide (REGLAN) 10 MG tablet Take 10 mg by mouth 3 times daily. clonidine (CATAPRES) 0.1 MG tablet Take 0.1 mg by mouth nightly. hydrocodone-acetaminophen (VICODIN) 5-500 MG per tablet Take 1 tablet by mouth 2 times daily as needed.                ALLERGIES     Claritin [loratadine], Darvocet [propoxyphene n-acetaminophen], Flonase [fluticasone propionate], Lasix [furosemide], Mometasone furoate, Nasacort [triamcinolone acetonide], Tylenol [acetaminophen], Zetia [ezetimibe], and Zoloft    FAMILY HISTORY Family History   Problem Relation Age of Onset    Diabetes Mother     Diabetes Father     Cancer Father         skin    Hypertension Father           SOCIAL HISTORY       Social History     Socioeconomic History    Marital status:      Spouse name: None    Number of children: None    Years of education: None    Highest education level: None   Occupational History    None   Tobacco Use    Smoking status: Current Some Day Smoker   Substance and Sexual Activity    Alcohol use: No    Drug use: No    Sexual activity: None   Other Topics Concern    None   Social History Narrative    None     Social Determinants of Health     Financial Resource Strain:     Difficulty of Paying Living Expenses:    Food Insecurity:     Worried About Running Out of Food in the Last Year:     Ran Out of Food in the Last Year:    Transportation Needs:     Lack of Transportation (Medical):  Lack of Transportation (Non-Medical):    Physical Activity:     Days of Exercise per Week:     Minutes of Exercise per Session:    Stress:     Feeling of Stress :    Social Connections:     Frequency of Communication with Friends and Family:     Frequency of Social Gatherings with Friends and Family:     Attends L[employer] Services:     Active Member of Clubs or Organizations:     Attends Club or Organization Meetings:     Marital Status:    Intimate Partner Violence:     Fear of Current or Ex-Partner:     Emotionally Abused:     Physically Abused:     Sexually Abused:        SCREENINGS             PHYSICAL EXAM    (up to 7 for level 4, 8 or more for level 5)     ED Triage Vitals [07/09/21 2315]   BP Temp Temp src Pulse Resp SpO2 Height Weight   (!) 156/74 -- -- 91 14 95 % -- --       Physical Exam  Vitals and nursing note reviewed. Constitutional:       General: She is in acute distress. Appearance: She is well-developed.       Comments: Pt with moderate anxiety and stress in room   HENT:      Head: Normocephalic. Eyes:      Extraocular Movements: Extraocular movements intact. Pupils: Pupils are equal, round, and reactive to light. Cardiovascular:      Rate and Rhythm: Normal rate and regular rhythm. Pulmonary:      Effort: Pulmonary effort is normal.      Comments: Pt performing what seemed to be forced expiratory expirations with forced wheezing  Abdominal:      Palpations: Abdomen is soft. Tenderness: There is no abdominal tenderness. Musculoskeletal:         General: Normal range of motion. Cervical back: Normal range of motion and neck supple. Skin:     General: Skin is warm and dry. Neurological:      Mental Status: She is alert and oriented to person, place, and time. Psychiatric:         Attention and Perception: She is inattentive. Mood and Affect: Mood is anxious. Speech: Speech normal.         Behavior: Behavior is withdrawn. DIAGNOSTIC RESULTS     EKG: All EKG's are interpreted by the Emergency Department Physician who either signs or Co-signs this chart in the absence of a cardiologist.    ; Sinus Rhythm; Normal  ms; Normal QRS 95 ms; No acute ST-T elev/dep; Normal axis    RADIOLOGY:   Non-plain film images such as CT, Ultrasound and MRI are read by the radiologist. Plain radiographic images are visualized andpreliminarily interpreted by the emergency physician with the below findings:    CXR - See Below    Interpretationper the Radiologist below, if available at the time of this note:    XR CHEST PORTABLE   Final Result   1. No acute cardiopulmonary abnormality. 2.  Elevated left hemidiaphragm. 3.  Cardiomegaly.                 ED BEDSIDE ULTRASOUND:   Performed by ED Physician - none    LABS:  Labs Reviewed   CBC WITH AUTO DIFFERENTIAL - Abnormal; Notable for the following components:       Result Value    Lymphocytes Absolute 1.1 (*)     All other components within normal limits    Narrative:     Performed at:  [facility]  [place], [place] [address]   Phone [phone]   COMPREHENSIVE METABOLIC PANEL W/ REFLEX TO MG FOR LOW K - Abnormal; Notable for the following components:    Glucose 148 (*)     BUN 25 (*)     All other components within normal limits    Narrative:     Performed at:  [address] [facility]  [facility],  [place], [place] [address]   Phone [phone]   TROPONIN    Narrative:     Performed at:  [address] [facility]  [facility],  [place], [place] [address]   Phone [phone]       All other labs were within normal range or not returned as of this dictation. EMERGENCY DEPARTMENT COURSE and DIFFERENTIAL DIAGNOSIS/MDM:   Vitals:    Vitals:    07/10/21 0000 07/10/21 0015 07/10/21 0030 07/10/21 0045   BP: (!) 146/59 134/77 (!) 141/66 (!) 140/69   Pulse: 93 98 90 90   Resp: 17 26 23 27   SpO2: 94% 93% 95% 99%           CRITICAL CARE TIME   Total Critical Care time was 0 minutes, excluding separatelyreportable procedures. There was a high probability ofclinically significant/life threatening deterioration in the patient's condition which required my urgent intervention. CONSULTS:  None    PROCEDURES:  None    PROGRESS NOTES:    Ordered 0.5 mg xanax for patient and proceeded with cardiac/SOA work up  Pt with normal ECG, labs, no acute findings on CXR. Pt feeling better.  getting ready for transfer and gave another 0.5 mg xanax before discharge. FINAL IMPRESSION      1.  Panic attack due to exceptional stress New Problem         DISPOSITION/PLAN   DISPOSITION Decision To Discharge 07/10/2021 12:41:11 AM      PATIENT REFERRED TO:    Pt to follow up with PCP as needed once back from [facility] or return as needed          DISCHARGE MEDICATIONS:  Discharge Medication List as of 7/10/2021 12:41 AM          (Please note that portions of this note were completed with a voice

## 2021-07-10 NOTE — ED NOTES
Pt resting on bed, family at bedside, pt iris lab stick well and speci taken to lab.  Pt advised of plan of care and is agreeable     Devante Tamayo RN  07/09/21 [id number]

## 2021-07-10 NOTE — ED TRIAGE NOTES
Pt states that she started feeling short of air. Pt states that she is also having a panic attack related to  being in ER.

## 2021-08-02 DIAGNOSIS — J44.9 CHRONIC OBSTRUCTIVE PULMONARY DISEASE, UNSPECIFIED COPD TYPE (HCC): Chronic | ICD-10-CM

## 2021-08-02 RX ORDER — ALBUTEROL SULFATE 90 UG/1
AEROSOL, METERED RESPIRATORY (INHALATION)
Qty: 54 G | Refills: 3 | Status: SHIPPED | OUTPATIENT
Start: 2021-08-02 | End: 2022-05-10 | Stop reason: SDUPTHER

## 2021-09-19 ENCOUNTER — APPOINTMENT (OUTPATIENT)
Dept: GENERAL RADIOLOGY | Facility: HOSPITAL | Age: 76
End: 2021-09-19

## 2021-09-19 ENCOUNTER — HOSPITAL ENCOUNTER (EMERGENCY)
Facility: HOSPITAL | Age: 76
Discharge: HOME | End: 2021-09-19
Attending: EMERGENCY MEDICINE | Admitting: EMERGENCY MEDICINE

## 2021-09-19 VITALS
HEIGHT: 55 IN | BODY MASS INDEX: 50.91 KG/M2 | TEMPERATURE: 97.5 F | SYSTOLIC BLOOD PRESSURE: 156 MMHG | WEIGHT: 220 LBS | RESPIRATION RATE: 18 BRPM | DIASTOLIC BLOOD PRESSURE: 94 MMHG | OXYGEN SATURATION: 99 % | HEART RATE: 70 BPM

## 2021-09-19 DIAGNOSIS — S89.92XA INJURY OF LEFT KNEE, INITIAL ENCOUNTER: Primary | ICD-10-CM

## 2021-09-19 PROCEDURE — 73562 X-RAY EXAM OF KNEE 3: CPT

## 2021-09-19 PROCEDURE — 99283 EMERGENCY DEPT VISIT LOW MDM: CPT

## 2021-09-19 RX ORDER — HYDROCODONE BITARTRATE AND ACETAMINOPHEN 5; 325 MG/1; MG/1
1 TABLET ORAL ONCE
Status: COMPLETED | OUTPATIENT
Start: 2021-09-19 | End: 2021-09-19

## 2021-09-19 RX ORDER — HYDROCODONE BITARTRATE AND ACETAMINOPHEN 5; 325 MG/1; MG/1
1 TABLET ORAL EVERY 8 HOURS PRN
Qty: 10 TABLET | Refills: 0 | Status: SHIPPED | OUTPATIENT
Start: 2021-09-19 | End: 2021-11-03

## 2021-09-19 RX ORDER — HYDROCODONE BITARTRATE AND ACETAMINOPHEN 5; 325 MG/1; MG/1
1 TABLET ORAL EVERY 8 HOURS PRN
Qty: 10 TABLET | Refills: 0 | Status: SHIPPED | OUTPATIENT
Start: 2021-09-19 | End: 2021-09-19 | Stop reason: SDUPTHER

## 2021-09-19 RX ADMIN — HYDROCODONE BITARTRATE AND ACETAMINOPHEN 1 TABLET: 5; 325 TABLET ORAL at 15:21

## 2021-09-19 NOTE — ED PROVIDER NOTES
"Subjective     History provided by:  Patient  Knee Pain  Location:  Knee  Time since incident:  1 day  Injury: yes    Mechanism of injury comment:  Left knee gave out, did not fall  Knee location:  L knee  Pain details:     Quality:  Aching and throbbing    Radiates to:  Does not radiate    Severity:  Moderate    Onset quality:  Sudden    Timing:  Constant    Progression:  Unchanged  Chronicity:  New  Prior injury to area:  No  Relieved by:  Nothing  Worsened by:  Bearing weight  Associated symptoms: decreased ROM and swelling    Associated symptoms: no fever        Review of Systems   Constitutional: Negative.  Negative for fever.   HENT: Negative.    Respiratory: Negative.    Cardiovascular: Negative.  Negative for chest pain.   Gastrointestinal: Negative.  Negative for abdominal pain.   Endocrine: Negative.    Genitourinary: Negative.  Negative for dysuria.   Musculoskeletal: Positive for arthralgias.   Skin: Negative.    Neurological: Negative.    Psychiatric/Behavioral: Negative.    All other systems reviewed and are negative.      Past Medical History:   Diagnosis Date   • Abnormal finding     STATED SHE \"WOKE\" UP DURING AN EGD, BREAST BIOPSY, BACK SURGERY   • Acute colitis    • Allergic    • Anxiety    • Arthritis    • Body piercing     EARS   • Cataracts, bilateral    • COPD (chronic obstructive pulmonary disease) (CMS/[facility])    • Depression    • Depression    • Full dentures    • GERD (gastroesophageal reflux disease)    • Glaucoma    • High cholesterol    • Hx of colonic polyps    • Hypertension    • Injury of back    • Low back pain    • Obesity    • Osteoarthritis    • Pneumonia    • PONV (postoperative nausea and vomiting)    • Wears glasses        Allergies   Allergen Reactions   • Fluticasone Other (See Comments)     UNKNOWN     • Fluticasone Propionate Other (See Comments)     UNKNOWN     • Furosemide Other (See Comments)     UNKNOWN     • Loratadine Other (See Comments)     UNKNOWN     • Mometasone " "Furoate Other (See Comments)     UNKNOWN     • Propoxyphene Other (See Comments)     UNKNOWN     • Sertraline Hcl Other (See Comments)     UNKNOWN     • Triamcinolone Other (See Comments)     UNKNOWN     • Triamcinolone Acetonide Other (See Comments)     UNKNOWN     • Acetaminophen Other (See Comments)     \"I JUT CAN'T TAKE IT.  I'M NOT SURE WHAT IT DID\"   • Ezetimibe Other (See Comments)     UNKNOWN         Past Surgical History:   Procedure Laterality Date   • BACK SURGERY      Lower back X2   • CATARACT EXTRACTION W/ INTRAOCULAR LENS IMPLANT Left 3/2/2020    Procedure: CATARACT PHACO EXTRACTION WITH INTRAOCULAR LENS IMPLANT LEFT;  Surgeon: Alfredo Patiño MD;  Location: [facility] OR;  Service: Ophthalmology;  Laterality: Left;   • CATARACT EXTRACTION W/ INTRAOCULAR LENS IMPLANT Right 3/16/2020    Procedure: CATARACT PHACO EXTRACTION WITH INTRAOCULAR LENS IMPLANT;  Surgeon: Alfredo Patiño MD;  Location: [facility];  Service: Ophthalmology;  Laterality: Right;   • CHOLECYSTECTOMY     • COLONOSCOPY     • DILATION AND CURETTAGE, DIAGNOSTIC / THERAPEUTIC     • ENDOSCOPY     • HYSTERECTOMY      Total Hysterectomy   • MI TOTAL KNEE ARTHROPLASTY Right 10/18/2017    Procedure:  TOTAL KNEE ARTHROPLASTY RIGHT ELECTIVE ;  Surgeon: Doc Mixon MD;  Location: [facility];  Service: Orthopedics   • TUBAL ABDOMINAL LIGATION         Family History   Problem Relation Age of Onset   • Depression Daughter    • Mental illness Daughter    • Obesity Daughter    • Other Daughter         chronic back pain   • Arthritis Other    • Diabetes Other    • Hypertension Other    • Obesity Other    • Hyperlipidemia Other         high cholesterol       Social History     Socioeconomic History   • Marital status:      Spouse name: Not on file   • Number of children: Not on file   • Years of education: Not on file   • Highest education level: Not on file   Tobacco Use   • Smoking status: Former Smoker     Packs/day: 0.25     Years: " 4.00     Pack years: 1.00     Types: Cigarettes     Quit date: 10/9/1985     Years since quittin.9   • Smokeless tobacco: Never Used   Substance and Sexual Activity   • Alcohol use: No   • Drug use: No   • Sexual activity: Defer           Objective   Physical Exam  Vitals and nursing note reviewed.   Constitutional:       General: She is not in acute distress.     Appearance: She is well-developed. She is not diaphoretic.   HENT:      Head: Normocephalic and atraumatic.      Right Ear: External ear normal.      Left Ear: External ear normal.      Nose: Nose normal.   Eyes:      Conjunctiva/sclera: Conjunctivae normal.   Neck:      Vascular: No JVD.      Trachea: No tracheal deviation.   Cardiovascular:      Rate and Rhythm: Normal rate.      Heart sounds: No murmur heard.     Pulmonary:      Effort: Pulmonary effort is normal. No respiratory distress.      Breath sounds: No wheezing.   Abdominal:      Palpations: Abdomen is soft.      Tenderness: There is no abdominal tenderness.   Musculoskeletal:         General: Swelling and tenderness present. No deformity.      Cervical back: Normal range of motion and neck supple.      Comments: Moderate tenderness to the medial aspect of the left knee.  Pain w/ flexion.    Skin:     General: Skin is warm and dry.      Coloration: Skin is not pale.      Findings: No erythema or rash.   Neurological:      Mental Status: She is alert and oriented to person, place, and time.      Cranial Nerves: No cranial nerve deficit.   Psychiatric:         Behavior: Behavior normal.         Thought Content: Thought content normal.         Procedures           ED Course  ED Course as of Sep 19 154   Sun Sep 19, 2021   1534 XR rad interpreted:  Advanced medial compartment degenerative change. No acute fracture is  seen. Stable suprapatellar calcified loose body.    [RB]      ED Course User Index  [RB] Guanaco Ramos II, PA                                           [facility]  Number of Diagnoses  or Management Options  Injury of left knee, initial encounter: new and requires workup     Amount and/or Complexity of Data Reviewed  Tests in the radiology section of CPT®: ordered and reviewed    Risk of Complications, Morbidity, and/or Mortality  Presenting problems: low  Diagnostic procedures: low  Management options: low    Patient Progress  Patient progress: stable      Final diagnoses:   Injury of left knee, initial encounter       ED Disposition  ED Disposition     ED Disposition Condition Comment    Discharge Stable           Jefry Ceron MD  [address] [place], [place] [id number] [phone]    Schedule an appointment as soon as possible for a visit            Medication List      No changes were made to your prescriptions during this visit.          Guanaco Ramos II, PA  09/19/21 1543

## 2021-09-24 DIAGNOSIS — I73.9 INTERMITTENT CLAUDICATION (HCC): ICD-10-CM

## 2021-09-24 DIAGNOSIS — I73.9 PVD (PERIPHERAL VASCULAR DISEASE) (HCC): ICD-10-CM

## 2021-09-24 RX ORDER — PRAVASTATIN SODIUM 40 MG
TABLET ORAL
Qty: 90 TABLET | Refills: 3 | Status: SHIPPED | OUTPATIENT
Start: 2021-09-24 | End: 2022-05-10 | Stop reason: SDUPTHER

## 2021-09-24 RX ORDER — ASPIRIN 81 MG/1
TABLET, COATED ORAL
Qty: 90 TABLET | Refills: 3 | Status: SHIPPED | OUTPATIENT
Start: 2021-09-24 | End: 2022-05-10 | Stop reason: SDUPTHER

## 2021-09-24 RX ORDER — HYDROCHLOROTHIAZIDE 25 MG/1
TABLET ORAL
Qty: 90 TABLET | Refills: 3 | Status: SHIPPED | OUTPATIENT
Start: 2021-09-24 | End: 2022-05-10 | Stop reason: SDUPTHER

## 2021-09-24 RX ORDER — LISINOPRIL 40 MG/1
TABLET ORAL
Qty: 90 TABLET | Refills: 3 | Status: SHIPPED | OUTPATIENT
Start: 2021-09-24 | End: 2022-05-10 | Stop reason: SDUPTHER

## 2021-09-24 RX ORDER — CILOSTAZOL 100 MG/1
TABLET ORAL
Qty: 180 TABLET | Refills: 3 | Status: SHIPPED | OUTPATIENT
Start: 2021-09-24 | End: 2022-05-10 | Stop reason: SDUPTHER

## 2021-10-25 RX ORDER — FLUOXETINE HYDROCHLORIDE 20 MG/1
CAPSULE ORAL
Qty: 270 CAPSULE | Refills: 3 | Status: SHIPPED | OUTPATIENT
Start: 2021-10-25 | End: 2022-05-10 | Stop reason: SDUPTHER

## 2021-11-01 ENCOUNTER — LAB (OUTPATIENT)
Dept: LAB | Facility: HOSPITAL | Age: 76
End: 2021-11-01

## 2021-11-01 ENCOUNTER — TRANSCRIBE ORDERS (OUTPATIENT)
Dept: LAB | Facility: HOSPITAL | Age: 76
End: 2021-11-01

## 2021-11-01 DIAGNOSIS — Z01.818 PRE-OP EXAM: ICD-10-CM

## 2021-11-01 DIAGNOSIS — G56.01 CARPAL TUNNEL SYNDROME OF RIGHT WRIST: Primary | ICD-10-CM

## 2021-11-01 DIAGNOSIS — G56.01 CARPAL TUNNEL SYNDROME OF RIGHT WRIST: ICD-10-CM

## 2021-11-01 LAB
ANION GAP SERPL CALCULATED.3IONS-SCNC: 8.3 MMOL/L (ref 5–15)
APTT PPP: 25.9 SECONDS (ref 24.5–37.2)
BASOPHILS # BLD AUTO: 0.05 10*3/MM3 (ref 0–0.2)
BASOPHILS NFR BLD AUTO: 0.9 % (ref 0–1.5)
BUN SERPL-MCNC: 32 MG/DL (ref 8–23)
BUN/CREAT SERPL: 31.7 (ref 7–25)
CALCIUM SPEC-SCNC: 9.4 MG/DL (ref 8.6–10.5)
CHLORIDE SERPL-SCNC: 103 MMOL/L (ref 98–107)
CO2 SERPL-SCNC: 28.7 MMOL/L (ref 22–29)
CREAT SERPL-MCNC: 1.01 MG/DL (ref 0.57–1)
DEPRECATED RDW RBC AUTO: 42.4 FL (ref 37–54)
EOSINOPHIL # BLD AUTO: 0.14 10*3/MM3 (ref 0–0.4)
EOSINOPHIL NFR BLD AUTO: 2.6 % (ref 0.3–6.2)
ERYTHROCYTE [DISTWIDTH] IN BLOOD BY AUTOMATED COUNT: 12.9 % (ref 12.3–15.4)
GFR SERPL CREATININE-BSD FRML MDRD: 53 ML/MIN/1.73
GLUCOSE SERPL-MCNC: 100 MG/DL (ref 65–99)
HCT VFR BLD AUTO: 38.6 % (ref 34–46.6)
HGB BLD-MCNC: 13.1 G/DL (ref 12–15.9)
IMM GRANULOCYTES # BLD AUTO: 0.02 10*3/MM3 (ref 0–0.05)
IMM GRANULOCYTES NFR BLD AUTO: 0.4 % (ref 0–0.5)
INR PPP: 0.98 (ref 0.9–1.1)
LYMPHOCYTES # BLD AUTO: 1.2 10*3/MM3 (ref 0.7–3.1)
LYMPHOCYTES NFR BLD AUTO: 22.3 % (ref 19.6–45.3)
MCH RBC QN AUTO: 30.1 PG (ref 26.6–33)
MCHC RBC AUTO-ENTMCNC: 33.9 G/DL (ref 31.5–35.7)
MCV RBC AUTO: 88.7 FL (ref 79–97)
MONOCYTES # BLD AUTO: 0.43 10*3/MM3 (ref 0.1–0.9)
MONOCYTES NFR BLD AUTO: 8 % (ref 5–12)
NEUTROPHILS NFR BLD AUTO: 3.54 10*3/MM3 (ref 1.7–7)
NEUTROPHILS NFR BLD AUTO: 65.8 % (ref 42.7–76)
NRBC BLD AUTO-RTO: 0 /100 WBC (ref 0–0.2)
PLATELET # BLD AUTO: 214 10*3/MM3 (ref 140–450)
PMV BLD AUTO: 10.8 FL (ref 6–12)
POTASSIUM SERPL-SCNC: 4.9 MMOL/L (ref 3.5–5.2)
PROTHROMBIN TIME: 13.5 SECONDS (ref 12–15.1)
RBC # BLD AUTO: 4.35 10*6/MM3 (ref 3.77–5.28)
SARS-COV-2 RNA PNL SPEC NAA+PROBE: NOT DETECTED
SODIUM SERPL-SCNC: 140 MMOL/L (ref 136–145)
WBC # BLD AUTO: 5.38 10*3/MM3 (ref 3.4–10.8)

## 2021-11-01 PROCEDURE — U0004 COV-19 TEST NON-CDC HGH THRU: HCPCS

## 2021-11-01 PROCEDURE — 85025 COMPLETE CBC W/AUTO DIFF WBC: CPT

## 2021-11-01 PROCEDURE — 85610 PROTHROMBIN TIME: CPT

## 2021-11-01 PROCEDURE — C9803 HOPD COVID-19 SPEC COLLECT: HCPCS

## 2021-11-01 PROCEDURE — 85730 THROMBOPLASTIN TIME PARTIAL: CPT

## 2021-11-01 PROCEDURE — 80048 BASIC METABOLIC PNL TOTAL CA: CPT

## 2021-11-01 PROCEDURE — 36415 COLL VENOUS BLD VENIPUNCTURE: CPT

## 2021-11-04 ENCOUNTER — HOSPITAL ENCOUNTER (OUTPATIENT)
Facility: HOSPITAL | Age: 76
Setting detail: HOSPITAL OUTPATIENT SURGERY
Discharge: HOME | End: 2021-11-04
Attending: ORTHOPAEDIC SURGERY | Admitting: ORTHOPAEDIC SURGERY

## 2021-11-04 ENCOUNTER — ANESTHESIA (OUTPATIENT)
Dept: PERIOP | Facility: HOSPITAL | Age: 76
End: 2021-11-04

## 2021-11-04 ENCOUNTER — ANESTHESIA EVENT (OUTPATIENT)
Dept: PERIOP | Facility: HOSPITAL | Age: 76
End: 2021-11-04

## 2021-11-04 VITALS
BODY MASS INDEX: 50.02 KG/M2 | HEART RATE: 60 BPM | SYSTOLIC BLOOD PRESSURE: 151 MMHG | WEIGHT: 293 LBS | RESPIRATION RATE: 20 BRPM | OXYGEN SATURATION: 95 % | HEIGHT: 64 IN | DIASTOLIC BLOOD PRESSURE: 72 MMHG | TEMPERATURE: 97.8 F

## 2021-11-04 PROCEDURE — 0 CEFAZOLIN SODIUM-DEXTROSE 2-3 GM-%(50ML) RECONSTITUTED SOLUTION: Performed by: ORTHOPAEDIC SURGERY

## 2021-11-04 PROCEDURE — 25010000002 FENTANYL CITRATE (PF) 50 MCG/ML SOLUTION: Performed by: NURSE ANESTHETIST, CERTIFIED REGISTERED

## 2021-11-04 PROCEDURE — 25010000002 PROPOFOL 10 MG/ML EMULSION: Performed by: NURSE ANESTHETIST, CERTIFIED REGISTERED

## 2021-11-04 PROCEDURE — 25010000002 ONDANSETRON PER 1 MG: Performed by: NURSE ANESTHETIST, CERTIFIED REGISTERED

## 2021-11-04 RX ORDER — SODIUM CHLORIDE, SODIUM LACTATE, POTASSIUM CHLORIDE, CALCIUM CHLORIDE 600; 310; 30; 20 MG/100ML; MG/100ML; MG/100ML; MG/100ML
1000 INJECTION, SOLUTION INTRAVENOUS CONTINUOUS
Status: DISCONTINUED | OUTPATIENT
Start: 2021-11-04 | End: 2021-11-04 | Stop reason: HOSPADM

## 2021-11-04 RX ORDER — FENTANYL CITRATE 50 UG/ML
INJECTION, SOLUTION INTRAMUSCULAR; INTRAVENOUS AS NEEDED
Status: DISCONTINUED | OUTPATIENT
Start: 2021-11-04 | End: 2021-11-04 | Stop reason: SURG

## 2021-11-04 RX ORDER — MAGNESIUM HYDROXIDE 1200 MG/15ML
LIQUID ORAL AS NEEDED
Status: DISCONTINUED | OUTPATIENT
Start: 2021-11-04 | End: 2021-11-04 | Stop reason: HOSPADM

## 2021-11-04 RX ORDER — PROPOFOL 10 MG/ML
VIAL (ML) INTRAVENOUS AS NEEDED
Status: DISCONTINUED | OUTPATIENT
Start: 2021-11-04 | End: 2021-11-04 | Stop reason: SURG

## 2021-11-04 RX ORDER — LIDOCAINE HYDROCHLORIDE 20 MG/ML
INJECTION, SOLUTION INTRAVENOUS AS NEEDED
Status: DISCONTINUED | OUTPATIENT
Start: 2021-11-04 | End: 2021-11-04 | Stop reason: SURG

## 2021-11-04 RX ORDER — ONDANSETRON 2 MG/ML
INJECTION INTRAMUSCULAR; INTRAVENOUS AS NEEDED
Status: DISCONTINUED | OUTPATIENT
Start: 2021-11-04 | End: 2021-11-04 | Stop reason: SURG

## 2021-11-04 RX ORDER — BUPIVACAINE HYDROCHLORIDE 5 MG/ML
INJECTION, SOLUTION EPIDURAL; INTRACAUDAL AS NEEDED
Status: DISCONTINUED | OUTPATIENT
Start: 2021-11-04 | End: 2021-11-04 | Stop reason: HOSPADM

## 2021-11-04 RX ORDER — CEFAZOLIN SODIUM 2 G/50ML
2 SOLUTION INTRAVENOUS
Status: COMPLETED | OUTPATIENT
Start: 2021-11-04 | End: 2021-11-04

## 2021-11-04 RX ADMIN — PROPOFOL 50 MG: 10 INJECTION, EMULSION INTRAVENOUS at 13:54

## 2021-11-04 RX ADMIN — FENTANYL CITRATE 50 MCG: 50 INJECTION INTRAMUSCULAR; INTRAVENOUS at 13:54

## 2021-11-04 RX ADMIN — ONDANSETRON 4 MG: 2 INJECTION INTRAMUSCULAR; INTRAVENOUS at 14:15

## 2021-11-04 RX ADMIN — PROPOFOL 50 MG: 10 INJECTION, EMULSION INTRAVENOUS at 14:04

## 2021-11-04 RX ADMIN — PROPOFOL 50 MG: 10 INJECTION, EMULSION INTRAVENOUS at 13:59

## 2021-11-04 RX ADMIN — CEFAZOLIN SODIUM 2 G: 2 SOLUTION INTRAVENOUS at 13:56

## 2021-11-04 RX ADMIN — SODIUM CHLORIDE, POTASSIUM CHLORIDE, SODIUM LACTATE AND CALCIUM CHLORIDE 1000 ML: 600; 310; 30; 20 INJECTION, SOLUTION INTRAVENOUS at 10:20

## 2021-11-04 RX ADMIN — LIDOCAINE HYDROCHLORIDE 60 MG: 20 INJECTION, SOLUTION INTRAVENOUS at 13:54

## 2021-11-04 RX ADMIN — PROPOFOL 50 MG: 10 INJECTION, EMULSION INTRAVENOUS at 14:09

## 2021-11-04 NOTE — OP NOTE
CARPAL TUNNEL RELEASE ENDOSCOPIC  Procedure Report    Patient Name:  Lata Justice  YOB: 1945    Date of Surgery:  11/4/2021     Indications:  R hand numbness    Pre-op Diagnosis:   Carpal tunnel syndrome, bilateral upper limbs [G56.03]       Post-Op Diagnosis Codes:     * Carpal tunnel syndrome, bilateral upper limbs [G56.03]    Procedure/CPT® Codes:      Procedure(s):  CARPAL TUNNEL RELEASE ENDOSCOPIC RIGHT    Staff:  Surgeon(s):  Jefry Ceron MD         Anesthesia: Monitored Anesthesia Care    Estimated Blood Loss: 0 mL    Implants:    Nothing was implanted during the procedure    Specimen:          None        Findings: see dictation    Complications: none    Description of Procedure:     The patient was identified in the preoperative holding area and taken to the operating suite under the care of myself and anesthesia staff.  Patient was placed supine on her bed where general was provided.  An LMA was placed.  The right upper extremity was prepped and draped in a standard sterile fashion.  We performed a timeout to ensure the correct patient procedure and side was being done.  Then turned our attention towards incision after preoperative antibiotics were delivered    A tourniquet was inflated to 250 mmHg.  We injected local anesthetic into the operative area.  We then made a 1 cm incision at the distal volar wrist crease.  Soft tissue dissection was carried down to the level of the fascia. We opened it in-line with the incision, transversely.  This allowed us to visualize the underlying median nerve.  We then entered through this rent to the carpal tunnel space.  We debrided the undersurface of the carpal tunnel ligament with a dissecting spatula.  This freed it up from any underlying synovium.  We then dilated through the carpal tunnel space so that it would accommodate the working cannula.  The endoscopic carpal tunnel release working cannula was placed into the carpal tunnel space.   We then entered our small joint arthroscope into the carpal tunnel space and visualize the undersurface of the carpal tunnel ligament.  We used the endoscopic rasp to debride any additional synovium away from the carpal tunnel ligament.  We then used the endoscopic hook knife to release the carpal tunnel ligament from distal to proximal.  There was complete diastases of the ligament observed.  We then removed the working cannula and confirmed the diastases of the ligament directly with a Ragnell retractor.  We then released the distal aspect of the volar forearm fascia with tenotomy scissors.  We then released the tourniquet and closed the wound with 5-0 Vicryl suture and Dermabond glue.        Jefry Ceron MD     Date: 11/4/2021  Time: 17:03 EDT

## 2021-11-04 NOTE — BRIEF OP NOTE
CARPAL TUNNEL RELEASE ENDOSCOPIC  Progress Note    Lata Justice  11/4/2021    Pre-op Diagnosis:   Carpal tunnel syndrome, bilateral upper limbs [G56.03]       Post-Op Diagnosis Codes:     * Carpal tunnel syndrome, bilateral upper limbs [G56.03]    Procedure/CPT® Codes:        Procedure(s):  CARPAL TUNNEL RELEASE ENDOSCOPIC RIGHT    Surgeon(s):  Jefry Ceron MD    Anesthesia: Monitored Anesthesia Care    Staff:   Circulator: Maryam Shook RN; Eliza Andrews RN  Scrub Person: Clementina Matthews Misty         Estimated Blood Loss: 0 mL    Urine Voided: * No values recorded between 11/4/2021  1:47 PM and 11/4/2021  2:22 PM *    Specimens:                None          Drains: * No LDAs found *    Findings: see dictation    Complications: none          Jefry Ceron MD     Date: 11/4/2021  Time: 14:39 EDT

## 2021-11-04 NOTE — ANESTHESIA POSTPROCEDURE EVALUATION
Patient: Lata Justice    Procedure Summary     Date: 11/04/21 Room / Location: [facility] OR  /  [facility]    Anesthesia Start: 1346 Anesthesia Stop: 1424    Procedure: CARPAL TUNNEL RELEASE ENDOSCOPIC RIGHT (Right Wrist) Diagnosis:       Carpal tunnel syndrome, bilateral upper limbs      (Carpal tunnel syndrome, bilateral upper limbs [G56.03])    Surgeons: Jefry Ceron MD Provider: Yeimy Huitron CRNA    Anesthesia Type: MAC ASA Status: 3          Anesthesia Type: MAC    Vitals  Vitals Value Taken Time   /60 11/04/21 1424   Temp 97.8 °F (36.6 °C) 11/04/21 1424   Pulse 72 11/04/21 1424   Resp 16 11/04/21 1424   SpO2 94 % 11/04/21 1424           Post Anesthesia Care and Evaluation    Patient location during evaluation: PHASE II  Patient participation: complete - patient participated  Level of consciousness: awake and alert  Pain score: 0  Pain management: satisfactory to patient  Airway patency: patent  Anesthetic complications: No anesthetic complications  PONV Status: none  Cardiovascular status: acceptable and stable  Respiratory status: acceptable  Hydration status: acceptable

## 2021-11-04 NOTE — ANESTHESIA PREPROCEDURE EVALUATION
Anesthesia Evaluation     Patient summary reviewed and Nursing notes reviewed   no history of anesthetic complications:  NPO Solid Status: > 8 hours  NPO Liquid Status: > 8 hours           Airway   Mallampati: II  TM distance: >3 FB  Neck ROM: full  no difficulty expected  Dental - normal exam     Pulmonary - normal exam   (+) a smoker Former, COPD,   Cardiovascular - normal exam    ECG reviewed  Rhythm: regular  Rate: normal    (+) hypertension 2 medications or greater, PVD, hyperlipidemia,       Neuro/Psych  (+) psychiatric history Anxiety and Depression,     GI/Hepatic/Renal/Endo    (+) obesity, morbid obesity, GERD,  diabetes mellitus,     Musculoskeletal     Abdominal    Substance History - negative use     OB/GYN negative ob/gyn ROS         Other   arthritis,                      Anesthesia Plan    ASA 3     MAC   (Pt told that intravenous sedation will be used as the primary anesthetic along with local anesthesia if necessary. Every effort will be made to make sure the patient is comfortable.     The patient was told they may or may not have recall for the procedure. It was further explained that if the MAC was not adequate that a general anesthetic with either an LMA or endotracheal tube would be required.     Will proceed with the plan of care.)  intravenous induction     Anesthetic plan, all risks, benefits, and alternatives have been provided, discussed and informed consent has been obtained with: patient.

## 2021-11-04 NOTE — DISCHARGE INSTRUCTIONS
No pushing, pulling, tugging,  heavy lifting, or strenuous activity.  No major decision making, driving, or drinking alcoholic beverages for 24 hours. ( due to the medications you have  received)  Always use good hand hygiene/washing techniques.  NO driving while taking pain medications.    * if you have an incision:  Check your incision area every day for signs of infection.   Check for:  * more redness, swelling, or pain  *more fluid or blood  *warmth  *pus or bad smell.    To assist you in voiding:  Drink plenty of fluids  Listen to running water while attempting to void.    If you are unable to urinate and you have an uncomfortable urge to void or it has been   6 hours since you were discharged, return to the Emergency Room.    Can take over the counter pain meds of Tylenol or Motrin for pain per package instructions for pain as needed.

## 2021-11-08 ENCOUNTER — OFFICE VISIT (OUTPATIENT)
Dept: INTERNAL MEDICINE | Facility: CLINIC | Age: 76
End: 2021-11-08

## 2021-11-08 VITALS
HEIGHT: 64 IN | TEMPERATURE: 97.1 F | WEIGHT: 201 LBS | HEART RATE: 63 BPM | OXYGEN SATURATION: 99 % | SYSTOLIC BLOOD PRESSURE: 107 MMHG | BODY MASS INDEX: 34.31 KG/M2 | DIASTOLIC BLOOD PRESSURE: 54 MMHG | RESPIRATION RATE: 18 BRPM

## 2021-11-08 DIAGNOSIS — E66.09 CLASS 1 OBESITY DUE TO EXCESS CALORIES WITH SERIOUS COMORBIDITY AND BODY MASS INDEX (BMI) OF 34.0 TO 34.9 IN ADULT: ICD-10-CM

## 2021-11-08 DIAGNOSIS — J44.9 CHRONIC OBSTRUCTIVE PULMONARY DISEASE, UNSPECIFIED COPD TYPE (HCC): ICD-10-CM

## 2021-11-08 DIAGNOSIS — Z00.00 MEDICARE ANNUAL WELLNESS VISIT, SUBSEQUENT: Primary | ICD-10-CM

## 2021-11-08 DIAGNOSIS — M15.9 PRIMARY OSTEOARTHRITIS INVOLVING MULTIPLE JOINTS: ICD-10-CM

## 2021-11-08 DIAGNOSIS — E78.2 MIXED HYPERLIPIDEMIA: ICD-10-CM

## 2021-11-08 DIAGNOSIS — E53.8 CYANOCOBALAMIN DEFICIENCY: ICD-10-CM

## 2021-11-08 DIAGNOSIS — R73.9 HYPERGLYCEMIA: ICD-10-CM

## 2021-11-08 DIAGNOSIS — I10 ESSENTIAL HYPERTENSION: ICD-10-CM

## 2021-11-08 DIAGNOSIS — Z71.85 IMMUNIZATION COUNSELING: ICD-10-CM

## 2021-11-08 DIAGNOSIS — I73.9 INTERMITTENT CLAUDICATION (HCC): ICD-10-CM

## 2021-11-08 DIAGNOSIS — I73.9 PVD (PERIPHERAL VASCULAR DISEASE) (HCC): ICD-10-CM

## 2021-11-08 DIAGNOSIS — F33.41 RECURRENT MAJOR DEPRESSIVE DISORDER, IN PARTIAL REMISSION (HCC): ICD-10-CM

## 2021-11-08 DIAGNOSIS — E55.9 VITAMIN D DEFICIENCY: ICD-10-CM

## 2021-11-08 PROCEDURE — 1159F MED LIST DOCD IN RCRD: CPT | Performed by: FAMILY MEDICINE

## 2021-11-08 PROCEDURE — 1170F FXNL STATUS ASSESSED: CPT | Performed by: FAMILY MEDICINE

## 2021-11-08 PROCEDURE — 96160 PT-FOCUSED HLTH RISK ASSMT: CPT | Performed by: FAMILY MEDICINE

## 2021-11-08 PROCEDURE — 1126F AMNT PAIN NOTED NONE PRSNT: CPT | Performed by: FAMILY MEDICINE

## 2021-11-08 PROCEDURE — 99397 PER PM REEVAL EST PAT 65+ YR: CPT | Performed by: FAMILY MEDICINE

## 2021-11-08 PROCEDURE — G0439 PPPS, SUBSEQ VISIT: HCPCS | Performed by: FAMILY MEDICINE

## 2021-11-08 NOTE — PATIENT INSTRUCTIONS
Medicare Wellness  Personal Prevention Plan of Service     Date of Office Visit:  2021  Encounter Provider:  Annetta Mcclendon MD  Place of Service:  [facility]  Patient Name: Lata Justice  :  1945    As part of the Medicare Wellness portion of your visit today, we are providing you with this personalized preventive plan of services (PPPS). This plan is based upon recommendations of the [place] Preventive Services Task Force (USPSTF) and the Advisory Committee on Immunization Practices (ACIP).    This lists the preventive care services that should be considered, and provides dates of when you are due. Items listed as completed are up-to-date and do not require any further intervention.    Health Maintenance   Topic Date Due   • ZOSTER VACCINE (2 of 2) 2017   • ANNUAL WELLNESS VISIT  2021   • LIPID PANEL  2021   • TDAP/TD VACCINES (2 - Td or Tdap) 2027   • COLORECTAL CANCER SCREENING  2028   • HEPATITIS C SCREENING  Completed   • COVID-19 Vaccine  Completed   • INFLUENZA VACCINE  Completed   • Pneumococcal Vaccine 65+  Completed   • MAMMOGRAM  Discontinued   • DXA SCAN  Discontinued       No orders of the defined types were placed in this encounter.      Return in about 6 months (around 2022) for Blood Pressure follow up.

## 2021-11-08 NOTE — PROGRESS NOTES
The ABCs of the Annual Wellness Visit  Subsequent Medicare Wellness Visit    Chief Complaint   Patient presents with   • Medicare Wellness-subsequent      Subjective    History of Present Illness:  Lata Justice is a 76 y.o. female who presents for a Subsequent Medicare Wellness Visit.    Doing okay she says. Son staying with her at night.  passed suddenly due to choking.     Has had carpal tunnel surgery on right wrist. Has upcoming left knee replacement at some point, just waiting on scheduling. She got to where she could not walk on it, has had shot in it and it helped some. She's using her walker, has handicap tag for car (daughter-in-law driving her).    Has lost weight (11/4/21 weight is an error), about 20 lb, denies any symptoms of hypotension once discussed.    The following portions of the patient's history were reviewed and   updated as appropriate: allergies, current medications, past family history, past medical history, past social history, past surgical history and problem list.    Compared to one year ago, the patient feels her physical   health is the same.    Compared to one year ago, the patient feels her mental   health is the same.    Recent Hospitalizations:  She was not admitted to the hospital during the last year.       Current Medical Providers:  Patient Care Team:  Annetta Mcclendon MD as PCP - General (Family Medicine)  Alfredo Patiño MD as Consulting Physician (Ophthalmology)  Jefry Ceron MD (Orthopedic Surgery)    Outpatient Medications Prior to Visit   Medication Sig Dispense Refill   • albuterol sulfate  (90 Base) MCG/ACT inhaler INHALE 2 PUFFS EVERY 6 HOURS AS NEEDED FOR WHEEZING OR SHORTNESS OF AIR. (Patient taking differently: Inhale 1 puff Every 6 (Six) Hours As Needed for Wheezing or Shortness of Air.) 54 g 3   • Aspirin Low Dose 81 MG EC tablet TAKE 1 TABLET EVERY NIGHT AT BEDTIME (Patient taking differently: Take 81 mg by mouth Daily.) 90 tablet  3   • cetirizine (zyrTEC) 10 MG tablet Take 1 tablet by mouth Daily. 90 tablet 3   • cilostazol (PLETAL) 100 MG tablet TAKE 1 TABLET TWICE DAILY TO HELP WITH LEG CRAMPS DUE TO CIRCULATION (Patient taking differently: Take 100 mg by mouth 2 (Two) Times a Day.) 180 tablet 3   • esomeprazole (NEXIUM) 40 MG capsule Take 1 capsule by mouth daily.     • FLUoxetine (PROzac) 20 MG capsule TAKE 3 CAPSULES EVERY MORNING (Patient taking differently: Take 60 mg by mouth Daily.) 270 capsule 3   • hydroCHLOROthiazide (HYDRODIURIL) 25 MG tablet TAKE 1 TABLET DAILY. INDICATIONS: HIGH BLOOD PRESSURE DISORDER (Patient taking differently: Take 25 mg by mouth Daily.) 90 tablet 3   • lisinopril (PRINIVIL,ZESTRIL) 40 MG tablet TAKE 1 TABLET DAILY. INDICATIONS: HIGH BLOOD PRESSURE DISORDER (Patient taking differently: Take 40 mg by mouth Daily.) 90 tablet 3   • meloxicam (MOBIC) 7.5 MG tablet Take 1 tablet by mouth Daily. Take with food. For osteoarthritis pain. 90 tablet 3   • metoprolol tartrate (LOPRESSOR) 25 MG tablet TAKE 1 TABLET TWICE DAILY. INDICATIONS:HIGH BLOOD PRESSURE DISORDER (Patient taking differently: Take 25 mg by mouth 2 (Two) Times a Day.) 180 tablet 3   • pravastatin (PRAVACHOL) 40 MG tablet TAKE 1 TABLET EVERY NIGHT. INDICATIONS: HIGH CHOLESTEROL (Patient taking differently: Take 40 mg by mouth Every Night.) 90 tablet 3     No facility-administered medications prior to visit.       No opioid medication identified on active medication list. I have reviewed chart for other potential  high risk medication/s and harmful drug interactions in the elderly.          Aspirin is on active medication list. Aspirin use is indicated based on review of current medical condition/s. Pros and cons of this therapy have been discussed today. Benefits of this medication outweigh potential harm.  Patient has been encouraged to continue taking this medication.  .      Patient Active Problem List   Diagnosis   • Adkins's esophagus   •  "Essential hypertension   • Chronic low back pain   • Chronic obstructive pulmonary disease (HCC)   • Recurrent major depressive disorder, in partial remission ([facility])   • Impaired glucose tolerance   • Mixed hyperlipidemia   • Seasonal allergic rhinitis   • Bilateral chronic knee pain   • Bilateral edema of lower extremity   • Chronic pain of both ankles   • Primary osteoarthritis of both knees   • Status post right knee replacement   • Cyanocobalamin deficiency   • Primary osteoarthritis involving multiple joints   • Intermittent claudication ([facility])   • PVD (peripheral vascular disease) ([facility])     Advance Care Planning  Advance Directive is not on file.  ACP discussion was held with the patient during this visit. Patient does not have an advance directive, declines further assistance.          Objective    Vitals:    11/08/21 1512   BP: 107/54   BP Location: Left arm   Patient Position: Sitting   Cuff Size: Adult   Pulse: 63   Resp: 18   Temp: 97.1 °F (36.2 °C)   TempSrc: Temporal   SpO2: 99%   Weight: 91.2 kg (201 lb)   Height: 162.6 cm (64.02\")   PainSc: 0-No pain     BMI Readings from Last 1 Encounters:   11/08/21 34.48 kg/m²   BMI is above normal parameters. Recommendations include: educational material    Does the patient have evidence of cognitive impairment? No    Physical Exam  Vitals and nursing note reviewed.   Constitutional:       General: She is not in acute distress.     Appearance: Normal appearance. She is well-developed and well-groomed. She is obese. She is not ill-appearing, toxic-appearing or diaphoretic.      Interventions: Face mask in place.   HENT:      Head: Normocephalic and atraumatic.      Right Ear: Hearing, tympanic membrane, ear canal and external ear normal.      Left Ear: Hearing, tympanic membrane, ear canal and external ear normal.   Eyes:      General: Lids are normal. No scleral icterus.     Extraocular Movements: Extraocular movements intact.   Neck:      Trachea: Phonation normal. "   Cardiovascular:      Rate and Rhythm: Normal rate and regular rhythm.   Pulmonary:      Effort: Pulmonary effort is normal.      Breath sounds: Normal breath sounds.   Musculoskeletal:      Cervical back: Neck supple.   Skin:     Coloration: Skin is not jaundiced or pale.      Findings: Bruising (right wrist s/p surgery) present.   Neurological:      General: No focal deficit present.      Mental Status: She is alert and oriented to person, place, and time.      Motor: Motor function is intact.      Gait: Gait abnormal (using rolling walker, slow, antalgic).   Psychiatric:         Attention and Perception: Attention and perception normal.         Mood and Affect: Mood normal. Affect is flat.         Speech: Speech normal.         Behavior: Behavior normal. Behavior is cooperative.         Thought Content: Thought content normal.         Cognition and Memory: Cognition and memory normal.         Judgment: Judgment normal.                 HEALTH RISK ASSESSMENT    Smoking Status:  Social History     Tobacco Use   Smoking Status Former Smoker   • Packs/day: 0.25   • Years: 4.00   • Pack years: 1.00   • Types: Cigarettes   • Quit date: 10/9/1985   • Years since quittin.1   Smokeless Tobacco Never Used     Alcohol Consumption:  Social History     Substance and Sexual Activity   Alcohol Use No     Fall Risk Screen:    STEADI Fall Risk Assessment was completed, and patient is at LOW risk for falls.Assessment completed on:2021    Depression Screening:  PHQ-2/PHQ-9 Depression Screening 2021   Little interest or pleasure in doing things 0   Feeling down, depressed, or hopeless 1   Total Score 1       Health Habits and Functional and Cognitive Screening:  Functional & Cognitive Status 2021   Do you have difficulty preparing food and eating? Yes   Do you have difficulty bathing yourself, getting dressed or grooming yourself? No   Do you have difficulty using the toilet? Yes   Do you have difficulty moving  around from place to place? Yes   Do you have trouble with steps or getting out of a bed or a chair? Yes   Current Diet Limited Junk Food   Dental Exam Not up to date   Eye Exam Up to date   Exercise (times per week) Other        Exercise Frequency Comment walking    Current Exercises Include Walking   Current Exercise Activities Include -   Do you need help using the phone?  No   Are you deaf or do you have serious difficulty hearing?  No   Do you need help with transportation? Yes   Do you need help shopping? Yes   Do you need help preparing meals?  No   Do you need help with housework?  Yes   Do you need help with laundry? Yes   Do you need help taking your medications? No   Do you need help managing money? No   Do you ever drive or ride in a car without wearing a seat belt? No   Have you felt unusual stress, anger or loneliness in the last month? Yes   Who do you live with? Alone   If you need help, do you have trouble finding someone available to you? No   Have you been bothered in the last four weeks by sexual problems? No   Do you have difficulty concentrating, remembering or making decisions? No       Age-appropriate Screening Schedule:  Refer to the list below for future screening recommendations based on patient's age, sex and/or medical conditions. Orders for these recommended tests are listed in the plan section. The patient has been provided with a written plan.    Health Maintenance   Topic Date Due   • LIPID PANEL  09/18/2021   • ZOSTER VACCINE (2 of 2) 04/01/2022 (Originally 2/3/2017)   • TDAP/TD VACCINES (2 - Td or Tdap) 03/07/2027   • INFLUENZA VACCINE  Completed   • MAMMOGRAM  Discontinued   • DXA SCAN  Discontinued              Assessment/Plan   CMS Preventative Services Quick Reference  Risk Factors Identified During Encounter  Cardiovascular Disease  Chronic Pain   Depression/Dysphoria  Fall Risk-High or Moderate  Immunizations Discussed/Encouraged (specific Immunizations; Shingrix and  COVID19  Inactivity/Sedentary  Obesity/Overweight   Polypharmacy  The above risks/problems have been discussed with the patient.  Follow up actions/plans if indicated are seen below in the Assessment/Plan Section.  Pertinent information has been shared with the patient in the After Visit Summary.    Diagnoses and all orders for this visit:    1. Medicare annual wellness visit, subsequent (Primary)    2. Intermittent claudication (HCC)  Comments:  continue aspirin, statin, pletal  Orders:  -     Lipid Panel    3. PVD (peripheral vascular disease) (HCC)  Comments:  continue aspirin, statin, pletal  Orders:  -     Lipid Panel    4. Chronic obstructive pulmonary disease, unspecified COPD type (HCC)  -     CBC (No Diff)    5. Recurrent major depressive disorder, in partial remission (HCC)  Comments:  seems to be doing okay after sudden loss of [place]. Continue Prozac. Has family support  Orders:  -     TSH+Free T4  -     Vitamin D 25 Hydroxy    6. Mixed hyperlipidemia  Comments:  continue statin; if lipids not adequately controlled may change pravastatin to crestor.  Orders:  -     Lipid Panel  -     Comprehensive Metabolic Panel    7. Essential hypertension  Comments:  with her weight loss (likely due to stress) I've encouraged her to watch for symptoms of hypotension, none at this time so no med changes.  Orders:  -     TSH+Free T4  -     CBC (No Diff)  -     Comprehensive Metabolic Panel    8. Cyanocobalamin deficiency  -     CBC (No Diff)  -     Vitamin B12  -     Folate    9. Hyperglycemia  -     Hemoglobin A1c  -     Comprehensive Metabolic Panel    10. Vitamin D deficiency  -     Vitamin D 25 Hydroxy  -     Comprehensive Metabolic Panel    11. Class 1 obesity due to excess calories with serious comorbidity and body mass index (BMI) of 34.0 to 34.9 in adult  Comments:  heart healthy diet ideal    12. Immunization counseling  Comments:  s/p Moderna x 2 last winter, 8 months out, encouraged Moderna booster at  pharmacy; defer Shingrix until later visit.    13. Primary osteoarthritis involving multiple joints  Comments:  follow up with orthopedics        Follow Up:   Return in about 6 months (around 5/8/2022) for Blood Pressure follow up.     An After Visit Summary and PPPS were made available to the patient.

## 2021-11-09 LAB
25(OH)D3+25(OH)D2 SERPL-MCNC: 16.8 NG/ML (ref 30–100)
ALBUMIN SERPL-MCNC: 4.3 G/DL (ref 3.5–5.2)
ALBUMIN/GLOB SERPL: 1.8 G/DL
ALP SERPL-CCNC: 66 U/L (ref 39–117)
ALT SERPL-CCNC: 12 U/L (ref 1–33)
AST SERPL-CCNC: 17 U/L (ref 1–32)
BILIRUB SERPL-MCNC: 1.3 MG/DL (ref 0–1.2)
BUN SERPL-MCNC: 27 MG/DL (ref 8–23)
BUN/CREAT SERPL: 29.7 (ref 7–25)
CALCIUM SERPL-MCNC: 9.9 MG/DL (ref 8.6–10.5)
CHLORIDE SERPL-SCNC: 100 MMOL/L (ref 98–107)
CHOLEST SERPL-MCNC: 157 MG/DL (ref 0–200)
CO2 SERPL-SCNC: 30.8 MMOL/L (ref 22–29)
CREAT SERPL-MCNC: 0.91 MG/DL (ref 0.57–1)
ERYTHROCYTE [DISTWIDTH] IN BLOOD BY AUTOMATED COUNT: 12.6 % (ref 12.3–15.4)
FOLATE SERPL-MCNC: 6.12 NG/ML (ref 4.78–24.2)
GLOBULIN SER CALC-MCNC: 2.4 GM/DL
GLUCOSE SERPL-MCNC: 100 MG/DL (ref 65–99)
HBA1C MFR BLD: 5.8 % (ref 4.8–5.6)
HCT VFR BLD AUTO: 38.7 % (ref 34–46.6)
HDLC SERPL-MCNC: 45 MG/DL (ref 40–60)
HGB BLD-MCNC: 13 G/DL (ref 12–15.9)
LDLC SERPL CALC-MCNC: 92 MG/DL (ref 0–100)
MCH RBC QN AUTO: 29.6 PG (ref 26.6–33)
MCHC RBC AUTO-ENTMCNC: 33.6 G/DL (ref 31.5–35.7)
MCV RBC AUTO: 88.2 FL (ref 79–97)
PLATELET # BLD AUTO: 208 10*3/MM3 (ref 140–450)
POTASSIUM SERPL-SCNC: 4.2 MMOL/L (ref 3.5–5.2)
PROT SERPL-MCNC: 6.7 G/DL (ref 6–8.5)
RBC # BLD AUTO: 4.39 10*6/MM3 (ref 3.77–5.28)
SODIUM SERPL-SCNC: 140 MMOL/L (ref 136–145)
T4 FREE SERPL-MCNC: 1.14 NG/DL (ref 0.93–1.7)
TRIGL SERPL-MCNC: 111 MG/DL (ref 0–150)
TSH SERPL DL<=0.005 MIU/L-ACNC: 1.54 UIU/ML (ref 0.27–4.2)
VIT B12 SERPL-MCNC: 396 PG/ML (ref 211–946)
VLDLC SERPL CALC-MCNC: 20 MG/DL (ref 5–40)
WBC # BLD AUTO: 6.26 10*3/MM3 (ref 3.4–10.8)

## 2021-11-10 DIAGNOSIS — E55.9 VITAMIN D DEFICIENCY: ICD-10-CM

## 2021-11-10 DIAGNOSIS — R17 ELEVATED BILIRUBIN: Primary | ICD-10-CM

## 2021-11-10 RX ORDER — CHOLECALCIFEROL (VITAMIN D3) 1250 MCG
50000 CAPSULE ORAL
Qty: 12 CAPSULE | Refills: 1 | Status: SHIPPED | OUTPATIENT
Start: 2021-11-10 | End: 2022-11-14

## 2021-11-11 NOTE — PROGRESS NOTES
Patient has low vitamin levels, suggest taking daily multivitamin like centrum silver. Vitamin D is low enough she needs a supplement prescribed, take once a week. One liver enzyme is abnormal, bilirubin. I looked back, that's new for her. Suggest rechecking lab when she can here (or can send order to [facility]) to see if it was possibly lab error or if it needs further check. Pre-diabetic by 0.1, but likely due to stress, diet (fyi-- passed away suddenly this summer).

## 2022-05-10 ENCOUNTER — OFFICE VISIT (OUTPATIENT)
Dept: INTERNAL MEDICINE | Facility: CLINIC | Age: 77
End: 2022-05-10

## 2022-05-10 VITALS
WEIGHT: 202 LBS | TEMPERATURE: 97.5 F | HEART RATE: 63 BPM | SYSTOLIC BLOOD PRESSURE: 120 MMHG | OXYGEN SATURATION: 98 % | BODY MASS INDEX: 34.49 KG/M2 | DIASTOLIC BLOOD PRESSURE: 70 MMHG | HEIGHT: 64 IN

## 2022-05-10 DIAGNOSIS — M54.41 CHRONIC MIDLINE LOW BACK PAIN WITH RIGHT-SIDED SCIATICA: Chronic | ICD-10-CM

## 2022-05-10 DIAGNOSIS — E53.8 CYANOCOBALAMIN DEFICIENCY: ICD-10-CM

## 2022-05-10 DIAGNOSIS — R73.03 PREDIABETES: ICD-10-CM

## 2022-05-10 DIAGNOSIS — I73.9 PVD (PERIPHERAL VASCULAR DISEASE): ICD-10-CM

## 2022-05-10 DIAGNOSIS — M17.0 PRIMARY OSTEOARTHRITIS OF BOTH KNEES: ICD-10-CM

## 2022-05-10 DIAGNOSIS — G89.29 CHRONIC MIDLINE LOW BACK PAIN WITH RIGHT-SIDED SCIATICA: Chronic | ICD-10-CM

## 2022-05-10 DIAGNOSIS — K22.719 BARRETT'S ESOPHAGUS WITH DYSPLASIA: ICD-10-CM

## 2022-05-10 DIAGNOSIS — R73.9 HYPERGLYCEMIA: ICD-10-CM

## 2022-05-10 DIAGNOSIS — E55.9 VITAMIN D DEFICIENCY: ICD-10-CM

## 2022-05-10 DIAGNOSIS — I10 ESSENTIAL HYPERTENSION: ICD-10-CM

## 2022-05-10 DIAGNOSIS — R17 ELEVATED BILIRUBIN: Primary | ICD-10-CM

## 2022-05-10 DIAGNOSIS — R79.9 ABNORMAL BLOOD CHEMISTRY: ICD-10-CM

## 2022-05-10 DIAGNOSIS — J44.9 CHRONIC OBSTRUCTIVE PULMONARY DISEASE, UNSPECIFIED COPD TYPE: Chronic | ICD-10-CM

## 2022-05-10 DIAGNOSIS — I73.9 INTERMITTENT CLAUDICATION: ICD-10-CM

## 2022-05-10 DIAGNOSIS — F33.41 RECURRENT MAJOR DEPRESSIVE DISORDER, IN PARTIAL REMISSION: ICD-10-CM

## 2022-05-10 DIAGNOSIS — Z51.81 MEDICATION MONITORING ENCOUNTER: ICD-10-CM

## 2022-05-10 DIAGNOSIS — J30.89 ENVIRONMENTAL AND SEASONAL ALLERGIES: ICD-10-CM

## 2022-05-10 DIAGNOSIS — M25.40 JOINT SWELLING: ICD-10-CM

## 2022-05-10 DIAGNOSIS — E78.2 MIXED HYPERLIPIDEMIA: ICD-10-CM

## 2022-05-10 PROCEDURE — 99214 OFFICE O/P EST MOD 30 MIN: CPT | Performed by: FAMILY MEDICINE

## 2022-05-10 RX ORDER — LISINOPRIL 40 MG/1
40 TABLET ORAL DAILY
Qty: 90 TABLET | Refills: 3 | Status: SHIPPED | OUTPATIENT
Start: 2022-05-10 | End: 2022-05-11

## 2022-05-10 RX ORDER — CILOSTAZOL 100 MG/1
100 TABLET ORAL 2 TIMES DAILY
Qty: 180 TABLET | Refills: 3 | Status: SHIPPED | OUTPATIENT
Start: 2022-05-10

## 2022-05-10 RX ORDER — HYDROCHLOROTHIAZIDE 25 MG/1
25 TABLET ORAL DAILY
Qty: 90 TABLET | Refills: 3 | Status: SHIPPED | OUTPATIENT
Start: 2022-05-10

## 2022-05-10 RX ORDER — CETIRIZINE HYDROCHLORIDE 10 MG/1
10 TABLET ORAL DAILY
Qty: 90 TABLET | Refills: 3 | Status: SHIPPED | OUTPATIENT
Start: 2022-05-10

## 2022-05-10 RX ORDER — ALBUTEROL SULFATE 90 UG/1
2 AEROSOL, METERED RESPIRATORY (INHALATION) EVERY 6 HOURS PRN
Qty: 54 G | Refills: 3 | Status: SHIPPED | OUTPATIENT
Start: 2022-05-10

## 2022-05-10 RX ORDER — ASPIRIN 81 MG/1
81 TABLET ORAL DAILY
Qty: 90 TABLET | Refills: 3 | Status: SHIPPED | OUTPATIENT
Start: 2022-05-10

## 2022-05-10 RX ORDER — MELOXICAM 7.5 MG/1
7.5 TABLET ORAL DAILY
Qty: 90 TABLET | Refills: 3 | Status: SHIPPED | OUTPATIENT
Start: 2022-05-10

## 2022-05-10 RX ORDER — FLUOXETINE HYDROCHLORIDE 20 MG/1
60 CAPSULE ORAL DAILY
Qty: 270 CAPSULE | Refills: 3 | Status: SHIPPED | OUTPATIENT
Start: 2022-05-10

## 2022-05-10 RX ORDER — PRAVASTATIN SODIUM 40 MG
40 TABLET ORAL NIGHTLY
Qty: 90 TABLET | Refills: 3 | Status: SHIPPED | OUTPATIENT
Start: 2022-05-10

## 2022-05-10 RX ORDER — ESOMEPRAZOLE MAGNESIUM 40 MG/1
40 CAPSULE, DELAYED RELEASE ORAL DAILY
Qty: 90 CAPSULE | Refills: 3 | Status: SHIPPED | OUTPATIENT
Start: 2022-05-10

## 2022-05-10 NOTE — ASSESSMENT & PLAN NOTE
indefinite PPI use required to lower risk of esophageal cancer. Warned of osteoporosis risk, impacts of med on renal function, etc

## 2022-05-10 NOTE — PROGRESS NOTES
05/10/2022      [unfilled]  Problem List Items Addressed This Visit        Cardiac and Vasculature    Mixed hyperlipidemia (Chronic)    Current Assessment & Plan     Lipid abnormalities are improving with treatment.  Pharmacotherapy as ordered.  Lipids will be reassessed in 6 months.           Relevant Medications    pravastatin (PRAVACHOL) 40 MG tablet    Other Relevant Orders    Lipid Panel    Essential hypertension    Current Assessment & Plan     Hypertension is improving with treatment.  Continue current treatment regimen.  Blood pressure will be reassessed at the next regular appointment.           Relevant Medications    hydroCHLOROthiazide (HYDRODIURIL) 25 MG tablet    lisinopril (PRINIVIL,ZESTRIL) 40 MG tablet    metoprolol tartrate (LOPRESSOR) 25 MG tablet    Other Relevant Orders    TSH+Free T4    Intermittent claudication (HCC)    Relevant Medications    cilostazol (PLETAL) 100 MG tablet    pravastatin (PRAVACHOL) 40 MG tablet    PVD (peripheral vascular disease) (HCC)    Relevant Medications    aspirin (Aspirin Low Dose) 81 MG EC tablet    pravastatin (PRAVACHOL) 40 MG tablet       Endocrine and Metabolic    Cyanocobalamin deficiency    Relevant Orders    CBC (No Diff)    Vitamin B12 & Folate       Gastrointestinal Abdominal     Adkins's esophagus (Chronic)    Current Assessment & Plan     indefinite PPI use required to lower risk of esophageal cancer. Warned of osteoporosis risk, impacts of med on renal function, etc            Relevant Medications    esomeprazole (nexIUM) 40 MG capsule    Other Relevant Orders    Comprehensive Metabolic Panel    CBC (No Diff)    Vitamin B12 & Folate       Mental Health    Recurrent major depressive disorder, in partial remission (HCC) (Chronic)    Relevant Medications    FLUoxetine (PROzac) 20 MG capsule    Other Relevant Orders    TSH+Free T4       Musculoskeletal and Injuries    Chronic low back pain (Chronic)    Relevant Medications    meloxicam (MOBIC) 7.5  MG tablet    Primary osteoarthritis of both knees    Relevant Medications    meloxicam (MOBIC) 7.5 MG tablet    Other Relevant Orders    Ambulatory Referral to Orthopedic Surgery (Completed)       Pulmonary and Pneumonias    Chronic obstructive pulmonary disease (HCC) (Chronic)    Relevant Medications    albuterol sulfate  (90 Base) MCG/ACT inhaler    cetirizine (zyrTEC) 10 MG tablet      Other Visit Diagnoses     Elevated bilirubin    -  Primary    Relevant Orders    Comprehensive Metabolic Panel    Vitamin D deficiency        Relevant Orders    Comprehensive Metabolic Panel    Vitamin D 25 Hydroxy    Hyperglycemia        Relevant Orders    Hemoglobin A1c    Comprehensive Metabolic Panel    TSH+Free T4    Prediabetes         Relevant Orders    TSH+Free T4    Joint swelling        Relevant Orders    Cyclic Citrul Peptide Antibody, IgG / IgA    C-reactive Protein    Sedimentation Rate    TSH+Free T4    Uric Acid    Rheumatoid Factor    Environmental and seasonal allergies        Relevant Medications    cetirizine (zyrTEC) 10 MG tablet    Other Relevant Orders    CBC (No Diff)    Abnormal blood chemistry        Relevant Orders    Hemoglobin A1c    Cyclic Citrul Peptide Antibody, IgG / IgA    C-reactive Protein    Sedimentation Rate    Comprehensive Metabolic Panel    CBC (No Diff)    Lipid Panel    TSH+Free T4    Vitamin D 25 Hydroxy    Uric Acid    Rheumatoid Factor    Vitamin B12 & Folate    Medication monitoring encounter        Relevant Orders    Hemoglobin A1c    Cyclic Citrul Peptide Antibody, IgG / IgA    C-reactive Protein    Sedimentation Rate    Comprehensive Metabolic Panel    CBC (No Diff)    Lipid Panel    TSH+Free T4    Vitamin D 25 Hydroxy    Uric Acid    Rheumatoid Factor    Vitamin B12 & Folate        Encouraged follow up with orthopedics. New referral in for insurance.   Discussed possibly physical therapy to help work on strengthening/balance but will wait until she can get in with  "ortho.  Considering a burst of prednisone for joint swelling/pain but want labs drawn first for review.  rheumatoid arthritis comes up in her chart from past documentation, further investigating this today  Happy to hear her children are helping with her care (driving her, etc).       Return in about 6 months (around 11/10/2022) for Medicare Wellness.      Subjective    Lata Justice is a 76 y.o. female here for:  Chief Complaint   Patient presents with   • Hypertension     Follow up - fasting today       History per MA reviewed.    Blood pressure well controlled    Continues to solitario chronic knee pain. Hesitant to do surgery. Previously had injections which helped. Needs new referral to orthopedics it appears.     Swelling in right wrist, painful, as well as swelling/pain in left thenar eminence. Has topical Voltaren on hand. Both parents had rheumatoid arthritis she thinks. 5th digit right hand is turning outward, she wears a sleeve device to help straighten it out and decrease discomfort.     Last labs showed elevated bilirubin. Of note on exam today she does not appear icteric.         The following portions of the patient's history were reviewed and updated as appropriate: allergies, current medications, past family history, past medical history, past social history, past surgical history and problem list.    Review of Systems   Constitutional: Negative for fever.   HENT: Positive for congestion and postnasal drip.    Respiratory: Positive for cough (allergies/postnasal drip).    Musculoskeletal: Positive for arthralgias, gait problem and joint swelling.   Allergic/Immunologic: Positive for environmental allergies.   Neurological: Positive for weakness.   Psychiatric/Behavioral: Positive for stress (trying to sell property, move).         Objective   Visit Vitals  /70   Pulse 63   Temp 97.5 °F (36.4 °C) (Infrared)   Ht 162.6 cm (64.02\")   Wt 91.6 kg (202 lb)   LMP  (LMP Unknown)   SpO2 98%   BMI 34.65 " kg/m²       Physical Exam  Vitals and nursing note reviewed.   Constitutional:       General: She is not in acute distress.     Appearance: Normal appearance. She is well-developed and well-groomed. She is obese. She is not ill-appearing, toxic-appearing or diaphoretic.      Interventions: Face mask in place.   HENT:      Head: Normocephalic and atraumatic.      Right Ear: Hearing normal.      Left Ear: Hearing normal.   Eyes:      General: Lids are normal. No scleral icterus.     Extraocular Movements: Extraocular movements intact.   Neck:      Trachea: Phonation normal.   Cardiovascular:      Rate and Rhythm: Regular rhythm. Bradycardia present.   Pulmonary:      Effort: Pulmonary effort is normal.      Breath sounds: Normal breath sounds.   Musculoskeletal:      Cervical back: Neck supple.   Skin:     Coloration: Skin is not jaundiced or pale.   Neurological:      General: No focal deficit present.      Mental Status: She is alert and oriented to person, place, and time.      Motor: Weakness (difficulty with getting up out of chair.) present.      Gait: Gait abnormal (using rolling walker, slow gait).   Psychiatric:         Attention and Perception: Attention and perception normal.         Mood and Affect: Mood and affect normal.         Speech: Speech normal.         Behavior: Behavior normal. Behavior is cooperative.         Thought Content: Thought content normal.         Cognition and Memory: Cognition and memory normal.         Judgment: Judgment normal.               Annetta Mcclendon MD

## 2022-05-11 DIAGNOSIS — I10 ESSENTIAL HYPERTENSION: Primary | ICD-10-CM

## 2022-05-11 DIAGNOSIS — E53.8 FOLIC ACID DEFICIENCY: ICD-10-CM

## 2022-05-11 DIAGNOSIS — E83.52 HYPERCALCEMIA: ICD-10-CM

## 2022-05-11 DIAGNOSIS — E79.0 ELEVATED URIC ACID IN BLOOD: ICD-10-CM

## 2022-05-11 RX ORDER — LEVOMEFOLATE CALCIUM 15 MG
15 TABLET ORAL DAILY
Qty: 90 TABLET | Refills: 3 | Status: SHIPPED | OUTPATIENT
Start: 2022-05-11

## 2022-05-11 RX ORDER — LOSARTAN POTASSIUM 100 MG/1
100 TABLET ORAL NIGHTLY
Qty: 90 TABLET | Refills: 3 | Status: SHIPPED | OUTPATIENT
Start: 2022-05-11

## 2022-05-12 ENCOUNTER — TELEPHONE (OUTPATIENT)
Dept: INTERNAL MEDICINE | Facility: CLINIC | Age: 77
End: 2022-05-12

## 2022-05-12 LAB
25(OH)D3+25(OH)D2 SERPL-MCNC: 38 NG/ML (ref 30–100)
ALBUMIN SERPL-MCNC: 4.2 G/DL (ref 3.5–5.2)
ALBUMIN/GLOB SERPL: 1.6 G/DL
ALP SERPL-CCNC: 68 U/L (ref 39–117)
ALT SERPL-CCNC: 13 U/L (ref 1–33)
AST SERPL-CCNC: 16 U/L (ref 1–32)
BILIRUB SERPL-MCNC: 0.4 MG/DL (ref 0–1.2)
BUN SERPL-MCNC: 32 MG/DL (ref 8–23)
BUN/CREAT SERPL: 29.1 (ref 7–25)
CALCIUM SERPL-MCNC: 10.6 MG/DL (ref 8.6–10.5)
CCP IGA+IGG SERPL IA-ACNC: 7 UNITS (ref 0–19)
CHLORIDE SERPL-SCNC: 102 MMOL/L (ref 98–107)
CHOLEST SERPL-MCNC: 173 MG/DL (ref 0–200)
CO2 SERPL-SCNC: 26.1 MMOL/L (ref 22–29)
CREAT SERPL-MCNC: 1.1 MG/DL (ref 0.57–1)
CRP SERPL-MCNC: <0.3 MG/DL (ref 0–0.5)
EGFRCR SERPLBLD CKD-EPI 2021: 52.2 ML/MIN/1.73
ERYTHROCYTE [DISTWIDTH] IN BLOOD BY AUTOMATED COUNT: 13.6 % (ref 12.3–15.4)
ERYTHROCYTE [SEDIMENTATION RATE] IN BLOOD BY WESTERGREN METHOD: 10 MM/HR (ref 0–30)
FOLATE SERPL-MCNC: 4.74 NG/ML (ref 4.78–24.2)
GLOBULIN SER CALC-MCNC: 2.6 GM/DL
GLUCOSE SERPL-MCNC: 116 MG/DL (ref 65–99)
HBA1C MFR BLD: 5.8 % (ref 4.8–5.6)
HCT VFR BLD AUTO: 41.6 % (ref 34–46.6)
HDLC SERPL-MCNC: 42 MG/DL (ref 40–60)
HGB BLD-MCNC: 13.4 G/DL (ref 12–15.9)
LDLC SERPL CALC-MCNC: 104 MG/DL (ref 0–100)
MCH RBC QN AUTO: 29 PG (ref 26.6–33)
MCHC RBC AUTO-ENTMCNC: 32.2 G/DL (ref 31.5–35.7)
MCV RBC AUTO: 90 FL (ref 79–97)
PLATELET # BLD AUTO: 213 10*3/MM3 (ref 140–450)
POTASSIUM SERPL-SCNC: 5.1 MMOL/L (ref 3.5–5.2)
PROT SERPL-MCNC: 6.8 G/DL (ref 6–8.5)
RBC # BLD AUTO: 4.62 10*6/MM3 (ref 3.77–5.28)
RHEUMATOID FACT SERPL-ACNC: <10 IU/ML
SODIUM SERPL-SCNC: 139 MMOL/L (ref 136–145)
T4 FREE SERPL-MCNC: 1.03 NG/DL (ref 0.93–1.7)
TRIGL SERPL-MCNC: 154 MG/DL (ref 0–150)
TSH SERPL DL<=0.005 MIU/L-ACNC: 2.78 UIU/ML (ref 0.27–4.2)
URATE SERPL-MCNC: 7.3 MG/DL (ref 2.4–5.7)
VIT B12 SERPL-MCNC: 446 PG/ML (ref 211–946)
VLDLC SERPL CALC-MCNC: 27 MG/DL (ref 5–40)
WBC # BLD AUTO: 5.99 10*3/MM3 (ref 3.4–10.8)

## 2022-05-12 NOTE — TELEPHONE ENCOUNTER
Pharmacy Name: [facility] - [place], [place] - [address] - [phone] [facility] [phone] FX  [facility] [address] [facility] [address] AT [facility] - [phone]  - [phone] FX  [id number] [place], [place] - [address] AT [facility] [address] - [phone] [facility] [phone] FX  [facility] - [place]     Pharmacy representative name: [facility]    Pharmacy representative phone number: [phone] REF NUMBER [phone]    What medication are you calling in regards to: FLUOXETINE 20 MG     What question does the pharmacy have: THIS IS THE FIRST TIME THE PHARMACY IS FILLING THIS MEDICATION AND THE PATIENT IS SENSITIVE TO SEROTONIN INHIBITORS. WANTS TO MAKE SURE THE PROVIDER KNOWS THIS AND ITS OK TO FILL     Who is the provider that prescribed the medication: DR. HOROWITZ    Additional notes:

## 2022-05-12 NOTE — PROGRESS NOTES
Remains pre-diabetic, essentially unchanged compared to 5 years ago. Lab for gout is high. Could explain some of her symptoms. Changing blood pressure medicine: lisinopril stop, start losartan 100 mg nightly. This blood pressure med can help lower uric acid. I'd like her to repeat labs in about 6 weeks and I'll get those orders in. If uric acid still high next time I may change her HCTZ then. Also on the labs in six weeks I'll recheck her calcium level as it was high (may be due to meds). If taking calcium supplements needs to stop. Folic acid deficient--this can lead to fatigue, nerve pain, memory changes, etc. Sending supplement, if not covered get over the counter (I prefer the one I'm sending but can substitute with folic acid 1 mg if can't find). One lab pending but rest of labs suggest she does NOT have rheumatoid arthritis.

## 2022-07-25 ENCOUNTER — HOSPITAL ENCOUNTER (OUTPATIENT)
Facility: HOSPITAL | Age: 77
Discharge: HOME | End: 2022-07-25
Payer: MEDICARE

## 2022-07-25 LAB
A/G RATIO: 2 (ref 0.8–2)
ALBUMIN SERPL-MCNC: 4.1 G/DL (ref 3.4–4.8)
ALP BLD-CCNC: 61 U/L (ref 25–100)
ALT SERPL-CCNC: 8 U/L (ref 4–36)
ANION GAP SERPL CALCULATED.3IONS-SCNC: 11 MMOL/L (ref 3–16)
AST SERPL-CCNC: 15 U/L (ref 8–33)
BASOPHILS ABSOLUTE: 0 K/UL (ref 0–0.1)
BASOPHILS RELATIVE PERCENT: 0.6 %
BILIRUB SERPL-MCNC: 0.4 MG/DL (ref 0.3–1.2)
BUN BLDV-MCNC: 24 MG/DL (ref 6–20)
CALCIUM SERPL-MCNC: 9.1 MG/DL (ref 8.5–10.5)
CHLORIDE BLD-SCNC: 102 MMOL/L (ref 98–107)
CHOLESTEROL, TOTAL: 160 MG/DL (ref 0–200)
CO2: 22 MMOL/L (ref 20–30)
CREAT SERPL-MCNC: 0.9 MG/DL (ref 0.4–1.2)
EOSINOPHILS ABSOLUTE: 0.2 K/UL (ref 0–0.4)
EOSINOPHILS RELATIVE PERCENT: 4 %
FOLATE: 11 NG/ML
GFR AFRICAN AMERICAN: >59
GFR NON-AFRICAN AMERICAN: >60
GLOBULIN: 2.1 G/DL
GLUCOSE BLD-MCNC: 122 MG/DL (ref 74–106)
HBA1C MFR BLD: 5.7 %
HCT VFR BLD CALC: 37.7 % (ref 37–47)
HDLC SERPL-MCNC: 37 MG/DL (ref 40–60)
HEMOGLOBIN: 12.3 G/DL (ref 11.5–16.5)
IMMATURE GRANULOCYTES #: 0 K/UL
IMMATURE GRANULOCYTES %: 0.6 % (ref 0–5)
LDL CHOLESTEROL CALCULATED: 103 MG/DL
LYMPHOCYTES ABSOLUTE: 1 K/UL (ref 1.5–4)
LYMPHOCYTES RELATIVE PERCENT: 19.7 %
MCH RBC QN AUTO: 28.5 PG (ref 27–32)
MCHC RBC AUTO-ENTMCNC: 32.6 G/DL (ref 31–35)
MCV RBC AUTO: 87.3 FL (ref 80–100)
MONOCYTES ABSOLUTE: 0.3 K/UL (ref 0.2–0.8)
MONOCYTES RELATIVE PERCENT: 6.8 %
NEUTROPHILS ABSOLUTE: 3.4 K/UL (ref 2–7.5)
NEUTROPHILS RELATIVE PERCENT: 68.3 %
PDW BLD-RTO: 13.3 % (ref 11–16)
PLATELET # BLD: 194 K/UL (ref 150–400)
PMV BLD AUTO: 11.4 FL (ref 6–10)
POTASSIUM SERPL-SCNC: 4.3 MMOL/L (ref 3.4–5.1)
RBC # BLD: 4.32 M/UL (ref 3.8–5.8)
SODIUM BLD-SCNC: 135 MMOL/L (ref 136–145)
TOTAL PROTEIN: 6.2 G/DL (ref 6.4–8.3)
TRIGL SERPL-MCNC: 101 MG/DL (ref 0–249)
TSH SERPL DL<=0.05 MIU/L-ACNC: 1.66 UIU/ML (ref 0.27–4.2)
VITAMIN B-12: 339 PG/ML (ref 211–911)
VITAMIN D 25-HYDROXY: 30.2 (ref 32–100)
VLDLC SERPL CALC-MCNC: 20 MG/DL
WBC # BLD: 5 K/UL (ref 4–11)

## 2022-07-25 PROCEDURE — 36415 COLL VENOUS BLD VENIPUNCTURE: CPT

## 2022-07-25 PROCEDURE — 80053 COMPREHEN METABOLIC PANEL: CPT

## 2022-07-25 PROCEDURE — 85025 COMPLETE CBC W/AUTO DIFF WBC: CPT

## 2022-07-25 PROCEDURE — 84443 ASSAY THYROID STIM HORMONE: CPT

## 2022-07-25 PROCEDURE — 82607 VITAMIN B-12: CPT

## 2022-07-25 PROCEDURE — 82306 VITAMIN D 25 HYDROXY: CPT

## 2022-07-25 PROCEDURE — 80061 LIPID PANEL: CPT

## 2022-07-25 PROCEDURE — 82746 ASSAY OF FOLIC ACID SERUM: CPT

## 2022-07-25 PROCEDURE — 83036 HEMOGLOBIN GLYCOSYLATED A1C: CPT

## 2022-11-14 ENCOUNTER — OFFICE VISIT (OUTPATIENT)
Dept: INTERNAL MEDICINE | Facility: CLINIC | Age: 77
End: 2022-11-14

## 2022-11-14 VITALS
DIASTOLIC BLOOD PRESSURE: 82 MMHG | HEIGHT: 64 IN | SYSTOLIC BLOOD PRESSURE: 132 MMHG | RESPIRATION RATE: 16 BRPM | TEMPERATURE: 97.3 F | HEART RATE: 66 BPM | WEIGHT: 207.8 LBS | OXYGEN SATURATION: 97 % | BODY MASS INDEX: 35.48 KG/M2

## 2022-11-14 DIAGNOSIS — Z91.81 AT HIGH RISK FOR FALLS: ICD-10-CM

## 2022-11-14 DIAGNOSIS — I10 ESSENTIAL HYPERTENSION: ICD-10-CM

## 2022-11-14 DIAGNOSIS — Z00.00 MEDICARE ANNUAL WELLNESS VISIT, SUBSEQUENT: Primary | ICD-10-CM

## 2022-11-14 DIAGNOSIS — J44.9 CHRONIC OBSTRUCTIVE PULMONARY DISEASE, UNSPECIFIED COPD TYPE: Chronic | ICD-10-CM

## 2022-11-14 DIAGNOSIS — I73.9 INTERMITTENT CLAUDICATION: ICD-10-CM

## 2022-11-14 DIAGNOSIS — E78.2 MIXED HYPERLIPIDEMIA: ICD-10-CM

## 2022-11-14 DIAGNOSIS — E66.01 CLASS 2 SEVERE OBESITY DUE TO EXCESS CALORIES WITH SERIOUS COMORBIDITY AND BODY MASS INDEX (BMI) OF 35.0 TO 35.9 IN ADULT: ICD-10-CM

## 2022-11-14 DIAGNOSIS — F33.41 RECURRENT MAJOR DEPRESSIVE DISORDER, IN PARTIAL REMISSION: ICD-10-CM

## 2022-11-14 DIAGNOSIS — I73.9 PVD (PERIPHERAL VASCULAR DISEASE): ICD-10-CM

## 2022-11-14 DIAGNOSIS — E55.9 VITAMIN D DEFICIENCY: ICD-10-CM

## 2022-11-14 DIAGNOSIS — Z23 NEED FOR INFLUENZA VACCINATION: ICD-10-CM

## 2022-11-14 PROCEDURE — 99214 OFFICE O/P EST MOD 30 MIN: CPT | Performed by: FAMILY MEDICINE

## 2022-11-14 PROCEDURE — G0008 ADMIN INFLUENZA VIRUS VAC: HCPCS | Performed by: FAMILY MEDICINE

## 2022-11-14 PROCEDURE — 1170F FXNL STATUS ASSESSED: CPT | Performed by: FAMILY MEDICINE

## 2022-11-14 PROCEDURE — 1159F MED LIST DOCD IN RCRD: CPT | Performed by: FAMILY MEDICINE

## 2022-11-14 PROCEDURE — 90662 IIV NO PRSV INCREASED AG IM: CPT | Performed by: FAMILY MEDICINE

## 2022-11-14 RX ORDER — ACETAMINOPHEN 160 MG
2000 TABLET,DISINTEGRATING ORAL DAILY
Qty: 90 CAPSULE | Refills: 3 | Status: SHIPPED | OUTPATIENT
Start: 2022-11-14

## 2022-11-14 NOTE — PATIENT INSTRUCTIONS
Chronic Obstructive Pulmonary Disease  Chronic obstructive pulmonary disease (COPD) is a long-term (chronic) condition that affects the lungs. COPD is a general term that can be used to describe many different lung problems that cause lung inflammation and limit airflow, including chronic bronchitis and emphysema.  If you have COPD, your lung function will probably never return to normal. In most cases, it gets worse over time. However, there are steps you can take to slow the progression of the disease and improve your quality of life.  What are the causes?  This condition may be caused by:  Smoking. This is the most common cause.  Certain genes passed down through families.  What increases the risk?  The following factors may make you more likely to develop this condition:  Being exposed to secondhand smoke from cigarettes, pipes, or cigars.  Being exposed to chemicals and other irritants, such as fumes and dust in the work environment.  Having chronic lung conditions or infections.  What are the signs or symptoms?  Symptoms of this condition include:  Shortness of breath, especially during physical activity.  Chronic cough with a large amount of thick mucus. Sometimes, the cough may not have any mucus (dry cough).  Wheezing and rapid breathing.  Gray or bluish discoloration (cyanosis) of the skin, especially in the fingers, toes, or lips.  Feeling tired (fatigue).  Weight loss.  Chest tightness.  Frequent infections.  Episodes when breathing symptoms become much worse (exacerbations).  At the later stages of this disease, you may have swelling in the ankles, feet, or legs.  How is this diagnosed?  This condition is diagnosed based on:  Your medical history.  A physical exam.  You may also have tests, including:  Lung (pulmonary) function tests. This may include a spirometry test, which measures your ability to exhale properly.  Chest X-ray.  CT scan.  Blood tests.  How is this treated?  This condition may be  treated with:  Medicines. These may include inhaled rescue medicines to treat acute exacerbations as well as medicines that you take long-term (maintenance medicines) to prevent flare-ups of COPD.  Bronchodilators help treat COPD by dilating the airways to allow increased airflow and make your breathing more comfortable.  Steroids can reduce airway inflammation and help prevent exacerbations.  Smoking cessation. If you smoke, your health care provider may ask you to quit, and may also recommend therapy or replacement products to help you quit.  Pulmonary rehabilitation. This may involve working with a team of health care providers and specialists, such as respiratory, occupational, and physical therapists.  Exercise and physical activity. These are beneficial for nearly all people with COPD.  Nutrition therapy to gain weight, if you are underweight.  Oxygen. Supplemental oxygen therapy is only helpful if you have a low oxygen level in your blood (hypoxemia).  Lung surgery or transplant.  Palliative care. This is to help people with COPD feel comfortable when treatment is no longer working.  Follow these instructions at home:  Medicines  Take over-the-counter and prescription medicines only as told by your health care provider. This includes inhaled medicines and pills.  Talk to your health care provider before taking any cough or allergy medicines. You may need to avoid certain medicines that dry out your airways.  Lifestyle  If you smoke, the most important thing that you can do is to stop smoking. Continuing to smoke will cause the disease to progress faster.  Do not use any products that contain nicotine or tobacco. These products include cigarettes, chewing tobacco, and vaping devices, such as e-cigarettes. If you need help quitting, ask your health care provider.  Avoid exposure to things that irritate your lungs, such as smoke, chemicals, and fumes.  Stay active, but balance activity with periods of rest.  Exercise and physical activity will help you maintain your ability to do things you want to do.  Learn and use relaxation techniques to manage stress and to control your breathing.  Get the right amount of sleep and get quality sleep. Most adults need 7 or more hours per night.  Eat healthy foods. Eating smaller, more frequent meals and resting before meals may help you maintain your strength.  Controlled breathing  Learn and use controlled breathing techniques as directed by your health care provider. Controlled breathing techniques include:  Pursed lip breathing. Start by breathing in (inhaling) through your nose for 1 second. Then, purse your lips as if you were going to whistle and breathe out (exhale) through the pursed lips for 2 seconds.  Diaphragmatic breathing. Start by putting one hand on your abdomen just above your waist. Inhale slowly through your nose. The hand on your abdomen should move out. Then purse your lips and exhale slowly. You should be able to feel the hand on your abdomen moving in as you exhale.    Controlled coughing  Learn and use controlled coughing to clear mucus from your lungs. Controlled coughing is a series of short, progressive coughs. The steps of controlled coughing are:  Lean your head slightly forward.  Breathe in deeply using diaphragmatic breathing.  Try to hold your breath for 3 seconds.  Keep your mouth slightly open while coughing twice.  Spit any mucus out into a tissue.  Rest and repeat the steps once or twice as needed.  General instructions  Make sure you receive all the vaccines that your health care provider recommends, especially the pneumococcal and influenza vaccines. Preventing infection and hospitalization is very important when you have COPD.  Drink enough fluid to keep your urine pale yellow, unless you have a medical condition that requires fluid restriction.  Use oxygen therapy and pulmonary rehabilitation if told by your health care provider. If you  require home oxygen therapy, ask your health care provider whether you should purchase a pulse oximeter to measure your oxygen level at home.  Work with your health care provider to develop a COPD action plan. This will help you know what steps to take if your condition gets worse.  Keep other chronic health conditions under control as told by your health care provider.  Avoid extreme temperature and humidity changes.  Avoid contact with people who have an illness that spreads from person to person (is contagious), such as viral infections or pneumonia.  Keep all follow-up visits. This is important.  Contact a health care provider if:  You are coughing up more mucus than usual.  There is a change in the color or thickness of your mucus.  Your breathing is more labored than usual.  Your breathing is faster than usual.  You have difficulty sleeping.  You need to use your rescue medicines or inhalers more often than expected.  You have trouble doing routine activities such as getting dressed or walking around the house.  Get help right away if:  You have shortness of breath while you are resting.  You have shortness of breath that prevents you from:  Being able to talk.  Performing your usual physical activities.  You have chest pain lasting longer than 5 minutes.  Your skin color is more blue (cyanotic) than usual.  You measure low oxygen saturations for longer than 5 minutes with a pulse oximeter.  You have a fever.  You feel too tired to breathe normally.  These symptoms may represent a serious problem that is an emergency. Do not wait to see if the symptoms will go away. Get medical help right away. Call your local emergency services (911 in the [place] Do not drive yourself to the hospital.  Summary  Chronic obstructive pulmonary disease (COPD) is a long-term (chronic) condition that affects the lungs.  Your lung function will probably never return to normal. In most cases, it gets worse over time. However, there  are steps you can take to slow the progression of the disease and improve your quality of life.  Treatment for COPD may include taking medicines, quitting smoking, pulmonary rehabilitation, and changes to diet and exercise. As the disease progresses, you may need oxygen therapy, a lung transplant, or palliative care.  To help manage your condition, do not smoke, avoid exposure to things that irritate your lungs, stay up to date on all vaccines, and follow your health care provider's instructions for taking medicines.  This information is not intended to replace advice given to you by your health care provider. Make sure you discuss any questions you have with your health care provider.  Document Revised: 10/26/2021 Document Reviewed: 10/26/2021  [employer] Patient Education © 2022 [employer]      Fall Prevention in the Home, Adult  Falls can cause injuries and affect people of all ages. There are many simple things that you can do to make your home safe and to help prevent falls. Ask for help when making these changes, if needed.  What actions can I take to prevent falls?  General instructions  Use good lighting in all rooms. Replace any light bulbs that burn out, turn on lights if it is dark, and use night-lights.  Place frequently used items in easy-to-reach places. Lower the shelves around your home if necessary.  Set up furniture so that there are clear paths around it. Avoid moving your furniture around.  Remove throw rugs and other tripping hazards from the floor.  Avoid walking on wet floors.  Fix any uneven floor surfaces.  Add color or contrast paint or tape to grab bars and handrails in your home. Place contrasting color strips on the first and last steps of staircases.  When you use a stepladder, make sure that it is completely opened and that the sides and supports are firmly locked. Have someone hold the ladder while you are using it. Do not climb a closed stepladder.  Know where your pets are when  moving through your home.  What can I do in the bathroom?     Keep the floor dry. Immediately clean up any water that is on the floor.  Remove soap buildup in the tub or shower regularly.  Use nonskid mats or decals on the floor of the tub or shower.  Attach bath mats securely with double-sided, nonslip rug tape.  If you need to sit down while you are in the shower, use a plastic, nonslip stool.  Install grab bars by the toilet and in the tub and shower. Do not use towel bars as grab bars.  What can I do in the bedroom?  Make sure that a bedside light is easy to reach.  Do not use oversized bedding that reaches the floor.  Have a firm chair that has side arms to use for getting dressed.  What can I do in the kitchen?  Clean up any spills right away.  If you need to reach for something above you, use a sturdy step stool that has a grab bar.  Keep electrical cables out of the way.  Do not use floor polish or wax that makes floors slippery. If you must use wax, make sure that it is non-skid floor wax.  What can I do with my stairs?  Do not leave any items on the stairs.  Make sure that you have a light switch at the top and the bottom of the stairs. Have them installed if you do not have them.  Make sure that there are handrails on both sides of the stairs. Fix handrails that are broken or loose. Make sure that handrails are as long as the staircases.  Install non-slip stair treads on all stairs in your home.  Avoid having throw rugs at the top or bottom of stairs, or secure the rugs with carpet tape to prevent them from moving.  Choose a carpet design that does not hide the edge of steps on the stairs.  Check any carpeting to make sure that it is firmly attached to the stairs. Fix any carpet that is loose or worn.  What can I do on the outside of my home?  Use bright outdoor lighting.  Regularly repair the edges of walkways and driveways and fix any cracks.  Remove high doorway thresholds.  Trim any shrubbery on the  main path into your home.  Regularly check that handrails are securely fastened and in good repair. Both sides of all steps should have handrails.  Install guardrails along the edges of any raised decks or porches.  Clear walkways of debris and clutter, including tools and rocks.  Have leaves, snow, and ice cleared regularly.  Use sand or salt on walkways during winter months.  In the garage, clean up any spills right away, including grease or oil spills.  What other actions can I take?  Wear closed-toe shoes that fit well and support your feet. Wear shoes that have rubber soles or low heels.  Use mobility aids as needed, such as canes, walkers, scooters, and crutches.  Review your medicines with your health care provider. Some medicines can cause dizziness or changes in blood pressure, which increase your risk of falling.  Talk with your health care provider about other ways that you can decrease your risk of falls. This may include working with a physical therapist or  to improve your strength, balance, and endurance.  Where to find more information  Centers for Disease Control and Prevention, [place]: www.cdc.gov  [employer] [place] on Aging: www.yari.nih.gov  Contact a health care provider if:  You are afraid of falling at home.  You feel weak, drowsy, or dizzy at home.  You fall at home.  Summary  There are many simple things that you can do to make your home safe and to help prevent falls.  Ways to make your home safe include removing tripping hazards and installing grab bars in the bathroom.  Ask for help when making these changes in your home.  This information is not intended to replace advice given to you by your health care provider. Make sure you discuss any questions you have with your health care provider.  Document Revised: 07/21/2021 Document Reviewed: 07/21/2021  [employer] Patient Education © 2022 [employer]    Medicare Wellness  Personal Prevention Plan of Service     Date of Office Visit:     Encounter Provider:  Annetta Mcclendon MD  Place of Service:  [facility]  Patient Name: Lata Justice  :  1945    As part of the Medicare Wellness portion of your visit today, we are providing you with this personalized preventive plan of services (PPPS). This plan is based upon recommendations of the [place] Preventive Services Task Force (USPSTF) and the Advisory Committee on Immunization Practices (ACIP).    This lists the preventive care services that should be considered, and provides dates of when you are due. Items listed as completed are up-to-date and do not require any further intervention.    Health Maintenance   Topic Date Due    ANNUAL WELLNESS VISIT  2022    COVID-19 Vaccine (4 - Booster) 2022 (Originally 2022)    LIPID PANEL  2023    TDAP/TD VACCINES (2 - Td or Tdap) 2027    COLORECTAL CANCER SCREENING  2028    HEPATITIS C SCREENING  Completed    INFLUENZA VACCINE  Completed    Pneumococcal Vaccine 65+  Completed    ZOSTER VACCINE  Completed    MAMMOGRAM  Discontinued    DXA SCAN  Discontinued       Orders Placed This Encounter   Procedures    Fluzone High-Dose 65+yrs ([id number])       Return in about 6 months (around 2023) for Blood Pressure follow up, labs (fasting not required).

## 2022-11-14 NOTE — PROGRESS NOTES
The ABCs of the Annual Wellness Visit  Subsequent Medicare Wellness Visit    Chief Complaint   Patient presents with   • Medicare Wellness-subsequent     AWV and preventive care      Subjective    History of Present Illness:  Lata Justice is a 77 y.o. female who presents for a Subsequent Medicare Wellness Visit.    The following portions of the patient's history were reviewed and   updated as appropriate: allergies, current medications, past family history, past medical history, past social history, past surgical history and problem list.    Compared to one year ago, the patient feels her physical   health is the same.    Compared to one year ago, the patient feels her mental   health is the same.    Recent Hospitalizations:  She was not admitted to the hospital during the last year.       Current Medical Providers:  Patient Care Team:  Annetta Mcclendon MD as PCP - General (Family Medicine)  Alfredo Patiño MD as Consulting Physician (Ophthalmology)  Jefry Ceron MD (Orthopedic Surgery)    Outpatient Medications Prior to Visit   Medication Sig Dispense Refill   • albuterol sulfate  (90 Base) MCG/ACT inhaler Inhale 2 puffs Every 6 (Six) Hours As Needed for Wheezing or Shortness of Air. 54 g 3   • aspirin (Aspirin Low Dose) 81 MG EC tablet Take 1 tablet by mouth Daily. 90 tablet 3   • cetirizine (zyrTEC) 10 MG tablet Take 1 tablet by mouth Daily. As needed for allergies 90 tablet 3   • cilostazol (PLETAL) 100 MG tablet Take 1 tablet by mouth 2 (Two) Times a Day. For poor circulation 180 tablet 3   • esomeprazole (nexIUM) 40 MG capsule Take 1 capsule by mouth Daily. For Adkins's esophagus 90 capsule 3   • FLUoxetine (PROzac) 20 MG capsule Take 3 capsules by mouth Daily. Indications: Depression 270 capsule 3   • hydroCHLOROthiazide (HYDRODIURIL) 25 MG tablet Take 1 tablet by mouth Daily. Indications: High Blood Pressure Disorder 90 tablet 3   • l-methylfolate 15 MG tablet tablet Take 1 tablet  by mouth Daily. 90 tablet 3   • losartan (COZAAR) 100 MG tablet Take 1 tablet by mouth Every Night. Indications: High Blood Pressure Disorder 90 tablet 3   • meloxicam (MOBIC) 7.5 MG tablet Take 1 tablet by mouth Daily. Take with food. For osteoarthritis pain. 90 tablet 3   • metoprolol tartrate (LOPRESSOR) 25 MG tablet Take 1 tablet by mouth 2 (Two) Times a Day. Indications: High Blood Pressure Disorder 180 tablet 3   • pravastatin (PRAVACHOL) 40 MG tablet Take 1 tablet by mouth Every Night. To lower cholesterol and stroke risk 90 tablet 3   • Cholecalciferol (Vitamin D3) 1.25 MG (39940 UT) capsule Take 1 capsule by mouth Every 7 (Seven) Days. Indications: Vitamin D Deficiency 12 capsule 1     No facility-administered medications prior to visit.       No opioid medication identified on active medication list. I have reviewed chart for other potential  high risk medication/s and harmful drug interactions in the elderly.          Aspirin is on active medication list. Aspirin use is indicated based on review of current medical condition/s. Pros and cons of this therapy have been discussed today. Benefits of this medication outweigh potential harm.  Patient has been encouraged to continue taking this medication.  .      Patient Active Problem List   Diagnosis   • Adkins's esophagus   • Essential hypertension   • Chronic low back pain   • Chronic obstructive pulmonary disease ([facility])   • Recurrent major depressive disorder, in partial remission ([facility])   • Impaired glucose tolerance   • Mixed hyperlipidemia   • Seasonal allergic rhinitis   • Bilateral chronic knee pain   • Bilateral edema of lower extremity   • Chronic pain of both ankles   • Primary osteoarthritis of both knees   • Status post right knee replacement   • Cyanocobalamin deficiency   • Primary osteoarthritis involving multiple joints   • Intermittent claudication ([facility])   • PVD (peripheral vascular disease) ([facility])     Advance Care Planning  Advance Directive  "is not on file.  ACP discussion was held with the patient during this visit. Patient has an advance directive (not in EMR), copy requested.    Review of Systems   Constitutional: Negative for fever.   Musculoskeletal: Positive for arthralgias and joint swelling.        Objective    Vitals:    11/14/22 0846   BP: 132/82   BP Location: Left arm   Patient Position: Sitting   Cuff Size: Adult   Pulse: 66   Resp: 16   Temp: 97.3 °F (36.3 °C)   TempSrc: Temporal   SpO2: 97%   Weight: 94.3 kg (207 lb 12.8 oz)   Height: 162.6 cm (64.02\")   PainSc: 0-No pain     Estimated body mass index is 35.65 kg/m² as calculated from the following:    Height as of this encounter: 162.6 cm (64.02\").    Weight as of this encounter: 94.3 kg (207 lb 12.8 oz).    Class 2 Severe Obesity (BMI >=35 and <=39.9). Obesity-related health conditions include the following: hypertension, osteoarthritis and peripheral vascular disease. Obesity is unchanged. BMI is is above average; BMI management plan is completed. We discussed information via AVS.      Does the patient have evidence of cognitive impairment? No    Physical Exam  Vitals and nursing note reviewed.   Constitutional:       General: She is not in acute distress.     Appearance: Normal appearance. She is well-developed and well-groomed. She is obese. She is not ill-appearing, toxic-appearing or diaphoretic.      Interventions: Face mask in place.   HENT:      Head: Normocephalic and atraumatic.      Right Ear: Hearing normal.      Left Ear: Hearing normal.   Eyes:      General: Lids are normal. No scleral icterus.     Extraocular Movements: Extraocular movements intact.   Neck:      Trachea: Phonation normal.   Cardiovascular:      Rate and Rhythm: Normal rate and regular rhythm.   Pulmonary:      Effort: Pulmonary effort is normal.      Breath sounds: Normal breath sounds.   Musculoskeletal:      Right wrist: Swelling present.      Cervical back: Neck supple.   Skin:     Coloration: Skin is " not jaundiced or pale.   Neurological:      General: No focal deficit present.      Mental Status: She is alert and oriented to person, place, and time.      Motor: Motor function is intact.      Gait: Gait abnormal (slowed, using rollator).   Psychiatric:         Attention and Perception: Attention and perception normal.         Mood and Affect: Mood and affect normal.         Speech: Speech normal.         Behavior: Behavior normal. Behavior is cooperative.         Thought Content: Thought content normal.         Cognition and Memory: Cognition and memory normal.         Judgment: Judgment normal.                 HEALTH RISK ASSESSMENT    Smoking Status:  Social History     Tobacco Use   Smoking Status Former   • Packs/day: 0.25   • Years: 4.00   • Pack years: 1.00   • Types: Cigarettes   • Quit date: 10/9/1985   • Years since quittin.1   Smokeless Tobacco Never     Alcohol Consumption:  Social History     Substance and Sexual Activity   Alcohol Use No     Fall Risk Screen:    STEADI Fall Risk Assessment was completed, and patient is at MODERATE risk for falls. Assessment completed on:2022    Depression Screening:  PHQ-2/PHQ-9 Depression Screening 2022   Retired PHQ-9 Total Score -   Retired Total Score -   Little Interest or Pleasure in Doing Things 0-->not at all   Feeling Down, Depressed or Hopeless 0-->not at all   Trouble Falling or Staying Asleep, or Sleeping Too Much -   Feeling Tired or Having Little Energy -   Poor Appetite or Overeating -   Feeling Bad about Yourself - or that You are a Failure or Have Let Yourself or Your Family Down -   Trouble Concentrating on Things, Such as Reading the Newspaper or Watching Television -   Moving or Speaking So Slowly that Other People Could Have Noticed? Or the Opposite - Being So Fidgety -   Thoughts that You Would be Better Off Dead or of Hurting Yourself in Some Way -   PHQ-9: Brief Depression Severity Measure Score 0       Health Habits and  Functional and Cognitive Screening:  Functional & Cognitive Status 11/14/2022   Do you have difficulty preparing food and eating? No   Do you have difficulty bathing yourself, getting dressed or grooming yourself? No   Do you have difficulty using the toilet? No   Do you have difficulty moving around from place to place? Yes   Do you have trouble with steps or getting out of a bed or a chair? Yes   Current Diet Well Balanced Diet   Dental Exam Up to date   Eye Exam Up to date   Exercise (times per week) 0 times per week        Exercise Frequency Comment -   Current Exercises Include No Regular Exercise   Current Exercise Activities Include -   Do you need help using the phone?  No   Are you deaf or do you have serious difficulty hearing?  No   Do you need help with transportation? Yes   Do you need help shopping? Yes   Do you need help preparing meals?  No   Do you need help with housework?  Yes   Do you need help with laundry? No   Do you need help taking your medications? No   Do you need help managing money? No   Do you ever drive or ride in a car without wearing a seat belt? No   Have you felt unusual stress, anger or loneliness in the last month? Yes   Who do you live with? Alone   If you need help, do you have trouble finding someone available to you? No   Have you been bothered in the last four weeks by sexual problems? No   Do you have difficulty concentrating, remembering or making decisions? No       Age-appropriate Screening Schedule:  Refer to the list below for future screening recommendations based on patient's age, sex and/or medical conditions. Orders for these recommended tests are listed in the plan section. The patient has been provided with a written plan.    Health Maintenance   Topic Date Due   • LIPID PANEL  07/25/2023   • TDAP/TD VACCINES (2 - Td or Tdap) 03/07/2027   • INFLUENZA VACCINE  Completed   • ZOSTER VACCINE  Completed   • MAMMOGRAM  Discontinued   • DXA SCAN  Discontinued               Assessment & Plan   CMS Preventative Services Quick Reference  Risk Factors Identified During Encounter  Chronic Pain   Depression/Dysphoria  Immunizations Discussed/Encouraged (specific Immunizations; Influenza  Inactivity/Sedentary  Obesity/Overweight   Polypharmacy  The above risks/problems have been discussed with the patient.  Follow up actions/plans if indicated are seen below in the Assessment/Plan Section.  Pertinent information has been shared with the patient in the After Visit Summary.    Diagnoses and all orders for this visit:    1. Medicare annual wellness visit, subsequent (Primary)    2. Recurrent major depressive disorder, in partial remission ([facility])  Comments:  continue Prozac    3. Chronic obstructive pulmonary disease, unspecified COPD type ([facility])  Comments:  continue inhalers as needed    4. PVD (peripheral vascular disease) ([facility])  Comments:  continue statin, aspirin    5. Intermittent claudication ([facility])  Comments:  continue statin, aspirin, Pletal    6. Class 2 severe obesity due to excess calories with serious comorbidity and body mass index (BMI) of 35.0 to 35.9 in adult ([facility])  Comments:  info via avs    7. Essential hypertension  Comments:  continue current medicine, check labs next visit (including uric acid)    8. Mixed hyperlipidemia  Comments:  good cholesterol control with pravastatin    9. Vitamin D deficiency  -     Cholecalciferol (Vitamin D3) 50 MCG (2000 UT) capsule; Take 1 capsule by mouth Daily. For low vitamin D.  Dispense: 90 capsule; Refill: 3    10. Need for influenza vaccination  -     Fluzone High-Dose 65+yrs (2703-4192)    11. At high risk for falls        Follow Up:   Return in about 6 months (around 5/14/2023) for Blood Pressure follow up, labs (fasting not required).     An After Visit Summary and PPPS were made available to the patient.

## 2023-05-15 ENCOUNTER — OFFICE VISIT (OUTPATIENT)
Dept: INTERNAL MEDICINE | Facility: CLINIC | Age: 78
End: 2023-05-15
Payer: MEDICARE

## 2023-05-15 VITALS
HEART RATE: 65 BPM | BODY MASS INDEX: 34.66 KG/M2 | DIASTOLIC BLOOD PRESSURE: 80 MMHG | HEIGHT: 64 IN | TEMPERATURE: 97.3 F | WEIGHT: 203 LBS | SYSTOLIC BLOOD PRESSURE: 128 MMHG | OXYGEN SATURATION: 98 %

## 2023-05-15 DIAGNOSIS — R79.9 ABNORMAL BLOOD CHEMISTRY: ICD-10-CM

## 2023-05-15 DIAGNOSIS — E53.8 CYANOCOBALAMIN DEFICIENCY: ICD-10-CM

## 2023-05-15 DIAGNOSIS — J44.9 CHRONIC OBSTRUCTIVE PULMONARY DISEASE, UNSPECIFIED COPD TYPE: Chronic | ICD-10-CM

## 2023-05-15 DIAGNOSIS — I73.9 INTERMITTENT CLAUDICATION: ICD-10-CM

## 2023-05-15 DIAGNOSIS — J30.89 ENVIRONMENTAL AND SEASONAL ALLERGIES: ICD-10-CM

## 2023-05-15 DIAGNOSIS — I73.9 PVD (PERIPHERAL VASCULAR DISEASE): ICD-10-CM

## 2023-05-15 DIAGNOSIS — E78.2 MIXED HYPERLIPIDEMIA: ICD-10-CM

## 2023-05-15 DIAGNOSIS — R22.41 MASS OF RIGHT THIGH: Primary | ICD-10-CM

## 2023-05-15 DIAGNOSIS — M17.0 PRIMARY OSTEOARTHRITIS OF BOTH KNEES: ICD-10-CM

## 2023-05-15 DIAGNOSIS — M25.551 BILATERAL HIP PAIN: ICD-10-CM

## 2023-05-15 DIAGNOSIS — M25.552 BILATERAL HIP PAIN: ICD-10-CM

## 2023-05-15 DIAGNOSIS — I10 ESSENTIAL HYPERTENSION: ICD-10-CM

## 2023-05-15 DIAGNOSIS — F33.41 RECURRENT MAJOR DEPRESSIVE DISORDER, IN PARTIAL REMISSION: ICD-10-CM

## 2023-05-15 DIAGNOSIS — K22.719 BARRETT'S ESOPHAGUS WITH DYSPLASIA: ICD-10-CM

## 2023-05-15 DIAGNOSIS — E79.0 ELEVATED URIC ACID IN BLOOD: ICD-10-CM

## 2023-05-15 DIAGNOSIS — Z51.81 MEDICATION MONITORING ENCOUNTER: ICD-10-CM

## 2023-05-15 DIAGNOSIS — E53.8 FOLIC ACID DEFICIENCY: ICD-10-CM

## 2023-05-15 DIAGNOSIS — R73.9 HYPERGLYCEMIA: ICD-10-CM

## 2023-05-15 DIAGNOSIS — G89.29 CHRONIC MIDLINE LOW BACK PAIN WITH RIGHT-SIDED SCIATICA: Chronic | ICD-10-CM

## 2023-05-15 DIAGNOSIS — E55.9 VITAMIN D DEFICIENCY: ICD-10-CM

## 2023-05-15 DIAGNOSIS — M54.41 CHRONIC MIDLINE LOW BACK PAIN WITH RIGHT-SIDED SCIATICA: Chronic | ICD-10-CM

## 2023-05-15 RX ORDER — HYDROCHLOROTHIAZIDE 25 MG/1
25 TABLET ORAL DAILY
Qty: 90 TABLET | Refills: 3 | Status: SHIPPED | OUTPATIENT
Start: 2023-05-15

## 2023-05-15 RX ORDER — PRAVASTATIN SODIUM 40 MG
40 TABLET ORAL NIGHTLY
Qty: 90 TABLET | Refills: 3 | Status: SHIPPED | OUTPATIENT
Start: 2023-05-15

## 2023-05-15 RX ORDER — CILOSTAZOL 100 MG/1
100 TABLET ORAL 2 TIMES DAILY
Qty: 180 TABLET | Refills: 3 | Status: SHIPPED | OUTPATIENT
Start: 2023-05-15

## 2023-05-15 RX ORDER — LISINOPRIL 40 MG/1
TABLET ORAL
COMMUNITY
Start: 2023-04-04 | End: 2023-05-15

## 2023-05-15 RX ORDER — CETIRIZINE HYDROCHLORIDE 10 MG/1
10 TABLET ORAL DAILY PRN
Qty: 90 TABLET | Refills: 3 | Status: SHIPPED | OUTPATIENT
Start: 2023-05-15

## 2023-05-15 RX ORDER — ACETAMINOPHEN 160 MG
2000 TABLET,DISINTEGRATING ORAL DAILY
Qty: 90 CAPSULE | Refills: 3 | Status: SHIPPED | OUTPATIENT
Start: 2023-05-15

## 2023-05-15 RX ORDER — MELOXICAM 7.5 MG/1
7.5 TABLET ORAL DAILY
Qty: 90 TABLET | Refills: 3 | Status: SHIPPED | OUTPATIENT
Start: 2023-05-15

## 2023-05-15 RX ORDER — ESOMEPRAZOLE MAGNESIUM 40 MG/1
40 CAPSULE, DELAYED RELEASE ORAL DAILY
Qty: 90 CAPSULE | Refills: 3 | Status: SHIPPED | OUTPATIENT
Start: 2023-05-15

## 2023-05-15 RX ORDER — LOSARTAN POTASSIUM 100 MG/1
100 TABLET ORAL NIGHTLY
Qty: 90 TABLET | Refills: 3 | Status: SHIPPED | OUTPATIENT
Start: 2023-05-15

## 2023-05-15 RX ORDER — ASPIRIN 81 MG/1
81 TABLET ORAL DAILY
Qty: 90 TABLET | Refills: 3 | Status: SHIPPED | OUTPATIENT
Start: 2023-05-15

## 2023-05-15 RX ORDER — ALBUTEROL SULFATE 90 UG/1
2 AEROSOL, METERED RESPIRATORY (INHALATION) EVERY 6 HOURS PRN
Qty: 54 G | Refills: 3 | Status: SHIPPED | OUTPATIENT
Start: 2023-05-15

## 2023-05-15 RX ORDER — FLUOXETINE HYDROCHLORIDE 20 MG/1
60 CAPSULE ORAL DAILY
Qty: 270 CAPSULE | Refills: 3 | Status: SHIPPED | OUTPATIENT
Start: 2023-05-15

## 2023-05-15 NOTE — PROGRESS NOTES
"Chief Complaint  Follow-up (6 month follow up) and Hypertension    Subjective        Lata Justice presents to [facility]  History of Present Illness  Pain and mass right anterior thigh  Seems to bother her more when back bothers her.    Hip pain worse since different injection on right hip. Will follow up with orthopedics    May be moving [place] area, down from her son. Downsizing. Daughter here today provides supplemental history, helping her with this transition.      Objective   Vital Signs:  /80 (BP Location: Right arm, Patient Position: Sitting, Cuff Size: Adult)   Pulse 65   Temp 97.3 °F (36.3 °C) (Temporal)   Ht 162.6 cm (64\") Comment: patient stated  Wt 92.1 kg (203 lb)   SpO2 98%   BMI 34.84 kg/m²   Estimated body mass index is 34.84 kg/m² as calculated from the following:    Height as of this encounter: 162.6 cm (64\").    Weight as of this encounter: 92.1 kg (203 lb).             Physical Exam  Vitals and nursing note reviewed.   Constitutional:       General: She is not in acute distress.     Appearance: Normal appearance. She is well-developed and well-groomed. She is not ill-appearing, toxic-appearing or diaphoretic.   HENT:      Head: Normocephalic and atraumatic.      Right Ear: Hearing normal.      Left Ear: Hearing normal.   Eyes:      General: Lids are normal. No scleral icterus.     Extraocular Movements: Extraocular movements intact.   Neck:      Trachea: Phonation normal.   Cardiovascular:      Rate and Rhythm: Normal rate and regular rhythm.   Pulmonary:      Effort: Pulmonary effort is normal.   Musculoskeletal:      Cervical back: Neck supple.        Legs:    Skin:     Coloration: Skin is not jaundiced or pale.   Neurological:      General: No focal deficit present.      Mental Status: She is alert and oriented to person, place, and time.      Motor: Motor function is intact.      Gait: Gait abnormal (using rollator, baseline).   Psychiatric:  "        Attention and Perception: Attention and perception normal.         Mood and Affect: Mood and affect normal.         Speech: Speech normal.         Behavior: Behavior normal. Behavior is cooperative.         Thought Content: Thought content normal.         Cognition and Memory: Cognition and memory normal.         Judgment: Judgment normal.        Result Review :                   Assessment and Plan   Diagnoses and all orders for this visit:    1. Mass of right thigh (Primary)  -     US Nonvascular Extremity Limited; Future    2. Bilateral hip pain  Comments:  follow up with orthopedics ASAP    3. Essential hypertension  -     metoprolol tartrate (LOPRESSOR) 25 MG tablet; Take 1 tablet by mouth 2 (Two) Times a Day. Indications: High Blood Pressure Disorder  Dispense: 180 tablet; Refill: 3  -     losartan (COZAAR) 100 MG tablet; Take 1 tablet by mouth Every Night. Indications: High Blood Pressure Disorder  Dispense: 90 tablet; Refill: 3  -     hydroCHLOROthiazide (HYDRODIURIL) 25 MG tablet; Take 1 tablet by mouth Daily. Indications: High Blood Pressure Disorder  Dispense: 90 tablet; Refill: 3    4. Mixed hyperlipidemia  -     Lipid Panel  -     Comprehensive Metabolic Panel    5. Vitamin D deficiency  -     Comprehensive Metabolic Panel  -     CBC (No Diff)  -     Vitamin D,25-Hydroxy  -     Cholecalciferol (Vitamin D3) 50 MCG (2000 UT) capsule; Take 1 capsule by mouth Daily. For low vitamin D.  Dispense: 90 capsule; Refill: 3    6. Elevated uric acid in blood  -     Uric Acid  -     Comprehensive Metabolic Panel  -     losartan (COZAAR) 100 MG tablet; Take 1 tablet by mouth Every Night. Indications: High Blood Pressure Disorder  Dispense: 90 tablet; Refill: 3    7. Folic acid deficiency  -     CBC (No Diff)  -     Vitamin B12 Deficiency Cascade  -     Folate  -     Vitamin B6    8. Cyanocobalamin deficiency  -     CBC (No Diff)  -     Vitamin B12 Deficiency Cascade  -     Folate  -     Vitamin B6    9.  Hyperglycemia  -     Comprehensive Metabolic Panel  -     Hemoglobin A1c    10. Abnormal blood chemistry  -     Lipid Panel  -     Uric Acid  -     Comprehensive Metabolic Panel  -     CBC (No Diff)  -     Vitamin B12 Deficiency Cascade  -     Folate  -     Hemoglobin A1c  -     Vitamin D,25-Hydroxy  -     Vitamin B6    11. Medication monitoring encounter  -     Lipid Panel  -     Uric Acid  -     Comprehensive Metabolic Panel  -     CBC (No Diff)  -     Vitamin B12 Deficiency Cascade  -     Folate  -     Hemoglobin A1c  -     Vitamin D,25-Hydroxy  -     Vitamin B6    12. Intermittent claudication  -     pravastatin (PRAVACHOL) 40 MG tablet; Take 1 tablet by mouth Every Night. To lower cholesterol and stroke risk  Dispense: 90 tablet; Refill: 3  -     cilostazol (PLETAL) 100 MG tablet; Take 1 tablet by mouth 2 (Two) Times a Day. For poor circulation  Dispense: 180 tablet; Refill: 3    13. PVD (peripheral vascular disease)  -     pravastatin (PRAVACHOL) 40 MG tablet; Take 1 tablet by mouth Every Night. To lower cholesterol and stroke risk  Dispense: 90 tablet; Refill: 3  -     aspirin (Aspirin Low Dose) 81 MG EC tablet; Take 1 tablet by mouth Daily.  Dispense: 90 tablet; Refill: 3    14. Chronic midline low back pain with right-sided sciatica  -     meloxicam (MOBIC) 7.5 MG tablet; Take 1 tablet by mouth Daily. Take with food. For osteoarthritis pain.  Dispense: 90 tablet; Refill: 3    15. Primary osteoarthritis of both knees  -     meloxicam (MOBIC) 7.5 MG tablet; Take 1 tablet by mouth Daily. Take with food. For osteoarthritis pain.  Dispense: 90 tablet; Refill: 3    16. Recurrent major depressive disorder, in partial remission  -     FLUoxetine (PROzac) 20 MG capsule; Take 3 capsules by mouth Daily. Indications: Depression  Dispense: 270 capsule; Refill: 3    17. Adkins's esophagus with dysplasia  -     esomeprazole (nexIUM) 40 MG capsule; Take 1 capsule by mouth Daily. For Adkins's esophagus  Dispense: 90  capsule; Refill: 3    18. Environmental and seasonal allergies  -     cetirizine (zyrTEC) 10 MG tablet; Take 1 tablet by mouth Daily As Needed for Allergies.  Dispense: 90 tablet; Refill: 3    19. Chronic obstructive pulmonary disease, unspecified COPD type  -     albuterol sulfate  (90 Base) MCG/ACT inhaler; Inhale 2 puffs Every 6 (Six) Hours As Needed for Wheezing or Shortness of Air.  Dispense: 54 g; Refill: 3             Follow Up   Return in about 6 months (around 11/15/2023) for Medicare Wellness, sooner if needed. May cancel if she has moved out of area.    Patient was given instructions and counseling regarding her condition or for health maintenance advice. Please see specific information pulled into the AVS if appropriate.

## 2023-05-17 LAB
25(OH)D3+25(OH)D2 SERPL-MCNC: 32.3 NG/ML (ref 30–100)
ALBUMIN SERPL-MCNC: 4.5 G/DL (ref 3.5–5.2)
ALBUMIN/GLOB SERPL: 2.3 G/DL
ALP SERPL-CCNC: 65 U/L (ref 39–117)
ALT SERPL-CCNC: 13 U/L (ref 1–33)
AST SERPL-CCNC: 20 U/L (ref 1–32)
BILIRUB SERPL-MCNC: 0.6 MG/DL (ref 0–1.2)
BUN SERPL-MCNC: 28 MG/DL (ref 8–23)
BUN/CREAT SERPL: 21.9 (ref 7–25)
CALCIUM SERPL-MCNC: 9.6 MG/DL (ref 8.6–10.5)
CHLORIDE SERPL-SCNC: 105 MMOL/L (ref 98–107)
CHOLEST SERPL-MCNC: 172 MG/DL (ref 0–200)
CO2 SERPL-SCNC: 23.7 MMOL/L (ref 22–29)
CREAT SERPL-MCNC: 1.28 MG/DL (ref 0.57–1)
EGFRCR SERPLBLD CKD-EPI 2021: 43.2 ML/MIN/1.73
ERYTHROCYTE [DISTWIDTH] IN BLOOD BY AUTOMATED COUNT: 13.5 % (ref 12.3–15.4)
FOLATE SERPL-MCNC: 8.39 NG/ML (ref 4.78–24.2)
GLOBULIN SER CALC-MCNC: 2 GM/DL
GLUCOSE SERPL-MCNC: 111 MG/DL (ref 65–99)
HBA1C MFR BLD: 6 % (ref 4.8–5.6)
HCT VFR BLD AUTO: 38.1 % (ref 34–46.6)
HDLC SERPL-MCNC: 39 MG/DL (ref 40–60)
HGB BLD-MCNC: 12.9 G/DL (ref 12–15.9)
LDLC SERPL CALC-MCNC: 110 MG/DL (ref 0–100)
MCH RBC QN AUTO: 29.8 PG (ref 26.6–33)
MCHC RBC AUTO-ENTMCNC: 33.9 G/DL (ref 31.5–35.7)
MCV RBC AUTO: 88 FL (ref 79–97)
METHYLMALONATE SERPL-SCNC: NORMAL
PLATELET # BLD AUTO: 225 10*3/MM3 (ref 140–450)
POTASSIUM SERPL-SCNC: 4.1 MMOL/L (ref 3.5–5.2)
PROT SERPL-MCNC: 6.5 G/DL (ref 6–8.5)
PYRIDOXAL PHOS SERPL-MCNC: 5.9 UG/L (ref 3.4–65.2)
RBC # BLD AUTO: 4.33 10*6/MM3 (ref 3.77–5.28)
SODIUM SERPL-SCNC: 145 MMOL/L (ref 136–145)
TRIGL SERPL-MCNC: 128 MG/DL (ref 0–150)
URATE SERPL-MCNC: 10.3 MG/DL (ref 2.4–5.7)
VIT B12 SERPL-MCNC: 354 PG/ML (ref 232–1245)
VLDLC SERPL CALC-MCNC: 23 MG/DL (ref 5–40)
WBC # BLD AUTO: 5.59 10*3/MM3 (ref 3.4–10.8)

## 2023-05-25 ENCOUNTER — APPOINTMENT (OUTPATIENT)
Dept: MRI IMAGING | Facility: HOSPITAL | Age: 78
End: 2023-05-25
Payer: MEDICARE

## 2023-05-25 ENCOUNTER — HOSPITAL ENCOUNTER (OUTPATIENT)
Facility: HOSPITAL | Age: 78
Setting detail: OBSERVATION
Discharge: HOME HEALTH | End: 2023-05-29
Attending: EMERGENCY MEDICINE | Admitting: FAMILY MEDICINE
Payer: MEDICARE

## 2023-05-25 ENCOUNTER — APPOINTMENT (OUTPATIENT)
Dept: GENERAL RADIOLOGY | Facility: HOSPITAL | Age: 78
End: 2023-05-25
Payer: MEDICARE

## 2023-05-25 ENCOUNTER — APPOINTMENT (OUTPATIENT)
Dept: CT IMAGING | Facility: HOSPITAL | Age: 78
End: 2023-05-25
Payer: MEDICARE

## 2023-05-25 DIAGNOSIS — E79.0 ELEVATED BLOOD URIC ACID LEVEL: Primary | ICD-10-CM

## 2023-05-25 DIAGNOSIS — I10 ESSENTIAL HYPERTENSION: ICD-10-CM

## 2023-05-25 DIAGNOSIS — R42 VERTIGO: Primary | ICD-10-CM

## 2023-05-25 DIAGNOSIS — R73.02 IMPAIRED GLUCOSE TOLERANCE: ICD-10-CM

## 2023-05-25 DIAGNOSIS — R11.10 INTRACTABLE VOMITING: ICD-10-CM

## 2023-05-25 DIAGNOSIS — H81.10 BENIGN PAROXYSMAL POSITIONAL VERTIGO, UNSPECIFIED LATERALITY: ICD-10-CM

## 2023-05-25 DIAGNOSIS — N18.31 STAGE 3A CHRONIC KIDNEY DISEASE: ICD-10-CM

## 2023-05-25 DIAGNOSIS — M15.9 PRIMARY OSTEOARTHRITIS INVOLVING MULTIPLE JOINTS: ICD-10-CM

## 2023-05-25 DIAGNOSIS — E78.2 MIXED HYPERLIPIDEMIA: Chronic | ICD-10-CM

## 2023-05-25 DIAGNOSIS — F33.41 RECURRENT MAJOR DEPRESSIVE DISORDER, IN PARTIAL REMISSION: Chronic | ICD-10-CM

## 2023-05-25 LAB
ALBUMIN SERPL-MCNC: 3.8 G/DL (ref 3.5–5.2)
ALBUMIN/GLOB SERPL: 1.5 G/DL
ALP SERPL-CCNC: 63 U/L (ref 39–117)
ALT SERPL W P-5'-P-CCNC: 12 U/L (ref 1–33)
ANION GAP SERPL CALCULATED.3IONS-SCNC: 11 MMOL/L (ref 5–15)
AST SERPL-CCNC: 17 U/L (ref 1–32)
BASOPHILS # BLD AUTO: 0.03 10*3/MM3 (ref 0–0.2)
BASOPHILS NFR BLD AUTO: 0.4 % (ref 0–1.5)
BILIRUB SERPL-MCNC: 0.4 MG/DL (ref 0–1.2)
BILIRUB UR QL STRIP: NEGATIVE
BUN SERPL-MCNC: 22 MG/DL (ref 8–23)
BUN/CREAT SERPL: 25.6 (ref 7–25)
CALCIUM SPEC-SCNC: 9 MG/DL (ref 8.6–10.5)
CHLORIDE SERPL-SCNC: 108 MMOL/L (ref 98–107)
CLARITY UR: CLEAR
CO2 SERPL-SCNC: 24 MMOL/L (ref 22–29)
COLOR UR: YELLOW
CREAT SERPL-MCNC: 0.86 MG/DL (ref 0.57–1)
DEPRECATED RDW RBC AUTO: 42.3 FL (ref 37–54)
EGFRCR SERPLBLD CKD-EPI 2021: 69.7 ML/MIN/1.73
EOSINOPHIL # BLD AUTO: 0.1 10*3/MM3 (ref 0–0.4)
EOSINOPHIL NFR BLD AUTO: 1.2 % (ref 0.3–6.2)
ERYTHROCYTE [DISTWIDTH] IN BLOOD BY AUTOMATED COUNT: 13.2 % (ref 12.3–15.4)
GLOBULIN UR ELPH-MCNC: 2.6 GM/DL
GLUCOSE SERPL-MCNC: 133 MG/DL (ref 65–99)
GLUCOSE UR STRIP-MCNC: NEGATIVE MG/DL
HCT VFR BLD AUTO: 37.6 % (ref 34–46.6)
HGB BLD-MCNC: 12.6 G/DL (ref 12–15.9)
HGB UR QL STRIP.AUTO: NEGATIVE
HOLD SPECIMEN: NORMAL
HOLD SPECIMEN: NORMAL
IMM GRANULOCYTES # BLD AUTO: 0.03 10*3/MM3 (ref 0–0.05)
IMM GRANULOCYTES NFR BLD AUTO: 0.4 % (ref 0–0.5)
KETONES UR QL STRIP: ABNORMAL
LEUKOCYTE ESTERASE UR QL STRIP.AUTO: NEGATIVE
LYMPHOCYTES # BLD AUTO: 0.93 10*3/MM3 (ref 0.7–3.1)
LYMPHOCYTES NFR BLD AUTO: 11.6 % (ref 19.6–45.3)
MAGNESIUM SERPL-MCNC: 1.5 MG/DL (ref 1.6–2.4)
MCH RBC QN AUTO: 29.4 PG (ref 26.6–33)
MCHC RBC AUTO-ENTMCNC: 33.5 G/DL (ref 31.5–35.7)
MCV RBC AUTO: 87.6 FL (ref 79–97)
MONOCYTES # BLD AUTO: 0.43 10*3/MM3 (ref 0.1–0.9)
MONOCYTES NFR BLD AUTO: 5.4 % (ref 5–12)
NEUTROPHILS NFR BLD AUTO: 6.49 10*3/MM3 (ref 1.7–7)
NEUTROPHILS NFR BLD AUTO: 81 % (ref 42.7–76)
NITRITE UR QL STRIP: NEGATIVE
NRBC BLD AUTO-RTO: 0 /100 WBC (ref 0–0.2)
PH UR STRIP.AUTO: 7 [PH] (ref 5–8)
PLATELET # BLD AUTO: 197 10*3/MM3 (ref 140–450)
PMV BLD AUTO: 9.5 FL (ref 6–12)
POTASSIUM SERPL-SCNC: 3.9 MMOL/L (ref 3.5–5.2)
PROT SERPL-MCNC: 6.4 G/DL (ref 6–8.5)
PROT UR QL STRIP: ABNORMAL
RBC # BLD AUTO: 4.29 10*6/MM3 (ref 3.77–5.28)
SODIUM SERPL-SCNC: 143 MMOL/L (ref 136–145)
SP GR UR STRIP: 1.02 (ref 1–1.03)
TROPONIN T SERPL HS-MCNC: 24 NG/L
TSH SERPL DL<=0.05 MIU/L-ACNC: 1.91 UIU/ML (ref 0.27–4.2)
UROBILINOGEN UR QL STRIP: ABNORMAL
WBC NRBC COR # BLD: 8.01 10*3/MM3 (ref 3.4–10.8)
WHOLE BLOOD HOLD COAG: NORMAL
WHOLE BLOOD HOLD SPECIMEN: NORMAL

## 2023-05-25 PROCEDURE — 93005 ELECTROCARDIOGRAM TRACING: CPT | Performed by: EMERGENCY MEDICINE

## 2023-05-25 PROCEDURE — 70498 CT ANGIOGRAPHY NECK: CPT

## 2023-05-25 PROCEDURE — 71045 X-RAY EXAM CHEST 1 VIEW: CPT

## 2023-05-25 PROCEDURE — 85025 COMPLETE CBC W/AUTO DIFF WBC: CPT | Performed by: EMERGENCY MEDICINE

## 2023-05-25 PROCEDURE — 99285 EMERGENCY DEPT VISIT HI MDM: CPT

## 2023-05-25 PROCEDURE — 96375 TX/PRO/DX INJ NEW DRUG ADDON: CPT

## 2023-05-25 PROCEDURE — 96374 THER/PROPH/DIAG INJ IV PUSH: CPT

## 2023-05-25 PROCEDURE — 25010000002 ONDANSETRON PER 1 MG: Performed by: EMERGENCY MEDICINE

## 2023-05-25 PROCEDURE — G0378 HOSPITAL OBSERVATION PER HR: HCPCS

## 2023-05-25 PROCEDURE — 70496 CT ANGIOGRAPHY HEAD: CPT

## 2023-05-25 PROCEDURE — 70551 MRI BRAIN STEM W/O DYE: CPT

## 2023-05-25 PROCEDURE — 84484 ASSAY OF TROPONIN QUANT: CPT | Performed by: EMERGENCY MEDICINE

## 2023-05-25 PROCEDURE — 84443 ASSAY THYROID STIM HORMONE: CPT | Performed by: INTERNAL MEDICINE

## 2023-05-25 PROCEDURE — 81003 URINALYSIS AUTO W/O SCOPE: CPT | Performed by: EMERGENCY MEDICINE

## 2023-05-25 PROCEDURE — 80053 COMPREHEN METABOLIC PANEL: CPT | Performed by: EMERGENCY MEDICINE

## 2023-05-25 PROCEDURE — 83735 ASSAY OF MAGNESIUM: CPT | Performed by: EMERGENCY MEDICINE

## 2023-05-25 PROCEDURE — 0042T HC CT CEREBRAL PERFUSION W/WO CONTRAST: CPT

## 2023-05-25 PROCEDURE — 25510000001 IOPAMIDOL PER 1 ML: Performed by: EMERGENCY MEDICINE

## 2023-05-25 PROCEDURE — 99214 OFFICE O/P EST MOD 30 MIN: CPT

## 2023-05-25 PROCEDURE — 25010000002 MAGNESIUM SULFATE IN D5W 1G/100ML (PREMIX) 1-5 GM/100ML-% SOLUTION: Performed by: EMERGENCY MEDICINE

## 2023-05-25 PROCEDURE — 25010000002 DIAZEPAM PER 5 MG: Performed by: EMERGENCY MEDICINE

## 2023-05-25 RX ORDER — MECLIZINE HYDROCHLORIDE 25 MG/1
25 TABLET ORAL EVERY 8 HOURS SCHEDULED
Status: DISCONTINUED | OUTPATIENT
Start: 2023-05-25 | End: 2023-05-26

## 2023-05-25 RX ORDER — POLYETHYLENE GLYCOL 3350 17 G/17G
17 POWDER, FOR SOLUTION ORAL DAILY PRN
Status: DISCONTINUED | OUTPATIENT
Start: 2023-05-25 | End: 2023-05-29 | Stop reason: HOSPADM

## 2023-05-25 RX ORDER — ONDANSETRON 2 MG/ML
4 INJECTION INTRAMUSCULAR; INTRAVENOUS ONCE
Status: COMPLETED | OUTPATIENT
Start: 2023-05-25 | End: 2023-05-25

## 2023-05-25 RX ORDER — BISACODYL 10 MG
10 SUPPOSITORY, RECTAL RECTAL DAILY PRN
Status: DISCONTINUED | OUTPATIENT
Start: 2023-05-25 | End: 2023-05-29 | Stop reason: HOSPADM

## 2023-05-25 RX ORDER — DIAZEPAM 5 MG/ML
5 INJECTION, SOLUTION INTRAMUSCULAR; INTRAVENOUS ONCE
Status: COMPLETED | OUTPATIENT
Start: 2023-05-25 | End: 2023-05-25

## 2023-05-25 RX ORDER — SODIUM CHLORIDE 0.9 % (FLUSH) 0.9 %
10 SYRINGE (ML) INJECTION EVERY 12 HOURS SCHEDULED
Status: DISCONTINUED | OUTPATIENT
Start: 2023-05-25 | End: 2023-05-29 | Stop reason: HOSPADM

## 2023-05-25 RX ORDER — NITROGLYCERIN 0.4 MG/1
0.4 TABLET SUBLINGUAL
Status: DISCONTINUED | OUTPATIENT
Start: 2023-05-25 | End: 2023-05-29 | Stop reason: HOSPADM

## 2023-05-25 RX ORDER — DIAZEPAM 5 MG/ML
5 INJECTION, SOLUTION INTRAMUSCULAR; INTRAVENOUS ONCE
Status: DISCONTINUED | OUTPATIENT
Start: 2023-05-25 | End: 2023-05-29 | Stop reason: HOSPADM

## 2023-05-25 RX ORDER — SODIUM CHLORIDE 9 MG/ML
75 INJECTION, SOLUTION INTRAVENOUS CONTINUOUS
Status: DISCONTINUED | OUTPATIENT
Start: 2023-05-25 | End: 2023-05-27

## 2023-05-25 RX ORDER — SODIUM CHLORIDE 0.9 % (FLUSH) 0.9 %
10 SYRINGE (ML) INJECTION AS NEEDED
Status: DISCONTINUED | OUTPATIENT
Start: 2023-05-25 | End: 2023-05-29 | Stop reason: HOSPADM

## 2023-05-25 RX ORDER — ALLOPURINOL 100 MG/1
100 TABLET ORAL DAILY
Qty: 90 TABLET | Refills: 1 | Status: SHIPPED | OUTPATIENT
Start: 2023-05-25

## 2023-05-25 RX ORDER — SODIUM CHLORIDE 9 MG/ML
40 INJECTION, SOLUTION INTRAVENOUS AS NEEDED
Status: DISCONTINUED | OUTPATIENT
Start: 2023-05-25 | End: 2023-05-29 | Stop reason: HOSPADM

## 2023-05-25 RX ORDER — MAGNESIUM SULFATE 1 G/100ML
1 INJECTION INTRAVENOUS ONCE
Status: COMPLETED | OUTPATIENT
Start: 2023-05-25 | End: 2023-05-25

## 2023-05-25 RX ORDER — DIAZEPAM 5 MG/1
5 TABLET ORAL EVERY 8 HOURS PRN
Status: DISCONTINUED | OUTPATIENT
Start: 2023-05-25 | End: 2023-05-29 | Stop reason: HOSPADM

## 2023-05-25 RX ORDER — AMOXICILLIN 250 MG
2 CAPSULE ORAL 2 TIMES DAILY
Status: DISCONTINUED | OUTPATIENT
Start: 2023-05-25 | End: 2023-05-29 | Stop reason: HOSPADM

## 2023-05-25 RX ORDER — BISACODYL 5 MG/1
5 TABLET, DELAYED RELEASE ORAL DAILY PRN
Status: DISCONTINUED | OUTPATIENT
Start: 2023-05-25 | End: 2023-05-29 | Stop reason: HOSPADM

## 2023-05-25 RX ORDER — ONDANSETRON 2 MG/ML
4 INJECTION INTRAMUSCULAR; INTRAVENOUS EVERY 6 HOURS PRN
Status: DISCONTINUED | OUTPATIENT
Start: 2023-05-25 | End: 2023-05-29 | Stop reason: HOSPADM

## 2023-05-25 RX ADMIN — DIAZEPAM 5 MG: 5 INJECTION, SOLUTION INTRAMUSCULAR; INTRAVENOUS at 19:18

## 2023-05-25 RX ADMIN — MAGNESIUM SULFATE HEPTAHYDRATE 1 G: 1 INJECTION, SOLUTION INTRAVENOUS at 21:49

## 2023-05-25 RX ADMIN — IOPAMIDOL 115 ML: 755 INJECTION, SOLUTION INTRAVENOUS at 20:39

## 2023-05-25 RX ADMIN — ONDANSETRON 4 MG: 2 INJECTION INTRAMUSCULAR; INTRAVENOUS at 20:15

## 2023-05-25 NOTE — Clinical Note
Level of Care: Telemetry [5]   Diagnosis: BPV (benign positional vertigo) [[id number]]   Admitting Physician: JASMYN JOSE III [[id number]]   Attending Physician: JASMYN JOSE III [[id number]]   Bed Request Comments: tele obs

## 2023-05-26 ENCOUNTER — APPOINTMENT (OUTPATIENT)
Dept: MRI IMAGING | Facility: HOSPITAL | Age: 78
End: 2023-05-26
Payer: MEDICARE

## 2023-05-26 LAB
25(OH)D3+25(OH)D2 SERPL-MCNC: 32.3 NG/ML (ref 30–100)
ALBUMIN SERPL-MCNC: 3.7 G/DL (ref 3.5–5.2)
ALBUMIN SERPL-MCNC: 4.5 G/DL (ref 3.5–5.2)
ALBUMIN/GLOB SERPL: 1.9 G/DL
ALBUMIN/GLOB SERPL: 2.3 G/DL
ALP SERPL-CCNC: 55 U/L (ref 39–117)
ALP SERPL-CCNC: 65 U/L (ref 39–117)
ALT SERPL W P-5'-P-CCNC: 10 U/L (ref 1–33)
ALT SERPL-CCNC: 13 U/L (ref 1–33)
ANION GAP SERPL CALCULATED.3IONS-SCNC: 10 MMOL/L (ref 5–15)
AST SERPL-CCNC: 13 U/L (ref 1–32)
AST SERPL-CCNC: 20 U/L (ref 1–32)
BASOPHILS # BLD AUTO: 0.03 10*3/MM3 (ref 0–0.2)
BASOPHILS NFR BLD AUTO: 0.5 % (ref 0–1.5)
BILIRUB SERPL-MCNC: 0.4 MG/DL (ref 0–1.2)
BILIRUB SERPL-MCNC: 0.6 MG/DL (ref 0–1.2)
BUN SERPL-MCNC: 16 MG/DL (ref 8–23)
BUN SERPL-MCNC: 28 MG/DL (ref 8–23)
BUN/CREAT SERPL: 21.9 (ref 7–25)
BUN/CREAT SERPL: 23.9 (ref 7–25)
CALCIUM SERPL-MCNC: 9.6 MG/DL (ref 8.6–10.5)
CALCIUM SPEC-SCNC: 8.6 MG/DL (ref 8.6–10.5)
CHLORIDE SERPL-SCNC: 104 MMOL/L (ref 98–107)
CHLORIDE SERPL-SCNC: 105 MMOL/L (ref 98–107)
CHOLEST SERPL-MCNC: 142 MG/DL (ref 0–200)
CHOLEST SERPL-MCNC: 172 MG/DL (ref 0–200)
CO2 SERPL-SCNC: 23.7 MMOL/L (ref 22–29)
CO2 SERPL-SCNC: 27 MMOL/L (ref 22–29)
CREAT SERPL-MCNC: 0.67 MG/DL (ref 0.57–1)
CREAT SERPL-MCNC: 1.28 MG/DL (ref 0.57–1)
DEPRECATED RDW RBC AUTO: 41.9 FL (ref 37–54)
EGFRCR SERPLBLD CKD-EPI 2021: 43.2 ML/MIN/1.73
EGFRCR SERPLBLD CKD-EPI 2021: 90.2 ML/MIN/1.73
EOSINOPHIL # BLD AUTO: 0.06 10*3/MM3 (ref 0–0.4)
EOSINOPHIL NFR BLD AUTO: 1 % (ref 0.3–6.2)
ERYTHROCYTE [DISTWIDTH] IN BLOOD BY AUTOMATED COUNT: 12.9 % (ref 12.3–15.4)
ERYTHROCYTE [DISTWIDTH] IN BLOOD BY AUTOMATED COUNT: 13.5 % (ref 12.3–15.4)
FOLATE SERPL-MCNC: 8.39 NG/ML (ref 4.78–24.2)
GLOBULIN SER CALC-MCNC: 2 GM/DL
GLOBULIN UR ELPH-MCNC: 1.9 GM/DL
GLUCOSE BLDC GLUCOMTR-MCNC: 118 MG/DL (ref 70–130)
GLUCOSE BLDC GLUCOMTR-MCNC: 119 MG/DL (ref 70–130)
GLUCOSE BLDC GLUCOMTR-MCNC: 124 MG/DL (ref 70–130)
GLUCOSE BLDC GLUCOMTR-MCNC: 126 MG/DL (ref 70–130)
GLUCOSE SERPL-MCNC: 111 MG/DL (ref 65–99)
GLUCOSE SERPL-MCNC: 128 MG/DL (ref 65–99)
HBA1C MFR BLD: 6 % (ref 4.8–5.6)
HBA1C MFR BLD: 7.4 % (ref 4.8–5.6)
HCT VFR BLD AUTO: 36.6 % (ref 34–46.6)
HCT VFR BLD AUTO: 38.1 % (ref 34–46.6)
HDLC SERPL-MCNC: 34 MG/DL (ref 40–60)
HDLC SERPL-MCNC: 39 MG/DL (ref 40–60)
HGB BLD-MCNC: 12 G/DL (ref 12–15.9)
HGB BLD-MCNC: 12.9 G/DL (ref 12–15.9)
IMM GRANULOCYTES # BLD AUTO: 0.02 10*3/MM3 (ref 0–0.05)
IMM GRANULOCYTES NFR BLD AUTO: 0.3 % (ref 0–0.5)
IMP & REVIEW OF LAB RESULTS: NORMAL
LDLC SERPL CALC-MCNC: 110 MG/DL (ref 0–100)
LDLC SERPL CALC-MCNC: 87 MG/DL (ref 0–100)
LDLC/HDLC SERPL: 2.49 {RATIO}
LYMPHOCYTES # BLD AUTO: 1.24 10*3/MM3 (ref 0.7–3.1)
LYMPHOCYTES NFR BLD AUTO: 20.1 % (ref 19.6–45.3)
MAGNESIUM SERPL-MCNC: 1.6 MG/DL (ref 1.6–2.4)
MCH RBC QN AUTO: 29.3 PG (ref 26.6–33)
MCH RBC QN AUTO: 29.8 PG (ref 26.6–33)
MCHC RBC AUTO-ENTMCNC: 32.8 G/DL (ref 31.5–35.7)
MCHC RBC AUTO-ENTMCNC: 33.9 G/DL (ref 31.5–35.7)
MCV RBC AUTO: 88 FL (ref 79–97)
MCV RBC AUTO: 89.3 FL (ref 79–97)
METHYLMALONATE SERPL-SCNC: 302 NMOL/L (ref 0–378)
MONOCYTES # BLD AUTO: 0.46 10*3/MM3 (ref 0.1–0.9)
MONOCYTES NFR BLD AUTO: 7.4 % (ref 5–12)
NEUTROPHILS NFR BLD AUTO: 4.37 10*3/MM3 (ref 1.7–7)
NEUTROPHILS NFR BLD AUTO: 70.7 % (ref 42.7–76)
NRBC BLD AUTO-RTO: 0 /100 WBC (ref 0–0.2)
PLATELET # BLD AUTO: 177 10*3/MM3 (ref 140–450)
PLATELET # BLD AUTO: 225 10*3/MM3 (ref 140–450)
PMV BLD AUTO: 10.3 FL (ref 6–12)
POTASSIUM SERPL-SCNC: 3.7 MMOL/L (ref 3.5–5.2)
POTASSIUM SERPL-SCNC: 4.1 MMOL/L (ref 3.5–5.2)
PROT SERPL-MCNC: 5.6 G/DL (ref 6–8.5)
PROT SERPL-MCNC: 6.5 G/DL (ref 6–8.5)
PYRIDOXAL PHOS SERPL-MCNC: 5.9 UG/L (ref 3.4–65.2)
RBC # BLD AUTO: 4.1 10*6/MM3 (ref 3.77–5.28)
RBC # BLD AUTO: 4.33 10*6/MM3 (ref 3.77–5.28)
SODIUM SERPL-SCNC: 141 MMOL/L (ref 136–145)
SODIUM SERPL-SCNC: 145 MMOL/L (ref 136–145)
TRIGL SERPL-MCNC: 117 MG/DL (ref 0–150)
TRIGL SERPL-MCNC: 128 MG/DL (ref 0–150)
URATE SERPL-MCNC: 10.3 MG/DL (ref 2.4–5.7)
VIT B12 SERPL-MCNC: 354 PG/ML (ref 232–1245)
VLDLC SERPL CALC-MCNC: 23 MG/DL (ref 5–40)
VLDLC SERPL-MCNC: 21 MG/DL (ref 5–40)
WBC # BLD AUTO: 5.59 10*3/MM3 (ref 3.4–10.8)
WBC NRBC COR # BLD: 6.18 10*3/MM3 (ref 3.4–10.8)

## 2023-05-26 PROCEDURE — 0 GADOBENATE DIMEGLUMINE 529 MG/ML SOLUTION: Performed by: FAMILY MEDICINE

## 2023-05-26 PROCEDURE — 96376 TX/PRO/DX INJ SAME DRUG ADON: CPT

## 2023-05-26 PROCEDURE — 83036 HEMOGLOBIN GLYCOSYLATED A1C: CPT | Performed by: INTERNAL MEDICINE

## 2023-05-26 PROCEDURE — 85025 COMPLETE CBC W/AUTO DIFF WBC: CPT | Performed by: INTERNAL MEDICINE

## 2023-05-26 PROCEDURE — 82948 REAGENT STRIP/BLOOD GLUCOSE: CPT

## 2023-05-26 PROCEDURE — 25010000002 ONDANSETRON PER 1 MG: Performed by: INTERNAL MEDICINE

## 2023-05-26 PROCEDURE — 83735 ASSAY OF MAGNESIUM: CPT | Performed by: INTERNAL MEDICINE

## 2023-05-26 PROCEDURE — 99223 1ST HOSP IP/OBS HIGH 75: CPT | Performed by: INTERNAL MEDICINE

## 2023-05-26 PROCEDURE — 70553 MRI BRAIN STEM W/O & W/DYE: CPT

## 2023-05-26 PROCEDURE — 99214 OFFICE O/P EST MOD 30 MIN: CPT

## 2023-05-26 PROCEDURE — 80061 LIPID PANEL: CPT | Performed by: INTERNAL MEDICINE

## 2023-05-26 PROCEDURE — 96361 HYDRATE IV INFUSION ADD-ON: CPT

## 2023-05-26 PROCEDURE — G0378 HOSPITAL OBSERVATION PER HR: HCPCS

## 2023-05-26 PROCEDURE — A9577 INJ MULTIHANCE: HCPCS | Performed by: FAMILY MEDICINE

## 2023-05-26 PROCEDURE — 80053 COMPREHEN METABOLIC PANEL: CPT | Performed by: INTERNAL MEDICINE

## 2023-05-26 RX ORDER — LOSARTAN POTASSIUM 50 MG/1
100 TABLET ORAL NIGHTLY
Status: DISCONTINUED | OUTPATIENT
Start: 2023-05-26 | End: 2023-05-29 | Stop reason: HOSPADM

## 2023-05-26 RX ORDER — SCOLOPAMINE TRANSDERMAL SYSTEM 1 MG/1
1 PATCH, EXTENDED RELEASE TRANSDERMAL
Status: DISCONTINUED | OUTPATIENT
Start: 2023-05-26 | End: 2023-05-29 | Stop reason: HOSPADM

## 2023-05-26 RX ORDER — IBUPROFEN 600 MG/1
1 TABLET ORAL
Status: DISCONTINUED | OUTPATIENT
Start: 2023-05-26 | End: 2023-05-29 | Stop reason: HOSPADM

## 2023-05-26 RX ORDER — CILOSTAZOL 50 MG/1
100 TABLET ORAL 2 TIMES DAILY
Status: DISCONTINUED | OUTPATIENT
Start: 2023-05-26 | End: 2023-05-29 | Stop reason: HOSPADM

## 2023-05-26 RX ORDER — PRAVASTATIN SODIUM 40 MG
40 TABLET ORAL NIGHTLY
Status: DISCONTINUED | OUTPATIENT
Start: 2023-05-26 | End: 2023-05-29 | Stop reason: HOSPADM

## 2023-05-26 RX ORDER — INSULIN LISPRO 100 [IU]/ML
2-7 INJECTION, SOLUTION INTRAVENOUS; SUBCUTANEOUS
Status: DISCONTINUED | OUTPATIENT
Start: 2023-05-26 | End: 2023-05-29 | Stop reason: HOSPADM

## 2023-05-26 RX ORDER — DEXTROSE MONOHYDRATE 25 G/50ML
25 INJECTION, SOLUTION INTRAVENOUS
Status: DISCONTINUED | OUTPATIENT
Start: 2023-05-26 | End: 2023-05-29 | Stop reason: HOSPADM

## 2023-05-26 RX ORDER — NICOTINE POLACRILEX 4 MG
15 LOZENGE BUCCAL
Status: DISCONTINUED | OUTPATIENT
Start: 2023-05-26 | End: 2023-05-29 | Stop reason: HOSPADM

## 2023-05-26 RX ORDER — PANTOPRAZOLE SODIUM 40 MG/1
40 TABLET, DELAYED RELEASE ORAL
Status: DISCONTINUED | OUTPATIENT
Start: 2023-05-26 | End: 2023-05-29 | Stop reason: HOSPADM

## 2023-05-26 RX ORDER — ASPIRIN 81 MG/1
81 TABLET ORAL DAILY
Status: DISCONTINUED | OUTPATIENT
Start: 2023-05-27 | End: 2023-05-29 | Stop reason: HOSPADM

## 2023-05-26 RX ADMIN — METOPROLOL TARTRATE 25 MG: 25 TABLET, FILM COATED ORAL at 20:33

## 2023-05-26 RX ADMIN — GADOBENATE DIMEGLUMINE 20 ML: 529 INJECTION, SOLUTION INTRAVENOUS at 16:49

## 2023-05-26 RX ADMIN — SODIUM CHLORIDE 75 ML/HR: 9 INJECTION, SOLUTION INTRAVENOUS at 20:34

## 2023-05-26 RX ADMIN — CILOSTAZOL 100 MG: 50 TABLET ORAL at 09:38

## 2023-05-26 RX ADMIN — CILOSTAZOL 100 MG: 50 TABLET ORAL at 20:34

## 2023-05-26 RX ADMIN — PRAVASTATIN SODIUM 40 MG: 40 TABLET ORAL at 20:34

## 2023-05-26 RX ADMIN — PANTOPRAZOLE SODIUM 40 MG: 40 TABLET, DELAYED RELEASE ORAL at 09:38

## 2023-05-26 RX ADMIN — SCOPOLAMINE 1 PATCH: 1.5 PATCH, EXTENDED RELEASE TRANSDERMAL at 14:29

## 2023-05-26 RX ADMIN — ONDANSETRON 4 MG: 2 INJECTION INTRAMUSCULAR; INTRAVENOUS at 11:22

## 2023-05-26 RX ADMIN — MECLIZINE HYDROCHLORIDE 25 MG: 25 TABLET ORAL at 09:38

## 2023-05-26 RX ADMIN — LOSARTAN POTASSIUM 100 MG: 50 TABLET, FILM COATED ORAL at 20:34

## 2023-05-26 RX ADMIN — SODIUM CHLORIDE 75 ML/HR: 9 INJECTION, SOLUTION INTRAVENOUS at 02:25

## 2023-05-26 RX ADMIN — Medication 10 ML: at 09:38

## 2023-05-26 RX ADMIN — MECLIZINE HYDROCHLORIDE 25 MG: 25 TABLET ORAL at 03:10

## 2023-05-26 RX ADMIN — ONDANSETRON 4 MG: 2 INJECTION INTRAMUSCULAR; INTRAVENOUS at 03:04

## 2023-05-26 RX ADMIN — METOPROLOL TARTRATE 25 MG: 25 TABLET, FILM COATED ORAL at 09:38

## 2023-05-26 RX ADMIN — Medication 10 ML: at 03:07

## 2023-05-26 RX ADMIN — Medication 10 ML: at 20:34

## 2023-05-26 NOTE — PROGRESS NOTES
[facility] Medicine Services  PROGRESS NOTE    Patient Name: Lata Justice  : 1945  MRN: [id number]    Date of Admission: 2023  Primary Care Physician: Annetta Mcclendon MD    Subjective   Subjective     CC:  Dizziness and nausea    HPI:  Patient is a 77-year-old who was admitted with dizziness and nausea.  Neurology was consulted for suspected benign positional vertigo.  PT and OT have been consulted.    ROS:  Gen- No fevers, chills  CV- No chest pain, palpitations  Resp- No cough, dyspnea  GI-positive mild nausea, no vomiting         Objective   Objective     Vital Signs:   Temp:  [97.6 °F (36.4 °C)-98.4 °F (36.9 °C)] 97.7 °F (36.5 °C)  Heart Rate:  [78-87] 78  Resp:  [18-22] 18  BP: (110-193)/() 110/53     Physical Exam:  Constitutional: No acute distress, awake, alert  HENT: NCAT, mucous membranes moist  Respiratory: Clear to auscultation bilaterally, respiratory effort normal   Cardiovascular: RRR  Gastrointestinal: Positive bowel sounds, soft, nontender, nondistended  Musculoskeletal: No bilateral ankle edema  Psychiatric: Appropriate affect, cooperative  Neurologic: Oriented x 3, generally weak, Cranial Nerves grossly intact to confrontation, speech clear  Skin: No rashes      Results Reviewed:  LAB RESULTS:      Lab 23   WBC 6.18 8.01   HEMOGLOBIN 12.0 12.6   HEMATOCRIT 36.6 37.6   PLATELETS 177 197   NEUTROS ABS 4.37 6.49   IMMATURE GRANS (ABS) 0.02 0.03   LYMPHS ABS 1.24 0.93   MONOS ABS 0.46 0.43   EOS ABS 0.06 0.10   MCV 89.3 87.6         Lab 23   SODIUM 141 143   POTASSIUM 3.7 3.9   CHLORIDE 104 108*   CO2 27.0 24.0   ANION GAP 10.0 11.0   BUN 16 22   CREATININE 0.67 0.86   EGFR 90.2 69.7   GLUCOSE 128* 133*   CALCIUM 8.6 9.0   MAGNESIUM 1.6 1.5*   HEMOGLOBIN A1C 7.40*  --    TSH  --  1.910         Lab 23   TOTAL PROTEIN 5.6* 6.4   ALBUMIN 3.7 3.8   GLOBULIN 1.9 2.6   ALT  (SGPT) 10 12   AST (SGOT) 13 17   BILIRUBIN 0.4 0.4   ALK PHOS 55 63         Lab 05/25/23  1851   HSTROP T 24*         Lab 05/26/23  0437   CHOLESTEROL 142   LDL CHOL 87   HDL CHOL 34*   TRIGLYCERIDES 117             Brief Urine Lab Results  (Last result in the past 365 days)      Color   Clarity   Blood   Leuk Est   Nitrite   Protein   CREAT   Urine HCG        05/25/23 2112 Yellow   Clear   Negative   Negative   Negative   Trace                 Microbiology Results Abnormal     None          CT Angiogram Neck    Result Date: 5/25/2023  CT ANGIOGRAM NECK, CT ANGIOGRAM HEAD W AI ANALYSIS OF LVO Date of Exam: 5/25/2023 8:34 PM EDT Indication: Stroke, follow up. Comparison: MRI brain from earlier today Technique: CTA of the head and neck was performed before and after the uneventful intravenous administration of 75 mL Isovue-370. Reconstructed coronal and sagittal images were also obtained. In addition, a 3-D volume rendered image was created for interpretation. Automated exposure control and iterative reconstruction methods were used. Findings: There is a three-vessel aortic arch. The right common carotid artery is widely patent. There is atherosclerotic plaque along the right carotid bifurcation that is not hemodynamically significant. The right internal carotid artery is widely patent. The left common carotid artery is widely patent. There is mild plaque at the left carotid bifurcation that is not hemodynamically significant. The left internal carotid artery is widely patent. Both vertebral arteries are widely patent. The right vertebral artery is dominant. The bilateral middle cerebral, bilateral anterior cerebral, and anterior communicating arteries are widely patent. The basilar and bilateral posterior cerebral arteries are widely patent. There are patent bilateral posterior communicating arteries. No intracranial aneurysm is identified. The visualized portions of the bilateral superior cerebellar, anteroinferior  cerebellar, and posterior inferior cerebellar arteries are unremarkable.     Impression: Impression: Major arterial vasculature within head and neck appears widely patent, with no hemodynamically significant stenosis, dissection, thrombus, or aneurysm. Electronically Signed: Esequiel Miller  5/25/2023 9:02 PM EDT  Workstation ID: [id number]    XR Chest 1 View    Result Date: 5/25/2023  XR CHEST 1 VW Date of Exam: 5/25/2023 6:49 PM EDT Indication: Weak/Dizzy/AMS triage protocol Comparison: October 9, 2017 FINDINGS: No definite focal or diffuse pulmonary infiltrate is identified.  No pneumothorax or significant pleural effusion. Mediastinal contour appears grossly normal and unchanged. Heart size appears at the upper limits of normal.     Impression: 1.Heart size appears at the upper limits of normal. 2.No radiographic findings of acute pulmonary abnormality. Electronically Signed: Dale Lotus  5/25/2023 7:14 PM EDT  Workstation ID: [id number]    CT Angiogram Head w AI Analysis of LVO    Result Date: 5/25/2023  CT ANGIOGRAM NECK, CT ANGIOGRAM HEAD W AI ANALYSIS OF LVO Date of Exam: 5/25/2023 8:34 PM EDT Indication: Stroke, follow up. Comparison: MRI brain from earlier today Technique: CTA of the head and neck was performed before and after the uneventful intravenous administration of 75 mL Isovue-370. Reconstructed coronal and sagittal images were also obtained. In addition, a 3-D volume rendered image was created for interpretation. Automated exposure control and iterative reconstruction methods were used. Findings: There is a three-vessel aortic arch. The right common carotid artery is widely patent. There is atherosclerotic plaque along the right carotid bifurcation that is not hemodynamically significant. The right internal carotid artery is widely patent. The left common carotid artery is widely patent. There is mild plaque at the left carotid bifurcation that is not hemodynamically significant. The left internal  carotid artery is widely patent. Both vertebral arteries are widely patent. The right vertebral artery is dominant. The bilateral middle cerebral, bilateral anterior cerebral, and anterior communicating arteries are widely patent. The basilar and bilateral posterior cerebral arteries are widely patent. There are patent bilateral posterior communicating arteries. No intracranial aneurysm is identified. The visualized portions of the bilateral superior cerebellar, anteroinferior cerebellar, and posterior inferior cerebellar arteries are unremarkable.     Impression: Impression: Major arterial vasculature within head and neck appears widely patent, with no hemodynamically significant stenosis, dissection, thrombus, or aneurysm. Electronically Signed: Esequiel Miller  5/25/2023 9:02 PM EDT  Workstation ID: [id number]    MRI Brain WO CON Hyper Acute Stroke Protocol    Result Date: 5/25/2023  MRI BRAIN WO CON HYPER ACUTE STROKE PROTOCOL Date of Exam: 5/25/2023 7:59 PM EDT Indication: vertigo.  Comparison: None available. Technique: Limited multisequence MR images of the brain were obtained without contrast administration. Findings: No midline shift. No significant intracranial hemorrhage is identified. Basal cisterns appear to be patent. There is mild periventricular and scattered foci of hyperintense signal on FLAIR images in the cerebral white matter. No significant mass or focal  edema is identified. No definite focal restricted diffusion is identified at this time.     Impression: Impression: 1.No definite findings of acute ischemia or hemorrhage at this time. 2.Hyperintense signal foci in the cerebral white matter on FLAIR images, nonspecific, but most commonly seen with mild chronic small vessel ischemic changes. Electronically Signed: Dale Gautam  5/25/2023 8:13 PM EDT  Workstation ID: [id number]    CT CEREBRAL PERFUSION WITH & WITHOUT CONTRAST    Result Date: 5/25/2023  CT CEREBRAL PERFUSION W WO CONTRAST Date of  Exam: 5/25/2023 8:34 PM EDT Indication: Neuro deficit, acute, stroke suspected.  Comparison: MRI brain from earlier today Technique: Axial CT images of the brain were obtained prior to and after the administration of 50 mL Isovue-370. Core blood volume, core blood flow, mean transit time, and Tmax images were obtained utilizing the Rapid software protocol. A limited CT angiogram of the head was also performed to measure the blood vessel density. The radiation dose reduction device was turned on for each scan per the ALARA (As Low as Reasonably Achievable) protocol. Findings: The intracranial cerebral perfusion appears normal.     Impression: Impression: Examination appears within normal limits. Electronically Signed: Esequiel Miller  5/25/2023 8:54 PM EDT  Workstation ID: [id number]          Current medications:  Scheduled Meds:[START ON 5/27/2023] aspirin, 81 mg, Oral, Daily  cilostazol, 100 mg, Oral, BID  diazePAM, 5 mg, Intravenous, Once  insulin lispro, 2-7 Units, Subcutaneous, 4x Daily AC & at Bedtime  losartan, 100 mg, Oral, Nightly  metoprolol tartrate, 25 mg, Oral, BID  pantoprazole, 40 mg, Oral, BID AC  pravastatin, 40 mg, Oral, Nightly  Scopolamine, 1 patch, Transdermal, Q72H  senna-docusate sodium, 2 tablet, Oral, BID  sodium chloride, 10 mL, Intravenous, Q12H      Continuous Infusions:sodium chloride, 75 mL/hr, Last Rate: 75 mL/hr (05/26/23 0225)      PRN Meds:.•  senna-docusate sodium **AND** polyethylene glycol **AND** bisacodyl **AND** bisacodyl  •  Calcium Replacement - Follow Nurse / BPA Driven Protocol  •  dextrose  •  dextrose  •  diazePAM  •  glucagon (human recombinant)  •  Magnesium Standard Dose Replacement - Follow Nurse / BPA Driven Protocol  •  nitroglycerin  •  ondansetron  •  Phosphorus Replacement - Follow Nurse / BPA Driven Protocol  •  Potassium Replacement - Follow Nurse / BPA Driven Protocol  •  sodium chloride  •  sodium chloride  •  sodium chloride    Assessment & Plan   Assessment &  Plan     Active Hospital Problems    Diagnosis  POA   • **BPV (benign positional vertigo) [H81.10]  Yes      Resolved Hospital Problems   No resolved problems to display.        Brief Hospital Course to date:  Lata Justice is a 77 y.o. female admitted with benign positional vertigo.  Neurology is consulted and recommended MRI, scopolamine patch and PT evaluation with Epley maneuver.    Benign positional vertigo  -Neurology consult  -Continue scopolamine patch  -MRI pending  -PT evaluation with Epley maneuver requested    Hypertension  Hyperlipidemia  COPD  Diabetes mellitus type 2  Chronic low back pain  General weakness  Functional decline    Expected Discharge Location and Transportation: Home versus rehab, private car  Expected Discharge   Expected Discharge Date: 5/29/2023; Expected Discharge Time:      DVT prophylaxis:  No DVT prophylaxis order currently exists.          CODE STATUS:   Code Status and Medical Interventions:   Ordered at: 05/25/23 2628     Level Of Support Discussed With:    Patient     Code Status (Patient has no pulse and is not breathing):    CPR (Attempt to Resuscitate)     Medical Interventions (Patient has pulse or is breathing):    Full Support       Lucie Guillaume MD  05/26/23

## 2023-05-26 NOTE — H&P
"    [facility]  HISTORY AND PHYSICAL    Patient Name: Lata Justice  : 1945  MRN: [id number]  Primary Care Physician: Annetta Mcclendon MD  Date of admission: 2023      Subjective   Subjective     Chief Complaint:  Dizziness    HPI:  Lata Justice is a 77 y.o. female who states that around 4 PM today (Thursday) she had rather sudden onset of dizziness.  She states she was sitting at her desk and writing, and all once \"it was like the paper was moving around and coming at me.\"  She states that the sensation of \"the room spinning around and things flying around\" was continuous until she arrived in the ER and received treatment, though she is still having some low-level symptoms.  She denies any slurred speech, facial droop, or focal weakness.  Also denies chest pain, headache, or any visual changes, no syncope.  She does confirm nausea with emesis due to the \"near constant\" dizziness and sensation of motion/spinning (no nemo blood in the emesis).  Her medical history is significant for hypertension, hyperlipidemia, anxiety/depression, COPD, type 2 diabetes (she states she is no longer prescribed medications for this), and chronic low back pain.      Review of Systems   Gastrointestinal: Positive for nausea and vomiting.   Neurological: Positive for dizziness.   All other systems reviewed and are negative.         Personal History     Past Medical History:   Diagnosis Date   • Abnormal finding     STATED SHE \"WOKE\" UP DURING AN EGD, BREAST BIOPSY, BACK SURGERY   • Acute colitis    • Allergic    • Anxiety    • Arthritis    • Bladder leak 2021   • Body piercing     EARS   • Cataracts, bilateral    • Constipation    • COPD (chronic obstructive pulmonary disease)    • Depression    • Depression    • Diabetes mellitus     Patient reported \"borderline\" and that she has been on medication in the past   • Elevated cholesterol    • Full dentures     Advised no " "adhesives DOS   • GERD (gastroesophageal reflux disease)    • Glaucoma    • High cholesterol    • History of bronchitis    • History of nuclear stress test     Patient reported \"a long time ago - more than 5 years\" and reported wnl's at that time   • History of pneumonia    • Hx of colonic polyps    • Hypertension    • Injury of back    • Low back pain    • Obesity    • Osteoarthritis    • PONV (postoperative nausea and vomiting) 11/03/2021   • Poor historian    • Wears glasses    • Wears glasses              Past Surgical History:   Procedure Laterality Date   • BACK SURGERY      Lower back X2   • CARPAL TUNNEL RELEASE Right 11/4/2021    Procedure: CARPAL TUNNEL RELEASE ENDOSCOPIC RIGHT;  Surgeon: Jefry Ceron MD;  Location: [facility];  Service: Orthopedics;  Laterality: Right;   • CATARACT EXTRACTION W/ INTRAOCULAR LENS IMPLANT Left 3/2/2020    Procedure: CATARACT PHACO EXTRACTION WITH INTRAOCULAR LENS IMPLANT LEFT;  Surgeon: Alfredo Patiño MD;  Location: [facility];  Service: Ophthalmology;  Laterality: Left;   • CATARACT EXTRACTION W/ INTRAOCULAR LENS IMPLANT Right 3/16/2020    Procedure: CATARACT PHACO EXTRACTION WITH INTRAOCULAR LENS IMPLANT;  Surgeon: Alfredo Patiño MD;  Location: [facility];  Service: Ophthalmology;  Laterality: Right;   • CHOLECYSTECTOMY     • COLONOSCOPY     • DILATION AND CURETTAGE, DIAGNOSTIC / THERAPEUTIC     • ENDOSCOPY      With dilation   • HAND SURGERY  11/04/2021   • HIATAL HERNIA REPAIR     • HYSTERECTOMY      Total Hysterectomy   • MOUTH SURGERY      Full mouth extraction   • UT ARTHRP KNE CONDYLE&PLATU MEDIAL&LAT COMPARTMENTS Right 10/18/2017    Procedure:  TOTAL KNEE ARTHROPLASTY RIGHT ELECTIVE ;  Surgeon: Doc Mixon MD;  Location: [facility];  Service: Orthopedics   • TUBAL ABDOMINAL LIGATION         Family History: family history includes Arthritis in an other family member; Depression in her daughter; Diabetes in an other family member; Hyperlipidemia in " "an other family member; Hypertension in an other family member; Mental illness in her daughter; Obesity in her daughter and another family member; Other in her daughter.     Social History:  reports that she quit smoking about 37 years ago. Her smoking use included cigarettes. She has a 1.00 pack-year smoking history. She has never used smokeless tobacco. She reports that she does not drink alcohol and does not use drugs.  Social History     Social History Narrative   • Not on file       Medications:  Available home medication information reviewed.  (Not in a hospital admission)      Allergies   Allergen Reactions   • Fluticasone Propionate Other (See Comments)     UNKNOWN - Patient reported \"I don't remember\"   • Furosemide Nausea And Vomiting   • Sertraline Hcl Nausea Only   • Acetaminophen Nausea Only     Patient reported she thinks Tylenol caused nausea      • Ezetimibe Other (See Comments)     UNKNOWN     • Loratadine Other (See Comments)     UNKNOWN - Patient reported unsure reaction type but that she was unable to take this     • Mometasone Furoate Other (See Comments)     UNKNOWN - Patient reported \"I don't remember\"     • Propoxyphene Other (See Comments)     UNKNOWN - Patient reported \"I don't remember\"      • Triamcinolone Acetonide Other (See Comments)     UNKNOWN - Patient reported \"I don't remember\"\"         Objective   Objective     Vital Signs:   Temp:  [98.4 °F (36.9 °C)] 98.4 °F (36.9 °C)  Heart Rate:  [78-87] 78  Resp:  [20] 20  BP: (141-193)/() 163/85       Physical Exam   Constitutional: Awake, alert, NAD, still some dizziness.  Eyes: PERRLA, sclerae anicteric, no conjunctival injection.  No nystagmus.  HENT: NCAT, mucous membranes moist  Neck: Supple, no thyromegaly, no lymphadenopathy, trachea midline  Respiratory: Clear to auscultation bilaterally, nonlabored respirations   Cardiovascular: RRR, no murmurs, rubs, or gallops, palpable pedal pulses bilaterally  Gastrointestinal: Positive " bowel sounds, soft, nontender, nondistended  Musculoskeletal: No bilateral ankle edema, no clubbing or cyanosis to extremities  Psychiatric: Appropriate affect, cooperative  Neurologic: Oriented x 3, strength symmetric in all extremities, Cranial Nerves grossly intact to confrontation, speech clear  Skin: No rashes, normal turgor.    Result Review:  I have personally reviewed the results from the time of this admission to 5/26/2023 00:58 EDT and agree with these findings:  [x]  Laboratory list / accordion  []  Microbiology  [x]  Radiology  [x]  EKG/Telemetry   []  Cardiology/Vascular   []  Pathology  []  Old records  []  Other:  Most notable findings include: I reviewed chest x-ray which is a single AP view and by my read shows no acute infiltrate/edema.  I reviewed EKG which by my read shows normal sinus rhythm, normal axis, ventricular rate approximately 80 bpm, no acute appearing ST/T wave changes.        LAB RESULTS:      Lab 05/25/23  1851   WBC 8.01   HEMOGLOBIN 12.6   HEMATOCRIT 37.6   PLATELETS 197   NEUTROS ABS 6.49   IMMATURE GRANS (ABS) 0.03   LYMPHS ABS 0.93   MONOS ABS 0.43   EOS ABS 0.10   MCV 87.6         Lab 05/25/23  1851   SODIUM 143   POTASSIUM 3.9   CHLORIDE 108*   CO2 24.0   ANION GAP 11.0   BUN 22   CREATININE 0.86   EGFR 69.7   GLUCOSE 133*   CALCIUM 9.0   MAGNESIUM 1.5*   TSH 1.910         Lab 05/25/23  1851   TOTAL PROTEIN 6.4   ALBUMIN 3.8   GLOBULIN 2.6   ALT (SGPT) 12   AST (SGOT) 17   BILIRUBIN 0.4   ALK PHOS 63         Lab 05/25/23  1851   HSTROP T 24*                 UA        5/25/2023    21:12   Urinalysis   Specific Gravity, UA 1.025     Ketones, UA Trace     Blood, UA Negative     Leukocytes, UA Negative     Nitrite, UA Negative         Microbiology Results (last 10 days)     ** No results found for the last 240 hours. **          CT Angiogram Neck    Result Date: 5/25/2023  CT ANGIOGRAM NECK, CT ANGIOGRAM HEAD W AI ANALYSIS OF LVO Date of Exam: 5/25/2023 8:34 PM EDT Indication:  Stroke, follow up. Comparison: MRI brain from earlier today Technique: CTA of the head and neck was performed before and after the uneventful intravenous administration of 75 mL Isovue-370. Reconstructed coronal and sagittal images were also obtained. In addition, a 3-D volume rendered image was created for interpretation. Automated exposure control and iterative reconstruction methods were used. Findings: There is a three-vessel aortic arch. The right common carotid artery is widely patent. There is atherosclerotic plaque along the right carotid bifurcation that is not hemodynamically significant. The right internal carotid artery is widely patent. The left common carotid artery is widely patent. There is mild plaque at the left carotid bifurcation that is not hemodynamically significant. The left internal carotid artery is widely patent. Both vertebral arteries are widely patent. The right vertebral artery is dominant. The bilateral middle cerebral, bilateral anterior cerebral, and anterior communicating arteries are widely patent. The basilar and bilateral posterior cerebral arteries are widely patent. There are patent bilateral posterior communicating arteries. No intracranial aneurysm is identified. The visualized portions of the bilateral superior cerebellar, anteroinferior cerebellar, and posterior inferior cerebellar arteries are unremarkable.     Impression: Impression: Major arterial vasculature within head and neck appears widely patent, with no hemodynamically significant stenosis, dissection, thrombus, or aneurysm. Electronically Signed: Esequiel Miller  5/25/2023 9:02 PM EDT  Workstation ID: [id number]    XR Chest 1 View    Result Date: 5/25/2023  XR CHEST 1 VW Date of Exam: 5/25/2023 6:49 PM EDT Indication: Weak/Dizzy/AMS triage protocol Comparison: October 9, 2017 FINDINGS: No definite focal or diffuse pulmonary infiltrate is identified.  No pneumothorax or significant pleural effusion. Mediastinal  contour appears grossly normal and unchanged. Heart size appears at the upper limits of normal.     Impression: 1.Heart size appears at the upper limits of normal. 2.No radiographic findings of acute pulmonary abnormality. Electronically Signed: Dale Gautam  5/25/2023 7:14 PM EDT  Workstation ID: [id number]    CT Angiogram Head w AI Analysis of LVO    Result Date: 5/25/2023  CT ANGIOGRAM NECK, CT ANGIOGRAM HEAD W AI ANALYSIS OF LVO Date of Exam: 5/25/2023 8:34 PM EDT Indication: Stroke, follow up. Comparison: MRI brain from earlier today Technique: CTA of the head and neck was performed before and after the uneventful intravenous administration of 75 mL Isovue-370. Reconstructed coronal and sagittal images were also obtained. In addition, a 3-D volume rendered image was created for interpretation. Automated exposure control and iterative reconstruction methods were used. Findings: There is a three-vessel aortic arch. The right common carotid artery is widely patent. There is atherosclerotic plaque along the right carotid bifurcation that is not hemodynamically significant. The right internal carotid artery is widely patent. The left common carotid artery is widely patent. There is mild plaque at the left carotid bifurcation that is not hemodynamically significant. The left internal carotid artery is widely patent. Both vertebral arteries are widely patent. The right vertebral artery is dominant. The bilateral middle cerebral, bilateral anterior cerebral, and anterior communicating arteries are widely patent. The basilar and bilateral posterior cerebral arteries are widely patent. There are patent bilateral posterior communicating arteries. No intracranial aneurysm is identified. The visualized portions of the bilateral superior cerebellar, anteroinferior cerebellar, and posterior inferior cerebellar arteries are unremarkable.     Impression: Impression: Major arterial vasculature within head and neck appears widely  patent, with no hemodynamically significant stenosis, dissection, thrombus, or aneurysm. Electronically Signed: Esequiel Miller  5/25/2023 9:02 PM EDT  Workstation ID: [id number]    MRI Brain WO CON Hyper Acute Stroke Protocol    Result Date: 5/25/2023  MRI BRAIN WO CON HYPER ACUTE STROKE PROTOCOL Date of Exam: 5/25/2023 7:59 PM EDT Indication: vertigo.  Comparison: None available. Technique: Limited multisequence MR images of the brain were obtained without contrast administration. Findings: No midline shift. No significant intracranial hemorrhage is identified. Basal cisterns appear to be patent. There is mild periventricular and scattered foci of hyperintense signal on FLAIR images in the cerebral white matter. No significant mass or focal  edema is identified. No definite focal restricted diffusion is identified at this time.     Impression: Impression: 1.No definite findings of acute ischemia or hemorrhage at this time. 2.Hyperintense signal foci in the cerebral white matter on FLAIR images, nonspecific, but most commonly seen with mild chronic small vessel ischemic changes. Electronically Signed: Dale Lotus  5/25/2023 8:13 PM EDT  Workstation ID: [id number]    CT CEREBRAL PERFUSION WITH & WITHOUT CONTRAST    Result Date: 5/25/2023  CT CEREBRAL PERFUSION W WO CONTRAST Date of Exam: 5/25/2023 8:34 PM EDT Indication: Neuro deficit, acute, stroke suspected.  Comparison: MRI brain from earlier today Technique: Axial CT images of the brain were obtained prior to and after the administration of 50 mL Isovue-370. Core blood volume, core blood flow, mean transit time, and Tmax images were obtained utilizing the Rapid software protocol. A limited CT angiogram of the head was also performed to measure the blood vessel density. The radiation dose reduction device was turned on for each scan per the ALARA (As Low as Reasonably Achievable) protocol. Findings: The intracranial cerebral perfusion appears normal.     Impression:  Impression: Examination appears within normal limits. Electronically Signed: Esequiel Miller  5/25/2023 8:54 PM EDT  Workstation ID: [id number]          Assessment & Plan   Assessment & Plan     Active Hospital Problems    Diagnosis  POA   • **BPV (benign positional vertigo) [H81.10]  Yes       77F with vertigo    Vertigo  - Scheduled meclizine.  - As needed oral Valium.  - Zofran IV as needed for nausea.  - Will be seen by the neurology service on Friday.  - Fall risk, up with assistance only.    Hypertension  - Continue ARB and metoprolol from home regimen.    Hyperlipidemia  - Continue statin from home regimen.  - Check lipid panel with a.m. labs.    COPD  - Continue home as needed albuterol inhaler.  - O2 saturations good on room air in the ED; not currently in exacerbation.    Type 2 diabetes  - She states she no longer takes any prescriptions for this.  - SSI coverage, fingerstick checks AC/at bedtime.  - Check HbA1c with a.m. labs.    Chronic low back pain  - No home agents on her medication list for this.  - No acute issues; chronic comorbidity.    Anxiety/depression  - No agents listed for this on her medication list.  - No acute issues; chronic comorbidity.      Total time spent: 77 minutes  Time spent includes time reviewing chart, face-to-face time, counseling patient/family/caregiver, ordering medications/tests/procedures, communicating with other health care professionals, documenting clinical information in the electronic health record, and coordination of care.    DVT prophylaxis:  scds      CODE STATUS:  full  Code Status and Medical Interventions:   Ordered at: 05/25/23 4282     Level Of Support Discussed With:    Patient     Code Status (Patient has no pulse and is not breathing):    CPR (Attempt to Resuscitate)     Medical Interventions (Patient has pulse or is breathing):    Full Support       Expected Discharge 5/26 Fri.    Sae Franco III, DO  05/26/23

## 2023-05-26 NOTE — ED PROVIDER NOTES
"Subjective   History of Present Illness  Patient is a pleasant 77-year-old female who presents with vertigo, nausea, and vomiting.  She states that approximately 4 PM she is working in her coloring book and developed severe vertigo and vomiting.  The symptoms have persisted, although waxing waning, since that time.  At times symptoms are severe.  Denies similar episodes in the past.  States that his dizziness is so bad she was unable to stand.  She was unable to get to the door to let people and to help her.  To plan to break her window to get into her house as again her dizziness was very severe and she was unable to ambulate or even move through the house.  Denies fever, chills, chest pain, shortness of breath, abdominal pain, vomiting, diarrhea, or other acute complaint.        Review of Systems   All other systems reviewed and are negative.      Past Medical History:   Diagnosis Date   • Abnormal finding     STATED SHE \"WOKE\" UP DURING AN EGD, BREAST BIOPSY, BACK SURGERY   • Acute colitis    • Allergic    • Anxiety    • Arthritis    • Bladder leak 11/03/2021   • Body piercing     EARS   • Cataracts, bilateral    • Constipation    • COPD (chronic obstructive pulmonary disease)    • Depression    • Depression    • Diabetes mellitus     Patient reported \"borderline\" and that she has been on medication in the past   • Elevated cholesterol    • Full dentures     Advised no adhesives DOS   • GERD (gastroesophageal reflux disease)    • Glaucoma    • High cholesterol    • History of bronchitis    • History of nuclear stress test     Patient reported \"a long time ago - more than 5 years\" and reported wnl's at that time   • History of pneumonia    • Hx of colonic polyps    • Hypertension    • Injury of back    • Low back pain    • Obesity    • Osteoarthritis    • PONV (postoperative nausea and vomiting) 11/03/2021   • Poor historian    • Wears glasses    • Wears glasses        Allergies   Allergen Reactions   • " "Fluticasone Propionate Other (See Comments)     UNKNOWN - Patient reported \"I don't remember\"   • Furosemide Nausea And Vomiting   • Sertraline Hcl Nausea Only   • Acetaminophen Nausea Only     Patient reported she thinks Tylenol caused nausea      • Ezetimibe Other (See Comments)     UNKNOWN     • Loratadine Other (See Comments)     UNKNOWN - Patient reported unsure reaction type but that she was unable to take this     • Mometasone Furoate Other (See Comments)     UNKNOWN - Patient reported \"I don't remember\"     • Propoxyphene Other (See Comments)     UNKNOWN - Patient reported \"I don't remember\"      • Triamcinolone Acetonide Other (See Comments)     UNKNOWN - Patient reported \"I don't remember\"\"         Past Surgical History:   Procedure Laterality Date   • BACK SURGERY      Lower back X2   • CARPAL TUNNEL RELEASE Right 11/4/2021    Procedure: CARPAL TUNNEL RELEASE ENDOSCOPIC RIGHT;  Surgeon: Jefry Ceron MD;  Location: [facility];  Service: Orthopedics;  Laterality: Right;   • CATARACT EXTRACTION W/ INTRAOCULAR LENS IMPLANT Left 3/2/2020    Procedure: CATARACT PHACO EXTRACTION WITH INTRAOCULAR LENS IMPLANT LEFT;  Surgeon: Alfredo Patiño MD;  Location: [facility];  Service: Ophthalmology;  Laterality: Left;   • CATARACT EXTRACTION W/ INTRAOCULAR LENS IMPLANT Right 3/16/2020    Procedure: CATARACT PHACO EXTRACTION WITH INTRAOCULAR LENS IMPLANT;  Surgeon: Alfredo Patiño MD;  Location: [facility];  Service: Ophthalmology;  Laterality: Right;   • CHOLECYSTECTOMY     • COLONOSCOPY     • DILATION AND CURETTAGE, DIAGNOSTIC / THERAPEUTIC     • ENDOSCOPY      With dilation   • HAND SURGERY  11/04/2021   • HIATAL HERNIA REPAIR     • HYSTERECTOMY      Total Hysterectomy   • MOUTH SURGERY      Full mouth extraction   • ME ARTHRP KNE CONDYLE&PLATU MEDIAL&LAT COMPARTMENTS Right 10/18/2017    Procedure:  TOTAL KNEE ARTHROPLASTY RIGHT ELECTIVE ;  Surgeon: Doc Mixon MD;  Location: [facility];  Service: " Orthopedics   • TUBAL ABDOMINAL LIGATION         Family History   Problem Relation Age of Onset   • Depression Daughter    • Mental illness Daughter    • Obesity Daughter    • Other Daughter         chronic back pain   • Arthritis Other    • Diabetes Other    • Hypertension Other    • Obesity Other    • Hyperlipidemia Other         high cholesterol       Social History     Socioeconomic History   • Marital status:    Tobacco Use   • Smoking status: Former     Packs/day: 0.25     Years: 4.00     Pack years: 1.00     Types: Cigarettes     Quit date: 10/9/1985     Years since quittin.6   • Smokeless tobacco: Never   Vaping Use   • Vaping Use: Never used   Substance and Sexual Activity   • Alcohol use: No   • Drug use: No   • Sexual activity: Defer           Objective   Physical Exam  Vitals and nursing note reviewed.   Constitutional:       General: She is in acute distress.      Appearance: She is ill-appearing.      Comments: Actively dry heaving.  Appears very uncomfortable, nauseous.   HENT:      Head: Normocephalic and atraumatic.   Eyes:      Conjunctiva/sclera: Conjunctivae normal.      Pupils: Pupils are equal, round, and reactive to light.   Neck:      Thyroid: No thyromegaly.   Cardiovascular:      Rate and Rhythm: Normal rate and regular rhythm.      Heart sounds: Normal heart sounds. No murmur heard.    No friction rub. No gallop.   Pulmonary:      Effort: Pulmonary effort is normal. No respiratory distress.      Breath sounds: Normal breath sounds.   Abdominal:      General: Bowel sounds are normal.      Palpations: Abdomen is soft.      Tenderness: There is no abdominal tenderness.   Musculoskeletal:         General: Normal range of motion.      Cervical back: Normal range of motion and neck supple.   Lymphadenopathy:      Cervical: No cervical adenopathy.   Skin:     General: Skin is warm and dry.      Capillary Refill: Capillary refill takes less than 2 seconds.   Neurological:      Mental  Status: She is alert and oriented to person, place, and time.      Comments: Patient has difficulty with finger-nose testing.  Possibly due to her current dizziness.   Psychiatric:         Behavior: Behavior normal.         Thought Content: Thought content normal.         Procedures           ED Course  ED Course as of 05/26/23 0747   Fri May 26, 2023   0746 Although likely peripheral vertigo, given the possibility of a central etiology and the fact she was only 3 hours out from onset we did a hyperacute MRI scan which did not reveal acute cerebellar infarct.  Although this is not 100% sensitive, the risk, especially given the clinical suspicion for peripheral vertigo, outweighs benefit regarding TNK.  Patient does feel better following IV Valium and nausea medicine.  Unfortunately when she is moved or attempts to stand or attempt even minimally ambulate, as she would be required to do if she were discharged home, she is unable to do this.  Vertigo continues to return and patient continues to have dry heaving despite treatment in the emergency department.  I discussed the case with Dr. Franco, internal medicine.  Patient will be admitted for further evaluation and management. [CP]      ED Course User Index  [CP] Dominguez Shepherd,       Recent Results (from the past 24 hour(s))   ECG 12 Lead ED Triage Standing Order; Weak / Dizzy / AMS    Collection Time: 05/25/23  6:49 PM   Result Value Ref Range    QT Interval 378 ms    QTC Interval 449 ms   Comprehensive Metabolic Panel    Collection Time: 05/25/23  6:51 PM    Specimen: Blood   Result Value Ref Range    Glucose 133 (H) 65 - 99 mg/dL    BUN 22 8 - 23 mg/dL    Creatinine 0.86 0.57 - 1.00 mg/dL    Sodium 143 136 - 145 mmol/L    Potassium 3.9 3.5 - 5.2 mmol/L    Chloride 108 (H) 98 - 107 mmol/L    CO2 24.0 22.0 - 29.0 mmol/L    Calcium 9.0 8.6 - 10.5 mg/dL    Total Protein 6.4 6.0 - 8.5 g/dL    Albumin 3.8 3.5 - 5.2 g/dL    ALT (SGPT) 12 1 - 33 U/L    AST (SGOT) 17 1 -  32 U/L    Alkaline Phosphatase 63 39 - 117 U/L    Total Bilirubin 0.4 0.0 - 1.2 mg/dL    Globulin 2.6 gm/dL    A/G Ratio 1.5 g/dL    BUN/Creatinine Ratio 25.6 (H) 7.0 - 25.0    Anion Gap 11.0 5.0 - 15.0 mmol/L    eGFR 69.7 >60.0 mL/min/1.73   Single High Sensitivity Troponin T    Collection Time: 05/25/23  6:51 PM    Specimen: Blood   Result Value Ref Range    HS Troponin T 24 (H) <10 ng/L   Magnesium    Collection Time: 05/25/23  6:51 PM    Specimen: Blood   Result Value Ref Range    Magnesium 1.5 (L) 1.6 - 2.4 mg/dL   Green Top (Gel)    Collection Time: 05/25/23  6:51 PM   Result Value Ref Range    Extra Tube Hold for add-ons.    Lavender Top    Collection Time: 05/25/23  6:51 PM   Result Value Ref Range    Extra Tube hold for add-on    Gold Top - SST    Collection Time: 05/25/23  6:51 PM   Result Value Ref Range    Extra Tube Hold for add-ons.    Light Blue Top    Collection Time: 05/25/23  6:51 PM   Result Value Ref Range    Extra Tube Hold for add-ons.    CBC Auto Differential    Collection Time: 05/25/23  6:51 PM    Specimen: Blood   Result Value Ref Range    WBC 8.01 3.40 - 10.80 10*3/mm3    RBC 4.29 3.77 - 5.28 10*6/mm3    Hemoglobin 12.6 12.0 - 15.9 g/dL    Hematocrit 37.6 34.0 - 46.6 %    MCV 87.6 79.0 - 97.0 fL    MCH 29.4 26.6 - 33.0 pg    MCHC 33.5 31.5 - 35.7 g/dL    RDW 13.2 12.3 - 15.4 %    RDW-SD 42.3 37.0 - 54.0 fl    MPV 9.5 6.0 - 12.0 fL    Platelets 197 140 - 450 10*3/mm3    Neutrophil % 81.0 (H) 42.7 - 76.0 %    Lymphocyte % 11.6 (L) 19.6 - 45.3 %    Monocyte % 5.4 5.0 - 12.0 %    Eosinophil % 1.2 0.3 - 6.2 %    Basophil % 0.4 0.0 - 1.5 %    Immature Grans % 0.4 0.0 - 0.5 %    Neutrophils, Absolute 6.49 1.70 - 7.00 10*3/mm3    Lymphocytes, Absolute 0.93 0.70 - 3.10 10*3/mm3    Monocytes, Absolute 0.43 0.10 - 0.90 10*3/mm3    Eosinophils, Absolute 0.10 0.00 - 0.40 10*3/mm3    Basophils, Absolute 0.03 0.00 - 0.20 10*3/mm3    Immature Grans, Absolute 0.03 0.00 - 0.05 10*3/mm3    nRBC 0.0 0.0 - 0.2  /100 WBC   TSH Rfx On Abnormal To Free T4    Collection Time: 05/25/23  6:51 PM    Specimen: Blood   Result Value Ref Range    TSH 1.910 0.270 - 4.200 uIU/mL   Urinalysis With Microscopic If Indicated (No Culture) - Urine, Clean Catch    Collection Time: 05/25/23  9:12 PM    Specimen: Urine, Clean Catch   Result Value Ref Range    Color, UA Yellow Yellow, Straw    Appearance, UA Clear Clear    pH, UA 7.0 5.0 - 8.0    Specific Gravity, UA 1.025 1.001 - 1.030    Glucose, UA Negative Negative    Ketones, UA Trace (A) Negative    Bilirubin, UA Negative Negative    Blood, UA Negative Negative    Protein, UA Trace (A) Negative    Leuk Esterase, UA Negative Negative    Nitrite, UA Negative Negative    Urobilinogen, UA 1.0 E.U./dL 0.2 - 1.0 E.U./dL   CBC Auto Differential    Collection Time: 05/26/23  4:37 AM    Specimen: Blood   Result Value Ref Range    WBC 6.18 3.40 - 10.80 10*3/mm3    RBC 4.10 3.77 - 5.28 10*6/mm3    Hemoglobin 12.0 12.0 - 15.9 g/dL    Hematocrit 36.6 34.0 - 46.6 %    MCV 89.3 79.0 - 97.0 fL    MCH 29.3 26.6 - 33.0 pg    MCHC 32.8 31.5 - 35.7 g/dL    RDW 12.9 12.3 - 15.4 %    RDW-SD 41.9 37.0 - 54.0 fl    MPV 10.3 6.0 - 12.0 fL    Platelets 177 140 - 450 10*3/mm3    Neutrophil % 70.7 42.7 - 76.0 %    Lymphocyte % 20.1 19.6 - 45.3 %    Monocyte % 7.4 5.0 - 12.0 %    Eosinophil % 1.0 0.3 - 6.2 %    Basophil % 0.5 0.0 - 1.5 %    Immature Grans % 0.3 0.0 - 0.5 %    Neutrophils, Absolute 4.37 1.70 - 7.00 10*3/mm3    Lymphocytes, Absolute 1.24 0.70 - 3.10 10*3/mm3    Monocytes, Absolute 0.46 0.10 - 0.90 10*3/mm3    Eosinophils, Absolute 0.06 0.00 - 0.40 10*3/mm3    Basophils, Absolute 0.03 0.00 - 0.20 10*3/mm3    Immature Grans, Absolute 0.02 0.00 - 0.05 10*3/mm3    nRBC 0.0 0.0 - 0.2 /100 WBC   Comprehensive Metabolic Panel    Collection Time: 05/26/23  4:37 AM    Specimen: Blood   Result Value Ref Range    Glucose 128 (H) 65 - 99 mg/dL    BUN 16 8 - 23 mg/dL    Creatinine 0.67 0.57 - 1.00 mg/dL    Sodium  141 136 - 145 mmol/L    Potassium 3.7 3.5 - 5.2 mmol/L    Chloride 104 98 - 107 mmol/L    CO2 27.0 22.0 - 29.0 mmol/L    Calcium 8.6 8.6 - 10.5 mg/dL    Total Protein 5.6 (L) 6.0 - 8.5 g/dL    Albumin 3.7 3.5 - 5.2 g/dL    ALT (SGPT) 10 1 - 33 U/L    AST (SGOT) 13 1 - 32 U/L    Alkaline Phosphatase 55 39 - 117 U/L    Total Bilirubin 0.4 0.0 - 1.2 mg/dL    Globulin 1.9 gm/dL    A/G Ratio 1.9 g/dL    BUN/Creatinine Ratio 23.9 7.0 - 25.0    Anion Gap 10.0 5.0 - 15.0 mmol/L    eGFR 90.2 >60.0 mL/min/1.73   Magnesium    Collection Time: 05/26/23  4:37 AM    Specimen: Blood   Result Value Ref Range    Magnesium 1.6 1.6 - 2.4 mg/dL   Lipid Panel    Collection Time: 05/26/23  4:37 AM    Specimen: Blood   Result Value Ref Range    Total Cholesterol 142 0 - 200 mg/dL    Triglycerides 117 0 - 150 mg/dL    HDL Cholesterol 34 (L) 40 - 60 mg/dL    LDL Cholesterol  87 0 - 100 mg/dL    VLDL Cholesterol 21 5 - 40 mg/dL    LDL/HDL Ratio 2.49    Hemoglobin A1c    Collection Time: 05/26/23  4:37 AM    Specimen: Blood   Result Value Ref Range    Hemoglobin A1C 7.40 (H) 4.80 - 5.60 %   POC Glucose Once    Collection Time: 05/26/23  7:24 AM    Specimen: Blood   Result Value Ref Range    Glucose 119 70 - 130 mg/dL     Note: In addition to lab results from this visit, the labs listed above may include labs taken at another facility or during a different encounter within the last 24 hours. Please correlate lab times with ED admission and discharge times for further clarification of the services performed during this visit.    CT Angiogram Head w AI Analysis of LVO   Final Result   Impression:   Major arterial vasculature within head and neck appears widely patent, with no hemodynamically significant stenosis, dissection, thrombus, or aneurysm.         Electronically Signed: Esequiel Mliler     5/25/2023 9:02 PM EDT     Workstation ID: [id number]      CT Angiogram Neck   Final Result   Impression:   Major arterial vasculature within head and  neck appears widely patent, with no hemodynamically significant stenosis, dissection, thrombus, or aneurysm.         Electronically Signed: Esequiel Miller     5/25/2023 9:02 PM EDT     Workstation ID: [id number]      CT CEREBRAL PERFUSION WITH & WITHOUT CONTRAST   Final Result   Impression:   Examination appears within normal limits.            Electronically Signed: Esequiel Miller     5/25/2023 8:54 PM EDT     Workstation ID: [id number]      MRI Brain WO CON Hyper Acute Stroke Protocol   Final Result   Impression:   1.No definite findings of acute ischemia or hemorrhage at this time.   2.Hyperintense signal foci in the cerebral white matter on FLAIR images, nonspecific, but most commonly seen with mild chronic small vessel ischemic changes.            Electronically Signed: Dale Gautam     5/25/2023 8:13 PM EDT     Workstation ID: [id number]      XR Chest 1 View   Final Result   1.Heart size appears at the upper limits of normal.   2.No radiographic findings of acute pulmonary abnormality.            Electronically Signed: Dale Gautam     5/25/2023 7:14 PM EDT     Workstation ID: [id number]        Vitals:    05/26/23 0155 05/26/23 0345 05/26/23 0505 05/26/23 0717   BP:  178/81 143/69 153/70   BP Location:  Right arm  Right arm   Patient Position:  Lying  Lying   Pulse:  78     Resp:  18  20   Temp: 97.8 °F (36.6 °C) 97.6 °F (36.4 °C)  97.7 °F (36.5 °C)   TempSrc: Oral Oral  Oral   SpO2:       Weight:       Height:         Medications   sodium chloride 0.9 % flush 10 mL (has no administration in time range)   diazePAM (VALIUM) injection 5 mg (5 mg Intravenous Not Given 5/25/23 1953)   sodium chloride 0.9 % flush 10 mL (10 mL Intravenous Given 5/26/23 0307)   sodium chloride 0.9 % flush 10 mL (has no administration in time range)   sodium chloride 0.9 % infusion 40 mL (has no administration in time range)   sennosides-docusate (PERICOLACE) 8.6-50 MG per tablet 2 tablet (2 tablets Oral Not Given 5/26/23 0307)     And    polyethylene glycol (MIRALAX) packet 17 g (has no administration in time range)     And   bisacodyl (DULCOLAX) EC tablet 5 mg (has no administration in time range)     And   bisacodyl (DULCOLAX) suppository 10 mg (has no administration in time range)   nitroglycerin (NITROSTAT) SL tablet 0.4 mg (has no administration in time range)   sodium chloride 0.9 % infusion (75 mL/hr Intravenous New Bag 5/26/23 0225)   Potassium Replacement - Follow Nurse / BPA Driven Protocol (has no administration in time range)   Magnesium Standard Dose Replacement - Follow Nurse / BPA Driven Protocol (has no administration in time range)   Phosphorus Replacement - Follow Nurse / BPA Driven Protocol (has no administration in time range)   Calcium Replacement - Follow Nurse / BPA Driven Protocol (has no administration in time range)   ondansetron (ZOFRAN) injection 4 mg (4 mg Intravenous Given 5/26/23 0304)   meclizine (ANTIVERT) tablet 25 mg (25 mg Oral Given 5/26/23 0310)   diazePAM (VALIUM) tablet 5 mg (has no administration in time range)   cilostazol (PLETAL) tablet 100 mg (has no administration in time range)   pantoprazole (PROTONIX) EC tablet 40 mg (has no administration in time range)   losartan (COZAAR) tablet 100 mg (has no administration in time range)   metoprolol tartrate (LOPRESSOR) tablet 25 mg (has no administration in time range)   pravastatin (PRAVACHOL) tablet 40 mg (has no administration in time range)   aspirin EC tablet 81 mg (has no administration in time range)   dextrose (GLUTOSE) oral gel 15 g (has no administration in time range)   dextrose (D50W) (25 g/50 mL) IV injection 25 g (has no administration in time range)   glucagon (GLUCAGEN) injection 1 mg (has no administration in time range)   Insulin Lispro (humaLOG) injection 2-7 Units (has no administration in time range)   diazePAM (VALIUM) injection 5 mg (5 mg Intravenous Given 5/25/23 1918)   ondansetron (ZOFRAN) injection 4 mg (4 mg Intravenous Given  5/25/23 2015)   iopamidol (ISOVUE-370) 76 % injection 150 mL (115 mL Intravenous Given 5/25/23 2039)   magnesium sulfate in D5W 1g/100mL (PREMIX) (1 g Intravenous New Bag 5/25/23 2149)     ECG/EMG Results (last 24 hours)     Procedure Component Value Units Date/Time    ECG 12 Lead ED Triage Standing Order; Weak / Dizzy / AMS [[id number]] Collected: 05/25/23 1849     Updated: 05/26/23 0728     QT Interval 378 ms      QTC Interval 449 ms     Narrative:      Test Reason : DIZZINESS  Blood Pressure :   */*   mmHG  Vent. Rate :  85 BPM     Atrial Rate :  85 BPM     P-R Int : 160 ms          QRS Dur :  84 ms      QT Int : 378 ms       P-R-T Axes :  40  45  50 degrees     QTc Int : 449 ms    Normal sinus rhythm  Normal ECG  No previous ECGs available    Referred By: EDMD           Confirmed By:     SCANNED - TELEMETRY   [[id number]] Resulted: 05/25/23     Updated: 05/26/23 0739        ECG 12 Lead ED Triage Standing Order; Weak / Dizzy / AMS   Preliminary Result   Test Reason : DIZZINESS   Blood Pressure :   */*   mmHG   Vent. Rate :  85 BPM     Atrial Rate :  85 BPM      P-R Int : 160 ms          QRS Dur :  84 ms       QT Int : 378 ms       P-R-T Axes :  40  45  50 degrees      QTc Int : 449 ms      Normal sinus rhythm   Normal ECG   No previous ECGs available      Referred By: EDMD           Confirmed By:       SCANNED - TELEMETRY     Final Result                                               Medical Decision Making  Intractable vomiting: complicated acute illness or injury  Vertigo: complicated acute illness or injury  Amount and/or Complexity of Data Reviewed  Independent Historian:      Details: Daughter  External Data Reviewed: notes.  Labs: ordered. Decision-making details documented in ED Course.  Radiology: ordered and independent interpretation performed. Decision-making details documented in ED Course.  ECG/medicine tests: ordered and independent interpretation performed. Decision-making details documented in ED  Course.      Risk  Prescription drug management.  Decision regarding hospitalization.          Final diagnoses:   Vertigo   Intractable vomiting       ED Disposition  ED Disposition     ED Disposition   Decision to Admit    Condition   --    Comment   Level of Care: Telemetry [5]   Diagnosis: BPV (benign positional vertigo) [4809011]   Admitting Physician: JASMYN JOSE III [[id number]]   Attending Physician: JASMYN JOSE III [[id number]]   Bed Request Comments: tele obs               No follow-up provider specified.       Medication List      No changes were made to your prescriptions during this visit.          Dominguez Shepherd DO  05/26/23 0748

## 2023-05-26 NOTE — CASE MANAGEMENT/SOCIAL WORK
Discharge Planning Assessment  [facility]     Patient Name: Lata Justice  MRN: [id number]  Today's Date: 5/26/2023    Admit Date: 5/25/2023    Plan: Home with    Discharge Needs Assessment     Row Name 05/26/23 1622       Living Environment    People in Home alone    Current Living Arrangements home    Family Caregiver if Needed none       Resource/Environmental Concerns    Resource/Environmental Concerns none       Discharge Needs Assessment    Equipment Currently Used at Home walker, rolling;shower chair;other (see comments);grab bar    Equipment Needed After Discharge none               Discharge Plan     Row Name 05/26/23 1612       Plan    Plan Home with     Patient/Family in Agreement with Plan yes    Plan Comments Spoke with patient’s daughter by phone. Patient lives alone for now in [facility]. She is independent with ADL's and uses a walker for mobility. She has become forgetful and they are in the process of moving her to an apt beside the patient's son. PCP is Annetta Mcclendon. Insurance is [employer] [employer]. We discussed discharge plan. They are agreeable for  services in [place] where the new apt will be. CM will drop off information for a place for [facility] and private pay sitters. Discharge plan for now is home with HH. PT and OT are ordered. CM will follow.    Final Discharge Disposition Code 06 - home with home health care              Continued Care and Services - Admitted Since 5/25/2023    Coordination has not been started for this encounter.       Expected Discharge Date and Time     Expected Discharge Date Expected Discharge Time    May 29, 2023          Demographic Summary     Row Name 05/26/23 1622       General Information    Admission Type observation    Preferred Language English               Functional Status     Row Name 05/26/23 1622       Functional Status    Usual Activity Tolerance fair    Current Activity Tolerance fair       Functional Status, IADL    Medications independent     Meal Preparation independent    Housekeeping independent    Laundry independent    Shopping independent               Psychosocial    No documentation.                Abuse/Neglect    No documentation.                Legal    No documentation.                Substance Abuse    No documentation.                Patient Forms    No documentation.                   Debra Steven RN

## 2023-05-26 NOTE — PROGRESS NOTES
[facility] Neurology    Progress Note    Patient Name: Lata Justice  : 1945  MRN: [id number]  Primary Care Physician:  Annetta Mcclendon MD  Date of admission: 2023    Subjective     Chief Complaint: Dizziness    History of Present Illness   Patient was resting in bed.  She states her dizziness goes from right to left.  It is worse upon sitting.  She does have some mild nystagmus gaze which resolves.    Review of Systems   General: Positive for nausea  Neurological:  Positive for dizziness. negative for headache, pain, or weakness.     Objective     Physical Exam  Vitals and nursing note reviewed.   Constitutional:       General: She is not in acute distress.     Appearance: She is not ill-appearing.   Eyes:      Extraocular Movements: Extraocular movements intact.      Pupils: Pupils are equal, round, and reactive to light.   Neurological:      Mental Status: She is alert and oriented to person, place, and time.      Cranial Nerves: Cranial nerves 2-12 are intact.      Sensory: Sensation is intact.      Motor: Motor function is intact.      Coordination: Coordination is intact.      Deep Tendon Reflexes: Reflexes are normal and symmetric.      Comments:   Cranial Nerves   CN II: Pupils are equal, round, and reactive to light. Normal visual acuity and visual fields.    CN III IV VI: Extraocular movements are full without nystagmus.  CN V: Normal facial sensation and strength of muscles of mastication.  CN VII: Facial movements are symmetric. No weakness.  CN VIII:  Auditory acuity is normal.  CN IX & X:  Symmetric palatal movement.  CN XI: Sternocleidomastoid and trapezius are normal.  No weakness.  CN XII: The tongue is midline.  No atrophy or fasciculations.    Unable to witness gait at this time            Vitals:   Temp:  [97.6 °F (36.4 °C)-98.4 °F (36.9 °C)] 97.9 °F (36.6 °C)  Heart Rate:  [78-87] 78  Resp:  [18-22] 20  BP: (141-193)/() 141/66    Current Medications    Current  Facility-Administered Medications:   •  [START ON 5/27/2023] aspirin EC tablet 81 mg, 81 mg, Oral, Daily, Sae Franco III, DO  •  sennosides-docusate (PERICOLACE) 8.6-50 MG per tablet 2 tablet, 2 tablet, Oral, BID **AND** polyethylene glycol (MIRALAX) packet 17 g, 17 g, Oral, Daily PRN **AND** bisacodyl (DULCOLAX) EC tablet 5 mg, 5 mg, Oral, Daily PRN **AND** bisacodyl (DULCOLAX) suppository 10 mg, 10 mg, Rectal, Daily PRN, Sae Franco III, DO  •  Calcium Replacement - Follow Nurse / BPA Driven Protocol, , Does not apply, PRN, Sae Franco III, DO  •  cilostazol (PLETAL) tablet 100 mg, 100 mg, Oral, BID, Sae Franco III, DO, 100 mg at 05/26/23 0938  •  dextrose (D50W) (25 g/50 mL) IV injection 25 g, 25 g, Intravenous, Q15 Min PRN, Sae Franco III, DO  •  dextrose (GLUTOSE) oral gel 15 g, 15 g, Oral, Q15 Min PRN, Sae Franco III, DO  •  diazePAM (VALIUM) injection 5 mg, 5 mg, Intravenous, Once, Joao, Dominguez, DO  •  diazePAM (VALIUM) tablet 5 mg, 5 mg, Oral, Q8H PRN, Sae Franco III, DO  •  glucagon (GLUCAGEN) injection 1 mg, 1 mg, Intramuscular, Q15 Min PRN, Sae Franco III, DO  •  Insulin Lispro (humaLOG) injection 2-7 Units, 2-7 Units, Subcutaneous, 4x Daily AC & at Bedtime, Sae Franco III, DO  •  losartan (COZAAR) tablet 100 mg, 100 mg, Oral, Nightly, Sae Franco III, DO  •  Magnesium Standard Dose Replacement - Follow Nurse / BPA Driven Protocol, , Does not apply, PRN, Sae Franco III, DO  •  meclizine (ANTIVERT) tablet 25 mg, 25 mg, Oral, Q8H, Sae Franco III, DO, 25 mg at 05/26/23 0938  •  metoprolol tartrate (LOPRESSOR) tablet 25 mg, 25 mg, Oral, BID, Sae Farnco III, DO, 25 mg at 05/26/23 0938  •  nitroglycerin (NITROSTAT) SL tablet 0.4 mg, 0.4 mg, Sublingual, Q5 Min PRN, Sae Franco III, DO  •  ondansetron (ZOFRAN) injection 4 mg, 4 mg,  Intravenous, Q6H PRN, Sae Franco III, DO, 4 mg at 05/26/23 1122  •  pantoprazole (PROTONIX) EC tablet 40 mg, 40 mg, Oral, BID AC, Sae Franco III, DO, 40 mg at 05/26/23 0938  •  Phosphorus Replacement - Follow Nurse / BPA Driven Protocol, , Does not apply, PRN, Sae Franco III, DO  •  Potassium Replacement - Follow Nurse / BPA Driven Protocol, , Does not apply, PRN, Sae Franco III, DO  •  pravastatin (PRAVACHOL) tablet 40 mg, 40 mg, Oral, Nightly, Sae Franco III, DO  •  scopolamine patch 1 mg/72 hr, 1 patch, Transdermal, Q72H, Elvira Ramos K, APRN  •  sodium chloride 0.9 % flush 10 mL, 10 mL, Intravenous, PRN, Dominguez Shepherd, DO  •  sodium chloride 0.9 % flush 10 mL, 10 mL, Intravenous, Q12H, Sae Franco III, DO, 10 mL at 05/26/23 0938  •  sodium chloride 0.9 % flush 10 mL, 10 mL, Intravenous, PRN, Sae Franco III, DO  •  sodium chloride 0.9 % infusion 40 mL, 40 mL, Intravenous, PRN, Sae Franco III, DO  •  sodium chloride 0.9 % infusion, 75 mL/hr, Intravenous, Continuous, Sae Franco III, DO, Last Rate: 75 mL/hr at 05/26/23 0225, 75 mL/hr at 05/26/23 0225    Laboratory Results:   Lab Results   Component Value Date    GLUCOSE 128 (H) 05/26/2023    CALCIUM 8.6 05/26/2023     05/26/2023    K 3.7 05/26/2023    CO2 27.0 05/26/2023     05/26/2023    BUN 16 05/26/2023    CREATININE 0.67 05/26/2023    EGFRIFAFRI 73 11/08/2021    EGFRIFNONA 60 (L) 11/08/2021    BCR 23.9 05/26/2023    ANIONGAP 10.0 05/26/2023     Lab Results   Component Value Date    WBC 6.18 05/26/2023    HGB 12.0 05/26/2023    HCT 36.6 05/26/2023    MCV 89.3 05/26/2023     05/26/2023     Lab Results   Component Value Date    CHOL 142 05/26/2023    CHOL 219 (H) 03/07/2017     Lab Results   Component Value Date    HDL 34 (L) 05/26/2023    HDL 39 (L) 05/15/2023    HDL 37 (L) 07/25/2022     Lab Results   Component Value Date    LDL 87  05/26/2023     (H) 05/15/2023     07/25/2022     Lab Results   Component Value Date    TRIG 117 05/26/2023    TRIG 128 05/15/2023    TRIG 101 07/25/2022     Lab Results   Component Value Date    HGBA1C 7.40 (H) 05/26/2023     Lab Results   Component Value Date    INR 0.98 11/01/2021    INR 1.23 (H) 10/20/2017    INR 1.19 (H) 10/19/2017    PROTIME 13.5 11/01/2021    PROTIME 13.5 (H) 10/20/2017    PROTIME 13.0 (H) 10/19/2017     Lab Results   Component Value Date    FOLATE 8.39 05/15/2023     Lab Results   Component Value Date    [id number] 354 05/15/2023             Assessment / Plan     Active Hospital Problems:    BPV (benign positional vertigo)       Brief Patient Summary:  Lata Justice is a 77 y.o. female who has a past medical history of anxiety, arthritis, type II DM, GERD, HLD, HTN who presented to [facility] ED with lightheadedness and dizziness.  Patient was seen by our stroke navigator with NIH score of 0 blood pressure 179 was able to follow all commands.  Did endorse nausea with lightheadedness and dizziness.  Hemoglobin A1c 7.4.  CTA of head and neck and CTP were negative for any intercranial abnormalities or flow-limiting stenosis.MRI of brain showed no ischemic or hemorrhagic events with some hyperintensities in the cerebral white matter which is most likely mild chronic vessel ischemic changes.  Upon assessment patient states that the room is spinning right to left.  It is worse when patient goes from a lying to seated stage.    Plan:   BPV   1.  Complete MRI brain with IACs  2.  Scopolamine patch  3.  Requested PT to aid in Epley maneuver.  4.  IV Zofran as needed for nausea  5.  DC meclizine  6.  General neurology will continue to follow    I have discussed the above with the patient, bedside RN and Dr. Guillaume  Time spent with patient: 35 minutes in face-to-face evaluation and management of the patient.      FELIX Kurtz

## 2023-05-26 NOTE — PLAN OF CARE
Goal Outcome Evaluation:         VSS on RA. Pt still c/o nausea and dizziness relieved by PRN meds. MRI with contrast ordered. Will continue plan of care.

## 2023-05-26 NOTE — CONSULTS
"Stroke Consult Note    Patient Name: Lata Justice   MRN: [id number]  Age: 77 y.o.  Sex: female  : 1945    Primary Care Physician: Annetta Mcclendon MD  Referring Physician: Dr. Shepherd    TIME STROKE TEAM CALLED:  EST   TIME PATIENT SEEN:  EST    Handedness: Right  Race: White    Chief Complaint/Reason for Consultation: Lightheaded and dizziness    HPI: 77-year-old female with past medical history of anxiety, arthritis, diabetes GERD, hyperlipidemia hypertension presents to the ED with lightheadedness and dizziness.  She states this started at 1600 today when she was writing on a piece of paper and felt like the pencil and paper was moving towards her.  Patient then laid down and called her brother for help.  EMS was called and patient brought to [facility].    NIH 0.  Blood pressure 179/84.  Patient is able to follow commands and answer questions appropriate.  He states feeling nauseous when she has episodes of lightheadedness and dizziness.  Denies any paresthesias or focal weakness.  Last Known Normal Date/Time: 2023 1600 EST     Review of Systems   Constitutional: Positive for activity change. Negative for fever.   HENT: Negative for voice change.    Respiratory: Positive for shortness of breath (Anxious).    Cardiovascular: Negative for chest pain.   Gastrointestinal: Positive for nausea. Negative for abdominal pain.   Neurological: Positive for dizziness, weakness and light-headedness. Negative for tremors, seizures, facial asymmetry, speech difficulty and numbness.   Psychiatric/Behavioral: Negative for confusion.      Past Medical History:   Diagnosis Date   • Abnormal finding     STATED SHE \"WOKE\" UP DURING AN EGD, BREAST BIOPSY, BACK SURGERY   • Acute colitis    • Allergic    • Anxiety    • Arthritis    • Bladder leak 2021   • Body piercing     EARS   • Cataracts, bilateral    • Constipation    • COPD (chronic obstructive pulmonary disease)    • Depression    • Depression    • " "Diabetes mellitus     Patient reported \"borderline\" and that she has been on medication in the past   • Elevated cholesterol    • Full dentures     Advised no adhesives DOS   • GERD (gastroesophageal reflux disease)    • Glaucoma    • High cholesterol    • History of bronchitis    • History of nuclear stress test     Patient reported \"a long time ago - more than 5 years\" and reported wnl's at that time   • History of pneumonia    • Hx of colonic polyps    • Hypertension    • Injury of back    • Low back pain    • Obesity    • Osteoarthritis    • PONV (postoperative nausea and vomiting) 11/03/2021   • Poor historian    • Wears glasses    • Wears glasses      Past Surgical History:   Procedure Laterality Date   • BACK SURGERY      Lower back X2   • CARPAL TUNNEL RELEASE Right 11/4/2021    Procedure: CARPAL TUNNEL RELEASE ENDOSCOPIC RIGHT;  Surgeon: Jefry Ceron MD;  Location: [facility];  Service: Orthopedics;  Laterality: Right;   • CATARACT EXTRACTION W/ INTRAOCULAR LENS IMPLANT Left 3/2/2020    Procedure: CATARACT PHACO EXTRACTION WITH INTRAOCULAR LENS IMPLANT LEFT;  Surgeon: Alfredo Patiño MD;  Location: [facility];  Service: Ophthalmology;  Laterality: Left;   • CATARACT EXTRACTION W/ INTRAOCULAR LENS IMPLANT Right 3/16/2020    Procedure: CATARACT PHACO EXTRACTION WITH INTRAOCULAR LENS IMPLANT;  Surgeon: Alfredo Patiño MD;  Location: [facility];  Service: Ophthalmology;  Laterality: Right;   • CHOLECYSTECTOMY     • COLONOSCOPY     • DILATION AND CURETTAGE, DIAGNOSTIC / THERAPEUTIC     • ENDOSCOPY      With dilation   • HAND SURGERY  11/04/2021   • HIATAL HERNIA REPAIR     • HYSTERECTOMY      Total Hysterectomy   • MOUTH SURGERY      Full mouth extraction   • DE ARTHRP KNE CONDYLE&PLATU MEDIAL&LAT COMPARTMENTS Right 10/18/2017    Procedure:  TOTAL KNEE ARTHROPLASTY RIGHT ELECTIVE ;  Surgeon: Doc Mixon MD;  Location: [facility];  Service: Orthopedics   • TUBAL ABDOMINAL LIGATION       Family " "History   Problem Relation Age of Onset   • Depression Daughter    • Mental illness Daughter    • Obesity Daughter    • Other Daughter         chronic back pain   • Arthritis Other    • Diabetes Other    • Hypertension Other    • Obesity Other    • Hyperlipidemia Other         high cholesterol     Social History     Socioeconomic History   • Marital status:    Tobacco Use   • Smoking status: Former     Packs/day: 0.25     Years: 4.00     Pack years: 1.00     Types: Cigarettes     Quit date: 10/9/1985     Years since quittin.6   • Smokeless tobacco: Never   Vaping Use   • Vaping Use: Never used   Substance and Sexual Activity   • Alcohol use: No   • Drug use: No   • Sexual activity: Defer     Allergies   Allergen Reactions   • Fluticasone Propionate Other (See Comments)     UNKNOWN - Patient reported \"I don't remember\"   • Furosemide Nausea And Vomiting   • Sertraline Hcl Nausea Only   • Acetaminophen Nausea Only     Patient reported she thinks Tylenol caused nausea      • Ezetimibe Other (See Comments)     UNKNOWN     • Loratadine Other (See Comments)     UNKNOWN - Patient reported unsure reaction type but that she was unable to take this     • Mometasone Furoate Other (See Comments)     UNKNOWN - Patient reported \"I don't remember\"     • Propoxyphene Other (See Comments)     UNKNOWN - Patient reported \"I don't remember\"      • Triamcinolone Acetonide Other (See Comments)     UNKNOWN - Patient reported \"I don't remember\"\"       Prior to Admission medications    Medication Sig Start Date End Date Taking? Authorizing Provider   albuterol sulfate  (90 Base) MCG/ACT inhaler Inhale 2 puffs Every 6 (Six) Hours As Needed for Wheezing or Shortness of Air. 5/15/23   Annetta Mcclendon MD   allopurinol (Zyloprim) 100 MG tablet Take 1 tablet by mouth Daily. To lower uric acid, gout risk 23   Annetta Mcclendon MD   aspirin (Aspirin Low Dose) 81 MG EC tablet Take 1 tablet by mouth Daily. 5/15/23   " Annetta Mcclendon MD   cetirizine (zyrTEC) 10 MG tablet Take 1 tablet by mouth Daily As Needed for Allergies. 5/15/23   Annetta Mcclendon MD   Cholecalciferol (Vitamin D3) 50 MCG (2000 UT) capsule Take 1 capsule by mouth Daily. For low vitamin D. 5/15/23   Annetta Mcclendon MD   cilostazol (PLETAL) 100 MG tablet Take 1 tablet by mouth 2 (Two) Times a Day. For poor circulation 5/15/23   Annetta Mcclendon MD   esomeprazole (nexIUM) 40 MG capsule Take 1 capsule by mouth Daily. For Adkins's esophagus 5/15/23   Annetta Mcclendon MD   FLUoxetine (PROzac) 20 MG capsule Take 3 capsules by mouth Daily. Indications: Depression 5/15/23   Annetta Mcclendon MD   hydroCHLOROthiazide (HYDRODIURIL) 25 MG tablet Take 1 tablet by mouth Daily. Indications: High Blood Pressure Disorder 5/15/23   Annetta Mcclendon MD   l-methylfolate 15 MG tablet tablet Take 1 tablet by mouth Daily.  Patient not taking: Reported on 5/15/2023 5/11/22   Annetta Mcclendon MD   losartan (COZAAR) 100 MG tablet Take 1 tablet by mouth Every Night. Indications: High Blood Pressure Disorder 5/15/23   Annetta Mcclendon MD   meloxicam (MOBIC) 7.5 MG tablet Take 1 tablet by mouth Daily. Take with food. For osteoarthritis pain. 5/15/23   Annetta Mcclendon MD   metoprolol tartrate (LOPRESSOR) 25 MG tablet Take 1 tablet by mouth 2 (Two) Times a Day. Indications: High Blood Pressure Disorder 5/15/23   Annetta Mcclendon MD   pravastatin (PRAVACHOL) 40 MG tablet Take 1 tablet by mouth Every Night. To lower cholesterol and stroke risk 5/15/23   Annetta Mcclendon MD         Temp:  [98.4 °F (36.9 °C)] 98.4 °F (36.9 °C)  Heart Rate:  [83-87] 87  Resp:  [20] 20  BP: (141-193)/() 179/84  Neurological Exam  Mental Status  Awake, alert and oriented to person, place and time.Alert. Oriented to person, place and time. Speech is normal. Language is fluent with no aphasia.    Cranial Nerves  CN II: Visual fields full to  confrontation.  CN III, IV, VI: Extraocular movements intact bilaterally. Pupils equal round and reactive to light bilaterally.  CN V: Facial sensation is normal.  CN VII: Full and symmetric facial movement.  CN XI: Shoulder shrug strength is normal.  CN XII: Tongue midline without atrophy or fasciculations.    Motor  Normal muscle bulk throughout. Normal muscle tone. Strength is 5/5 in all four extremities except as noted.  Generalized weakness.    Sensory  Light touch is normal in upper and lower extremities.     Coordination  Right: Finger-to-nose normal. Rapid alternating movement normal. Heel-to-shin normal.    Gait    Unable to assess.      Physical Exam  Constitutional:       General: She is not in acute distress.     Appearance: She is ill-appearing.   HENT:      Head: Normocephalic and atraumatic.      Nose: Nose normal.      Mouth/Throat:      Mouth: Mucous membranes are dry.   Eyes:      Extraocular Movements: Extraocular movements intact.      Pupils: Pupils are equal, round, and reactive to light.   Cardiovascular:      Rate and Rhythm: Normal rate.   Pulmonary:      Effort: Pulmonary effort is normal. No respiratory distress.   Abdominal:      Palpations: Abdomen is soft.   Musculoskeletal:         General: Normal range of motion.      Cervical back: Normal range of motion.   Skin:     General: Skin is warm and dry.   Neurological:      General: No focal deficit present.      Mental Status: She is alert.      Cranial Nerves: No cranial nerve deficit.      Sensory: No sensory deficit.      Motor: Weakness present.   Psychiatric:         Mood and Affect: Mood normal.         Speech: Speech normal.         Behavior: Behavior normal.         Acute Stroke Data    Thrombolytic Inclusion / Exclusion Criteria    Time: 20:53 EDT  Person Administering Scale: FELIX Parra    Inclusion Criteria  [x]   18 years of age or greater   []   Onset of symptoms < 4.5 hours before beginning treatment (stroke onset =  time patient was last seen well or without symptoms).   []   Diagnosis of acute ischemic stroke causing measurable disabling deficit (Complete Hemianopia, Any Aphasia, Visual or Sensory Extinction, Any weakness limiting sustained effort against gravity)   []   Any remaining deficit considered potentially disabling in view of patient and practitioner   Exclusion criteria (Do not proceed with Alteplase if any are checked under exclusion criteria)  []   Onset unknown or GREATER than 4.5 hours   []   ICH on CT/MRI   []   CT demonstrates hypodensity representing acute or subacute infarct   []   Significant head trauma or prior stroke in the previous 3 months   []   Symptoms suggestive of subarachnoid hemorrhage   []   History of un-ruptured intracranial aneurysm GREATER than 10 mm   []   Recent intracranial or intraspinal surgery within the last 3 months   []   Arterial puncture at a non-compressible site in the previous 7 days   []   Active internal bleeding   []   Acute bleeding tendency   []   Platelet count LESS than 100,000 for known hematological diseases such as leukemia, thrombocytopenia or chronic cirrhosis   []   Current use of anticoagulant with INR GREATER than 1.7 or PT GREATER than 15 seconds, aPTT GREATER than 40 seconds   []   Heparin received within 48 hours, resulting in abnormally elevated aPTT GREATER than upper limit of normal   []   Current use of direct thrombin inhibitors or direct factor Xa inhibitors in the past 48 hours   []   Elevated blood pressure refractory to treatment (systolic GREATER than 185 mm/Hg or diastolic  GREATER than 110 mm/Hg   []   Suspected infective endocarditis and aortic arch dissection   []   Current use of therapeutic treatment dose of low-molecular-weight heparin (LMWH) within the previous 24 hours   []   Structural GI malignancy or bleed   Relative exclusion for all patients  [x]   Only minor non-disabling symptoms   []   Pregnancy   []   Seizure at onset with  postictal residual neurological impairments   []   Major surgery or previous trauma within past 14 days   []   History of previous spontaneous ICH, intracranial neoplasm, or AV malformation   []   Postpartum (within previous 14 days)   []   Recent GI or urinary tract hemorrhage (within previous 21 days)   []   Recent acute MI (within previous 3 months)   []   History of un-ruptured intracranial aneurysm LESS than 10 mm   []   History of ruptured intracranial aneurysm   []   Blood glucose LESS than 50 mg/dL (2.7 mmol/L)   []   Dural puncture within the last 7 days   []   Known GREATER than 10 cerebral microbleeds   Additional exclusions for patients with symptoms onset between 3 and 4.5 hours.  []   Age > 80.   []   On any anticoagulants regardless of INR  >>> Warfarin (Coumadin), Heparin, Enoxaparin (Lovenox), fondaparinux (Arixtra), bivalirudin (Angiomax), Argatroban, dabigatran (Pradaxa), rivaroxaban (Xarelto), or apixaban (Eliquis)   []   Severe stroke (NIHSS > 25).   []   History of BOTH diabetes and previous ischemic stroke.   []   The risks and benefits have been discussed with the patient or family related to the administration of IV thrombolytic therapy for stroke symptoms.   []   I have discussed and reviewed the patient's case and imaging with the attending prior to IV thrombolytic therapy.   NA Time IV thrombolytic administered       Hospital Meds:  Scheduled- diazePAM, 5 mg, Intravenous, Once      Infusions-     PRNs- •  sodium chloride    Functional Status Prior to Current Stroke/[place] Score:   MODIFIED OSCAR SCALE (to be assessed for each patient having history of stroke) []Stroke history but not assessed  []0: No symptoms at all  []1: No significant disability despite symptoms  [x]2: Slight disability  []3: Moderate disability  []4: Moderately severe disability  []5: Severe disability  []6: Death       NIH Stroke Scale  Time: 20:53 EDT  Person Administering Scale: FELIX Parra    1a  Level of  consciousness: 0=alert; keenly responsive   1b. LOC questions:  0=Answers both questions correctly   1c. LOC commands: 0=Performs both tasks correctly   2.  Best Gaze: 0=normal   3.  Visual: 0=No visual loss   4. Facial Palsy: 0=Normal symmetric movement   5a.  Motor left arm: 0=No drift, limb holds 90 (or 45) degrees for full 10 seconds   5b.  Motor right arm: 0=No drift, limb holds 90 (or 45) degrees for full 10 seconds   6a. motor left le=No drift, limb holds 90 (or 45) degrees for full 10 seconds   6b  Motor right le=No drift, limb holds 90 (or 45) degrees for full 10 seconds   7. Limb Ataxia: 0=Absent   8.  Sensory: 0=Normal; no sensory loss   9. Best Language:  0=No aphasia, normal   10. Dysarthria: 0=Normal   11. Extinction and Inattention: 0=No abnormality    Total:   0       Results Reviewed:  I have personally reviewed current lab, radiology, and data and agree with results.       WBC   Date Value Ref Range Status   2023 8.01 3.40 - 10.80 10*3/mm3 Final     RBC   Date Value Ref Range Status   2023 4.29 3.77 - 5.28 10*6/mm3 Final     Hemoglobin   Date Value Ref Range Status   2023 12.6 12.0 - 15.9 g/dL Final     Hematocrit   Date Value Ref Range Status   2023 37.6 34.0 - 46.6 % Final     MCV   Date Value Ref Range Status   2023 87.6 79.0 - 97.0 fL Final     MCH   Date Value Ref Range Status   2023 29.4 26.6 - 33.0 pg Final     MCHC   Date Value Ref Range Status   2023 33.5 31.5 - 35.7 g/dL Final     RDW   Date Value Ref Range Status   2023 13.2 12.3 - 15.4 % Final     RDW-SD   Date Value Ref Range Status   2023 42.3 37.0 - 54.0 fl Final     MPV   Date Value Ref Range Status   2023 9.5 6.0 - 12.0 fL Final     Platelets   Date Value Ref Range Status   2023 197 140 - 450 10*3/mm3 Final     Neutrophil %   Date Value Ref Range Status   2023 81.0 (H) 42.7 - 76.0 % Final     Lymphocyte %   Date Value Ref Range Status   2023 11.6  (L) 19.6 - 45.3 % Final     Monocyte %   Date Value Ref Range Status   05/25/2023 5.4 5.0 - 12.0 % Final     Eosinophil %   Date Value Ref Range Status   05/25/2023 1.2 0.3 - 6.2 % Final     Basophil %   Date Value Ref Range Status   05/25/2023 0.4 0.0 - 1.5 % Final     Immature Grans %   Date Value Ref Range Status   05/25/2023 0.4 0.0 - 0.5 % Final     Neutrophils, Absolute   Date Value Ref Range Status   05/25/2023 6.49 1.70 - 7.00 10*3/mm3 Final     Lymphocytes, Absolute   Date Value Ref Range Status   05/25/2023 0.93 0.70 - 3.10 10*3/mm3 Final     Monocytes, Absolute   Date Value Ref Range Status   05/25/2023 0.43 0.10 - 0.90 10*3/mm3 Final     Eosinophils, Absolute   Date Value Ref Range Status   05/25/2023 0.10 0.00 - 0.40 10*3/mm3 Final     Basophils, Absolute   Date Value Ref Range Status   05/25/2023 0.03 0.00 - 0.20 10*3/mm3 Final     Immature Grans, Absolute   Date Value Ref Range Status   05/25/2023 0.03 0.00 - 0.05 10*3/mm3 Final     nRBC   Date Value Ref Range Status   05/25/2023 0.0 0.0 - 0.2 /100 WBC Final     Lab Results   Component Value Date    GLUCOSE 133 (H) 05/25/2023    BUN 22 05/25/2023    CREATININE 0.86 05/25/2023    EGFRRESULT 43.2 (L) 05/15/2023    EGFR 69.7 05/25/2023    BCR 25.6 (H) 05/25/2023    K 3.9 05/25/2023    CO2 24.0 05/25/2023    CALCIUM 9.0 05/25/2023    PROTENTOTREF 6.5 05/15/2023    ALBUMIN 3.8 05/25/2023    BILITOT 0.4 05/25/2023    AST 17 05/25/2023    ALT 12 05/25/2023   CT Angiogram Neck    Result Date: 5/25/2023  Impression: Major arterial vasculature within head and neck appears widely patent, with no hemodynamically significant stenosis, dissection, thrombus, or aneurysm. Electronically Signed: Esequiel Miller  5/25/2023 9:02 PM EDT  Workstation ID: [id number]    CT Angiogram Head w AI Analysis of LVO    Result Date: 5/25/2023  Impression: Major arterial vasculature within head and neck appears widely patent, with no hemodynamically significant stenosis, dissection,  thrombus, or aneurysm. Electronically Signed: Esequiel Miller  5/25/2023 9:02 PM EDT  Workstation ID: [id number]    MRI Brain WO CON Hyper Acute Stroke Protocol    Result Date: 5/25/2023  Impression: 1.No definite findings of acute ischemia or hemorrhage at this time. 2.Hyperintense signal foci in the cerebral white matter on FLAIR images, nonspecific, but most commonly seen with mild chronic small vessel ischemic changes. Electronically Signed: Dale Gautam  5/25/2023 8:13 PM EDT  Workstation ID: [id number]    CT CEREBRAL PERFUSION WITH & WITHOUT CONTRAST    Result Date: 5/25/2023  Impression: Examination appears within normal limits. Electronically Signed: Esequiel Miller  5/25/2023 8:54 PM EDT  Workstation ID: [id number]       Assessment/Plan:  77-year-old female with past medical history of anxiety, arthritis, diabetes GERD, hyperlipidemia hypertension presents to the ED with lightheadedness and dizziness.  She states this started at 1600 today when she was writing on a piece of paper and felt like the pencil and paper was moving towards her.  Patient then laid down and called her brother for help.  EMS was called and patient brought to [facility].      Antiplatelet PTA: Aspirin  Anticoagulant PTA: None      1.  Lightheaded and dizziness  Differentials include TIA, small CVA, BPPV  We will defer TIA/CVA order set at this time  Hyperacute MRI stat  CTA head and neck stat  CT perfusion stat  Blood pressure normal goals  Will discuss with rounding team in a.m. possible MRI with thin slices of brainstem  Continue aspirin 81 mg p.o. daily    Plan of care discussed with Dr. Shepherd, patient, nursing staff.  Stroke neurology will continue to follow.  Thank you for this consult.  Call with any questions or concerns.      Kenneth Olsen, FELIX  May 25, 2023  20:53 EDT

## 2023-05-27 LAB
GLUCOSE BLDC GLUCOMTR-MCNC: 108 MG/DL (ref 70–130)
GLUCOSE BLDC GLUCOMTR-MCNC: 114 MG/DL (ref 70–130)
GLUCOSE BLDC GLUCOMTR-MCNC: 120 MG/DL (ref 70–130)
GLUCOSE BLDC GLUCOMTR-MCNC: 140 MG/DL (ref 70–130)

## 2023-05-27 PROCEDURE — G0378 HOSPITAL OBSERVATION PER HR: HCPCS

## 2023-05-27 PROCEDURE — 99214 OFFICE O/P EST MOD 30 MIN: CPT | Performed by: PSYCHIATRY & NEUROLOGY

## 2023-05-27 PROCEDURE — 97162 PT EVAL MOD COMPLEX 30 MIN: CPT | Performed by: PHYSICAL THERAPIST

## 2023-05-27 PROCEDURE — 95992 CANALITH REPOSITIONING PROC: CPT | Performed by: PHYSICAL THERAPIST

## 2023-05-27 PROCEDURE — 82948 REAGENT STRIP/BLOOD GLUCOSE: CPT

## 2023-05-27 PROCEDURE — 96361 HYDRATE IV INFUSION ADD-ON: CPT

## 2023-05-27 PROCEDURE — 99231 SBSQ HOSP IP/OBS SF/LOW 25: CPT | Performed by: NURSE PRACTITIONER

## 2023-05-27 RX ADMIN — METOPROLOL TARTRATE 25 MG: 25 TABLET, FILM COATED ORAL at 21:10

## 2023-05-27 RX ADMIN — PANTOPRAZOLE SODIUM 40 MG: 40 TABLET, DELAYED RELEASE ORAL at 16:30

## 2023-05-27 RX ADMIN — SODIUM CHLORIDE 75 ML/HR: 9 INJECTION, SOLUTION INTRAVENOUS at 09:39

## 2023-05-27 RX ADMIN — LOSARTAN POTASSIUM 100 MG: 50 TABLET, FILM COATED ORAL at 21:10

## 2023-05-27 RX ADMIN — Medication 10 ML: at 21:11

## 2023-05-27 RX ADMIN — PRAVASTATIN SODIUM 40 MG: 40 TABLET ORAL at 21:10

## 2023-05-27 RX ADMIN — Medication 10 ML: at 08:20

## 2023-05-27 RX ADMIN — CILOSTAZOL 100 MG: 50 TABLET ORAL at 21:10

## 2023-05-27 RX ADMIN — DOCUSATE SODIUM 50 MG AND SENNOSIDES 8.6 MG 2 TABLET: 8.6; 5 TABLET, FILM COATED ORAL at 08:20

## 2023-05-27 RX ADMIN — Medication 81 MG: at 08:20

## 2023-05-27 RX ADMIN — PANTOPRAZOLE SODIUM 40 MG: 40 TABLET, DELAYED RELEASE ORAL at 08:20

## 2023-05-27 RX ADMIN — CILOSTAZOL 100 MG: 50 TABLET ORAL at 08:20

## 2023-05-27 RX ADMIN — METOPROLOL TARTRATE 25 MG: 25 TABLET, FILM COATED ORAL at 08:20

## 2023-05-27 NOTE — PLAN OF CARE
Goal Outcome Evaluation:            VSS. RA. IV fluids continued. No acute complaints. Will continue plan of care.

## 2023-05-27 NOTE — PLAN OF CARE
Goal Outcome Evaluation:  Plan of Care Reviewed With: patient, daughter           Outcome Evaluation: PT evaluation completed.  Caldwell-Hallpike test completed with head turned towards the R - positive nystagmus noted.  Epley Manuever completed - pt tolerated well.  Pt transferred supine->sit with ModAx1, stood with ModAx1, and took ~3-4 steps over to recliner with ModAx2 and BUE support.  Pt lives alone and would benefit from inpt rehab at d/c for vestibular and regular rehab.      PT Evaluation Complexity  History, PT Evaluation Complexity: 3 or more personal factors and/or comorbidities  Examination of Body Systems (PT Eval Complexity): total of 4 or more elements  Clinical Presentation (PT Evaluation Complexity): evolving  Clinical Decision Making (PT Evaluation Complexity): moderate complexity  Overall Complexity (PT Evaluation Complexity): moderate complexity

## 2023-05-27 NOTE — THERAPY EVALUATION
"Patient Name: Lata Justice  : 1945    MRN: [id number]                              Today's Date: 2023       Admit Date: 2023    Visit Dx:     ICD-10-CM ICD-9-CM   1. Vertigo  R42 780.4   2. Intractable vomiting  R11.10 536.2   3. Essential hypertension  I10 401.9   4. Recurrent major depressive disorder, in partial remission  F33.41 296.35   5. Impaired glucose tolerance  R73.02 790.22   6. Mixed hyperlipidemia  E78.2 272.2     Patient Active Problem List   Diagnosis   • Adkins's esophagus   • Essential hypertension   • Chronic low back pain   • Chronic obstructive pulmonary disease   • Recurrent major depressive disorder, in partial remission   • Impaired glucose tolerance   • Mixed hyperlipidemia   • Seasonal allergic rhinitis   • Bilateral chronic knee pain   • Bilateral edema of lower extremity   • Chronic pain of both ankles   • Primary osteoarthritis of both knees   • Status post right knee replacement   • Cyanocobalamin deficiency   • Primary osteoarthritis involving multiple joints   • Intermittent claudication   • PVD (peripheral vascular disease)   • BPV (benign positional vertigo)     Past Medical History:   Diagnosis Date   • Abnormal finding     STATED SHE \"WOKE\" UP DURING AN EGD, BREAST BIOPSY, BACK SURGERY   • Acute colitis    • Allergic    • Anxiety    • Arthritis    • Bladder leak 2021   • Body piercing     EARS   • Cataracts, bilateral    • Constipation    • COPD (chronic obstructive pulmonary disease)    • Depression    • Depression    • Diabetes mellitus     Patient reported \"borderline\" and that she has been on medication in the past   • Elevated cholesterol    • Full dentures     Advised no adhesives DOS   • GERD (gastroesophageal reflux disease)    • Glaucoma    • High cholesterol    • History of bronchitis    • History of nuclear stress test     Patient reported \"a long time ago - more than 5 years\" and reported wnl's at that time   • History of pneumonia    • Hx of " colonic polyps    • Hypertension    • Injury of back    • Low back pain    • Obesity    • Osteoarthritis    • PONV (postoperative nausea and vomiting) 11/03/2021   • Poor historian    • Wears glasses    • Wears glasses      Past Surgical History:   Procedure Laterality Date   • BACK SURGERY      Lower back X2   • CARPAL TUNNEL RELEASE Right 11/4/2021    Procedure: CARPAL TUNNEL RELEASE ENDOSCOPIC RIGHT;  Surgeon: Jefry Ceron MD;  Location: [facility];  Service: Orthopedics;  Laterality: Right;   • CATARACT EXTRACTION W/ INTRAOCULAR LENS IMPLANT Left 3/2/2020    Procedure: CATARACT PHACO EXTRACTION WITH INTRAOCULAR LENS IMPLANT LEFT;  Surgeon: Alfredo Patiño MD;  Location: [facility];  Service: Ophthalmology;  Laterality: Left;   • CATARACT EXTRACTION W/ INTRAOCULAR LENS IMPLANT Right 3/16/2020    Procedure: CATARACT PHACO EXTRACTION WITH INTRAOCULAR LENS IMPLANT;  Surgeon: Alfredo Patiño MD;  Location: [facility];  Service: Ophthalmology;  Laterality: Right;   • CHOLECYSTECTOMY     • COLONOSCOPY     • DILATION AND CURETTAGE, DIAGNOSTIC / THERAPEUTIC     • ENDOSCOPY      With dilation   • HAND SURGERY  11/04/2021   • HIATAL HERNIA REPAIR     • HYSTERECTOMY      Total Hysterectomy   • MOUTH SURGERY      Full mouth extraction   • [place] ARTHRP KNE CONDYLE&PLATU MEDIAL&LAT COMPARTMENTS Right 10/18/2017    Procedure:  TOTAL KNEE ARTHROPLASTY RIGHT ELECTIVE ;  Surgeon: Doc Mixon MD;  Location: [facility];  Service: Orthopedics   • TUBAL ABDOMINAL LIGATION        General Information     Row Name 05/27/23 1503          Physical Therapy Time and Intention    Document Type evaluation  -LM     Mode of Treatment individual therapy;physical therapy  -     Row Name 05/27/23 1508          General Information    Patient Profile Reviewed yes  -LM     Prior Level of Function independent:;all household mobility;gait;ADL's  Uses rollator or SC with ambulation  -LM     Existing Precautions/Restrictions fall;other  (see comments)  BPPV  -LM     Barriers to Rehab medically complex  -LM     Row Name 05/27/23 1503          Living Environment    People in Home alone  -LM     Row Name 05/27/23 1503          Home Main Entrance    Number of Stairs, Main Entrance --  Entry Ramp  -LM     Row Name 05/27/23 1503          Stairs Within Home, Primary    Number of Stairs, Within Home, Primary none  -LM     Row Name 05/27/23 1503          Cognition    Orientation Status (Cognition) oriented x 4  -LM     Row Name 05/27/23 1503          Safety Issues, Functional Mobility    Safety Issues Affecting Function (Mobility) awareness of need for assistance;insight into deficits/self-awareness;safety precaution awareness;safety precautions follow-through/compliance;sequencing abilities  -LM     Impairments Affecting Function (Mobility) balance;endurance/activity tolerance;strength  -LM     Comment, Safety Issues/Impairments (Mobility) BPPV  -LM           User Key  (r) = Recorded By, (t) = Taken By, (c) = Cosigned By    Initials Name Provider Type    LM Janelle Cornelius, PT Physical Therapist               Mobility     Row Name 05/27/23 1507          Bed Mobility    Bed Mobility supine-sit;sit-supine  -LM     Supine-Sit [place] (Bed Mobility) moderate assist (50% patient effort);1 person assist;verbal cues  -LM     Sit-Supine [place] (Bed Mobility) moderate assist (50% patient effort);1 person assist;verbal cues  -LM     Assistive Device (Bed Mobility) bed rails;head of bed elevated  -LM     Comment, (Bed Mobility) Completed Vinicius-Hallpike test with head turned towards the R - positive for nystagmus.  Epley Manuever completed.  Pt tolerated well.  -LM     Row Name 05/27/23 1507          Bed-Chair Transfer    Bed-Chair [place] (Transfers) moderate assist (50% patient effort);2 person assist;verbal cues  -LM     Assistive Device (Bed-Chair Transfers) other (see comments)  BUE Support  -LM     Comment, (Bed-Chair Transfer) Vc's for sequencing.   -LM     Row Name 05/27/23 1507          Sit-Stand Transfer    Sit-Stand [place] (Transfers) moderate assist (50% patient effort);1 person assist;verbal cues  -LM     Assistive Device (Sit-Stand Transfers) other (see comments)  UE Support  -LM     Comment, (Sit-Stand Transfer) Stood x 3 reps from EOB.  Vc's for hand placement.  -LM     Row Name 05/27/23 1507          Gait/Stairs (Locomotion)    [place] Level (Gait) not tested  -LM     Comment, (Gait/Stairs) Not safe to attempt at this time.  -LM           User Key  (r) = Recorded By, (t) = Taken By, (c) = Cosigned By    Initials Name Provider Type    LM Janelle Cornelius PT Physical Therapist               Obj/Interventions     Row Name 05/27/23 1516          Range of Motion Comprehensive    General Range of Motion bilateral lower extremity ROM WFL  -[facility] Name 05/27/23 1516          Strength Comprehensive (MMT)    General Manual Muscle Testing (MMT) Assessment lower extremity strength deficits identified  -LM     Comment, General Manual Muscle Testing (MMT) Assessment BLEs - Hip flex - 4-/5;  Knees/Ankles grossly 4/5  -[facility] Name 05/27/23 1516          Balance    Balance Assessment sitting static balance;standing static balance;standing dynamic balance  -LM     Static Sitting Balance standby assist  -LM     Position, Sitting Balance unsupported;sitting edge of bed  -LM     Static Standing Balance moderate assist;1-person assist;verbal cues  -LM     Dynamic Standing Balance moderate assist;2-person assist;verbal cues  -LM     Position/Device Used, Standing Balance supported  -[facility] Name 05/27/23 1516          Sensory Assessment (Somatosensory)    Bilateral LE Sensory Assessment impaired  Unable to feel light touch sensation to B feet  -LM           User Key  (r) = Recorded By, (t) = Taken By, (c) = Cosigned By    Initials Name Provider Type    LM Janelle Cornelius PT Physical Therapist               Goals/Plan     Row Name 05/27/23 1528           Bed Mobility Goal 1 (PT)    Activity/Assistive Device (Bed Mobility Goal 1, PT) sit to supine/supine to sit  -LM     [place] Level/Cues Needed (Bed Mobility Goal 1, PT) standby assist  -LM     Time Frame (Bed Mobility Goal 1, PT) long term goal (LTG);2 weeks  -LM     Row Name 05/27/23 1520          Transfer Goal 1 (PT)    Activity/Assistive Device (Transfer Goal 1, PT) bed-to-chair/chair-to-bed  -LM     [place] Level/Cues Needed (Transfer Goal 1, PT) standby assist  -LM     Time Frame (Transfer Goal 1, PT) long term goal (LTG);2 weeks  -LM     Row Name 05/27/23 1520          Gait Training Goal 1 (PT)    Activity/Assistive Device (Gait Training Goal 1, PT) gait (walking locomotion);assistive device use  -LM     [place] Level (Gait Training Goal 1, PT) standby assist  -LM     Distance (Gait Training Goal 1, PT) 150 feet  -LM     Time Frame (Gait Training Goal 1, PT) long term goal (LTG);2 weeks  -LM     Row Name 05/27/23 1520          Therapy Assessment/Plan (PT)    Planned Therapy Interventions (PT) balance training;bed mobility training;gait training;home exercise program;motor coordination training;neuromuscular re-education;patient/family education;postural re-education;ROM (range of motion);strengthening;stretching;transfer training;vestibular therapy  -LM           User Key  (r) = Recorded By, (t) = Taken By, (c) = Cosigned By    Initials Name Provider Type    LM Janelle Cornelius, PT Physical Therapist               Clinical Impression     Row Name 05/27/23 151          Pain    Pretreatment Pain Rating 0/10 - no pain  -LM     Posttreatment Pain Rating 0/10 - no pain  -LM     Row Name 05/27/23 1510          Plan of Care Review    Plan of Care Reviewed With patient;daughter  -LM     Outcome Evaluation PT evaluation completed.  Vinicius-Hallpike test completed with head turned towards the R - positive nystagmus noted.  Epley Manuever completed - pt tolerated well.  Pt transferred supine->sit with ModAx1,  stood with ModAx1, and took ~3-4 steps over to recliner with ModAx2 and BUE support.  Pt lives alone and would benefit from inpt rehab at d/c for vestibular and regular rehab.  -LM     Row Name 05/27/23 1517          Therapy Assessment/Plan (PT)    Rehab Potential (PT) good, to achieve stated therapy goals  -LM     Criteria for Skilled Interventions Met (PT) yes;meets criteria;skilled treatment is necessary  -LM     Therapy Frequency (PT) daily  -LM     Row Name 05/27/23 1517          Vital Signs    Pre Systolic BP Rehab 141  -LM     Pre Treatment Diastolic BP 73  -LM     Pretreatment Heart Rate (beats/min) 75  -LM     Posttreatment Heart Rate (beats/min) 72  -LM     Pre SpO2 (%) 96  -LM     O2 Delivery Pre Treatment room air  -LM     Post SpO2 (%) 94  -LM     O2 Delivery Post Treatment room air  -LM     Pre Patient Position Supine  -LM     Post Patient Position Sitting  -LM     Row Name 05/27/23 1517          Positioning and Restraints    Pre-Treatment Position in bed  -LM     Post Treatment Position chair  -LM     In Chair reclined;call light within reach;encouraged to call for assist;exit alarm on;waffle cushion;with family/caregiver;notified nsg  -LM           User Key  (r) = Recorded By, (t) = Taken By, (c) = Cosigned By    Initials Name Provider Type    LM Janelle Cornelius, PT Physical Therapist               Outcome Measures     Row Name 05/27/23 1521          How much help from another person do you currently need...    Turning from your back to your side while in flat bed without using bedrails? 3  -LM     Moving from lying on back to sitting on the side of a flat bed without bedrails? 2  -LM     Moving to and from a bed to a chair (including a wheelchair)? 2  -LM     Standing up from a chair using your arms (e.g., wheelchair, bedside chair)? 2  -LM     Climbing 3-5 steps with a railing? 2  -LM     To walk in hospital room? 2  -LM     AM-PAC 6 Clicks Score (PT) 13  -LM     Highest level of mobility 4 -->  Transferred to chair/commode  -     Row Name 05/27/23 1521          Functional Assessment    Outcome Measure Options AM-PAC 6 Clicks Basic Mobility (PT)  -           User Key  (r) = Recorded By, (t) = Taken By, (c) = Cosigned By    Initials Name Provider Type     Janelle Cornelius, PT Physical Therapist                             Physical Therapy Education     Title: PT OT SLP Therapies (In Progress)     Topic: Physical Therapy (In Progress)     Point: Mobility training (Done)     Learning Progress Summary           Patient Acceptance, E, VU,NR by  at 5/27/2023 1521                   Point: Home exercise program (Not Started)     Learner Progress:  Not documented in this visit.          Point: Precautions (Done)     Learning Progress Summary           Patient Acceptance, E, VU,NR by  at 5/27/2023 1521                               User Key     Initials Effective Dates Name Provider Type Discipline     06/16/21 -  Janelle Cornelius, PT Physical Therapist PT              PT Recommendation and Plan  Planned Therapy Interventions (PT): balance training, bed mobility training, gait training, home exercise program, motor coordination training, neuromuscular re-education, patient/family education, postural re-education, ROM (range of motion), strengthening, stretching, transfer training, vestibular therapy  Plan of Care Reviewed With: patient, daughter  Outcome Evaluation: PT evaluation completed.  [place]-Hallpike test completed with head turned towards the R - positive nystagmus noted.  Epley Manuever completed - pt tolerated well.  Pt transferred supine->sit with ModAx1, stood with ModAx1, and took ~3-4 steps over to recliner with ModAx2 and BUE support.  Pt lives alone and would benefit from inpt rehab at d/ for vestibular and regular rehab.     Time Calculation:    PT Charges     Row Name 05/27/23 1522             Time Calculation    Start Time 1411  -LM      PT Received On 05/27/23  -      PT Goal Re-Cert Due  Date 06/06/23  -LM         Untimed Charges    PT Eval/Re-eval Minutes 50  -LM      PT Canalith Repositioning 20  -LM         Total Minutes    Untimed Charges Total Minutes 70  -LM       Total Minutes 70  -LM            User Key  (r) = Recorded By, (t) = Taken By, (c) = Cosigned By    Initials Name Provider Type    LM Janelle Cornelius, PT Physical Therapist              Therapy Charges for Today     Code Description Service Date Service Provider Modifiers Qty    25113192879 HC PT EVAL MOD COMPLEXITY 4 5/27/2023 Janelle Cornelius, PT GP 1    37500888516 HC PT CANALITH REPOSITIONING PER DAY 5/27/2023 Janelle Cornelius, PT GP 1          PT G-Codes  Outcome Measure Options: AM-PAC 6 Clicks Basic Mobility (PT)  AM-PAC 6 Clicks Score (PT): 13  PT Discharge Summary  Anticipated Discharge Disposition (PT): inpatient rehabilitation facility    Janelle Cornelius, LAUREN  5/27/2023

## 2023-05-27 NOTE — PLAN OF CARE
Goal Outcome Evaluation:  No /co pain today and VSS on RA.   Pt c/o dizziness but was able to get up to chair w therapy.  Blood sugars WNL.    NSR on tele.   Will cont current POC

## 2023-05-27 NOTE — PROGRESS NOTES
"DOS: 2023  NAME: Lata Justice   : 1945  PCP: Annetta Mcclendon MD  Chief Complaint   Patient presents with   • Dizziness       Chief complaint: Saw her sitting in the recliner eating her dinner.  Subjective: Still has some dizziness but better than before.  Says that the Epley maneuver has helped and she is using the scopolamine patch.  Reassured her that the neuroimaging with thin sections through the bilateral internal and auditory canals have been unremarkable.    Objective:  Vital signs: /62 (BP Location: Right arm, Patient Position: Sitting)   Pulse 67   Temp 96.2 °F (35.7 °C) (Oral)   Resp 18   Ht 162.6 cm (64\")   Wt 92.1 kg (203 lb)   LMP  (LMP Unknown)   SpO2 92%   BMI 34.84 kg/m²    Gen: NAD, vitals reviewed  MS: Normal.  CN: Cranials 2-12: No focal deficits noted.  Motor: Muscles of both upper and lower extremities show good bulk power and tone.  Sensory: No sensory loss noted.  Coordination: Normal finger-to-nose coordination noted.  Gait: She is reluctant to get up and walk independently but she has been helped up as she still gets dizzy from the benign positional vertigo.    ROS:  General: Feeling better and the nausea has relieved.  Neurological: Still has vertiginous feelings but is improving.    Laboratory results:  Lab Results   Component Value Date    GLUCOSE 128 (H) 2023    CALCIUM 8.6 2023     2023    K 3.7 2023    CO2 27.0 2023     2023    BUN 16 2023    CREATININE 0.67 2023    EGFRIFAFRI 73 2021    EGFRIFNONA 60 (L) 2021    BCR 23.9 2023    ANIONGAP 10.0 2023     Lab Results   Component Value Date    WBC 6.18 2023    HGB 12.0 2023    HCT 36.6 2023    MCV 89.3 2023     2023     Lab Results   Component Value Date    LDL 87 2023     (H) 05/15/2023     2022         Lab 23  0437   HEMOGLOBIN A1C 7.40*        Review " of labs: Hemoglobin A1c is elevated at 7.4.     CMP:      Lab 05/26/23  0437 05/25/23  1851   SODIUM 141 143   POTASSIUM 3.7 3.9   CHLORIDE 104 108*   CO2 27.0 24.0   ANION GAP 10.0 11.0   BUN 16 22   CREATININE 0.67 0.86   EGFR 90.2 69.7   GLUCOSE 128* 133*   CALCIUM 8.6 9.0   MAGNESIUM 1.6 1.5*   TOTAL PROTEIN 5.6* 6.4   ALBUMIN 3.7 3.8   GLOBULIN 1.9 2.6   ALT (SGPT) 10 12   AST (SGOT) 13 17   BILIRUBIN 0.4 0.4   ALK PHOS 55 63        TSH        7/25/2022    10:28 5/25/2023    18:51   TSH   TSH 1.66      1.910         This result is from an external source.        Lipid Panel        7/25/2022    10:28 5/15/2023    09:45 5/26/2023    04:37   Lipid Panel   Total Cholesterol   142     Total Cholesterol 160      172      Triglycerides 101      128   117     HDL Cholesterol 37      39   34     VLDL Cholesterol 20      23   21     LDL Cholesterol  103      110   87     LDL/HDL Ratio   2.49         This result is from an external source.        Lab Results   Component Value Date    RZIJVSEN70 354 05/15/2023       Lab Results   Component Value Date    FOLATE 8.39 05/15/2023       Lab Results   Component Value Date    HGBA1C 7.40 (H) 05/26/2023       Result Review:  I have personally reviewed the results from the time of this admission to 5/27/2023 17:58 EDT and agree with these findings:  [x]  Laboratory list / accordion  [x]  Microbiology  [x]  Radiology  [x]  EKG/Telemetry   [x]  Cardiology/Vascular   [x]  Pathology  [x]  Old records  []  Other:   Most notable findings include: She has responded to the Epley maneuver for the benign positional vertigo.           Review and interpretation of imaging: The MRI of the brain with and without contrast with thin sections through bilateral internal auditory canal shows the following:    FINDINGS: No acute infarct is present on diffusion weighted sequences. Midline structures are normal and the craniocervical junction is satisfactory in appearance. Age-related changes are  present with mild volume loss and typical periventricular   leukomalacia. There is otherwise no evidence of intracranial hemorrhage, mass or mass effect. There is no abnormal brain parenchymal enhancement. Dedicated evaluation of the internal auditory canals demonstrates normal appearance of the cisternal and   canalicular segments 7th and 8th cranial nerves bilaterally. The cisternal and Meckel's cave segment trigeminal nerves also appear normal. There is no evidence of cerebellopontine angle mass or abnormal enhancement. The inner ear structures including   bilateral cochlea and semicircular canals appear normal.           IMPRESSION:  Essentially normal contrast-enhanced MRI of the brain and internal auditory canals as above. There is no evidence of acute ischemia, hemorrhage, mass or abnormal enhancement. There is no evidence of cerebellopontine angle mass. Inner ear   structures appear normal bilaterally.             Workup to date: Epley maneuver was performed and it has helped her condition.           Diagnoses: Benign positional vertigo.      Comment: Discussed about continuing therapy and she has been approved for [facility].    Plan:  1.  Patient to continue the scopolamine patch 1.5 mg to alternate every 72 hours behind the styloid foramen.  2.  Continue the Epley maneuver to reposition the crystals in the vestibular apparatus.  3.  She will be going to long-term rehab center.  4.  Upon discharge to follow-up with ENT.    Discussed with patient and her hospitalist and it will not have any further suggestions and will be signing off.    Spent a total of 30 minutes in face-to-face evaluation and management of the patient.     Electronically signed by Caleb Galicia MD, 05/27/23, 5:58 PM EDT.

## 2023-05-27 NOTE — PROGRESS NOTES
[facility] Medicine Services  PROGRESS NOTE    Patient Name: Lata Justice  : 1945  MRN: [id number]    Date of Admission: 2023  Primary Care Physician: Annetta Mcclendon MD    Subjective   Subjective     CC:  Dizziness and nausea    HPI:  Pt sitting up in bed recalling the event that brought her to the hospital. She states she had never experienced anything like it before this event. She states she is still experiencing some dizziness but it is much improved. She also reports mild nausea.     Copied text in this note has been reviewed and is accurate as of 2023       ROS:  Gen- No fevers, chills  CV- No chest pain, palpitations  Resp- No cough, dyspnea  GI- (+) mild nausea (-) vomiting  Neuro- (+) mild dizziness    Objective   Objective     Vital Signs:   Temp:  [96.4 °F (35.8 °C)-97.7 °F (36.5 °C)] 96.4 °F (35.8 °C)  Heart Rate:  [65-67] 67  Resp:  [16-18] 18  BP: (102-146)/(53-79) 139/70     Physical Exam:  Constitutional: Awake, alert, NAD  HENT: NCAT, mucous membranes moist  Respiratory: Clear to auscultation bilaterally, nonlabored respirations   Cardiovascular: RRR, no murmurs, rubs, or gallops  Gastrointestinal: Positive bowel sounds, soft, nontender, nondistended  Musculoskeletal: No bilateral ankle edema  Psychiatric: Appropriate affect, cooperative  Neurologic: Oriented x 3, strength symmetric in all extremities, Cranial Nerves grossly intact to confrontation, speech clear  Skin: No rashes    Results Reviewed:  LAB RESULTS:      Lab 23  0437 23  1851   WBC 6.18 8.01   HEMOGLOBIN 12.0 12.6   HEMATOCRIT 36.6 37.6   PLATELETS 177 197   NEUTROS ABS 4.37 6.49   IMMATURE GRANS (ABS) 0.02 0.03   LYMPHS ABS 1.24 0.93   MONOS ABS 0.46 0.43   EOS ABS 0.06 0.10   MCV 89.3 87.6         Lab 23  0437 23  1851   SODIUM 141 143   POTASSIUM 3.7 3.9   CHLORIDE 104 108*   CO2 27.0 24.0   ANION GAP 10.0 11.0   BUN 16 22   CREATININE 0.67 0.86   EGFR  90.2 69.7   GLUCOSE 128* 133*   CALCIUM 8.6 9.0   MAGNESIUM 1.6 1.5*   HEMOGLOBIN A1C 7.40*  --    TSH  --  1.910         Lab 05/26/23  0437 05/25/23  1851   TOTAL PROTEIN 5.6* 6.4   ALBUMIN 3.7 3.8   GLOBULIN 1.9 2.6   ALT (SGPT) 10 12   AST (SGOT) 13 17   BILIRUBIN 0.4 0.4   ALK PHOS 55 63         Lab 05/25/23  1851   HSTROP T 24*         Lab 05/26/23 0437   CHOLESTEROL 142   LDL CHOL 87   HDL CHOL 34*   TRIGLYCERIDES 117             Brief Urine Lab Results  (Last result in the past 365 days)      Color   Clarity   Blood   Leuk Est   Nitrite   Protein   CREAT   Urine HCG        05/25/23 2112 Yellow   Clear   Negative   Negative   Negative   Trace                 Microbiology Results Abnormal     None          CT Angiogram Neck    Result Date: 5/25/2023  CT ANGIOGRAM NECK, CT ANGIOGRAM HEAD W AI ANALYSIS OF LVO Date of Exam: 5/25/2023 8:34 PM EDT Indication: Stroke, follow up. Comparison: MRI brain from earlier today Technique: CTA of the head and neck was performed before and after the uneventful intravenous administration of 75 mL Isovue-370. Reconstructed coronal and sagittal images were also obtained. In addition, a 3-D volume rendered image was created for interpretation. Automated exposure control and iterative reconstruction methods were used. Findings: There is a three-vessel aortic arch. The right common carotid artery is widely patent. There is atherosclerotic plaque along the right carotid bifurcation that is not hemodynamically significant. The right internal carotid artery is widely patent. The left common carotid artery is widely patent. There is mild plaque at the left carotid bifurcation that is not hemodynamically significant. The left internal carotid artery is widely patent. Both vertebral arteries are widely patent. The right vertebral artery is dominant. The bilateral middle cerebral, bilateral anterior cerebral, and anterior communicating arteries are widely patent. The basilar and bilateral  posterior cerebral arteries are widely patent. There are patent bilateral posterior communicating arteries. No intracranial aneurysm is identified. The visualized portions of the bilateral superior cerebellar, anteroinferior cerebellar, and posterior inferior cerebellar arteries are unremarkable.     Impression: Impression: Major arterial vasculature within head and neck appears widely patent, with no hemodynamically significant stenosis, dissection, thrombus, or aneurysm. Electronically Signed: Esequiel Miller  5/25/2023 9:02 PM EDT  Workstation ID: [id number]    XR Chest 1 View    Result Date: 5/25/2023  XR CHEST 1 VW Date of Exam: 5/25/2023 6:49 PM EDT Indication: Weak/Dizzy/AMS triage protocol Comparison: October 9, 2017 FINDINGS: No definite focal or diffuse pulmonary infiltrate is identified.  No pneumothorax or significant pleural effusion. Mediastinal contour appears grossly normal and unchanged. Heart size appears at the upper limits of normal.     Impression: 1.Heart size appears at the upper limits of normal. 2.No radiographic findings of acute pulmonary abnormality. Electronically Signed: Dale Gautam  5/25/2023 7:14 PM EDT  Workstation ID: [id number]    MRI Internal Auditory Canal With Wo    Result Date: 5/26/2023  EXAMINATION: MRI INTERNAL AUDITORY CANAL W WO  INDICATION: Vertigo TECHNIQUE: Multiplanar multisequence MRI of the internal auditory canals performed with and without IV contrast. 17 mL MultiHance administered IV. COMPARISON: 5/25/2023 FINDINGS: No acute infarct is present on diffusion weighted sequences. Midline structures are normal and the craniocervical junction is satisfactory in appearance. Age-related changes are present with mild volume loss and typical periventricular leukomalacia. There is otherwise no evidence of intracranial hemorrhage, mass or mass effect. There is no abnormal brain parenchymal enhancement. Dedicated evaluation of the internal auditory canals demonstrates normal  appearance of the cisternal and canalicular segments 7th and 8th cranial nerves bilaterally. The cisternal and Meckel's cave segment trigeminal nerves also appear normal. There is no evidence of cerebellopontine angle mass or abnormal enhancement. The inner ear structures including bilateral cochlea and semicircular canals appear normal.     Impression: Essentially normal contrast-enhanced MRI of the brain and internal auditory canals as above. There is no evidence of acute ischemia, hemorrhage, mass or abnormal enhancement. There is no evidence of cerebellopontine angle mass. Inner ear structures appear normal bilaterally.  Electronically Signed: Nathaniel Hendricks  5/26/2023 9:14 PM EDT  Workstation ID: [id number]    CT Angiogram Head w AI Analysis of LVO    Result Date: 5/25/2023  CT ANGIOGRAM NECK, CT ANGIOGRAM HEAD W AI ANALYSIS OF LVO Date of Exam: 5/25/2023 8:34 PM EDT Indication: Stroke, follow up. Comparison: MRI brain from earlier today Technique: CTA of the head and neck was performed before and after the uneventful intravenous administration of 75 mL Isovue-370. Reconstructed coronal and sagittal images were also obtained. In addition, a 3-D volume rendered image was created for interpretation. Automated exposure control and iterative reconstruction methods were used. Findings: There is a three-vessel aortic arch. The right common carotid artery is widely patent. There is atherosclerotic plaque along the right carotid bifurcation that is not hemodynamically significant. The right internal carotid artery is widely patent. The left common carotid artery is widely patent. There is mild plaque at the left carotid bifurcation that is not hemodynamically significant. The left internal carotid artery is widely patent. Both vertebral arteries are widely patent. The right vertebral artery is dominant. The bilateral middle cerebral, bilateral anterior cerebral, and anterior communicating arteries are widely patent. The  basilar and bilateral posterior cerebral arteries are widely patent. There are patent bilateral posterior communicating arteries. No intracranial aneurysm is identified. The visualized portions of the bilateral superior cerebellar, anteroinferior cerebellar, and posterior inferior cerebellar arteries are unremarkable.     Impression: Impression: Major arterial vasculature within head and neck appears widely patent, with no hemodynamically significant stenosis, dissection, thrombus, or aneurysm. Electronically Signed: Esequiel Miller  5/25/2023 9:02 PM EDT  Workstation ID: [id number]    MRI Brain WO CON Hyper Acute Stroke Protocol    Result Date: 5/25/2023  MRI BRAIN WO CON HYPER ACUTE STROKE PROTOCOL Date of Exam: 5/25/2023 7:59 PM EDT Indication: vertigo.  Comparison: None available. Technique: Limited multisequence MR images of the brain were obtained without contrast administration. Findings: No midline shift. No significant intracranial hemorrhage is identified. Basal cisterns appear to be patent. There is mild periventricular and scattered foci of hyperintense signal on FLAIR images in the cerebral white matter. No significant mass or focal  edema is identified. No definite focal restricted diffusion is identified at this time.     Impression: Impression: 1.No definite findings of acute ischemia or hemorrhage at this time. 2.Hyperintense signal foci in the cerebral white matter on FLAIR images, nonspecific, but most commonly seen with mild chronic small vessel ischemic changes. Electronically Signed: Dale Gautam  5/25/2023 8:13 PM EDT  Workstation ID: [id number]    CT CEREBRAL PERFUSION WITH & WITHOUT CONTRAST    Result Date: 5/25/2023  CT CEREBRAL PERFUSION W WO CONTRAST Date of Exam: 5/25/2023 8:34 PM EDT Indication: Neuro deficit, acute, stroke suspected.  Comparison: MRI brain from earlier today Technique: Axial CT images of the brain were obtained prior to and after the administration of 50 mL Isovue-370.  Core blood volume, core blood flow, mean transit time, and Tmax images were obtained utilizing the Rapid software protocol. A limited CT angiogram of the head was also performed to measure the blood vessel density. The radiation dose reduction device was turned on for each scan per the ALARA (As Low as Reasonably Achievable) protocol. Findings: The intracranial cerebral perfusion appears normal.     Impression: Impression: Examination appears within normal limits. Electronically Signed: Esequiel Miller  5/25/2023 8:54 PM EDT  Workstation ID: [id number]          Current medications:  Scheduled Meds:aspirin, 81 mg, Oral, Daily  cilostazol, 100 mg, Oral, BID  diazePAM, 5 mg, Intravenous, Once  insulin lispro, 2-7 Units, Subcutaneous, 4x Daily AC & at Bedtime  losartan, 100 mg, Oral, Nightly  metoprolol tartrate, 25 mg, Oral, BID  pantoprazole, 40 mg, Oral, BID AC  pravastatin, 40 mg, Oral, Nightly  Scopolamine, 1 patch, Transdermal, Q72H  senna-docusate sodium, 2 tablet, Oral, BID  sodium chloride, 10 mL, Intravenous, Q12H      Continuous Infusions:sodium chloride, 75 mL/hr, Last Rate: 75 mL/hr (05/27/23 0939)      PRN Meds:.•  senna-docusate sodium **AND** polyethylene glycol **AND** bisacodyl **AND** bisacodyl  •  Calcium Replacement - Follow Nurse / BPA Driven Protocol  •  dextrose  •  dextrose  •  diazePAM  •  glucagon (human recombinant)  •  Magnesium Standard Dose Replacement - Follow Nurse / BPA Driven Protocol  •  nitroglycerin  •  ondansetron  •  Phosphorus Replacement - Follow Nurse / BPA Driven Protocol  •  Potassium Replacement - Follow Nurse / BPA Driven Protocol  •  sodium chloride  •  sodium chloride  •  sodium chloride    Assessment & Plan   Assessment & Plan     Active Hospital Problems    Diagnosis  POA   • **BPV (benign positional vertigo) [H81.10]  Yes      Resolved Hospital Problems   No resolved problems to display.      Brief Hospital Course to date:  Lata Justice is a 77 y.o. female admitted  with benign positional vertigo.  Neurology is consulted and recommended MRI, scopolamine patch and PT evaluation with Epley maneuver.     Benign positional vertigo  -Neurology consult  -Continue scopolamine patch  -CTA head and neck and CTP neg for intracranial abn or flow-limiting stenosis. MRI of head showed no ischemic or hemorrhagic events, MRI Internal Auditory Canal W/Wo contrast whosed normal brain and internal auditory canals. No evidence of ischemia, hemorrhage or mass enhancement. Inner ear structures appear normal. No evidence of cerebellopontine angle mass.   -PT evaluation with Epley maneuver requested    Hypertension/Hyperlipidemia  --pravastatin, metoprolol, losartan    COPD  --inhaler    Diabetes mellitus type 2  --A1C 7.4  --SSI    Chronic low back pain  General weakness  Functional decline    Expected Discharge Location and Transportation: Home versus rehab, private car  Expected Discharge   Expected Discharge Date: 5/29/2023; Expected Discharge Time:      DVT prophylaxis:  No DVT prophylaxis order currently exists.          CODE STATUS:   Code Status and Medical Interventions:   Ordered at: 05/25/23 3205     Level Of Support Discussed With:    Patient     Code Status (Patient has no pulse and is not breathing):    CPR (Attempt to Resuscitate)     Medical Interventions (Patient has pulse or is breathing):    Full Support       Shruthi Winters, APRN  05/27/23

## 2023-05-28 LAB
GLUCOSE BLDC GLUCOMTR-MCNC: 111 MG/DL (ref 70–130)
GLUCOSE BLDC GLUCOMTR-MCNC: 119 MG/DL (ref 70–130)
GLUCOSE BLDC GLUCOMTR-MCNC: 146 MG/DL (ref 70–130)
GLUCOSE BLDC GLUCOMTR-MCNC: 153 MG/DL (ref 70–130)

## 2023-05-28 PROCEDURE — G0378 HOSPITAL OBSERVATION PER HR: HCPCS

## 2023-05-28 PROCEDURE — 97535 SELF CARE MNGMENT TRAINING: CPT

## 2023-05-28 PROCEDURE — 95992 CANALITH REPOSITIONING PROC: CPT | Performed by: PHYSICAL THERAPIST

## 2023-05-28 PROCEDURE — 82948 REAGENT STRIP/BLOOD GLUCOSE: CPT

## 2023-05-28 PROCEDURE — 97166 OT EVAL MOD COMPLEX 45 MIN: CPT

## 2023-05-28 PROCEDURE — 99231 SBSQ HOSP IP/OBS SF/LOW 25: CPT | Performed by: NURSE PRACTITIONER

## 2023-05-28 PROCEDURE — 97110 THERAPEUTIC EXERCISES: CPT | Performed by: PHYSICAL THERAPIST

## 2023-05-28 PROCEDURE — 97116 GAIT TRAINING THERAPY: CPT | Performed by: PHYSICAL THERAPIST

## 2023-05-28 RX ADMIN — DOCUSATE SODIUM 50 MG AND SENNOSIDES 8.6 MG 2 TABLET: 8.6; 5 TABLET, FILM COATED ORAL at 08:38

## 2023-05-28 RX ADMIN — DOCUSATE SODIUM 50 MG AND SENNOSIDES 8.6 MG 2 TABLET: 8.6; 5 TABLET, FILM COATED ORAL at 21:44

## 2023-05-28 RX ADMIN — Medication 81 MG: at 08:39

## 2023-05-28 RX ADMIN — PRAVASTATIN SODIUM 40 MG: 40 TABLET ORAL at 21:44

## 2023-05-28 RX ADMIN — LOSARTAN POTASSIUM 100 MG: 50 TABLET, FILM COATED ORAL at 21:44

## 2023-05-28 RX ADMIN — CILOSTAZOL 100 MG: 50 TABLET ORAL at 08:39

## 2023-05-28 RX ADMIN — METOPROLOL TARTRATE 25 MG: 25 TABLET, FILM COATED ORAL at 21:44

## 2023-05-28 RX ADMIN — CILOSTAZOL 100 MG: 50 TABLET ORAL at 21:44

## 2023-05-28 RX ADMIN — PANTOPRAZOLE SODIUM 40 MG: 40 TABLET, DELAYED RELEASE ORAL at 08:39

## 2023-05-28 RX ADMIN — Medication 10 ML: at 08:38

## 2023-05-28 RX ADMIN — Medication 10 ML: at 21:44

## 2023-05-28 RX ADMIN — PANTOPRAZOLE SODIUM 40 MG: 40 TABLET, DELAYED RELEASE ORAL at 17:19

## 2023-05-28 RX ADMIN — METOPROLOL TARTRATE 25 MG: 25 TABLET, FILM COATED ORAL at 08:39

## 2023-05-28 NOTE — PLAN OF CARE
Goal Outcome Evaluation:            Pt slept well. No complaints. Will continue plan of care.

## 2023-05-28 NOTE — PROGRESS NOTES
[facility] Medicine Services  PROGRESS NOTE    Patient Name: Lata Justice  : 1945  MRN: [id number]    Date of Admission: 2023  Primary Care Physician: Annetta Mcclendon MD    Subjective   Subjective     CC:  Dizziness and nausea    HPI:  Pt feeling better but still with some dizziness when moving from sitting to standing and when moving head side to side. Denies nausea today. Planning to go to rehab and follow up with ENT.     Copied text in this note has been reviewed and is accurate as of 23.       ROS:  Gen- No fevers, chills  CV- No chest pain, palpitations  Resp- No cough, dyspnea  GI- No N/V/D, abd pain  Neuro- (+) dizziness with position change    Objective   Objective     Vital Signs:   Temp:  [96.2 °F (35.7 °C)-98.8 °F (37.1 °C)] 98.5 °F (36.9 °C)  Heart Rate:  [69-73] 69  Resp:  [17-18] 18  BP: (126-148)/(54-77) 141/64     Physical Exam:  Constitutional: Awake, alert, NAD  HENT: NCAT, mucous membranes moist  Respiratory: Clear to auscultation bilaterally, nonlabored respirations   Cardiovascular: RRR, no murmurs, rubs, or gallops  Gastrointestinal: Positive bowel sounds, soft, nontender, nondistended  Musculoskeletal: No bilateral ankle edema  Psychiatric: Appropriate affect, cooperative  Neurologic: Oriented x 3, strength symmetric in all extremities, nonfocal, speech clear  Skin: No rashes      Results Reviewed:  LAB RESULTS:      Lab 23   WBC 6.18 8.01   HEMOGLOBIN 12.0 12.6   HEMATOCRIT 36.6 37.6   PLATELETS 177 197   NEUTROS ABS 4.37 6.49   IMMATURE GRANS (ABS) 0.02 0.03   LYMPHS ABS 1.24 0.93   MONOS ABS 0.46 0.43   EOS ABS 0.06 0.10   MCV 89.3 87.6         Lab 23  0437 23  1851   SODIUM 141 143   POTASSIUM 3.7 3.9   CHLORIDE 104 108*   CO2 27.0 24.0   ANION GAP 10.0 11.0   BUN 16 22   CREATININE 0.67 0.86   EGFR 90.2 69.7   GLUCOSE 128* 133*   CALCIUM 8.6 9.0   MAGNESIUM 1.6 1.5*   HEMOGLOBIN A1C 7.40*  --     TSH  --  1.910         Lab 05/26/23  0437 05/25/23  1851   TOTAL PROTEIN 5.6* 6.4   ALBUMIN 3.7 3.8   GLOBULIN 1.9 2.6   ALT (SGPT) 10 12   AST (SGOT) 13 17   BILIRUBIN 0.4 0.4   ALK PHOS 55 63         Lab 05/25/23  1851   HSTROP T 24*         Lab 05/26/23  0437   CHOLESTEROL 142   LDL CHOL 87   HDL CHOL 34*   TRIGLYCERIDES 117             Brief Urine Lab Results  (Last result in the past 365 days)      Color   Clarity   Blood   Leuk Est   Nitrite   Protein   CREAT   Urine HCG        05/25/23 2112 Yellow   Clear   Negative   Negative   Negative   Trace                 Microbiology Results Abnormal     None          MRI Internal Auditory Canal With Wo    Result Date: 5/26/2023  EXAMINATION: MRI INTERNAL AUDITORY CANAL W WO  INDICATION: Vertigo TECHNIQUE: Multiplanar multisequence MRI of the internal auditory canals performed with and without IV contrast. 17 mL MultiHance administered IV. COMPARISON: 5/25/2023 FINDINGS: No acute infarct is present on diffusion weighted sequences. Midline structures are normal and the craniocervical junction is satisfactory in appearance. Age-related changes are present with mild volume loss and typical periventricular leukomalacia. There is otherwise no evidence of intracranial hemorrhage, mass or mass effect. There is no abnormal brain parenchymal enhancement. Dedicated evaluation of the internal auditory canals demonstrates normal appearance of the cisternal and canalicular segments 7th and 8th cranial nerves bilaterally. The cisternal and Meckel's cave segment trigeminal nerves also appear normal. There is no evidence of cerebellopontine angle mass or abnormal enhancement. The inner ear structures including bilateral cochlea and semicircular canals appear normal.     Impression: Essentially normal contrast-enhanced MRI of the brain and internal auditory canals as above. There is no evidence of acute ischemia, hemorrhage, mass or abnormal enhancement. There is no evidence of  cerebellopontine angle mass. Inner ear structures appear normal bilaterally.  Electronically Signed: Nathaniel Hendricks  5/26/2023 9:14 PM EDT  Workstation ID: [id number]          Current medications:  Scheduled Meds:aspirin, 81 mg, Oral, Daily  cilostazol, 100 mg, Oral, BID  diazePAM, 5 mg, Intravenous, Once  insulin lispro, 2-7 Units, Subcutaneous, 4x Daily AC & at Bedtime  losartan, 100 mg, Oral, Nightly  metoprolol tartrate, 25 mg, Oral, BID  pantoprazole, 40 mg, Oral, BID AC  pravastatin, 40 mg, Oral, Nightly  Scopolamine, 1 patch, Transdermal, Q72H  senna-docusate sodium, 2 tablet, Oral, BID  sodium chloride, 10 mL, Intravenous, Q12H      Continuous Infusions:   PRN Meds:.•  senna-docusate sodium **AND** polyethylene glycol **AND** bisacodyl **AND** bisacodyl  •  Calcium Replacement - Follow Nurse / BPA Driven Protocol  •  dextrose  •  dextrose  •  diazePAM  •  glucagon (human recombinant)  •  Magnesium Standard Dose Replacement - Follow Nurse / BPA Driven Protocol  •  nitroglycerin  •  ondansetron  •  Phosphorus Replacement - Follow Nurse / BPA Driven Protocol  •  Potassium Replacement - Follow Nurse / BPA Driven Protocol  •  sodium chloride  •  sodium chloride  •  sodium chloride    Assessment & Plan   Assessment & Plan     Active Hospital Problems    Diagnosis  POA   • **BPV (benign positional vertigo) [H81.10]  Yes      Resolved Hospital Problems   No resolved problems to display.      Brief Hospital Course to date:  Lata Justice is a 77 y.o. female admitted with benign positional vertigo.  Neurology is consulted. CTA head and neck and CTP neg for intracranial abn or flow-limiting stenosis. MRI head showing no ischemic or hemorrhagic events. MRI internal Auditory Canal W/Wo contrast showed normal brain and internal auditory canals. No evidence of ischemia, hemorrhage or mass enhancement. Inner ears structures appear normal. No evidence of cerebellopontine angle mass. Neurology recommending scopolamine patch, PT  with Epley maneuver and follow up with ENT after rehab.     Benign positional vertigo  -Neurology consult  -Continue scopolamine patch  -CTA head and neck and CTP neg for intracranial abn or flow-limiting stenosis. MRI of head showed no ischemic or hemorrhagic events, MRI Internal Auditory Canal W/Wo contrast showed normal brain and internal auditory canals. No evidence of ischemia, hemorrhage or mass enhancement. Inner ear structures appear normal. No evidence of cerebellopontine angle mass.   -PT evaluation with Epley maneuver requested    Hypertension/Hyperlipidemia  --pravastatin, metoprolol, losartan    COPD  --inhaler    Diabetes mellitus type 2  --A1C 7.4  --SSI    Chronic low back pain  General weakness  Functional decline    Expected Discharge Location and Transportation: Home versus rehab, private car  Expected Discharge   05/31/2023    DVT prophylaxis:  SCDs    AM-PAC 6 Clicks Score (PT): 13 (05/27/23 1521)    CODE STATUS:   Code Status and Medical Interventions:   Ordered at: 05/25/23 9586     Level Of Support Discussed With:    Patient     Code Status (Patient has no pulse and is not breathing):    CPR (Attempt to Resuscitate)     Medical Interventions (Patient has pulse or is breathing):    Full Support       Shruthi Winters, FELIX  05/28/23

## 2023-05-28 NOTE — PLAN OF CARE
Goal Outcome Evaluation:  Plan of Care Reviewed With: patient        Progress: improving  Outcome Evaluation: Pt with significant improvement since yesterday.  Epley Manuever with head turned towards the R completed for the second time.  Pt tolerated well.  Nystagmus much improved from yesterday, but still there mildly.  Pt stood with CGA and increased gait distance to 20 feet using rw with MinAx1.  Pt still unsteady and fatigues very quickly.  Continue to recommend inpt rehab at d/c.      PT Evaluation Complexity  History, PT Evaluation Complexity: 3 or more personal factors and/or comorbidities  Examination of Body Systems (PT Eval Complexity): total of 4 or more elements  Clinical Presentation (PT Evaluation Complexity): evolving  Clinical Decision Making (PT Evaluation Complexity): moderate complexity  Overall Complexity (PT Evaluation Complexity): moderate complexity

## 2023-05-28 NOTE — PLAN OF CARE
Goal Outcome Evaluation:  Plan of Care Reviewed With: patient, daughter           Outcome Evaluation: OT initial eval and expanded chart review completed. Pt presents with multiple comorbidities and decreased independence with ADL's and mobility from baseline. Recommend continued skilled OT services and transfer to IRF at d/c.

## 2023-05-28 NOTE — THERAPY TREATMENT NOTE
"Patient Name: Lata Justice  : 1945    MRN: [id number]                              Today's Date: 2023       Admit Date: 2023    Visit Dx:     ICD-10-CM ICD-9-CM   1. Vertigo  R42 780.4   2. Intractable vomiting  R11.10 536.2   3. Essential hypertension  I10 401.9   4. Recurrent major depressive disorder, in partial remission  F33.41 296.35   5. Impaired glucose tolerance  R73.02 790.22   6. Mixed hyperlipidemia  E78.2 272.2     Patient Active Problem List   Diagnosis   • Adkins's esophagus   • Essential hypertension   • Chronic low back pain   • Chronic obstructive pulmonary disease   • Recurrent major depressive disorder, in partial remission   • Impaired glucose tolerance   • Mixed hyperlipidemia   • Seasonal allergic rhinitis   • Bilateral chronic knee pain   • Bilateral edema of lower extremity   • Chronic pain of both ankles   • Primary osteoarthritis of both knees   • Status post right knee replacement   • Cyanocobalamin deficiency   • Primary osteoarthritis involving multiple joints   • Intermittent claudication   • PVD (peripheral vascular disease)   • BPV (benign positional vertigo)     Past Medical History:   Diagnosis Date   • Abnormal finding     STATED SHE \"WOKE\" UP DURING AN EGD, BREAST BIOPSY, BACK SURGERY   • Acute colitis    • Allergic    • Anxiety    • Arthritis    • Bladder leak 2021   • Body piercing     EARS   • Cataracts, bilateral    • Constipation    • COPD (chronic obstructive pulmonary disease)    • Depression    • Depression    • Diabetes mellitus     Patient reported \"borderline\" and that she has been on medication in the past   • Elevated cholesterol    • Full dentures     Advised no adhesives DOS   • GERD (gastroesophageal reflux disease)    • Glaucoma    • High cholesterol    • History of bronchitis    • History of nuclear stress test     Patient reported \"a long time ago - more than 5 years\" and reported wnl's at that time   • History of pneumonia    • Hx of " colonic polyps    • Hypertension    • Injury of back    • Low back pain    • Obesity    • Osteoarthritis    • PONV (postoperative nausea and vomiting) 11/03/2021   • Poor historian    • Wears glasses    • Wears glasses      Past Surgical History:   Procedure Laterality Date   • BACK SURGERY      Lower back X2   • CARPAL TUNNEL RELEASE Right 11/4/2021    Procedure: CARPAL TUNNEL RELEASE ENDOSCOPIC RIGHT;  Surgeon: Jefry Ceron MD;  Location: [facility];  Service: Orthopedics;  Laterality: Right;   • CATARACT EXTRACTION W/ INTRAOCULAR LENS IMPLANT Left 3/2/2020    Procedure: CATARACT PHACO EXTRACTION WITH INTRAOCULAR LENS IMPLANT LEFT;  Surgeon: Alfredo Patiño MD;  Location: [facility];  Service: Ophthalmology;  Laterality: Left;   • CATARACT EXTRACTION W/ INTRAOCULAR LENS IMPLANT Right 3/16/2020    Procedure: CATARACT PHACO EXTRACTION WITH INTRAOCULAR LENS IMPLANT;  Surgeon: Alfredo Patiño MD;  Location: [facility];  Service: Ophthalmology;  Laterality: Right;   • CHOLECYSTECTOMY     • COLONOSCOPY     • DILATION AND CURETTAGE, DIAGNOSTIC / THERAPEUTIC     • ENDOSCOPY      With dilation   • HAND SURGERY  11/04/2021   • HIATAL HERNIA REPAIR     • HYSTERECTOMY      Total Hysterectomy   • MOUTH SURGERY      Full mouth extraction   • HI ARTHRP KNE CONDYLE&PLATU MEDIAL&LAT COMPARTMENTS Right 10/18/2017    Procedure:  TOTAL KNEE ARTHROPLASTY RIGHT ELECTIVE ;  Surgeon: Doc Mixon MD;  Location: [facility];  Service: Orthopedics   • TUBAL ABDOMINAL LIGATION        General Information     Row Name 05/28/23 8370          Physical Therapy Time and Intention    Document Type therapy note (daily note)  -LM     Mode of Treatment individual therapy;physical therapy  -LM     Row Name 05/28/23 7181          General Information    Patient Profile Reviewed yes  -LM     Existing Precautions/Restrictions fall;other (see comments)  BPPV  -LM     Row Name 05/28/23 5074          Cognition    Orientation Status (Cognition)  oriented x 4  -LM     Row Name 05/28/23 1356          Safety Issues, Functional Mobility    Safety Issues Affecting Function (Mobility) awareness of need for assistance;insight into deficits/self-awareness;problem-solving;safety precaution awareness;safety precautions follow-through/compliance;sequencing abilities  -LM     Impairments Affecting Function (Mobility) balance;endurance/activity tolerance;grasp;strength;shortness of breath;postural/trunk control;pain  -LM     Comment, Safety Issues/Impairments (Mobility) BPPV  -LM           User Key  (r) = Recorded By, (t) = Taken By, (c) = Cosigned By    Initials Name Provider Type    LM Janelle Cornelius, PT Physical Therapist               Mobility     Row Name 05/28/23 1354          Bed Mobility    Supine-Sit [place] (Bed Mobility) moderate assist (50% patient effort);1 person assist;verbal cues  -LM     Sit-Supine [place] (Bed Mobility) moderate assist (50% patient effort);1 person assist;verbal cues  -LM     Assistive Device (Bed Mobility) bed rails;head of bed elevated  -LM     Comment, (Bed Mobility) Epley Manuever completed with head turned towards the R for the 2nd time.  Pt tolerated well.  Nystagmus much improved from yesterday, but still mildly there.  -LM     Row Name 05/28/23 1359          Sit-Stand Transfer    Sit-Stand [place] (Transfers) contact guard;1 person assist;verbal cues  -LM     Assistive Device (Sit-Stand Transfers) walker, front-wheeled  -LM     Comment, (Sit-Stand Transfer) Stood x 2 reps - first from recliner; second from EOB.  Vc's for hand placement.  -LM     Row Name 05/28/23 1351          Gait/Stairs (Locomotion)    [place]one Level (Gait) minimum assist (75% patient effort);1 person assist;verbal cues  -LM     Assistive Device (Gait) walker, front-wheeled  -LM     Distance in Feet (Gait) 20  -LM     Deviations/Abnormal Patterns (Gait) catrachito decreased;gait speed decreased;stride length decreased  -LM     Bilateral Gait  Deviations forward flexed posture;heel strike decreased  -     Comment, (Gait/Stairs) Pt fatigues very quickly.  Pt kept trying to rest her R forearm on the handle of the walker - educated pt not to do this for safety reasons.  Pt still unsteady throughout gait.  -           User Key  (r) = Recorded By, (t) = Taken By, (c) = Cosigned By    Initials Name Provider Type     Janelle Cornelius, LAUREN Physical Therapist               Obj/Interventions     Row Name 05/28/23 [facility]          Motor Skills    Therapeutic Exercise hip;knee;ankle  -     Row Name 05/28/23 [facility]          Hip (Therapeutic Exercise)    Hip (Therapeutic Exercise) AROM (active range of motion);isometric exercises  -     Hip AROM (Therapeutic Exercise) bilateral;aBduction;aDduction;sitting;10 repetitions  -     Hip Isometrics (Therapeutic Exercise) bilateral;gluteal sets;sitting;10 repetitions  -     Row Name 05/28/23 [facility]          Knee (Therapeutic Exercise)    Knee (Therapeutic Exercise) AROM (active range of motion);isometric exercises  -     Knee AROM (Therapeutic Exercise) bilateral;SLR (straight leg raise);heel slides;sitting;10 repetitions  -     Knee Isometrics (Therapeutic Exercise) bilateral;quad sets;sitting;10 repetitions  -     Row Name 05/28/23 [facility]          Ankle (Therapeutic Exercise)    Ankle (Therapeutic Exercise) AROM (active range of motion)  -     Ankle AROM (Therapeutic Exercise) bilateral;dorsiflexion;plantarflexion;sitting;10 repetitions  -     Row Name 05/28/23 [facility]          Balance    Static Sitting Balance contact guard  -     Position, Sitting Balance unsupported;sitting edge of bed  -     Static Standing Balance contact guard;1-person assist;verbal cues  -LM     Dynamic Standing Balance minimal assist;1-person assist;verbal cues  -LM     Position/Device Used, Standing Balance supported;walker, front-wheeled  -           User Key  (r) = Recorded By, (t) = Taken By, (c) = Cosigned By    Initials Name  Provider Type    LM Janelle Cornelius, LAUREN Physical Therapist               Goals/Plan    No documentation.                Clinical Impression     Row Name 05/28/23 1404          Pain    Pretreatment Pain Rating 0/10 - no pain  -LM     Posttreatment Pain Rating 0/10 - no pain  -LM     Row Name 05/28/23 1404          Plan of Care Review    Plan of Care Reviewed With patient  -LM     Progress improving  -LM     Outcome Evaluation Pt with significant improvement since yesterday.  Epley Manuever with head turned towards the R completed for the second time.  Pt tolerated well.  Nystagmus much improved from yesterday, but still there mildly.  Pt stood with CGA and increased gait distance to 20 feet using rw with MinAx1.  Pt still unsteady and fatigues very quickly.  Continue to recommend inpt rehab at d/c.  -LM     Row Name 05/28/23 1404          Vital Signs    Pre Systolic BP Rehab 149  -LM     Pre Treatment Diastolic BP 67  -LM     Pretreatment Heart Rate (beats/min) 78  -LM     Posttreatment Heart Rate (beats/min) 74  -LM     Pre SpO2 (%) 92  -LM     O2 Delivery Pre Treatment room air  -LM     Post SpO2 (%) 96  -LM     O2 Delivery Post Treatment room air  -LM     Pre Patient Position Sitting  -LM     Post Patient Position Supine  -LM     Row Name 05/28/23 1406          Positioning and Restraints    Pre-Treatment Position sitting in chair/recliner  -LM     Post Treatment Position bed  -LM     In Bed supine;call light within reach;encouraged to call for assist;exit alarm on;heels elevated;notified nsg  -LM           User Key  (r) = Recorded By, (t) = Taken By, (c) = Cosigned By    Initials Name Provider Type    Janelle Doan PT Physical Therapist               Outcome Measures     Row Name 05/28/23 1404          How much help from another person do you currently need...    Turning from your back to your side while in flat bed without using bedrails? 3  -LM     Moving from lying on back to sitting on the side of a flat bed  without bedrails? 2  -LM     Moving to and from a bed to a chair (including a wheelchair)? 3  -LM     Standing up from a chair using your arms (e.g., wheelchair, bedside chair)? 3  -LM     Climbing 3-5 steps with a railing? 2  -LM     To walk in hospital room? 3  -LM     AM-PAC 6 Clicks Score (PT) 16  -LM     Highest level of mobility 5 --> Static standing  -LM     Row Name 05/28/23 1406 05/28/23 1251       Functional Assessment    Outcome Measure Options AM-PAC 6 Clicks Basic Mobility (PT)  -LM AM-PAC 6 Clicks Daily Activity (OT)  -          User Key  (r) = Recorded By, (t) = Taken By, (c) = Cosigned By    Initials Name Provider Type    JR Pat Presley, OT Occupational Therapist    Janelle Doan, PT Physical Therapist                             Physical Therapy Education     Title: PT OT SLP Therapies (In Progress)     Topic: Physical Therapy (In Progress)     Point: Mobility training (Done)     Learning Progress Summary           Patient Acceptance, E, VU,NR by  at 5/27/2023 1521                   Point: Home exercise program (Not Started)     Learner Progress:  Not documented in this visit.          Point: Precautions (Done)     Learning Progress Summary           Patient Acceptance, E, VU,NR by  at 5/27/2023 1521                               User Key     Initials Effective Dates Name Provider Type Discipline     06/16/21 -  Janelle Cornelius, PT Physical Therapist PT              PT Recommendation and Plan  Planned Therapy Interventions (PT): balance training, bed mobility training, gait training, home exercise program, motor coordination training, neuromuscular re-education, patient/family education, postural re-education, ROM (range of motion), strengthening, stretching, transfer training, vestibular therapy  Plan of Care Reviewed With: patient  Progress: improving  Outcome Evaluation: Pt with significant improvement since yesterday.  Epley Manuever with head turned towards the R completed for  the second time.  Pt tolerated well.  Nystagmus much improved from yesterday, but still there mildly.  Pt stood with CGA and increased gait distance to 20 feet using rw with MinAx1.  Pt still unsteady and fatigues very quickly.  Continue to recommend inpt rehab at d/c.     Time Calculation:    PT Charges     Row Name 05/28/23 1407             Time Calculation    Start Time 1310  -LM      PT Received On 05/28/23  -LM      PT Goal Re-Cert Due Date 06/06/23  -LM         Timed Charges    53640 - PT Therapeutic Exercise Minutes 10  -LM      11271 - Gait Training Minutes  14  -LM         Untimed Charges    PT Canalith Repositioning 18  -LM         Total Minutes    Timed Charges Total Minutes 24  -LM      Untimed Charges Total Minutes 18  -LM       Total Minutes 42  -LM            User Key  (r) = Recorded By, (t) = Taken By, (c) = Cosigned By    Initials Name Provider Type    LM Janelle Cornelius, PT Physical Therapist              Therapy Charges for Today     Code Description Service Date Service Provider Modifiers Qty    57559954428 HC PT EVAL MOD COMPLEXITY 4 5/27/2023 Janelle Cornelius, PT GP 1    97444281147 HC PT CANALITH REPOSITIONING PER DAY 5/27/2023 Janelle Cornelius, PT GP 1    90662054161 HC GAIT TRAINING EA 15 MIN 5/28/2023 Janelle Cornelius, PT GP 1    59043295601 HC PT THER PROC EA 15 MIN 5/28/2023 Janelle Cornelius, PT GP 1    68029834660 HC PT CANALITH REPOSITIONING PER DAY 5/28/2023 Janelle Cornelius, PT GP 1          PT G-Codes  Outcome Measure Options: AM-PAC 6 Clicks Basic Mobility (PT)  AM-PAC 6 Clicks Score (PT): 16  AM-PAC 6 Clicks Score (OT): 15  PT Discharge Summary  Anticipated Discharge Disposition (PT): inpatient rehabilitation facility    Janelle Cornelius PT  5/28/2023

## 2023-05-29 ENCOUNTER — READMISSION MANAGEMENT (OUTPATIENT)
Dept: CALL CENTER | Facility: HOSPITAL | Age: 78
End: 2023-05-29

## 2023-05-29 VITALS
OXYGEN SATURATION: 97 % | SYSTOLIC BLOOD PRESSURE: 116 MMHG | RESPIRATION RATE: 18 BRPM | WEIGHT: 203 LBS | DIASTOLIC BLOOD PRESSURE: 54 MMHG | TEMPERATURE: 97.7 F | HEART RATE: 71 BPM | HEIGHT: 64 IN | BODY MASS INDEX: 34.66 KG/M2

## 2023-05-29 LAB
GLUCOSE BLDC GLUCOMTR-MCNC: 112 MG/DL (ref 70–130)
GLUCOSE BLDC GLUCOMTR-MCNC: 115 MG/DL (ref 70–130)

## 2023-05-29 PROCEDURE — 99239 HOSP IP/OBS DSCHRG MGMT >30: CPT | Performed by: NURSE PRACTITIONER

## 2023-05-29 PROCEDURE — G0378 HOSPITAL OBSERVATION PER HR: HCPCS

## 2023-05-29 PROCEDURE — 82948 REAGENT STRIP/BLOOD GLUCOSE: CPT

## 2023-05-29 RX ORDER — SCOLOPAMINE TRANSDERMAL SYSTEM 1 MG/1
1 PATCH, EXTENDED RELEASE TRANSDERMAL
Qty: 5 EACH | Refills: 0 | Status: SHIPPED | OUTPATIENT
Start: 2023-05-29

## 2023-05-29 RX ORDER — SCOLOPAMINE TRANSDERMAL SYSTEM 1 MG/1
1 PATCH, EXTENDED RELEASE TRANSDERMAL
Qty: 5 EACH | Refills: 0 | Status: SHIPPED | OUTPATIENT
Start: 2023-05-29 | End: 2023-05-29 | Stop reason: SDUPTHER

## 2023-05-29 RX ADMIN — SCOPOLAMINE 1 PATCH: 1.5 PATCH, EXTENDED RELEASE TRANSDERMAL at 10:02

## 2023-05-29 RX ADMIN — CILOSTAZOL 100 MG: 50 TABLET ORAL at 09:53

## 2023-05-29 RX ADMIN — PANTOPRAZOLE SODIUM 40 MG: 40 TABLET, DELAYED RELEASE ORAL at 06:31

## 2023-05-29 RX ADMIN — DOCUSATE SODIUM 50 MG AND SENNOSIDES 8.6 MG 2 TABLET: 8.6; 5 TABLET, FILM COATED ORAL at 09:58

## 2023-05-29 RX ADMIN — Medication 81 MG: at 09:57

## 2023-05-29 RX ADMIN — METOPROLOL TARTRATE 25 MG: 25 TABLET, FILM COATED ORAL at 09:53

## 2023-05-29 RX ADMIN — Medication 10 ML: at 09:58

## 2023-05-29 NOTE — PLAN OF CARE
Goal Outcome Evaluation:  Plan of Care Reviewed With: patient    Progress: no change  Outcome Evaluation: Patient's VSS. NSR on tele. Placed on 2L nasal cannula. Patient ambulated to C a few times, complained of increased dizziness so a purewick was place per patient request. No complaints of nausea overnight. Continue plan of care.

## 2023-05-29 NOTE — OUTREACH NOTE
Prep Survey    Flowsheet Row Responses   [facility] patient discharged from? [facility]   Is LACE score < 7 ? No   Eligibility [facility]   Date of Admission 05/25/23   Date of Discharge 05/29/23   Discharge Disposition Home-Health Care Sv   Discharge diagnosis BPV (benign positional vertigo)   Does the patient have one of the following disease processes/diagnoses(primary or secondary)? Other   Does the patient have Home health ordered? Yes   What is the Home health agency?  ?   Is there a DME ordered? Yes   What DME was ordered? [facility]   Prep survey completed? Yes          Linda DANIELS - Registered Nurse

## 2023-05-29 NOTE — DISCHARGE SUMMARY
[facility]  DISCHARGE SUMMARY    Patient Name: Lata Justice  : 1945  MRN: [id number]    Date of Admission: 2023  6:35 PM  Date of Discharge:  2023  Primary Care Physician: Annetta Mcclendon MD    Consults     Date and Time Order Name Status Description    2023  2:18 AM Inpatient Neurology Consult Other (see comments) Completed           Hospital Course     Presenting Problem: Vertigo    Active Hospital Problems    Diagnosis  POA   • **BPV (benign positional vertigo) [H81.10]  Yes      Resolved Hospital Problems   No resolved problems to display.          Hospital Course:  Lata Justice is a 77 y.o. female admitted with benign positional vertigo.  Neurology is consulted. Imaging negative and no infectious etiology. PT has followed for Epley maneuver with improvement.      Benign positional vertigo  -Neurology consult  -Continue scopolamine patch as needed at dc  -CTA head and neck and CTP neg for intracranial abnormality or flow-limiting stenosis. MRI of head showed no ischemic or hemorrhagic events, MRI Internal Auditory Canal W/Wo contrast showed normal brain and internal auditory canals. No evidence of ischemia, hemorrhage or mass enhancement. Inner ear structures appear normal. No evidence of cerebellopontine angle mass.   -PT evaluation with Epley maneuver completed twice with improvement. Walking better and no further N/V     Hypertension/Hyperlipidemia  --pravastatin, metoprolol, losartan, HCTZ     COPD  --inhaler     Diabetes mellitus type 2  --A1C 7.4  --resume home meds     Chronic low back pain  General weakness  Functional decline  -PT recs inpatient rehab, but patient declining with improvement after Epley Maneuver and would like  PT.OT and go home today  -walker ordered at DC      Discharge Follow Up Recommendations for outpatient labs/diagnostics:   PCP follow up one week  ENT follow up 1-2 weeks/ next available  HH PT/OT  ordered    Day of Discharge     HPI:   Patient feeling much better. Wants to go home. Not interested in rehab, but would like  services and a walker for safety. No dizziness currently    Review of Systems  Gen- No fevers, chills  CV- No chest pain, palpitations  Resp- No cough, dyspnea  GI- No N/V/D, abd pain        Vital Signs:   Temp:  [97.6 °F (36.4 °C)-98.9 °F (37.2 °C)] 97.6 °F (36.4 °C)  Heart Rate:  [62-81] 62  Resp:  [16-18] 16  BP: (122-141)/(54-84) 141/79  Flow (L/min):  [2] 2      Physical Exam:  Constitutional: No acute distress, awake, alert  HENT: NCAT, mucous membranes moist  Respiratory: Clear to auscultation bilaterally, respiratory effort normal   Cardiovascular: RRR, no murmurs, rubs, or gallops  Gastrointestinal: Positive bowel sounds, soft, nontender, nondistended  Musculoskeletal: No bilateral ankle edema  Psychiatric: Appropriate affect, cooperative  Neurologic: Oriented x 3, strength symmetric in all extremities, Cranial Nerves grossly intact to confrontation, speech clear  Skin: No rashes      Pertinent  and/or Most Recent Results     LAB RESULTS:      Lab 05/26/23 0437 05/25/23  1851   WBC 6.18 8.01   HEMOGLOBIN 12.0 12.6   HEMATOCRIT 36.6 37.6   PLATELETS 177 197   NEUTROS ABS 4.37 6.49   IMMATURE GRANS (ABS) 0.02 0.03   LYMPHS ABS 1.24 0.93   MONOS ABS 0.46 0.43   EOS ABS 0.06 0.10   MCV 89.3 87.6         Lab 05/26/23  0437 05/25/23  1851   SODIUM 141 143   POTASSIUM 3.7 3.9   CHLORIDE 104 108*   CO2 27.0 24.0   ANION GAP 10.0 11.0   BUN 16 22   CREATININE 0.67 0.86   EGFR 90.2 69.7   GLUCOSE 128* 133*   CALCIUM 8.6 9.0   MAGNESIUM 1.6 1.5*   HEMOGLOBIN A1C 7.40*  --    TSH  --  1.910         Lab 05/26/23  0437 05/25/23  1851   TOTAL PROTEIN 5.6* 6.4   ALBUMIN 3.7 3.8   GLOBULIN 1.9 2.6   ALT (SGPT) 10 12   AST (SGOT) 13 17   BILIRUBIN 0.4 0.4   ALK PHOS 55 63         Lab 05/25/23  1851   HSTROP T 24*         Lab 05/26/23  0437   CHOLESTEROL 142   LDL CHOL 87   HDL CHOL 34*    TRIGLYCERIDES 117             Brief Urine Lab Results  (Last result in the past 365 days)      Color   Clarity   Blood   Leuk Est   Nitrite   Protein   CREAT   Urine HCG        05/25/23 2112 Yellow   Clear   Negative   Negative   Negative   Trace               Microbiology Results (last 10 days)     ** No results found for the last 240 hours. **          CT Angiogram Neck    Result Date: 5/25/2023  CT ANGIOGRAM NECK, CT ANGIOGRAM HEAD W AI ANALYSIS OF LVO Date of Exam: 5/25/2023 8:34 PM EDT Indication: Stroke, follow up. Comparison: MRI brain from earlier today Technique: CTA of the head and neck was performed before and after the uneventful intravenous administration of 75 mL Isovue-370. Reconstructed coronal and sagittal images were also obtained. In addition, a 3-D volume rendered image was created for interpretation. Automated exposure control and iterative reconstruction methods were used. Findings: There is a three-vessel aortic arch. The right common carotid artery is widely patent. There is atherosclerotic plaque along the right carotid bifurcation that is not hemodynamically significant. The right internal carotid artery is widely patent. The left common carotid artery is widely patent. There is mild plaque at the left carotid bifurcation that is not hemodynamically significant. The left internal carotid artery is widely patent. Both vertebral arteries are widely patent. The right vertebral artery is dominant. The bilateral middle cerebral, bilateral anterior cerebral, and anterior communicating arteries are widely patent. The basilar and bilateral posterior cerebral arteries are widely patent. There are patent bilateral posterior communicating arteries. No intracranial aneurysm is identified. The visualized portions of the bilateral superior cerebellar, anteroinferior cerebellar, and posterior inferior cerebellar arteries are unremarkable.     Impression: Major arterial vasculature within head and neck  appears widely patent, with no hemodynamically significant stenosis, dissection, thrombus, or aneurysm. Electronically Signed: Esequiel Miller  5/25/2023 9:02 PM EDT  Workstation ID: [id number]    XR Chest 1 View    Result Date: 5/25/2023  XR CHEST 1 VW Date of Exam: 5/25/2023 6:49 PM EDT Indication: Weak/Dizzy/AMS triage protocol Comparison: October 9, 2017 FINDINGS: No definite focal or diffuse pulmonary infiltrate is identified.  No pneumothorax or significant pleural effusion. Mediastinal contour appears grossly normal and unchanged. Heart size appears at the upper limits of normal.     1.Heart size appears at the upper limits of normal. 2.No radiographic findings of acute pulmonary abnormality. Electronically Signed: Dale Lotus  5/25/2023 7:14 PM EDT  Workstation ID: [id number]    MRI Internal Auditory Canal With Wo    Result Date: 5/26/2023  EXAMINATION: MRI INTERNAL AUDITORY CANAL W WO  INDICATION: Vertigo TECHNIQUE: Multiplanar multisequence MRI of the internal auditory canals performed with and without IV contrast. 17 mL MultiHance administered IV. COMPARISON: 5/25/2023 FINDINGS: No acute infarct is present on diffusion weighted sequences. Midline structures are normal and the craniocervical junction is satisfactory in appearance. Age-related changes are present with mild volume loss and typical periventricular leukomalacia. There is otherwise no evidence of intracranial hemorrhage, mass or mass effect. There is no abnormal brain parenchymal enhancement. Dedicated evaluation of the internal auditory canals demonstrates normal appearance of the cisternal and canalicular segments 7th and 8th cranial nerves bilaterally. The cisternal and Meckel's cave segment trigeminal nerves also appear normal. There is no evidence of cerebellopontine angle mass or abnormal enhancement. The inner ear structures including bilateral cochlea and semicircular canals appear normal.     Essentially normal contrast-enhanced MRI of  the brain and internal auditory canals as above. There is no evidence of acute ischemia, hemorrhage, mass or abnormal enhancement. There is no evidence of cerebellopontine angle mass. Inner ear structures appear normal bilaterally.  Electronically Signed: Nathaniel Hendricks  5/26/2023 9:14 PM EDT  Workstation ID: [id number]    CT Angiogram Head w AI Analysis of LVO    Result Date: 5/25/2023  CT ANGIOGRAM NECK, CT ANGIOGRAM HEAD W AI ANALYSIS OF LVO Date of Exam: 5/25/2023 8:34 PM EDT Indication: Stroke, follow up. Comparison: MRI brain from earlier today Technique: CTA of the head and neck was performed before and after the uneventful intravenous administration of 75 mL Isovue-370. Reconstructed coronal and sagittal images were also obtained. In addition, a 3-D volume rendered image was created for interpretation. Automated exposure control and iterative reconstruction methods were used. Findings: There is a three-vessel aortic arch. The right common carotid artery is widely patent. There is atherosclerotic plaque along the right carotid bifurcation that is not hemodynamically significant. The right internal carotid artery is widely patent. The left common carotid artery is widely patent. There is mild plaque at the left carotid bifurcation that is not hemodynamically significant. The left internal carotid artery is widely patent. Both vertebral arteries are widely patent. The right vertebral artery is dominant. The bilateral middle cerebral, bilateral anterior cerebral, and anterior communicating arteries are widely patent. The basilar and bilateral posterior cerebral arteries are widely patent. There are patent bilateral posterior communicating arteries. No intracranial aneurysm is identified. The visualized portions of the bilateral superior cerebellar, anteroinferior cerebellar, and posterior inferior cerebellar arteries are unremarkable.     Impression: Major arterial vasculature within head and neck appears widely  patent, with no hemodynamically significant stenosis, dissection, thrombus, or aneurysm. Electronically Signed: Esequiel Paul  5/25/2023 9:02 PM EDT  Workstation ID: [id number]    MRI Brain WO CON Hyper Acute Stroke Protocol    Result Date: 5/25/2023  MRI BRAIN WO CON HYPER ACUTE STROKE PROTOCOL Date of Exam: 5/25/2023 7:59 PM EDT Indication: vertigo.  Comparison: None available. Technique: Limited multisequence MR images of the brain were obtained without contrast administration. Findings: No midline shift. No significant intracranial hemorrhage is identified. Basal cisterns appear to be patent. There is mild periventricular and scattered foci of hyperintense signal on FLAIR images in the cerebral white matter. No significant mass or focal  edema is identified. No definite focal restricted diffusion is identified at this time.     Impression: 1.No definite findings of acute ischemia or hemorrhage at this time. 2.Hyperintense signal foci in the cerebral white matter on FLAIR images, nonspecific, but most commonly seen with mild chronic small vessel ischemic changes. Electronically Signed: Dale Lotus  5/25/2023 8:13 PM EDT  Workstation ID: [id number]    CT CEREBRAL PERFUSION WITH & WITHOUT CONTRAST    Result Date: 5/25/2023  CT CEREBRAL PERFUSION W WO CONTRAST Date of Exam: 5/25/2023 8:34 PM EDT Indication: Neuro deficit, acute, stroke suspected.  Comparison: MRI brain from earlier today Technique: Axial CT images of the brain were obtained prior to and after the administration of 50 mL Isovue-370. Core blood volume, core blood flow, mean transit time, and Tmax images were obtained utilizing the Rapid software protocol. A limited CT angiogram of the head was also performed to measure the blood vessel density. The radiation dose reduction device was turned on for each scan per the ALARA (As Low as Reasonably Achievable) protocol. Findings: The intracranial cerebral perfusion appears normal.     Impression: Examination  "appears within normal limits. Electronically Signed: Esequiel Miller  5/25/2023 8:54 PM EDT  Workstation ID: [id number]                  Plan for Follow-up of Pending Labs/Results:     Discharge Details        Discharge Medications      New Medications      Instructions Start Date   Scopolamine 1 MG/3DAYS patch   1 patch, Transdermal, Every 72 Hours PRN         Continue These Medications      Instructions Start Date   albuterol sulfate  (90 Base) MCG/ACT inhaler  Commonly known as: PROVENTIL HFA;VENTOLIN HFA;PROAIR HFA   2 puffs, Inhalation, Every 6 Hours PRN      allopurinol 100 MG tablet  Commonly known as: Zyloprim   100 mg, Oral, Daily, To lower uric acid, gout risk      aspirin 81 MG EC tablet  Commonly known as: Aspirin Low Dose   81 mg, Oral, Daily      cetirizine 10 MG tablet  Commonly known as: zyrTEC   10 mg, Oral, Daily PRN      cilostazol 100 MG tablet  Commonly known as: PLETAL   100 mg, Oral, 2 Times Daily, For poor circulation      esomeprazole 40 MG capsule  Commonly known as: nexIUM   40 mg, Oral, Daily, For Adkins's esophagus      FLUoxetine 20 MG capsule  Commonly known as: PROzac   60 mg, Oral, Daily      hydroCHLOROthiazide 25 MG tablet  Commonly known as: HYDRODIURIL   25 mg, Oral, Daily      losartan 100 MG tablet  Commonly known as: COZAAR   100 mg, Oral, Nightly      meloxicam 7.5 MG tablet  Commonly known as: MOBIC   7.5 mg, Oral, Daily, Take with food. For osteoarthritis pain.      metoprolol tartrate 25 MG tablet  Commonly known as: LOPRESSOR   25 mg, Oral, 2 Times Daily      pravastatin 40 MG tablet  Commonly known as: PRAVACHOL   40 mg, Oral, Nightly, To lower cholesterol and stroke risk      Vitamin D3 50 MCG (2000 UT) capsule   2,000 Units, Oral, Daily, For low vitamin D.         Stop These Medications    l-methylfolate 15 MG tablet tablet            Allergies   Allergen Reactions   • Fluticasone Propionate Other (See Comments)     UNKNOWN - Patient reported \"I don't remember\" " "  • Furosemide Nausea And Vomiting   • Sertraline Hcl Nausea Only   • Acetaminophen Nausea Only     Patient reported she thinks Tylenol caused nausea      • Ezetimibe Other (See Comments)     UNKNOWN     • Loratadine Other (See Comments)     UNKNOWN - Patient reported unsure reaction type but that she was unable to take this     • Mometasone Furoate Other (See Comments)     UNKNOWN - Patient reported \"I don't remember\"     • Propoxyphene Other (See Comments)     UNKNOWN - Patient reported \"I don't remember\"      • Triamcinolone Acetonide Other (See Comments)     UNKNOWN - Patient reported \"I don't remember\"\"           Discharge Disposition:  Home or Self Care    Diet:  Hospital:  Diet Order   Procedures   • Diet: Cardiac Diets; Healthy Heart (2-3 Na+); Texture: Regular Texture (IDDSI 7); Fluid Consistency: Thin (IDDSI 0)       Activity:  Activity Instructions     Activity as Tolerated            Restrictions or Other Recommendations:         CODE STATUS:    Code Status and Medical Interventions:   Ordered at: 05/25/23 0666     Level Of Support Discussed With:    Patient     Code Status (Patient has no pulse and is not breathing):    CPR (Attempt to Resuscitate)     Medical Interventions (Patient has pulse or is breathing):    Full Support       Future Appointments   Date Time Provider Department Center   7/17/2023  3:00 PM [facility] 2 [facility]   11/16/2023  8:30 AM Annetta Mcclendon MD [facility]       Additional Instructions for the Follow-ups that You Need to Schedule     Ambulatory Referral to Home Health   As directed      Face to Face Visit Date: 5/26/2023    Follow-up provider for Plan of Care?: I treated the patient in an acute care facility and will not continue treatment after discharge.    Follow-up provider: ANNETTA MCCLENDON [[id number]]    Reason/Clinical Findings: S/P hospital Stay    Describe mobility limitations that make leaving home difficult: Impaired gait, balance, endurance, and " mobility    Nursing/Therapeutic Services Requested: Skilled Nursing Physical Therapy Occupational Therapy    Skilled nursing orders: COPD management    PT orders: Gait Training Transfer training Therapeutic exercise Strengthening    Weight Bearing Status: As Tolerated    Occupational orders: Strengthening Energy conservation Activities of daily living Home safety assessment    Frequency: 1 Week 1         Discharge Follow-up with PCP   As directed       Currently Documented PCP:    Annetta Mcclendon MD    PCP Phone Number:    [phone]     Follow Up Details: 1 week         Discharge Follow-up with Specified Provider: ent- next available; 2 Weeks   As directed      To: ent- next available    Follow Up: 2 Weeks                     FELIX Roach  05/29/23      Time Spent on Discharge:  I spent 36 minutes on this discharge activity which included: face-to-face encounter with the patient, reviewing the data in the system, coordination of the care with the nursing staff as well as consultants, documentation, and entering orders.        Electronically signed by FELIX Roach, 05/29/23, 9:31 AM EDT.

## 2023-05-29 NOTE — DISCHARGE PLACEMENT REQUEST
"Case Management  [id number] Justice (77 y.o. Female)     Date of Birth   1945    Social Security Number       Address   [address] [place], [place] [address]    Home Phone   [phone]    MRN   [id number]       Yazidism   Evangelical    Marital Status                               Admission Date   5/25/23    Admission Type   Emergency    Admitting Provider   Lucie Guillaume MD    Attending Provider   Lucie Guillaume MD    Department, Room/Bed   [facility] 6B, N634/1       Discharge Date       Discharge Disposition   Home or Self Care    Discharge Destination                               Attending Provider: Lucie Guillaume MD    Allergies: Fluticasone Propionate, Furosemide, Sertraline Hcl, Acetaminophen, Ezetimibe, Loratadine, Mometasone Furoate, Propoxyphene, Triamcinolone Acetonide    Isolation: None   Infection: None   Code Status: CPR    Ht: 162.6 cm (64\")   Wt: 92.1 kg (203 lb)    Admission Cmt: None   Principal Problem: BPV (benign positional vertigo) [H81.10]                 Active Insurance as of 5/25/2023     Primary Coverage     Payor Plan Insurance Group Employer/Plan Group    ANTHEM MEDICARE REPLACEMENT ANTHEM MEDICARE ADVANTAGE [id number]     Payor Plan Address Payor Plan Phone Number Payor Plan Fax Number Effective Dates    [address] [phone]  1/1/2023 - None Entered    [facility] [address]       Subscriber Name Subscriber Birth Date Member ID       CHAZ JUSTICE 1945 [id number]                 Emergency Contacts      (Rel.) Home Phone Work Phone Mobile Phone    Makenzie Starkey (Daughter) [phone] -- --    ASHLEY,ALEXANDR (Son) [phone] -- --    ASHLEYSTEVEN (Relative) [phone] -- --         [address]  [address] [facility] [address]  Phone:  [phone]  Fax:  [phone] Date: May 26, 2023      Ambulatory Referral to Home Health     Patient:  Chaz Justice MRN:  [id number]   [address]   [place] [address] :  1945  SSN:    Phone: [phone] Sex:  F      INSURANCE PAYOR PLAN GROUP # SUBSCRIBER ID   Primary:    ANTHEM MEDICARE REPLACEMENT [id number]      Referring Provider Information:  IVETTE FLORES Phone: [phone] Fax: [phone]       Referral Information:   # Visits:  999 Referral Type: Home Health [42]   Urgency:  Routine Referral Reason: Specialty Services Required   Start Date: May 26, 2023 End Date:  To be determined by Insurer   Diagnosis: Vertigo (R42 [ICD-10-CM] 780.4 [ICD-9-CM])  Intractable vomiting (R11.10 [ICD-10-CM] 536.2 [ICD-9-CM])  Essential hypertension (I10 [ICD-10-CM] 401.9 [ICD-9-CM])  Recurrent major depressive disorder, in partial remission (F33.41 [ICD-10-CM] 296.35 [ICD-9-CM])  Impaired glucose tolerance (R73.02 [ICD-10-CM] 790.22 [ICD-9-CM])  Mixed hyperlipidemia (E78.2 [ICD-10-CM] 272.2 [ICD-9-CM])      Refer to Dept:   Refer to Provider:   Refer to Provider Phone:   Refer to Facility:       Face to Face Visit Date: 2023  Follow-up provider for Plan of Care? I treated the patient in an acute care facility and will not continue treatment after discharge.  Follow-up provider: DELONTE HOROWITZ [[id number]]  Reason/Clinical Findings: S/P hospital Stay  Describe mobility limitations that make leaving home difficult: Impaired gait, balance, endurance, and mobility  Nursing/Therapeutic Services Requested: Skilled Nursing  Nursing/Therapeutic Services Requested: Physical Therapy  Nursing/Therapeutic Services Requested: Occupational Therapy  Skilled nursing orders: COPD management  PT orders: Gait Training  PT orders: Transfer training  PT orders: Therapeutic exercise  PT orders: Strengthening  Weight Bearing Status: As Tolerated  Occupational orders: Strengthening  Occupational orders: Energy conservation  Occupational orders: Activities of daily living  Occupational orders: Home safety assessment  Frequency: 1  Week 1     This document serves as a request of services and does not constitute Insurance authorization or approval of services.  To determine eligibility, please contact the members Insurance carrier to verify and review coverage.     If you have medical questions regarding this request for services. Please contact [address] at [phone] during normal business hours.        Verbal Order Mode: Verbal with readback   Authorizing Provider: Lucie Guillaume MD  Authorizing Provider's NPI: [id number]     Order Entered By: Tez Stevens RN 2023  4:33 PM     Electronically signed by: Lucie Guillaume MD 2023  2:17 PM              Physical Therapy Notes (most recent note)      Janelle Cornelius, PT at 23 1310  Version 1 of 1         Patient Name: Lata Justice  : 1945    MRN: [id number]                              Today's Date: 2023       Admit Date: 2023    Visit Dx:     ICD-10-CM ICD-9-CM   1. Vertigo  R42 780.4   2. Intractable vomiting  R11.10 536.2   3. Essential hypertension  I10 401.9   4. Recurrent major depressive disorder, in partial remission  F33.41 296.35   5. Impaired glucose tolerance  R73.02 790.22   6. Mixed hyperlipidemia  E78.2 272.2     Patient Active Problem List   Diagnosis   • Adkins's esophagus   • Essential hypertension   • Chronic low back pain   • Chronic obstructive pulmonary disease   • Recurrent major depressive disorder, in partial remission   • Impaired glucose tolerance   • Mixed hyperlipidemia   • Seasonal allergic rhinitis   • Bilateral chronic knee pain   • Bilateral edema of lower extremity   • Chronic pain of both ankles   • Primary osteoarthritis of both knees   • Status post right knee replacement   • Cyanocobalamin deficiency   • Primary osteoarthritis involving multiple joints   • Intermittent claudication   • PVD (peripheral vascular disease)   • BPV (benign positional vertigo)     Past Medical History:   Diagnosis Date  "  • Abnormal finding     STATED SHE \"WOKE\" UP DURING AN EGD, BREAST BIOPSY, BACK SURGERY   • Acute colitis    • Allergic    • Anxiety    • Arthritis    • Bladder leak 11/03/2021   • Body piercing     EARS   • Cataracts, bilateral    • Constipation    • COPD (chronic obstructive pulmonary disease)    • Depression    • Depression    • Diabetes mellitus     Patient reported \"borderline\" and that she has been on medication in the past   • Elevated cholesterol    • Full dentures     Advised no adhesives DOS   • GERD (gastroesophageal reflux disease)    • Glaucoma    • High cholesterol    • History of bronchitis    • History of nuclear stress test     Patient reported \"a long time ago - more than 5 years\" and reported wnl's at that time   • History of pneumonia    • Hx of colonic polyps    • Hypertension    • Injury of back    • Low back pain    • Obesity    • Osteoarthritis    • PONV (postoperative nausea and vomiting) 11/03/2021   • Poor historian    • Wears glasses    • Wears glasses      Past Surgical History:   Procedure Laterality Date   • BACK SURGERY      Lower back X2   • CARPAL TUNNEL RELEASE Right 11/4/2021    Procedure: CARPAL TUNNEL RELEASE ENDOSCOPIC RIGHT;  Surgeon: Jefry Ceron MD;  Location: [facility];  Service: Orthopedics;  Laterality: Right;   • CATARACT EXTRACTION W/ INTRAOCULAR LENS IMPLANT Left 3/2/2020    Procedure: CATARACT PHACO EXTRACTION WITH INTRAOCULAR LENS IMPLANT LEFT;  Surgeon: Alfredo Patiño MD;  Location: [facility];  Service: Ophthalmology;  Laterality: Left;   • CATARACT EXTRACTION W/ INTRAOCULAR LENS IMPLANT Right 3/16/2020    Procedure: CATARACT PHACO EXTRACTION WITH INTRAOCULAR LENS IMPLANT;  Surgeon: Alfredo Patiño MD;  Location: [facility];  Service: Ophthalmology;  Laterality: Right;   • CHOLECYSTECTOMY     • COLONOSCOPY     • DILATION AND CURETTAGE, DIAGNOSTIC / THERAPEUTIC     • ENDOSCOPY      With dilation   • HAND SURGERY  11/04/2021   • HIATAL HERNIA REPAIR  "    • HYSTERECTOMY      Total Hysterectomy   • MOUTH SURGERY      Full mouth extraction   • [place] ARTHRP KNE CONDYLE&PLATU MEDIAL&LAT COMPARTMENTS Right 10/18/2017    Procedure:  TOTAL KNEE ARTHROPLASTY RIGHT ELECTIVE ;  Surgeon: Doc Mixon MD;  Location: [facility];  Service: Orthopedics   • TUBAL ABDOMINAL LIGATION        General Information     Row Name 05/28/23 1356          Physical Therapy Time and Intention    Document Type therapy note (daily note)  -LM     Mode of Treatment individual therapy;physical therapy  -LM     Row Name 05/28/23 1358          General Information    Patient Profile Reviewed yes  -LM     Existing Precautions/Restrictions fall;other (see comments)  BPPV  -LM     Row Name 05/28/23 1351          Cognition    Orientation Status (Cognition) oriented x 4  -LM     Row Name 05/28/23 1357          Safety Issues, Functional Mobility    Safety Issues Affecting Function (Mobility) awareness of need for assistance;insight into deficits/self-awareness;problem-solving;safety precaution awareness;safety precautions follow-through/compliance;sequencing abilities  -LM     Impairments Affecting Function (Mobility) balance;endurance/activity tolerance;grasp;strength;shortness of breath;postural/trunk control;pain  -LM     Comment, Safety Issues/Impairments (Mobility) BPPV  -LM           User Key  (r) = Recorded By, (t) = Taken By, (c) = Cosigned By    Initials Name Provider Type    LM Janelle Cornelius PT Physical Therapist               Mobility     Row Name 05/28/23 3943          Bed Mobility    Supine-Sit [place] (Bed Mobility) moderate assist (50% patient effort);1 person assist;verbal cues  -LM     Sit-Supine [place] (Bed Mobility) moderate assist (50% patient effort);1 person assist;verbal cues  -LM     Assistive Device (Bed Mobility) bed rails;head of bed elevated  -LM     Comment, (Bed Mobility) Epley Manuever completed with head turned towards the R for the 2nd time.  Pt tolerated well.   Nystagmus much improved from yesterday, but still mildly there.  -LM     Row Name 05/28/23 1358          Sit-Stand Transfer    Sit-Stand [place] (Transfers) contact guard;1 person assist;verbal cues  -LM     Assistive Device (Sit-Stand Transfers) walker, front-wheeled  -LM     Comment, (Sit-Stand Transfer) Stood x 2 reps - first from recliner; second from EOB.  Vc's for hand placement.  -LM     Row Name 05/28/23 1353          Gait/Stairs (Locomotion)    [place] Level (Gait) minimum assist (75% patient effort);1 person assist;verbal cues  -LM     Assistive Device (Gait) walker, front-wheeled  -LM     Distance in Feet (Gait) 20  -LM     Deviations/Abnormal Patterns (Gait) catrachito decreased;gait speed decreased;stride length decreased  -LM     Bilateral Gait Deviations forward flexed posture;heel strike decreased  -LM     Comment, (Gait/Stairs) Pt fatigues very quickly.  Pt kept trying to rest her R forearm on the handle of the walker - educated pt not to do this for safety reasons.  Pt still unsteady throughout gait.  -LM           User Key  (r) = Recorded By, (t) = Taken By, (c) = Cosigned By    Initials Name Provider Type    LM Janelle Cornelius, LAUREN Physical Therapist               Obj/Interventions     Row Name 05/28/23 1403          Motor Skills    Therapeutic Exercise hip;knee;ankle  -     Row Name 05/28/23 1403          Hip (Therapeutic Exercise)    Hip (Therapeutic Exercise) AROM (active range of motion);isometric exercises  -     Hip AROM (Therapeutic Exercise) bilateral;aBduction;aDduction;sitting;10 repetitions  -     Hip Isometrics (Therapeutic Exercise) bilateral;gluteal sets;sitting;10 repetitions  -     Row Name 05/28/23 1403          Knee (Therapeutic Exercise)    Knee (Therapeutic Exercise) AROM (active range of motion);isometric exercises  -     Knee AROM (Therapeutic Exercise) bilateral;SLR (straight leg raise);heel slides;sitting;10 repetitions  -     Knee Isometrics (Therapeutic  Exercise) bilateral;quad sets;sitting;10 repetitions  -LM     Row Name 05/28/23 1403          Ankle (Therapeutic Exercise)    Ankle (Therapeutic Exercise) AROM (active range of motion)  -LM     Ankle AROM (Therapeutic Exercise) bilateral;dorsiflexion;plantarflexion;sitting;10 repetitions  -LM     Row Name 05/28/23 1403          Balance    Static Sitting Balance contact guard  -LM     Position, Sitting Balance unsupported;sitting edge of bed  -LM     Static Standing Balance contact guard;1-person assist;verbal cues  -LM     Dynamic Standing Balance minimal assist;1-person assist;verbal cues  -LM     Position/Device Used, Standing Balance supported;walker, front-wheeled  -LM           User Key  (r) = Recorded By, (t) = Taken By, (c) = Cosigned By    Initials Name Provider Type    LM Janelle Cornelius, PT Physical Therapist               Goals/Plan    No documentation.                Clinical Impression     Row Name 05/28/23 1404          Pain    Pretreatment Pain Rating 0/10 - no pain  -LM     Posttreatment Pain Rating 0/10 - no pain  -LM     [facility] Name 05/28/23 1404          Plan of Care Review    Plan of Care Reviewed With patient  -LM     Progress improving  -     Outcome Evaluation Pt with significant improvement since yesterday.  Epley Manuever with head turned towards the R completed for the second time.  Pt tolerated well.  Nystagmus much improved from yesterday, but still there mildly.  Pt stood with CGA and increased gait distance to 20 feet using rw with MinAx1.  Pt still unsteady and fatigues very quickly.  Continue to recommend inpt rehab at d/c.  -     Row Name 05/28/23 1405          Vital Signs    Pre Systolic BP Rehab 149  -LM     Pre Treatment Diastolic BP 67  -LM     Pretreatment Heart Rate (beats/min) 78  -LM     Posttreatment Heart Rate (beats/min) 74  -LM     Pre SpO2 (%) 92  -LM     O2 Delivery Pre Treatment room air  -LM     Post SpO2 (%) 96  -LM     O2 Delivery Post Treatment room air  -LM     Pre  Patient Position Sitting  -LM     Post Patient Position Supine  -LM     Row Name 05/28/23 1404          Positioning and Restraints    Pre-Treatment Position sitting in chair/recliner  -LM     Post Treatment Position bed  -LM     In Bed supine;call light within reach;encouraged to call for assist;exit alarm on;heels elevated;notified nsg  -LM           User Key  (r) = Recorded By, (t) = Taken By, (c) = Cosigned By    Initials Name Provider Type    Janelle Doan, LAUREN Physical Therapist               Outcome Measures     Row Name 05/28/23 1406          How much help from another person do you currently need...    Turning from your back to your side while in flat bed without using bedrails? 3  -LM     Moving from lying on back to sitting on the side of a flat bed without bedrails? 2  -LM     Moving to and from a bed to a chair (including a wheelchair)? 3  -LM     Standing up from a chair using your arms (e.g., wheelchair, bedside chair)? 3  -LM     Climbing 3-5 steps with a railing? 2  -LM     To walk in hospital room? 3  -LM     AM-PAC 6 Clicks Score (PT) 16  -LM     Highest level of mobility 5 --> Static standing  -LM     Row Name 05/28/23 1406 05/28/23 1251       Functional Assessment    Outcome Measure Options AM-PAC 6 Clicks Basic Mobility (PT)  -LM AM-PAC 6 Clicks Daily Activity (OT)  -JR          User Key  (r) = Recorded By, (t) = Taken By, (c) = Cosigned By    Initials Name Provider Type    Pat Cross, OT Occupational Therapist    Janelle Doan, LAUREN Physical Therapist                             Physical Therapy Education     Title: PT OT SLP Therapies (In Progress)     Topic: Physical Therapy (In Progress)     Point: Mobility training (Done)     Learning Progress Summary           Patient Acceptance, E, VU,NR by OBIE at 5/27/2023 1521                   Point: Home exercise program (Not Started)     Learner Progress:  Not documented in this visit.          Point: Precautions (Done)     Learning  Progress Summary           Patient Acceptance, E, VU,NR by  at 5/27/2023 1521                               User Key     Initials Effective Dates Name Provider Type Discipline     06/16/21 -  Janelle Cornelius PT Physical Therapist PT              PT Recommendation and Plan  Planned Therapy Interventions (PT): balance training, bed mobility training, gait training, home exercise program, motor coordination training, neuromuscular re-education, patient/family education, postural re-education, ROM (range of motion), strengthening, stretching, transfer training, vestibular therapy  Plan of Care Reviewed With: patient  Progress: improving  Outcome Evaluation: Pt with significant improvement since yesterday.  Epley Manuever with head turned towards the R completed for the second time.  Pt tolerated well.  Nystagmus much improved from yesterday, but still there mildly.  Pt stood with CGA and increased gait distance to 20 feet using rw with MinAx1.  Pt still unsteady and fatigues very quickly.  Continue to recommend inpt rehab at d/c.     Time Calculation:    PT Charges     Row Name 05/28/23 1407             Time Calculation    Start Time 1310  -LM      PT Received On 05/28/23  -LM      PT Goal Re-Cert Due Date 06/06/23  -LM         Timed Charges    46385 - PT Therapeutic Exercise Minutes 10  -LM      74972 - Gait Training Minutes  14  -LM         Untimed Charges    PT Canalith Repositioning 18  -LM         Total Minutes    Timed Charges Total Minutes 24  -LM      Untimed Charges Total Minutes 18  -LM       Total Minutes 42  -LM            User Key  (r) = Recorded By, (t) = Taken By, (c) = Cosigned By    Initials Name Provider Type     Janelle Cornelius PT Physical Therapist              Therapy Charges for Today     Code Description Service Date Service Provider Modifiers Qty    73662235939 HC PT EVAL MOD COMPLEXITY 4 5/27/2023 Janelle Cornelius, PT GP 1    34329490257 HC PT CANALITH REPOSITIONING PER DAY 5/27/2023 Adryan  "Janelle, PT GP 1    37919142914 HC GAIT TRAINING EA 15 MIN 2023 Janelle Cornelius, PT GP 1    08763352536 HC PT THER PROC EA 15 MIN 2023 Janelle Cornelius, PT GP 1    40242576234 HC PT CANALITH REPOSITIONING PER DAY 2023 Janelle Cornelius, PT GP 1          PT G-Codes  Outcome Measure Options: AM-PAC 6 Clicks Basic Mobility (PT)  AM-PAC 6 Clicks Score (PT): 16  AM-PAC 6 Clicks Score (OT): 15  PT Discharge Summary  Anticipated Discharge Disposition (PT): inpatient rehabilitation facility    Janelle Cornelius PT  2023      Electronically signed by Janelle Cornelius, PT at 23 1408          Occupational Therapy Notes (most recent note)      Pat Presley, OT at 23 1045          Patient Name: Lata Justice  : 1945    MRN: [id number]                              Today's Date: 2023       Admit Date: 2023    Visit Dx:     ICD-10-CM ICD-9-CM   1. Vertigo  R42 780.4   2. Intractable vomiting  R11.10 536.2   3. Essential hypertension  I10 401.9   4. Recurrent major depressive disorder, in partial remission  F33.41 296.35   5. Impaired glucose tolerance  R73.02 790.22   6. Mixed hyperlipidemia  E78.2 272.2     Patient Active Problem List   Diagnosis   • Adkins's esophagus   • Essential hypertension   • Chronic low back pain   • Chronic obstructive pulmonary disease   • Recurrent major depressive disorder, in partial remission   • Impaired glucose tolerance   • Mixed hyperlipidemia   • Seasonal allergic rhinitis   • Bilateral chronic knee pain   • Bilateral edema of lower extremity   • Chronic pain of both ankles   • Primary osteoarthritis of both knees   • Status post right knee replacement   • Cyanocobalamin deficiency   • Primary osteoarthritis involving multiple joints   • Intermittent claudication   • PVD (peripheral vascular disease)   • BPV (benign positional vertigo)     Past Medical History:   Diagnosis Date   • Abnormal finding     STATED SHE \"WOKE\" UP DURING AN EGD, BREAST BIOPSY, BACK " "SURGERY   • Acute colitis    • Allergic    • Anxiety    • Arthritis    • Bladder leak 11/03/2021   • Body piercing     EARS   • Cataracts, bilateral    • Constipation    • COPD (chronic obstructive pulmonary disease)    • Depression    • Depression    • Diabetes mellitus     Patient reported \"borderline\" and that she has been on medication in the past   • Elevated cholesterol    • Full dentures     Advised no adhesives DOS   • GERD (gastroesophageal reflux disease)    • Glaucoma    • High cholesterol    • History of bronchitis    • History of nuclear stress test     Patient reported \"a long time ago - more than 5 years\" and reported wnl's at that time   • History of pneumonia    • Hx of colonic polyps    • Hypertension    • Injury of back    • Low back pain    • Obesity    • Osteoarthritis    • PONV (postoperative nausea and vomiting) 11/03/2021   • Poor historian    • Wears glasses    • Wears glasses      Past Surgical History:   Procedure Laterality Date   • BACK SURGERY      Lower back X2   • CARPAL TUNNEL RELEASE Right 11/4/2021    Procedure: CARPAL TUNNEL RELEASE ENDOSCOPIC RIGHT;  Surgeon: Jefry Ceron MD;  Location: [facility];  Service: Orthopedics;  Laterality: Right;   • CATARACT EXTRACTION W/ INTRAOCULAR LENS IMPLANT Left 3/2/2020    Procedure: CATARACT PHACO EXTRACTION WITH INTRAOCULAR LENS IMPLANT LEFT;  Surgeon: Alfredo Patiño MD;  Location: [facility];  Service: Ophthalmology;  Laterality: Left;   • CATARACT EXTRACTION W/ INTRAOCULAR LENS IMPLANT Right 3/16/2020    Procedure: CATARACT PHACO EXTRACTION WITH INTRAOCULAR LENS IMPLANT;  Surgeon: Alfredo Patiño MD;  Location: [facility] OR;  Service: Ophthalmology;  Laterality: Right;   • CHOLECYSTECTOMY     • COLONOSCOPY     • DILATION AND CURETTAGE, DIAGNOSTIC / THERAPEUTIC     • ENDOSCOPY      With dilation   • HAND SURGERY  11/04/2021   • HIATAL HERNIA REPAIR     • HYSTERECTOMY      Total Hysterectomy   • MOUTH SURGERY      Full mouth " extraction   • [place] ARTHRP KNE CONDYLE&PLATU MEDIAL&LAT COMPARTMENTS Right 10/18/2017    Procedure:  TOTAL KNEE ARTHROPLASTY RIGHT ELECTIVE ;  Surgeon: Doc Mixon MD;  Location: [facility];  Service: Orthopedics   • TUBAL ABDOMINAL LIGATION        General Information     Row Name 05/28/23 1241          OT Time and Intention    Document Type evaluation  -     Mode of Treatment occupational therapy  -     Row Name 05/28/23 1241          General Information    Patient Profile Reviewed yes  -JR     Prior Level of Function independent:;gait;transfer;bed mobility;ADL's  Pt's daughter reported pt was able to complete simple home management tasks, pt does not drive. Pt used rollator or sc for mobility  -JR     Existing Precautions/Restrictions fall;other (see comments)  BPPV  -JR     Barriers to Rehab medically complex;previous functional deficit;hearing deficit  -     Row Name 05/28/23 1241          Living Environment    People in Home alone  -     Row Name 05/28/23 1241          Home Main Entrance    Number of Stairs, Main Entrance --  has entry ramp. Plans to move to 1 level apt near son  -     Row Name 05/28/23 1241          Cognition    Orientation Status (Cognition) oriented x 4  -     Row Name 05/28/23 1241          Safety Issues, Functional Mobility    Safety Issues Affecting Function (Mobility) awareness of need for assistance;insight into deficits/self-awareness;problem-solving  -     Impairments Affecting Function (Mobility) balance;endurance/activity tolerance;grasp;strength;shortness of breath;postural/trunk control;pain;other (see comments)  BPPV  -JR           User Key  (r) = Recorded By, (t) = Taken By, (c) = Cosigned By    Initials Name Provider Type    JR Pat Presley OT Occupational Therapist                 Mobility/ADL's     Row Name 05/28/23 1244          Bed Mobility    Comment, (Bed Mobility) Up in chair  -     Row Name 05/28/23 1244          Transfers    Transfers sit-stand  transfer;toilet transfer  -     Row Name 05/28/23 1244          Sit-Stand Transfer    Sit-Stand [place] (Transfers) contact guard;verbal cues  -     Assistive Device (Sit-Stand Transfers) walker, front-wheeled  -     Comment, (Sit-Stand Transfer) Pt required verbal cues for hand placement for safety  -[facility] Name 05/28/23 1244          Toilet Transfer    Type (Toilet Transfer) sit-stand;stand-sit  -     [place] Level (Toilet Transfer) contact guard;verbal cues  -     Assistive Device (Toilet Transfer) commode, bedside without drop arms;walker, front-wheeled  -     Comment, (Toilet Transfer) Pt chose to use BSC as it was higher than commode. Verbal cues for hand placement and safety  -[facility] Name 05/28/23 1244          Functional Mobility    Functional Mobility- Ind. Level contact guard assist;verbal cues required  -     Functional Mobility- Device walker, front-wheeled  -[facility] Name 05/28/23 1244          Activities of Daily Living    BADL Assessment/Intervention toileting;lower body dressing;bathing  -[facility] Name 05/28/23 1244          Toileting Assessment/Training    [place] Level (Toileting) perform perineal hygiene;set up  -     Assistive Devices (Toileting) commode, bedside without drop arms  -     Position (Toileting) unsupported sitting  -[facility] Name 05/28/23 1244          Lower Body Dressing Assessment/Training    Assistive Devices (Lower Body Dressing) long-handled shoe horn;reacher  -     Comment, (Lower Body Dressing) Issued reacher and LH shoe horn. Educated pt and dtr in use, verbalized understanding. Dtr previously purchased sock aid, educated pt and daughter in use. They verbalized understanding.  -     Row Name 05/28/23 1244          Bathing Assessment/Intervention    Assistive Devices (Bathing) long-handled sponge  -     Comment, (Bathing) Issued long handled bath sponge and educated pt and daughter in use. they verbalized understanding.   -           User Key  (r) = Recorded By, (t) = Taken By, (c) = Cosigned By    Initials Name Provider Type    Pat Cross OT Occupational Therapist               Obj/Interventions     Row Name 05/28/23 1246          Sensory Assessment (Somatosensory)    Sensory Assessment (Somatosensory) sensation intact  -JR     Row Name 05/28/23 1246          Range of Motion Comprehensive    General Range of Motion no range of motion deficits identified  -JR     Row Name 05/28/23 1246          Strength Comprehensive (MMT)    General Manual Muscle Testing (MMT) Assessment no strength deficits identified  -JR     Row Name 05/28/23 1246          Balance    Balance Assessment sitting static balance;standing dynamic balance  -JR     Static Sitting Balance contact guard  -JR     Dynamic Standing Balance contact guard  -JR     Position/Device Used, Standing Balance supported  -           User Key  (r) = Recorded By, (t) = Taken By, (c) = Cosigned By    Initials Name Provider Type    Pat Cross OT Occupational Therapist               Goals/Plan     Row Name 05/28/23 1250          Bed Mobility Goal 1 (OT)    Activity/Assistive Device (Bed Mobility Goal 1, OT) bed mobility activities, all  -JR     [place] Level/Cues Needed (Bed Mobility Goal 1, OT) modified independence;verbal cues required  -JR     Time Frame (Bed Mobility Goal 1, OT) long term goal (LTG);by discharge  -JR     Progress/Outcomes (Bed Mobility Goal 1, OT) goal ongoing  -JR     Row Name 05/28/23 1250          Transfer Goal 1 (OT)    Activity/Assistive Device (Transfer Goal 1, OT) transfers, all;walker, rolling  -JR     [place] Level/Cues Needed (Transfer Goal 1, OT) modified independence;verbal cues required  -JR     Time Frame (Transfer Goal 1, OT) long term goal (LTG);by discharge  -JR     Progress/Outcome (Transfer Goal 1, OT) goal ongoing  -JR     Row Name 05/28/23 1250          Dressing Goal 1 (OT)    Activity/Device (Dressing Goal 1,  OT) lower body dressing;reacher  -JR     [place]/Cues Needed (Dressing Goal 1, OT) set-up required;verbal cues required  -JR     Time Frame (Dressing Goal 1, OT) long term goal (LTG);by discharge  -JR     Progress/Outcome (Dressing Goal 1, OT) goal ongoing  -     Row Name 05/28/23 1250          Problem Specific Goal 1 (OT)    Problem Specific Goal 1 (OT) Pt to stand sinkside for ADL task > 3 minutes to support ADL independence.  -JR     Time Frame (Problem Specific Goal 1, OT) long term goal (LTG);by discharge  -JR     Progress/Outcome (Problem Specific Goal 1, OT) goal ongoing  -     Row Name 05/28/23 1250          Therapy Assessment/Plan (OT)    Planned Therapy Interventions (OT) activity tolerance training;adaptive equipment training;BADL retraining;functional balance retraining;occupation/activity based interventions;ROM/therapeutic exercise;strengthening exercise;transfer/mobility retraining;patient/caregiver education/training  -           User Key  (r) = Recorded By, (t) = Taken By, (c) = Cosigned By    Initials Name Provider Type    JR Pat Presley, OT Occupational Therapist               Clinical Impression     Row Name 05/28/23 1247          Pain Assessment    Pretreatment Pain Rating 0/10 - no pain  -JR     Posttreatment Pain Rating 0/10 - no pain  -     Row Name 05/28/23 1246          Plan of Care Review    Plan of Care Reviewed With patient;daughter  -     Outcome Evaluation OT initial eval and expanded chart review completed. Pt presents with multiple comorbidities and decreased independence with ADL's and mobility from baseline. Recommend continued skilled OT services and transfer to IRF at d/c.  -     Row Name 05/28/23 2428          Therapy Assessment/Plan (OT)    Patient/Family Therapy Goal Statement (OT) get better  -JR     Rehab Potential (OT) good, to achieve stated therapy goals  -     Criteria for Skilled Therapeutic Interventions Met (OT) yes;meets criteria;skilled  treatment is necessary  -     Therapy Frequency (OT) daily  -     Row Name 05/28/23 1246          Therapy Plan Review/Discharge Plan (OT)    Anticipated Discharge Disposition (OT) inpatient rehabilitation facility  -     Row Name 05/28/23 1246          Vital Signs    Pre Systolic BP Rehab 141  -JR     Pre Treatment Diastolic BP 64  -JR     Post Systolic BP Rehab 149  -JR     Post Treatment Diastolic BP 67  -JR     Pretreatment Heart Rate (beats/min) 64  -JR     Posttreatment Heart Rate (beats/min) 65  -JR     Pre SpO2 (%) 95  -JR     O2 Delivery Pre Treatment room air  -JR     Post SpO2 (%) 93  -JR     O2 Delivery Post Treatment room air  -JR     Pre Patient Position Sitting  -JR     Intra Patient Position Standing  -JR     Post Patient Position Sitting  -JR     Row Name 05/28/23 1246          Positioning and Restraints    Pre-Treatment Position sitting in chair/recliner  -JR     Post Treatment Position chair  -JR     In Chair notified nsg;reclined;call light within reach;encouraged to call for assist;exit alarm on;on mechanical lift sling;with family/caregiver  -           User Key  (r) = Recorded By, (t) = Taken By, (c) = Cosigned By    Initials Name Provider Type    JR Pat Presley, OT Occupational Therapist               Outcome Measures     Row Name 05/28/23 1251          How much help from another is currently needed...    Putting on and taking off regular lower body clothing? 1  -JR     Bathing (including washing, rinsing, and drying) 2  -JR     Toileting (which includes using toilet bed pan or urinal) 2  -JR     Putting on and taking off regular upper body clothing 3  -JR     Taking care of personal grooming (such as brushing teeth) 3  -JR     Eating meals 4  -JR     AM-PAC 6 Clicks Score (OT) 15  -JR     Row Name 05/28/23 1251          Functional Assessment    Outcome Measure Options AM-PAC 6 Clicks Daily Activity (OT)  -           User Key  (r) = Recorded By, (t) = Taken By, (c) = Cosigned  By    Initials Name Provider Type     Pat Presley OT Occupational Therapist                Occupational Therapy Education     Title: PT OT SLP Therapies (In Progress)     Topic: Occupational Therapy (In Progress)     Point: ADL training (In Progress)     Description:   Instruct learner(s) on proper safety adaptation and remediation techniques during self care or transfers.   Instruct in proper use of assistive devices.              Learning Progress Summary           Patient Acceptance, E, NR by  at 5/28/2023 1049    Comment: Educated pt and daughter regarding role of therapy and AE for LBD/B                   Point: Home exercise program (Not Started)     Description:   Instruct learner(s) on appropriate technique for monitoring, assisting and/or progressing therapeutic exercises/activities.              Learner Progress:  Not documented in this visit.          Point: Precautions (Not Started)     Description:   Instruct learner(s) on prescribed precautions during self-care and functional transfers.              Learner Progress:  Not documented in this visit.          Point: Body mechanics (Not Started)     Description:   Instruct learner(s) on proper positioning and spine alignment during self-care, functional mobility activities and/or exercises.              Learner Progress:  Not documented in this visit.                      User Key     Initials Effective Dates Name Provider Type Discipline     02/03/23 -  Pat Presley OT Occupational Therapist OT              OT Recommendation and Plan  Planned Therapy Interventions (OT): activity tolerance training, adaptive equipment training, BADL retraining, functional balance retraining, occupation/activity based interventions, ROM/therapeutic exercise, strengthening exercise, transfer/mobility retraining, patient/caregiver education/training  Therapy Frequency (OT): daily  Plan of Care Review  Plan of Care Reviewed With: patient, daughter  Outcome  Evaluation: OT initial eval and expanded chart review completed. Pt presents with multiple comorbidities and decreased independence with ADL's and mobility from baseline. Recommend continued skilled OT services and transfer to IRF at d/c.     Time Calculation:    Time Calculation- OT     Row Name 233             Time Calculation- OT    OT Start Time 1045  -JR      OT Received On 23  -JR      OT Goal Re-Cert Due Date 23  -JR         Timed Charges    17012 - OT Self Care/Mgmt Minutes 15  -JR         Untimed Charges    OT Eval/Re-eval Minutes 50  -JR         Total Minutes    Timed Charges Total Minutes 15  -JR      Untimed Charges Total Minutes 50  -JR       Total Minutes 65  -JR            User Key  (r) = Recorded By, (t) = Taken By, (c) = Cosigned By    Initials Name Provider Type     Pat Presley OT Occupational Therapist              Therapy Charges for Today     Code Description Service Date Service Provider Modifiers Qty    99094102442 HC OT SELF CARE/MGMT/TRAIN EA 15 MIN 2023 Pat Presley OT GO 1    21171560501 HC OT EVAL MOD COMPLEXITY 4 2023 Pat Presley OT GO 1               Pat Presley OT  2023    Electronically signed by Pat Presley OT at 23 1255        Kayla Arias APRN   Nurse Practitioner  Medicine  Discharge Summary      Cosign Needed  Date of Service:  23  Creation Time:  23     Cosign Needed        Expand All Collapse All         [facility] Medicine Services  DISCHARGE SUMMARY     Patient Name: Lata Justice  : 1945  MRN: [id number]     Date of Admission: 2023  6:35 PM  Date of Discharge:  2023  Primary Care Physician: Annetta Mcclendon MD            Consults      Date and Time Order Name Status Description     2023  2:18 AM Inpatient Neurology Consult Other (see comments) Completed               Hospital Course      Presenting Problem:  Vertigo           Active Hospital Problems     Diagnosis   POA   • **BPV (benign positional vertigo) [H81.10]   Yes       Resolved Hospital Problems   No resolved problems to display.            Hospital Course:  Lata Justice is a 77 y.o. female admitted with benign positional vertigo.  Neurology is consulted. Imaging negative and no infectious etiology. PT has followed for Epley maneuver with improvement.      Benign positional vertigo  -Neurology consult  -Continue scopolamine patch as needed at dc  -CTA head and neck and CTP neg for intracranial abnormality or flow-limiting stenosis. MRI of head showed no ischemic or hemorrhagic events, MRI Internal Auditory Canal W/Wo contrast showed normal brain and internal auditory canals. No evidence of ischemia, hemorrhage or mass enhancement. Inner ear structures appear normal. No evidence of cerebellopontine angle mass.   -PT evaluation with Epley maneuver completed twice with improvement. Walking better and no further N/V     Hypertension/Hyperlipidemia  --pravastatin, metoprolol, losartan, HCTZ     COPD  --inhaler     Diabetes mellitus type 2  --A1C 7.4  --resume home meds     Chronic low back pain  General weakness  Functional decline  -PT recs inpatient rehab, but patient declining with improvement after Epley Maneuver and would like  PT.OT and go home today  -walker ordered at DC        Discharge Follow Up Recommendations for outpatient labs/diagnostics:   PCP follow up one week  ENT follow up 1-2 weeks/ next available  HH PT/OT ordered     Day of Discharge      HPI:   Patient feeling much better. Wants to go home. Not interested in rehab, but would like  services and a walker for safety. No dizziness currently     Review of Systems  Gen- No fevers, chills  CV- No chest pain, palpitations  Resp- No cough, dyspnea  GI- No N/V/D, abd pain           Vital Signs:   Temp:  [97.6 °F (36.4 °C)-98.9 °F (37.2 °C)] 97.6 °F (36.4 °C)  Heart Rate:  [62-81] 62  Resp:   [16-18] 16  BP: (122-141)/(54-84) 141/79  Flow (L/min):  [2] 2        Physical Exam:  Constitutional: No acute distress, awake, alert  HENT: NCAT, mucous membranes moist  Respiratory: Clear to auscultation bilaterally, respiratory effort normal   Cardiovascular: RRR, no murmurs, rubs, or gallops  Gastrointestinal: Positive bowel sounds, soft, nontender, nondistended  Musculoskeletal: No bilateral ankle edema  Psychiatric: Appropriate affect, cooperative  Neurologic: Oriented x 3, strength symmetric in all extremities, Cranial Nerves grossly intact to confrontation, speech clear  Skin: No rashes        Pertinent  and/or Most Recent Results      LAB RESULTS:           Lab 05/26/23 0437 05/25/23 1851   WBC 6.18 8.01   HEMOGLOBIN 12.0 12.6   HEMATOCRIT 36.6 37.6   PLATELETS 177 197   NEUTROS ABS 4.37 6.49   IMMATURE GRANS (ABS) 0.02 0.03   LYMPHS ABS 1.24 0.93   MONOS ABS 0.46 0.43   EOS ABS 0.06 0.10   MCV 89.3 87.6               Lab 05/26/23 0437 05/25/23  1851   SODIUM 141 143   POTASSIUM 3.7 3.9   CHLORIDE 104 108*   CO2 27.0 24.0   ANION GAP 10.0 11.0   BUN 16 22   CREATININE 0.67 0.86   EGFR 90.2 69.7   GLUCOSE 128* 133*   CALCIUM 8.6 9.0   MAGNESIUM 1.6 1.5*   HEMOGLOBIN A1C 7.40*  --    TSH  --  1.910          Lab 05/26/23 0437 05/25/23  1851   TOTAL PROTEIN 5.6* 6.4   ALBUMIN 3.7 3.8   GLOBULIN 1.9 2.6   ALT (SGPT) 10 12   AST (SGOT) 13 17   BILIRUBIN 0.4 0.4   ALK PHOS 55 63              Lab 05/25/23  1851   HSTROP T 24*              Lab 05/26/23 0437   CHOLESTEROL 142   LDL CHOL 87   HDL CHOL 34*   TRIGLYCERIDES 117                                   Brief Urine Lab Results  (Last result in the past 365 days)       Color   Clarity   Blood   Leuk Est   Nitrite   Protein   CREAT   Urine HCG         05/25/23 2112 Yellow    Clear    Negative    Negative    Negative    Trace                           Microbiology Results (last 10 days)      ** No results found for the last 240 hours. **               Radiology  results from the last 10 days:   CT Angiogram Neck     Result Date: 5/25/2023  CT ANGIOGRAM NECK, CT ANGIOGRAM HEAD W AI ANALYSIS OF LVO Date of Exam: 5/25/2023 8:34 PM EDT Indication: Stroke, follow up. Comparison: MRI brain from earlier today Technique: CTA of the head and neck was performed before and after the uneventful intravenous administration of 75 mL Isovue-370. Reconstructed coronal and sagittal images were also obtained. In addition, a 3-D volume rendered image was created for interpretation. Automated exposure control and iterative reconstruction methods were used. Findings: There is a three-vessel aortic arch. The right common carotid artery is widely patent. There is atherosclerotic plaque along the right carotid bifurcation that is not hemodynamically significant. The right internal carotid artery is widely patent. The left common carotid artery is widely patent. There is mild plaque at the left carotid bifurcation that is not hemodynamically significant. The left internal carotid artery is widely patent. Both vertebral arteries are widely patent. The right vertebral artery is dominant. The bilateral middle cerebral, bilateral anterior cerebral, and anterior communicating arteries are widely patent. The basilar and bilateral posterior cerebral arteries are widely patent. There are patent bilateral posterior communicating arteries. No intracranial aneurysm is identified. The visualized portions of the bilateral superior cerebellar, anteroinferior cerebellar, and posterior inferior cerebellar arteries are unremarkable.      Impression: Major arterial vasculature within head and neck appears widely patent, with no hemodynamically significant stenosis, dissection, thrombus, or aneurysm. Electronically Signed: Esequiel Miller  5/25/2023 9:02 PM EDT  Workstation ID: [id number]     XR Chest 1 View     Result Date: 5/25/2023  XR CHEST 1 VW Date of Exam: 5/25/2023 6:49 PM EDT Indication: Weak/Dizzy/AMS triage  protocol Comparison: October 9, 2017 FINDINGS: No definite focal or diffuse pulmonary infiltrate is identified.  No pneumothorax or significant pleural effusion. Mediastinal contour appears grossly normal and unchanged. Heart size appears at the upper limits of normal.      1.Heart size appears at the upper limits of normal. 2.No radiographic findings of acute pulmonary abnormality. Electronically Signed: Dale Gautam  5/25/2023 7:14 PM EDT  Workstation ID: [id number]     MRI Internal Auditory Canal With Wo     Result Date: 5/26/2023  EXAMINATION: MRI INTERNAL AUDITORY CANAL W WO  INDICATION: Vertigo TECHNIQUE: Multiplanar multisequence MRI of the internal auditory canals performed with and without IV contrast. 17 mL MultiHance administered IV. COMPARISON: 5/25/2023 FINDINGS: No acute infarct is present on diffusion weighted sequences. Midline structures are normal and the craniocervical junction is satisfactory in appearance. Age-related changes are present with mild volume loss and typical periventricular leukomalacia. There is otherwise no evidence of intracranial hemorrhage, mass or mass effect. There is no abnormal brain parenchymal enhancement. Dedicated evaluation of the internal auditory canals demonstrates normal appearance of the cisternal and canalicular segments 7th and 8th cranial nerves bilaterally. The cisternal and Meckel's cave segment trigeminal nerves also appear normal. There is no evidence of cerebellopontine angle mass or abnormal enhancement. The inner ear structures including bilateral cochlea and semicircular canals appear normal.      Essentially normal contrast-enhanced MRI of the brain and internal auditory canals as above. There is no evidence of acute ischemia, hemorrhage, mass or abnormal enhancement. There is no evidence of cerebellopontine angle mass. Inner ear structures appear normal bilaterally.  Electronically Signed: Nathaniel Hendricks  5/26/2023 9:14 PM EDT  Workstation ID:  [id number]     CT Angiogram Head w AI Analysis of LVO     Result Date: 5/25/2023  CT ANGIOGRAM NECK, CT ANGIOGRAM HEAD W AI ANALYSIS OF LVO Date of Exam: 5/25/2023 8:34 PM EDT Indication: Stroke, follow up. Comparison: MRI brain from earlier today Technique: CTA of the head and neck was performed before and after the uneventful intravenous administration of 75 mL Isovue-370. Reconstructed coronal and sagittal images were also obtained. In addition, a 3-D volume rendered image was created for interpretation. Automated exposure control and iterative reconstruction methods were used. Findings: There is a three-vessel aortic arch. The right common carotid artery is widely patent. There is atherosclerotic plaque along the right carotid bifurcation that is not hemodynamically significant. The right internal carotid artery is widely patent. The left common carotid artery is widely patent. There is mild plaque at the left carotid bifurcation that is not hemodynamically significant. The left internal carotid artery is widely patent. Both vertebral arteries are widely patent. The right vertebral artery is dominant. The bilateral middle cerebral, bilateral anterior cerebral, and anterior communicating arteries are widely patent. The basilar and bilateral posterior cerebral arteries are widely patent. There are patent bilateral posterior communicating arteries. No intracranial aneurysm is identified. The visualized portions of the bilateral superior cerebellar, anteroinferior cerebellar, and posterior inferior cerebellar arteries are unremarkable.      Impression: Major arterial vasculature within head and neck appears widely patent, with no hemodynamically significant stenosis, dissection, thrombus, or aneurysm. Electronically Signed: Esequiel Miller  5/25/2023 9:02 PM EDT  Workstation ID: [id number]     MRI Brain WO CON Hyper Acute Stroke Protocol     Result Date: 5/25/2023  MRI BRAIN WO CON HYPER ACUTE STROKE PROTOCOL Date of  Exam: 5/25/2023 7:59 PM EDT Indication: vertigo.  Comparison: None available. Technique: Limited multisequence MR images of the brain were obtained without contrast administration. Findings: No midline shift. No significant intracranial hemorrhage is identified. Basal cisterns appear to be patent. There is mild periventricular and scattered foci of hyperintense signal on FLAIR images in the cerebral white matter. No significant mass or focal  edema is identified. No definite focal restricted diffusion is identified at this time.      Impression: 1.No definite findings of acute ischemia or hemorrhage at this time. 2.Hyperintense signal foci in the cerebral white matter on FLAIR images, nonspecific, but most commonly seen with mild chronic small vessel ischemic changes. Electronically Signed: Dale Gautam  5/25/2023 8:13 PM EDT  Workstation ID: [id number]     CT CEREBRAL PERFUSION WITH & WITHOUT CONTRAST     Result Date: 5/25/2023  CT CEREBRAL PERFUSION W WO CONTRAST Date of Exam: 5/25/2023 8:34 PM EDT Indication: Neuro deficit, acute, stroke suspected.  Comparison: MRI brain from earlier today Technique: Axial CT images of the brain were obtained prior to and after the administration of 50 mL Isovue-370. Core blood volume, core blood flow, mean transit time, and Tmax images were obtained utilizing the Rapid software protocol. A limited CT angiogram of the head was also performed to measure the blood vessel density. The radiation dose reduction device was turned on for each scan per the ALARA (As Low as Reasonably Achievable) protocol. Findings: The intracranial cerebral perfusion appears normal.      Impression: Examination appears within normal limits. Electronically Signed: Esequiel Miller  5/25/2023 8:54 PM EDT  Workstation ID: [id number]                  Plan for Follow-up of Pending Labs/Results:   Discharge Details               Discharge Medications            New Medications      Instructions Start Date  "  Scopolamine 1 MG/3DAYS patch    1 patch, Transdermal, Every 72 Hours PRN                      Continue These Medications      Instructions Start Date   albuterol sulfate  (90 Base) MCG/ACT inhaler  Commonly known as: PROVENTIL HFA;VENTOLIN HFA;PROAIR HFA    2 puffs, Inhalation, Every 6 Hours PRN        allopurinol 100 MG tablet  Commonly known as: Zyloprim    100 mg, Oral, Daily, To lower uric acid, gout risk        aspirin 81 MG EC tablet  Commonly known as: Aspirin Low Dose    81 mg, Oral, Daily        cetirizine 10 MG tablet  Commonly known as: zyrTEC    10 mg, Oral, Daily PRN        cilostazol 100 MG tablet  Commonly known as: PLETAL    100 mg, Oral, 2 Times Daily, For poor circulation        esomeprazole 40 MG capsule  Commonly known as: nexIUM    40 mg, Oral, Daily, For Adkins's esophagus        FLUoxetine 20 MG capsule  Commonly known as: PROzac    60 mg, Oral, Daily        hydroCHLOROthiazide 25 MG tablet  Commonly known as: HYDRODIURIL    25 mg, Oral, Daily        losartan 100 MG tablet  Commonly known as: COZAAR    100 mg, Oral, Nightly        meloxicam 7.5 MG tablet  Commonly known as: MOBIC    7.5 mg, Oral, Daily, Take with food. For osteoarthritis pain.        metoprolol tartrate 25 MG tablet  Commonly known as: LOPRESSOR    25 mg, Oral, 2 Times Daily        pravastatin 40 MG tablet  Commonly known as: PRAVACHOL    40 mg, Oral, Nightly, To lower cholesterol and stroke risk        Vitamin D3 50 MCG (2000 UT) capsule    2,000 Units, Oral, Daily, For low vitamin D.            Stop These Medications    l-methylfolate 15 MG tablet tablet                      Allergies   Allergen Reactions   • Fluticasone Propionate Other (See Comments)       UNKNOWN - Patient reported \"I don't remember\"   • Furosemide Nausea And Vomiting   • Sertraline Hcl Nausea Only   • Acetaminophen Nausea Only       Patient reported she thinks Tylenol caused nausea       • Ezetimibe Other (See Comments)       UNKNOWN      • " "Loratadine Other (See Comments)       UNKNOWN - Patient reported unsure reaction type but that she was unable to take this      • Mometasone Furoate Other (See Comments)       UNKNOWN - Patient reported \"I don't remember\"      • Propoxyphene Other (See Comments)       UNKNOWN - Patient reported \"I don't remember\"       • Triamcinolone Acetonide Other (See Comments)       UNKNOWN - Patient reported \"I don't remember\"\"               Discharge Disposition:  Home or Self Care     Diet:  Hospital:      Diet Order   Procedures   • Diet: Cardiac Diets; Healthy Heart (2-3 Na+); Texture: Regular Texture (IDDSI 7); Fluid Consistency: Thin (IDDSI 0)         Activity:      Activity Instructions      Activity as Tolerated               Restrictions or Other Recommendations:          CODE STATUS:        Code Status and Medical Interventions:   Ordered at: 05/25/23 5205     Level Of Support Discussed With:     Patient     Code Status (Patient has no pulse and is not breathing):     CPR (Attempt to Resuscitate)     Medical Interventions (Patient has pulse or is breathing):     Full Support                Future Appointments   Date Time Provider Department Center   7/17/2023  3:00 PM [facility]   11/16/2023  8:30 AM Annetta Mcclendon MD [facility]              Additional Instructions for the Follow-ups that You Need to Schedule      Ambulatory Referral to Home Health   As directed        Face to Face Visit Date: 5/26/2023    Follow-up provider for Plan of Care?: I treated the patient in an acute care facility and will not continue treatment after discharge.    Follow-up provider: ANNETTA MCCLENDON [[id number]]    Reason/Clinical Findings: S/P hospital Stay    Describe mobility limitations that make leaving home difficult: Impaired gait, balance, endurance, and mobility    Nursing/Therapeutic Services Requested: Skilled Nursing Physical Therapy Occupational Therapy    Skilled nursing orders: COPD management    PT " orders: Gait Training Transfer training Therapeutic exercise Strengthening    Weight Bearing Status: As Tolerated    Occupational orders: Strengthening Energy conservation Activities of daily living Home safety assessment    Frequency: 1 Week 1           Discharge Follow-up with PCP   As directed         Currently Documented PCP:    Annetta Mcclendon MD    PCP Phone Number:    [phone]      Follow Up Details: 1 week           Discharge Follow-up with Specified Provider: ent- next available; 2 Weeks   As directed        To: ent- next available    Follow Up: 2 Weeks                            FELIX Roach  05/29/23        Time Spent on Discharge:  I spent 36 minutes on this discharge activity which included: face-to-face encounter with the patient, reviewing the data in the system, coordination of the care with the nursing staff as well as consultants, documentation, and entering orders.          Electronically signed by FELIX Roach, 05/29/23, 9:31 AM EDT.

## 2023-05-29 NOTE — CASE MANAGEMENT/SOCIAL WORK
Continued Stay Note  [facility]     Patient Name: Lata Justice  MRN: [id number]  Today's Date: 5/29/2023    Admit Date: 5/25/2023    Plan: Home with    Discharge Plan     Row Name 05/29/23 1515       Plan    Plan Home with     Patient/Family in Agreement with Plan yes    Plan Comments 05/30/23  16:25 EDT   I have left a message with Tatiana at [facility] regarding this referral.  CM will f/u with her in the am.  05/31/23  10:25 EDT  I have confirmed with Dilcia at [facility] that they will provide Home Health services for Ms. Justice.    I have spoken today to Ms. Justice's daughter, Makenzie by phone regarding the discharge plan.  She states that her mother when discharged will be residing with her brother, Ubaldo in [place], [place].  Ms. Justice has declined recommended inpatient rehab at this time.  She does request home health services.  Her address at discharge will be [address] [place] [address]. [address] [place] [address].  I have faxed referrals to [facility] and [facility] and will f/u with these referrals in the morning.  I have confirmed with Isreal at [facility] that he will deliver a rolling walker to her bedside today.  Makenzie denies having any other discharge needs at this time.    Final Discharge Disposition Code 06 - home with home health care               Discharge Codes    No documentation.               Expected Discharge Date and Time     Expected Discharge Date Expected Discharge Time    May 29, 2023             Anjelica Arevalo RN

## 2023-05-30 ENCOUNTER — TRANSITIONAL CARE MANAGEMENT TELEPHONE ENCOUNTER (OUTPATIENT)
Dept: CALL CENTER | Facility: HOSPITAL | Age: 78
End: 2023-05-30

## 2023-05-30 NOTE — OUTREACH NOTE
Call Center TCM Note    Flowsheet Row Responses   [facility] patient discharged from? [place]   Does the patient have one of the following disease processes/diagnoses(primary or secondary)? Other   TCM attempt successful? No   Unsuccessful attempts Attempt 1   Call Status Left message          Rojelio Reynolds RN    5/30/2023, 09:21 EDT

## 2023-05-30 NOTE — OUTREACH NOTE
Call Center TCM Note    Flowsheet Row Responses   [facility] patient discharged from? [place]   Does the patient have one of the following disease processes/diagnoses(primary or secondary)? Other   TCM attempt successful? No   Unsuccessful attempts Attempt 2   Call Status Left message          Rojelio Reynolds RN    5/30/2023, 09:52 EDT

## 2023-05-31 ENCOUNTER — TRANSITIONAL CARE MANAGEMENT TELEPHONE ENCOUNTER (OUTPATIENT)
Dept: CALL CENTER | Facility: HOSPITAL | Age: 78
End: 2023-05-31

## 2023-05-31 LAB
QT INTERVAL: 378 MS
QTC INTERVAL: 449 MS

## 2023-05-31 NOTE — OUTREACH NOTE
Call Center TCM Note    Flowsheet Row Responses   [facility] patient discharged from? [place]   Does the patient have one of the following disease processes/diagnoses(primary or secondary)? Other   TCM attempt successful? No   Unsuccessful attempts Attempt 3          Jacquie Geronimo RN    5/31/2023, 11:15 EDT

## 2023-09-16 DIAGNOSIS — I73.9 INTERMITTENT CLAUDICATION: ICD-10-CM

## 2023-09-16 DIAGNOSIS — I73.9 PVD (PERIPHERAL VASCULAR DISEASE): ICD-10-CM

## 2023-09-16 DIAGNOSIS — I10 ESSENTIAL HYPERTENSION: ICD-10-CM

## 2023-09-16 DIAGNOSIS — F33.41 RECURRENT MAJOR DEPRESSIVE DISORDER, IN PARTIAL REMISSION: ICD-10-CM

## 2023-09-18 RX ORDER — PRAVASTATIN SODIUM 40 MG
TABLET ORAL
Qty: 90 TABLET | Refills: 3 | Status: SHIPPED | OUTPATIENT
Start: 2023-09-18

## 2023-09-18 RX ORDER — CILOSTAZOL 100 MG/1
TABLET ORAL
Qty: 180 TABLET | Refills: 3 | Status: SHIPPED | OUTPATIENT
Start: 2023-09-18

## 2023-09-18 RX ORDER — FLUOXETINE HYDROCHLORIDE 20 MG/1
CAPSULE ORAL
Qty: 270 CAPSULE | Refills: 3 | Status: SHIPPED | OUTPATIENT
Start: 2023-09-18

## 2023-09-18 RX ORDER — HYDROCHLOROTHIAZIDE 25 MG/1
TABLET ORAL
Qty: 90 TABLET | Refills: 3 | Status: SHIPPED | OUTPATIENT
Start: 2023-09-18

## 2024-04-28 DIAGNOSIS — J44.9 CHRONIC OBSTRUCTIVE PULMONARY DISEASE, UNSPECIFIED COPD TYPE: Chronic | ICD-10-CM

## 2024-04-29 RX ORDER — ALBUTEROL SULFATE 90 UG/1
AEROSOL, METERED RESPIRATORY (INHALATION)
Qty: 54 G | Refills: 0 | Status: SHIPPED | OUTPATIENT
Start: 2024-04-29

## 2024-05-29 DIAGNOSIS — K22.719 BARRETT'S ESOPHAGUS WITH DYSPLASIA: ICD-10-CM

## 2024-05-29 RX ORDER — ESOMEPRAZOLE MAGNESIUM 40 MG/1
CAPSULE, DELAYED RELEASE ORAL
Qty: 90 CAPSULE | Refills: 0 | OUTPATIENT
Start: 2024-05-29

## 2024-05-29 NOTE — TELEPHONE ENCOUNTER
Rx Refill Note  Requested Prescriptions     Pending Prescriptions Disp Refills    esomeprazole (nexIUM) 40 MG capsule [Pharmacy Med Name: Esomeprazole 40 Mg Capsule] 90 capsule 2     Sig: TAKE 1 CAPSULE DAILY FOR   STRINGER'S ESOPHAGUS      Last office visit with prescribing clinician: 5/15/2023   Next office visit with prescribing clinician: Visit date not found       Toshia Waller MA  05/29/24, 14:19 EDT

## 2024-07-03 DIAGNOSIS — J44.9 CHRONIC OBSTRUCTIVE PULMONARY DISEASE, UNSPECIFIED COPD TYPE: Chronic | ICD-10-CM

## 2024-07-03 RX ORDER — ALBUTEROL SULFATE 90 UG/1
AEROSOL, METERED RESPIRATORY (INHALATION)
Qty: 54 G | Refills: 0 | Status: SHIPPED | OUTPATIENT
Start: 2024-07-03

## 2024-09-17 DIAGNOSIS — J44.9 CHRONIC OBSTRUCTIVE PULMONARY DISEASE, UNSPECIFIED COPD TYPE: Chronic | ICD-10-CM

## 2024-09-17 RX ORDER — ALBUTEROL SULFATE 90 UG/1
AEROSOL, METERED RESPIRATORY (INHALATION)
Qty: 54 G | Refills: 0 | Status: SHIPPED | OUTPATIENT
Start: 2024-09-17

## 2024-11-01 DIAGNOSIS — J44.9 CHRONIC OBSTRUCTIVE PULMONARY DISEASE, UNSPECIFIED COPD TYPE: Chronic | ICD-10-CM

## 2024-11-01 RX ORDER — ALBUTEROL SULFATE 90 UG/1
2 INHALANT RESPIRATORY (INHALATION) EVERY 6 HOURS PRN
Qty: 54 G | Refills: 0 | Status: SHIPPED | OUTPATIENT
Start: 2024-11-01

## 2025-01-15 DIAGNOSIS — J44.9 CHRONIC OBSTRUCTIVE PULMONARY DISEASE, UNSPECIFIED COPD TYPE: Chronic | ICD-10-CM

## 2025-01-15 RX ORDER — ALBUTEROL SULFATE 90 UG/1
INHALANT RESPIRATORY (INHALATION)
Qty: 54 G | Refills: 0 | OUTPATIENT
Start: 2025-01-15

## (undated) DEVICE — SOL IRRIG H2O 1000ML STRL

## (undated) DEVICE — FLEXIBLE YANKAUER,MEDIUM TIP, NO VACUUM CONTROL: Brand: ARGYLE

## (undated) DEVICE — DISPOSABLE TOURNIQUET CUFF SINGLE BLADDER, SINGLE PORT AND QUICK CONNECT CONNECTOR: Brand: COLOR CUFF

## (undated) DEVICE — CLEARCUT® HP2 SLIT KNIFE INTREPID MICRO-COAXIAL SYSTEM 2.4 DB: Brand: CLEARCUT®; INTREPID

## (undated) DEVICE — SYR LUERLOK 50ML

## (undated) DEVICE — SYR LUERLOK 5CC

## (undated) DEVICE — PLUS HANDPIECE WITH MULTIPLE-ORIFICE TIP WITH SOFT CONE SPLASH SHIELD: Brand: SURGILAV

## (undated) DEVICE — 15 DEG. MICROKNIFE - 3MM: Brand: SHARPOINT

## (undated) DEVICE — GLV SURG SENSICARE W/ALOE PF LF 8 STRL

## (undated) DEVICE — GOWN,SIRUS,NON REINFRCD,LARGE,SET IN SL: Brand: MEDLINE

## (undated) DEVICE — NDL SPINE 20G 3 1/2 YEL STRL 1P/U

## (undated) DEVICE — BLD SCLPL SURG PREM SS SZ15C

## (undated) DEVICE — SPNG GZ WOVN 4X4IN 12PLY 10/BX STRL

## (undated) DEVICE — HP CONCL INTREPID COAX I/A CRV .3MM

## (undated) DEVICE — SUT VIC 2/0 CT1 27IN J259H

## (undated) DEVICE — SPNG LAP 18X18IN LF STRL PK/5

## (undated) DEVICE — BOWL AND CEMENT CARTRIDGE WITH BREAKAWAY FEMORAL NOZZLE AND MEDIUM PRESSURIZER: Brand: ACM

## (undated) DEVICE — POST OP EYE CARE KIT: Brand: MEDLINE

## (undated) DEVICE — KT DRN EVAC WND PVC PCH WTROC RND 10F400

## (undated) DEVICE — DRSNG SURESITE123 2.4X2.8IN

## (undated) DEVICE — BNDG ADHS PLSTC 1X3IN LF

## (undated) DEVICE — TUBING, SUCTION, 1/4" X 12', STRAIGHT: Brand: MEDLINE

## (undated) DEVICE — CANN IRR/INJ AIR ANT CHAMBER 6MM BEND 27G

## (undated) DEVICE — STRYKER PERFORMANCE SERIES SAGITTAL BLADE: Brand: STRYKER PERFORMANCE SERIES

## (undated) DEVICE — 3M™ STERI-DRAPE™ U-DRAPE 1015: Brand: STERI-DRAPE™

## (undated) DEVICE — 4-PORT MANIFOLD: Brand: NEPTUNE 2

## (undated) DEVICE — GLV SURG SENSICARE W/ALOE PF LF 7.5 STRL

## (undated) DEVICE — BLD CLIP UNIV SURG GRY

## (undated) DEVICE — PK EXTRM UPPR 20

## (undated) DEVICE — SYS SKIN CLS DERMABOND PRINEO W/22CM MESH TP

## (undated) DEVICE — 2108 SERIES SAGITTAL BLADE, NO OFFSET  (24.8 X 1.24 X 80.1MM)

## (undated) DEVICE — PAD,ABDOMINAL,8"X10",ST,LF: Brand: MEDLINE

## (undated) DEVICE — BNDG ELAS ELITE V/CLOSE 6IN 5YD LF STRL

## (undated) DEVICE — SLV SCD CALF HEMOFORCE DVT THERP REPROC MD

## (undated) DEVICE — WRAP KNEE COLD THERAPY

## (undated) DEVICE — SHEET,DRAPE,70X100,STERILE: Brand: MEDLINE

## (undated) DEVICE — CUFF SCD HEMOFORCE SEQ CALF STD MD

## (undated) DEVICE — Device

## (undated) DEVICE — EYE PAK* 1033 NON-WOVEN OPHTHALMIC DRAPE APERTURE POUCHES: Brand: ALCON EYE-PAK

## (undated) DEVICE — PK KN TOTL 20

## (undated) DEVICE — UNDERCAST PADDING: Brand: DEROYAL

## (undated) DEVICE — SYS SKIN CLS DERMABOND PRINEO W/60CM MESH TP

## (undated) DEVICE — LENS ACRYSOF IQ SA60WF W/ULTRASERT 6X13MM ACU0T0 20
Type: IMPLANTABLE DEVICE | Site: POSTERIOR CHAMBER | Status: NON-FUNCTIONAL
Removed: 2020-03-16

## (undated) DEVICE — SUT 5/0 18 CTD VIC UND BR

## (undated) DEVICE — ADHS SKIN PREMIERPRO EXOFIN TOPICAL HI/VISC .5ML

## (undated) DEVICE — DEV TISS CONTRL SUT STRATAFIX SPIRAL PGAPCL 3/0 PSL 60CM

## (undated) DEVICE — ENDOSCOPIC CARPAL TUNNEL RELEASE, 4.0MM BASIC SET: Brand: SEGWAY ECTR-D

## (undated) DEVICE — SUT VIC 0 CT 36IN J958H